# Patient Record
Sex: FEMALE | Race: WHITE | Employment: FULL TIME | URBAN - METROPOLITAN AREA
[De-identification: names, ages, dates, MRNs, and addresses within clinical notes are randomized per-mention and may not be internally consistent; named-entity substitution may affect disease eponyms.]

---

## 2017-06-06 ENCOUNTER — OFFICE VISIT (OUTPATIENT)
Dept: ORTHOPEDIC SURGERY | Age: 57
End: 2017-06-06

## 2017-06-06 ENCOUNTER — TELEPHONE (OUTPATIENT)
Dept: ORTHOPEDIC SURGERY | Age: 57
End: 2017-06-06

## 2017-06-06 VITALS
DIASTOLIC BLOOD PRESSURE: 86 MMHG | SYSTOLIC BLOOD PRESSURE: 126 MMHG | BODY MASS INDEX: 19.99 KG/M2 | HEIGHT: 64 IN | HEART RATE: 98 BPM | WEIGHT: 117.06 LBS

## 2017-06-06 DIAGNOSIS — M51.36 DDD (DEGENERATIVE DISC DISEASE), LUMBAR: Primary | ICD-10-CM

## 2017-06-06 PROCEDURE — L0642 LO SAG RI AN/POS PNL PRE OTS: HCPCS | Performed by: PHYSICIAN ASSISTANT

## 2017-06-06 PROCEDURE — 99214 OFFICE O/P EST MOD 30 MIN: CPT | Performed by: PHYSICIAN ASSISTANT

## 2017-06-06 PROCEDURE — 72114 X-RAY EXAM L-S SPINE BENDING: CPT | Performed by: PHYSICIAN ASSISTANT

## 2017-06-06 RX ORDER — METHYLPREDNISOLONE 4 MG/1
TABLET ORAL
Qty: 1 KIT | Refills: 0 | Status: SHIPPED | OUTPATIENT
Start: 2017-06-06 | End: 2017-06-12

## 2017-06-12 ENCOUNTER — HOSPITAL ENCOUNTER (OUTPATIENT)
Dept: PHYSICAL THERAPY | Age: 57
Discharge: HOME | End: 2017-06-30
Admitting: PHYSICAL MEDICINE & REHABILITATION

## 2017-08-01 ENCOUNTER — HOSPITAL ENCOUNTER (OUTPATIENT)
Dept: PHYSICAL THERAPY | Age: 57
Discharge: HOME | End: 2017-08-31
Attending: PHYSICAL MEDICINE & REHABILITATION | Admitting: PHYSICAL MEDICINE & REHABILITATION

## 2017-08-03 ENCOUNTER — HOSPITAL ENCOUNTER (OUTPATIENT)
Dept: PHYSICAL THERAPY | Age: 57
Discharge: HOME | End: 2017-08-03
Admitting: PHYSICAL MEDICINE & REHABILITATION

## 2017-08-16 ENCOUNTER — OFFICE VISIT (OUTPATIENT)
Dept: ORTHOPEDIC SURGERY | Age: 57
End: 2017-08-16

## 2017-08-16 VITALS
WEIGHT: 117.06 LBS | BODY MASS INDEX: 19.99 KG/M2 | HEIGHT: 64 IN | HEART RATE: 97 BPM | DIASTOLIC BLOOD PRESSURE: 93 MMHG | SYSTOLIC BLOOD PRESSURE: 150 MMHG

## 2017-08-16 DIAGNOSIS — M51.36 DDD (DEGENERATIVE DISC DISEASE), LUMBAR: Primary | ICD-10-CM

## 2017-08-16 DIAGNOSIS — M43.16 SPONDYLOLISTHESIS OF LUMBAR REGION: ICD-10-CM

## 2017-08-16 DIAGNOSIS — M43.16 LUMBAR ADJACENT SEGMENT DISEASE WITH SPONDYLOLISTHESIS: ICD-10-CM

## 2017-08-16 DIAGNOSIS — M51.36 LUMBAR ADJACENT SEGMENT DISEASE WITH SPONDYLOLISTHESIS: ICD-10-CM

## 2017-08-16 PROCEDURE — 99213 OFFICE O/P EST LOW 20 MIN: CPT | Performed by: PHYSICIAN ASSISTANT

## 2017-08-25 ENCOUNTER — OFFICE VISIT (OUTPATIENT)
Dept: ORTHOPEDIC SURGERY | Age: 57
End: 2017-08-25

## 2017-08-25 ENCOUNTER — TELEPHONE (OUTPATIENT)
Dept: ORTHOPEDIC SURGERY | Age: 57
End: 2017-08-25

## 2017-08-25 VITALS
BODY MASS INDEX: 19.99 KG/M2 | SYSTOLIC BLOOD PRESSURE: 143 MMHG | HEIGHT: 64 IN | HEART RATE: 89 BPM | WEIGHT: 117.06 LBS | DIASTOLIC BLOOD PRESSURE: 93 MMHG

## 2017-08-25 DIAGNOSIS — M51.36 DDD (DEGENERATIVE DISC DISEASE), LUMBAR: Primary | ICD-10-CM

## 2017-08-25 DIAGNOSIS — M51.36 LUMBAR ADJACENT SEGMENT DISEASE WITH SPONDYLOLISTHESIS: ICD-10-CM

## 2017-08-25 DIAGNOSIS — M43.16 LUMBAR ADJACENT SEGMENT DISEASE WITH SPONDYLOLISTHESIS: ICD-10-CM

## 2017-08-25 DIAGNOSIS — M48.061 LUMBAR STENOSIS: ICD-10-CM

## 2017-08-25 PROCEDURE — 99214 OFFICE O/P EST MOD 30 MIN: CPT | Performed by: PHYSICIAN ASSISTANT

## 2017-08-25 RX ORDER — DOXYCYCLINE HYCLATE 50 MG/1
CAPSULE ORAL
COMMUNITY
Start: 2017-06-01

## 2017-08-25 RX ORDER — MEDROXYPROGESTERONE ACETATE 2.5 MG/1
TABLET ORAL
COMMUNITY
Start: 2017-07-24 | End: 2018-12-08

## 2017-08-29 ENCOUNTER — HOSPITAL ENCOUNTER (OUTPATIENT)
Dept: PAIN MANAGEMENT | Age: 57
Discharge: HOME | End: 2017-08-29
Attending: PHYSICAL MEDICINE & REHABILITATION | Admitting: PHYSICAL MEDICINE & REHABILITATION

## 2017-08-29 VITALS
OXYGEN SATURATION: 100 % | DIASTOLIC BLOOD PRESSURE: 100 MMHG | BODY MASS INDEX: 19.97 KG/M2 | TEMPERATURE: 98.3 F | HEIGHT: 64 IN | SYSTOLIC BLOOD PRESSURE: 165 MMHG | HEART RATE: 82 BPM | RESPIRATION RATE: 18 BRPM | WEIGHT: 117 LBS

## 2017-08-29 ASSESSMENT — PAIN DESCRIPTION - DESCRIPTORS: DESCRIPTORS: CONSTANT;NAGGING

## 2017-08-29 ASSESSMENT — PAIN - FUNCTIONAL ASSESSMENT
PAIN_FUNCTIONAL_ASSESSMENT: 0-10
PAIN_FUNCTIONAL_ASSESSMENT: 0-10

## 2017-09-14 ENCOUNTER — HOSPITAL ENCOUNTER (OUTPATIENT)
Dept: OTHER | Age: 57
Discharge: HOME | End: 2017-09-30
Attending: PHYSICIAN ASSISTANT | Admitting: PHYSICIAN ASSISTANT

## 2017-09-14 LAB
BASOPHILS ABSOLUTE: 0.1 K/UL (ref 0–0.2)
BASOPHILS RELATIVE PERCENT: 1 %
EOSINOPHILS ABSOLUTE: 0.1 K/UL (ref 0–0.6)
EOSINOPHILS RELATIVE PERCENT: 1.6 %
HCT VFR BLD CALC: 36.5 % (ref 36–48)
HEMOGLOBIN: 12.7 G/DL (ref 12–16)
LYMPHOCYTES ABSOLUTE: 1.9 K/UL (ref 1–5.1)
LYMPHOCYTES RELATIVE PERCENT: 33.5 %
MCH RBC QN AUTO: 33.5 PG (ref 26–34)
MCHC RBC AUTO-ENTMCNC: 34.9 G/DL (ref 31–36)
MCV RBC AUTO: 95.9 FL (ref 80–100)
MONOCYTES ABSOLUTE: 0.6 K/UL (ref 0–1.3)
MONOCYTES RELATIVE PERCENT: 11.6 %
NEUTROPHILS ABSOLUTE: 2.9 K/UL (ref 1.7–7.7)
NEUTROPHILS RELATIVE PERCENT: 52.3 %
PDW BLD-RTO: 13.1 % (ref 12.4–15.4)
PLATELET # BLD: 258 K/UL (ref 135–450)
PMV BLD AUTO: 10.1 FL (ref 5–10.5)
RBC # BLD: 3.8 M/UL (ref 4–5.2)
SEDIMENTATION RATE, ERYTHROCYTE: 25 MM/HR (ref 0–30)
WBC # BLD: 5.6 K/UL (ref 4–11)

## 2017-09-15 LAB
ALBUMIN SERPL-MCNC: 3.6 G/DL (ref 3.1–4.9)
ALPHA-1-GLOBULIN: 0.3 G/DL (ref 0.2–0.4)
ALPHA-2-GLOBULIN: 0.9 G/DL (ref 0.4–1.1)
BETA GLOBULIN: 1 G/DL (ref 0.9–1.6)
GAMMA GLOBULIN: 1 G/DL (ref 0.6–1.8)
SPE/IFE INTERPRETATION: NORMAL
TOTAL PROTEIN: 6.8 G/DL (ref 6.4–8.2)

## 2017-09-19 ENCOUNTER — OFFICE VISIT (OUTPATIENT)
Dept: ORTHOPEDIC SURGERY | Age: 57
End: 2017-09-19

## 2017-09-19 VITALS
WEIGHT: 117.06 LBS | HEIGHT: 64 IN | SYSTOLIC BLOOD PRESSURE: 128 MMHG | DIASTOLIC BLOOD PRESSURE: 75 MMHG | BODY MASS INDEX: 19.99 KG/M2 | HEART RATE: 88 BPM

## 2017-09-19 DIAGNOSIS — M51.36 LUMBAR ADJACENT SEGMENT DISEASE WITH SPONDYLOLISTHESIS: ICD-10-CM

## 2017-09-19 DIAGNOSIS — M51.36 DDD (DEGENERATIVE DISC DISEASE), LUMBAR: Primary | ICD-10-CM

## 2017-09-19 DIAGNOSIS — M43.16 LUMBAR ADJACENT SEGMENT DISEASE WITH SPONDYLOLISTHESIS: ICD-10-CM

## 2017-09-19 DIAGNOSIS — M48.061 LUMBAR STENOSIS: ICD-10-CM

## 2017-09-19 LAB
24HR URINE VOLUME (ML): 4200 ML
PROTEIN 24 HOUR URINE: 0.17 G/24HR

## 2017-09-19 PROCEDURE — 99213 OFFICE O/P EST LOW 20 MIN: CPT | Performed by: PHYSICIAN ASSISTANT

## 2017-09-20 LAB — URINE ELECTROPHORESIS INTERP: NORMAL

## 2017-09-21 ENCOUNTER — TELEPHONE (OUTPATIENT)
Dept: ORTHOPEDIC SURGERY | Age: 57
End: 2017-09-21

## 2017-11-07 NOTE — LETTER
Your outpatient injection is scheduled for 11/21/17 with Dr. Emilie Jordan. **PLEASE ARRIVE AT: [facility]102[address] **    1. Please do not have anything to eat or drink for 2 hours prior to your injection time. 2.  If you are receiving planned sedation, please do not eat or drink for 3 hours prior to your injection time. 3. **Please continue taking any daily routine prescribed medications as directed by your physician. **  4. PLEASE HAVE A  AVAILABLE TO TAKE YOU HOME. Please have an adult stay with you for 4-6 hours following your procedure. 5. If you develop a fever or any type of infections prior to your scheduled injection, please contact our office. 6. If you are on antibiotics (i.e. For urinary tract infection, bronchitis, etc.) the antibiotic must be completed and must be symptom free prior to your scheduled procedure. 7.  If you are taking Aspirin, please stop for __3___ days, anti-inflammatory medication, i.e. Advil, Aleve, Ibuprofen, Celebrex or Naprosyn, please stop for 3 days prior to your injection. 8. If you are taking any blood thinners, you must obtain clearance for your prescribing physician prior to stopping and have a note axed to our office to fax# [phone]: Coumadin: 6 days, Plavix: 7 days, Xarelto: 4 days, Pradaxa: 72 hours, Trental: 4 days, Eliquis: 3 days, Brillinta 5 days, Pletal 2 days, & Effient 7 days. 9. Please advise our office if you have Glaucoma, you will need to obtain clearance from your eye doctor prior to having an Epidural Steroid Injection. 10. **PLEASE BRING A LIST OF YOUR CURRENT MEDICATIONS TO YOUR PROCEDURE**        Insurance Information:    Our office will contact your insurance company to complete any prior authorization notification that    needs to be completed prior to your procedure. Please make sure we are notified of any insurance changes prior to your procedure. If you have any questions, please feel free to contact me at [phone] ext. [phone]      Thank you,       Judi Cormier LPN   to Dr. Dyana Shahid      Directions to the facility are attached. **YOUR FOLLOW UP APPOINTMENT FOR AFTER YOU PROCEDURE WITH DR. Catha Babinski / Yeyo Smith AT [facility] ON 12/5/17 AT 140PM .**                      [facility] [address]  [facility]  [phone]) [address] [facility]                     ________________________________________________________________________________________      [address] [place]      1. Admit to preop. 2. Start IV 1000 ml LR at [facility] or _____ml/hr for planned conscious sedation     3. May inject 1 % Lidocaine 0.1 ml Intradermal to numb IV site     4. Protime/INR if patient is on Coumadin     5. Urine Pregnancy Test (females only) - 12 -50 years     6. Accu Check Glucose if diabetic. Notify physician if <80 or >250.      7. Sedate all neurotomies        ________________________________________________________________________________________      POST-OPERATIVE ORDERS - DR. LOPEZ      1. Admit to Post Op Phase 2     2. Implement Standards of Care for Phase 2 Post Op     3. Check Site - May discharge when site is free of bleeding     4. Discharge to home after meets Phase 2 criteria     5. Discharge cervical patients after 30 minutes and when meets Phase 2 criteria. 6. Give discharge instruction sheet     7. For Diabetic patient, if blood sugar less than 80 in preop,          Recheck blood sugar in Post Op. 8. Discontinue IV     9.  For Nausea may give Zofran 4 MG IV/IM/ODT                 ________________________________________________________________________________________        11/7/17 9:56 AM

## 2017-11-13 NOTE — ADDENDUM NOTE
Encounter addended by: Donovan Carr MA on: 11/13/2017  8:46 AM<BR>    Actions taken: Letter status changed

## 2017-11-14 ENCOUNTER — TELEPHONE (OUTPATIENT)
Dept: ORTHOPEDIC SURGERY | Age: 57
End: 2017-11-14

## 2017-11-14 NOTE — TELEPHONE ENCOUNTER
CPT  70617   DX   M54.5  M43.16  M48.061  OP SX AUTH NPR FOR THIS PLAN  MIDLINE   LEVELS   L5 - S1   PROCEDURE   EPIDURAL INJECTION  [facility]   WITH   VALETIN  -  INSURANCE   [employer] OPEN ACCES NATIONAL POS  ASO

## 2017-11-21 ENCOUNTER — HOSPITAL ENCOUNTER (OUTPATIENT)
Dept: PAIN MANAGEMENT | Age: 57
Discharge: HOME | End: 2017-11-21
Attending: PHYSICAL MEDICINE & REHABILITATION | Admitting: PHYSICAL MEDICINE & REHABILITATION

## 2017-11-21 ENCOUNTER — HOSPITAL ENCOUNTER (OUTPATIENT)
Dept: MAMMOGRAPHY | Age: 57
Discharge: HOME | End: 2017-11-21
Attending: NURSE PRACTITIONER | Admitting: NURSE PRACTITIONER

## 2017-11-21 VITALS
HEIGHT: 64 IN | WEIGHT: 115 LBS | RESPIRATION RATE: 14 BRPM | DIASTOLIC BLOOD PRESSURE: 95 MMHG | SYSTOLIC BLOOD PRESSURE: 144 MMHG | BODY MASS INDEX: 19.63 KG/M2 | TEMPERATURE: 97.4 F | OXYGEN SATURATION: 100 % | HEART RATE: 87 BPM

## 2017-11-21 DIAGNOSIS — Z12.39 BREAST CANCER SCREENING: ICD-10-CM

## 2017-11-21 RX ORDER — LISINOPRIL 5 MG/1
5 TABLET ORAL DAILY
COMMUNITY
End: 2018-12-08

## 2017-11-21 RX ORDER — HYDROCHLOROTHIAZIDE 25 MG/1
25 TABLET ORAL DAILY
COMMUNITY
End: 2018-12-08

## 2017-11-21 ASSESSMENT — ACTIVITIES OF DAILY LIVING (ADL): EFFECT OF PAIN ON DAILY ACTIVITIES: WALKING

## 2017-11-21 ASSESSMENT — PAIN - FUNCTIONAL ASSESSMENT
PAIN_FUNCTIONAL_ASSESSMENT: 0-10
PAIN_FUNCTIONAL_ASSESSMENT: 0-10

## 2017-11-21 ASSESSMENT — PAIN SCALES - GENERAL: PAINLEVEL_OUTOF10: 0

## 2017-11-21 ASSESSMENT — PAIN DESCRIPTION - DESCRIPTORS: DESCRIPTORS: ACHING

## 2017-11-21 NOTE — PROGRESS NOTES
Pt stable. Instructions give to patient and family, verbalized understanding. Pt discharged per w/c.

## 2017-11-21 NOTE — PROGRESS NOTES
Lumbar/Sacral Interlaminar Injection   Injection Note    Sight marked/ confirmed with x-ray: yes  Position: Prone with head on pillow    Prepped with: Chloraprep      Lidocaine 1%-4ml  Depomedrol (40 mg/ml)-2ml  Omnipaque 240mg/ml-2ml    Monitor by: Anayeli Dumont RN  C - Arm operated by: Enrico Warren RT  Tech: BRAVO Alvarez SA  Prepped by:  Ronald Gottlieb SA

## 2017-11-21 NOTE — PROGRESS NOTES
NURSING CARE PLANS FOLLOWED     · Potential for anxiety-decrease anxiety-allow patient to verbalize  · Potential for infection-no infection-proper infection controls  · Potential for fall the patient will move to fall risk after procedure- through the recovery  phase   · [place] to environment  · Call light in reach  · Instruct to call for assistance prior to getting up  · Non skid footwear on   · Bed wheels locked and bed in lowest position - siderails up times two  · Potential for deep sedation-no deep sedation-know maximum allowable dose         adverse reactions and nursing considerations.  Assess VS and LOC

## 2017-11-22 NOTE — OP NOTE
Patient:  Louie Gamble Record #:  [id number]   Date:  11-21-17  Physician:  Serge Fuentes M.D. Facility: [facility]     Pre-op diagnosis: Lumbar spondylosis, lumbar radiculitis, lumbar stenosis  Post-op diagnosis:  same  Procedure: Midline L5/S1 interlaminar epidural injection #2 with flouroscopic guidance  Anesthesia: Local  Procedure Note:    The patient was admitted through pre-op and written consent was obtained. The patient was advised of the risks and benefits of the procedure, including but not limited to the following: bleeding, pain, infection, temporary paralysis, nerve damage and spinal headache. The patient was given the opportunity to ask questions. There were no contraindications for this procedure. The appropriate area was prepped and draped in a sterile fashion. Landmarks were identified and marked. The skin and soft tissues were anesthetized with 1% lidocaine. A 20G Touhy needle was advanced to the midline L5/S1 interlaminar space using fluoroscopic guidance with ideal needle tip placement confirmed by multiple views. Injection of contrast showed epidural flow. There were no signs of intravascular or intrathecal injection. 80 mg depomedrol and 1cc 1% lidocaine were then injected. There were no complications and the patient tolerated the procedure well. The patient was transferred to the recovery area and monitored. Discharge instructions were given. The patient is to contact me for any post-procedure concerns. The patient is to follow up as scheduled.     KEVIN: 5cm     F Tori Dueñas MD

## 2017-11-30 ENCOUNTER — TELEPHONE (OUTPATIENT)
Dept: ORTHOPEDIC SURGERY | Age: 57
End: 2017-11-30

## 2017-11-30 NOTE — TELEPHONE ENCOUNTER
LET PATIENT KNOW PER HER REQUEST A SCRIPT WAS FAXED TO [facility] FOR A COMPOUNDED CREAM PER DR LOPEZ'S INSTRUCTIONS.

## 2017-12-28 ENCOUNTER — TELEPHONE (OUTPATIENT)
Dept: ORTHOPEDIC SURGERY | Age: 57
End: 2017-12-28

## 2017-12-28 NOTE — TELEPHONE ENCOUNTER
L/M to call office and schedule a follow-up appt deena the compounding med she was given is only taking the edge of and she states that she is having blistering on the skin where she applies the cream

## 2018-01-25 ENCOUNTER — OFFICE VISIT (OUTPATIENT)
Dept: ORTHOPEDIC SURGERY | Age: 58
End: 2018-01-25

## 2018-01-25 VITALS
HEART RATE: 95 BPM | SYSTOLIC BLOOD PRESSURE: 116 MMHG | HEIGHT: 64 IN | BODY MASS INDEX: 19.65 KG/M2 | WEIGHT: 115.08 LBS | DIASTOLIC BLOOD PRESSURE: 65 MMHG

## 2018-01-25 DIAGNOSIS — M43.16 LUMBAR ADJACENT SEGMENT DISEASE WITH SPONDYLOLISTHESIS: Primary | ICD-10-CM

## 2018-01-25 DIAGNOSIS — M51.36 DDD (DEGENERATIVE DISC DISEASE), LUMBAR: ICD-10-CM

## 2018-01-25 DIAGNOSIS — M51.36 LUMBAR ADJACENT SEGMENT DISEASE WITH SPONDYLOLISTHESIS: Primary | ICD-10-CM

## 2018-01-25 PROCEDURE — 99213 OFFICE O/P EST LOW 20 MIN: CPT | Performed by: PHYSICIAN ASSISTANT

## 2018-01-25 NOTE — PROGRESS NOTES
6/6/2017 shows L4 5 spondylolisthesis with severe DDD, facet arthropathy, no high-grade instability                  Impression:  1) Chronic mechanical back pain  2) L4-5 spondy, severe stenosis, facet arthropathy        Plan:  1) Diclofenac gel 1% PRN  2) We discussed bilateral L3 L4 L5 MBB #1 to rule out contributing facet pain syndrome.   May call if wishing to proceed              Dictated by Malcolm Vila PA-C, also seen & evaluated by Dr. Janessa Calero

## 2018-05-17 ENCOUNTER — TELEPHONE (OUTPATIENT)
Dept: ORTHOPEDIC SURGERY | Age: 58
End: 2018-05-17

## 2018-05-17 DIAGNOSIS — M43.16 SPONDYLOLISTHESIS OF LUMBAR REGION: ICD-10-CM

## 2018-05-17 DIAGNOSIS — M51.36 DEGENERATION OF LUMBAR INTERVERTEBRAL DISC: Primary | ICD-10-CM

## 2018-06-07 ENCOUNTER — TELEPHONE (OUTPATIENT)
Dept: ORTHOPEDIC SURGERY | Age: 58
End: 2018-06-07

## 2018-06-08 ENCOUNTER — TELEPHONE (OUTPATIENT)
Dept: ORTHOPEDIC SURGERY | Age: 58
End: 2018-06-08

## 2018-12-08 ENCOUNTER — APPOINTMENT (OUTPATIENT)
Dept: CT IMAGING | Age: 58
End: 2018-12-08
Payer: COMMERCIAL

## 2018-12-08 ENCOUNTER — HOSPITAL ENCOUNTER (EMERGENCY)
Age: 58
Discharge: HOME | End: 2018-12-08
Attending: EMERGENCY MEDICINE
Payer: COMMERCIAL

## 2018-12-08 VITALS
RESPIRATION RATE: 14 BRPM | TEMPERATURE: 97.4 F | DIASTOLIC BLOOD PRESSURE: 81 MMHG | HEART RATE: 92 BPM | WEIGHT: 115 LBS | HEIGHT: 63 IN | OXYGEN SATURATION: 97 % | BODY MASS INDEX: 20.38 KG/M2 | SYSTOLIC BLOOD PRESSURE: 129 MMHG

## 2018-12-08 DIAGNOSIS — R18.8 PELVIC FLUID COLLECTION: Primary | ICD-10-CM

## 2018-12-08 DIAGNOSIS — R10.9 ABDOMINAL PAIN, UNSPECIFIED ABDOMINAL LOCATION: ICD-10-CM

## 2018-12-08 LAB
A/G RATIO: 1.5 (ref 1.1–2.2)
ALBUMIN SERPL-MCNC: 4.5 G/DL (ref 3.4–5)
ALP BLD-CCNC: 70 U/L (ref 40–129)
ALT SERPL-CCNC: 10 U/L (ref 10–40)
ANION GAP SERPL CALCULATED.3IONS-SCNC: 19 MMOL/L (ref 3–16)
AST SERPL-CCNC: 21 U/L (ref 15–37)
BACTERIA: ABNORMAL /HPF
BASOPHILS ABSOLUTE: 0.1 K/UL (ref 0–0.2)
BASOPHILS RELATIVE PERCENT: 0.8 %
BILIRUB SERPL-MCNC: 0.4 MG/DL (ref 0–1)
BILIRUBIN URINE: NEGATIVE
BLOOD, URINE: NEGATIVE
BUN BLDV-MCNC: 6 MG/DL (ref 7–20)
CALCIUM SERPL-MCNC: 9.1 MG/DL (ref 8.3–10.6)
CHLORIDE BLD-SCNC: 95 MMOL/L (ref 99–110)
CLARITY: CLEAR
CO2: 22 MMOL/L (ref 21–32)
COLOR: YELLOW
CREAT SERPL-MCNC: <0.5 MG/DL (ref 0.6–1.1)
EOSINOPHILS ABSOLUTE: 0.1 K/UL (ref 0–0.6)
EOSINOPHILS RELATIVE PERCENT: 0.6 %
EPITHELIAL CELLS, UA: ABNORMAL /HPF
GFR AFRICAN AMERICAN: >60
GFR NON-AFRICAN AMERICAN: >60
GLOBULIN: 3.1 G/DL
GLUCOSE BLD-MCNC: 90 MG/DL (ref 70–99)
GLUCOSE URINE: NEGATIVE MG/DL
HCT VFR BLD CALC: 37.5 % (ref 36–48)
HEMOGLOBIN: 12.9 G/DL (ref 12–16)
KETONES, URINE: NEGATIVE MG/DL
LEUKOCYTE ESTERASE, URINE: ABNORMAL
LIPASE: 35 U/L (ref 13–60)
LYMPHOCYTES ABSOLUTE: 1.9 K/UL (ref 1–5.1)
LYMPHOCYTES RELATIVE PERCENT: 18.3 %
MCH RBC QN AUTO: 32.4 PG (ref 26–34)
MCHC RBC AUTO-ENTMCNC: 34.4 G/DL (ref 31–36)
MCV RBC AUTO: 94 FL (ref 80–100)
MICROSCOPIC EXAMINATION: YES
MONOCYTES ABSOLUTE: 0.6 K/UL (ref 0–1.3)
MONOCYTES RELATIVE PERCENT: 5.9 %
NEUTROPHILS ABSOLUTE: 7.6 K/UL (ref 1.7–7.7)
NEUTROPHILS RELATIVE PERCENT: 74.4 %
NITRITE, URINE: NEGATIVE
PDW BLD-RTO: 13.6 % (ref 12.4–15.4)
PH UA: 7
PLATELET # BLD: 318 K/UL (ref 135–450)
PMV BLD AUTO: 9.2 FL (ref 5–10.5)
POTASSIUM SERPL-SCNC: 3.5 MMOL/L (ref 3.5–5.1)
PROTEIN UA: NEGATIVE MG/DL
RBC # BLD: 3.99 M/UL (ref 4–5.2)
RBC UA: ABNORMAL /HPF (ref 0–2)
SODIUM BLD-SCNC: 136 MMOL/L (ref 136–145)
SPECIFIC GRAVITY UA: <=1.005
TOTAL PROTEIN: 7.6 G/DL (ref 6.4–8.2)
TROPONIN: <0.01 NG/ML
URINE TYPE: ABNORMAL
UROBILINOGEN, URINE: 0.2 E.U./DL
WBC # BLD: 10.2 K/UL (ref 4–11)
WBC UA: ABNORMAL /HPF (ref 0–5)

## 2018-12-08 PROCEDURE — 93005 ELECTROCARDIOGRAM TRACING: CPT | Performed by: EMERGENCY MEDICINE

## 2018-12-08 PROCEDURE — 99284 EMERGENCY DEPT VISIT MOD MDM: CPT

## 2018-12-08 PROCEDURE — 83690 ASSAY OF LIPASE: CPT

## 2018-12-08 PROCEDURE — 84484 ASSAY OF TROPONIN QUANT: CPT

## 2018-12-08 PROCEDURE — 74177 CT ABD & PELVIS W/CONTRAST: CPT

## 2018-12-08 PROCEDURE — 80053 COMPREHEN METABOLIC PANEL: CPT

## 2018-12-08 PROCEDURE — 85025 COMPLETE CBC W/AUTO DIFF WBC: CPT

## 2018-12-08 PROCEDURE — 6360000004 HC RX CONTRAST MEDICATION: Performed by: EMERGENCY MEDICINE

## 2018-12-08 PROCEDURE — 81001 URINALYSIS AUTO W/SCOPE: CPT

## 2018-12-08 RX ORDER — ESTRADIOL 0.05 MG/D
1 PATCH TRANSDERMAL WEEKLY
COMMUNITY

## 2018-12-08 RX ADMIN — IOPAMIDOL 75 ML: 755 INJECTION, SOLUTION INTRAVENOUS at 20:09

## 2018-12-08 ASSESSMENT — PAIN DESCRIPTION - LOCATION: LOCATION: ABDOMEN

## 2018-12-08 ASSESSMENT — PAIN DESCRIPTION - PAIN TYPE: TYPE: ACUTE PAIN

## 2018-12-08 ASSESSMENT — PAIN SCALES - GENERAL: PAINLEVEL_OUTOF10: 8

## 2018-12-08 ASSESSMENT — PAIN SCALES - WONG BAKER: WONGBAKER_NUMERICALRESPONSE: 0

## 2018-12-09 LAB
EKG ATRIAL RATE: 81 BPM
EKG DIAGNOSIS: NORMAL
EKG P AXIS: 141 DEGREES
EKG P-R INTERVAL: 172 MS
EKG Q-T INTERVAL: 364 MS
EKG QRS DURATION: 76 MS
EKG QTC CALCULATION (BAZETT): 422 MS
EKG R AXIS: -17 DEGREES
EKG T AXIS: 89 DEGREES
EKG VENTRICULAR RATE: 81 BPM

## 2018-12-09 PROCEDURE — 93010 ELECTROCARDIOGRAM REPORT: CPT | Performed by: INTERNAL MEDICINE

## 2018-12-09 NOTE — ED PROVIDER NOTES
Denies rash   Neurologic:  Denies headache, focal weakness or sensory changes   Endocrine:  Denies polyuria or polydypsia   Lymphatic:  Denies swollen glands   Psychiatric:  Denies depression, suicidal ideation or homicidal ideation   All systems negative except as marked. Positives and Pertinent negatives as per HPI. Except as noted above in the ROS, all other systems were reviewed and negative. PAST MEDICAL HISTORY     Past Medical History:   Diagnosis Date    Acid reflux     Gastritis     Hiatal hernia     Hypertension     Pinched nerve in neck          SURGICAL HISTORY       Past Surgical History:   Procedure Laterality Date    COLONOSCOPY  1/23/2013    polyps    COLONOSCOPY  3/18/16    diverticulosis    ENDOMETRIAL ABLATION      NOSE SURGERY      UPPER GASTROINTESTINAL ENDOSCOPY  1/23/2013    Hiatal Hernia    UPPER GASTROINTESTINAL ENDOSCOPY  3/18/16    bx         CURRENT MEDICATIONS       Current Discharge Medication List      CONTINUE these medications which have NOT CHANGED    Details   Nebivolol HCl (BYSTOLIC PO) Take by mouth      estradiol (CLIMARA) 0.05 MG/24HR Place 1 patch onto the skin once a week      doxycycline (VIBRAMYCIN) 50 MG capsule       omeprazole (PRILOSEC) 40 MG capsule Take 40 mg by mouth daily. diclofenac sodium (VOLTAREN) 1 % GEL Apply 4 g topically 4 times daily  Qty: 5 Tube, Refills: 0      Nutritional Supplements (JUICE PLUS FIBRE PO) Take  by mouth. Multiple Vitamins-Minerals (CENTRUM SILVER PO) Take  by mouth. ALLERGIES     Patient has no known allergies. FAMILY HISTORY     History reviewed. No pertinent family history.        SOCIAL HISTORY       Social History     Social History    Marital status:      Spouse name: N/A    Number of children: N/A    Years of education: N/A     Social History Main Topics    Smoking status: Former Smoker     Packs/day: 0.50     Years: 30.00     Quit date: 1/21/2017    Smokeless tobacco: Never Used    Alcohol use 0.6 oz/week     1 Cans of beer per week    Drug use: Unknown    Sexual activity: Not Asked     Other Topics Concern    None     Social History Narrative    None       SCREENINGS    Benedict Coma Scale  Eye Opening: Spontaneous  Best Verbal Response: Oriented  Best Motor Response: Obeys commands  Yvette Coma Scale Score: 15        PHYSICAL EXAM    (up to 7 for level 4, 8 or more for level 5)     ED Triage Vitals [12/08/18 1833]   BP Temp Temp Source Pulse Resp SpO2 Height Weight   (!) 149/90 97.4 °F (36.3 °C) Temporal 103 16 100 % 5' 3\" (1.6 m) 115 lb (52.2 kg)           PHYSICAL EXAM    VITAL SIGNS: /81   Pulse 92   Temp 97.4 °F (36.3 °C) (Temporal)   Resp 14   Ht 5' 3\" (1.6 m)   Wt 115 lb (52.2 kg)   SpO2 97%   BMI 20.37 kg/m²    Constitutional:  Well developed, Well nourished, No acute distress, Non-toxic appearance. HENT:  Normocephalic, Atraumatic, Bilateral external ears normal, Oropharynx moist, No oral exudates, Nose normal.   Neck: Normal range of motion, No tenderness, Supple, No stridor. Eyes:   Conjunctiva normal, No discharge. Respiratory:  Normal breath sounds, No respiratory distress, No wheezing, No chest tenderness. Cardiovascular:  Normal heart rate, Normal rhythm, No murmurs, No rubs, No gallops. GI:  Bowel sounds normal, Soft, very mild generalized tenderness, but mostly in the epigastric and upper area with no guarding, No masses, No pulsatile masses. :     Musculoskeletal:  Intact distal pulses, No edema, No tenderness, No cyanosis, No clubbing. Good range of motion in all major joints. No tenderness to palpation or major deformities noted. Back: No tenderness. Integument:  Warm, Dry, No erythema, No rash. Lymphatic:  No lymphadenopathy noted. Neurologic:  Alert & oriented x 3, Normal motor function, Normal sensory function, No focal deficits noted.    Psychiatric:  Affect normal, Judgment normal, Mood normal.       DIAGNOSTIC RESULTS bilateral renal collecting system dilation. The patient may benefit from catheterization. Otherwise, no acute abnormality identified. Fluid within the endometrial canal, unusual for the patient's age. Nonemergent pelvic ultrasound is recommended to better evaluate that. PROCEDURES   Unless otherwise noted below, none     Procedures    CRITICAL CARE TIME   N/A    CONSULTS:  None    EMERGENCY DEPARTMENT COURSE and DIFFERENTIAL DIAGNOSIS/MDM:   Vitals:    Vitals:    12/08/18 1833 12/08/18 1908 12/08/18 2034 12/08/18 2131   BP: (!) 149/90 (!) 155/91 136/82 129/81   Pulse: 103  88 92   Resp: 16  14 14   Temp: 97.4 °F (36.3 °C)      TempSrc: Temporal      SpO2: 100% 100% 100% 97%   Weight: 115 lb (52.2 kg)      Height: 5' 3\" (1.6 m)          Elder Mosher is a 60 y.o. female who presented to the Emergency Department with Generalized epigastric and \"all over\" abdominal pain. CT was done to rule out acute surgical issues. There is no suspicion for obstruction with normal bowel movements and tolerating oral intake without difficulty. Her symptoms did improve in the ED with no treatment. CT was noted to have bladder distention, which was immediately prior to patient urinating large volume. There is no evidence of infection. CT also incidentally noted a small amount of pelvic fluid which at her age is abnormal and does need follow-up imaging with OB. This was discussed with the patient. Patient was given the following medications:  Medications   iopamidol (ISOVUE-370) 76 % injection 75 mL (75 mLs Intravenous Given 12/8/18 2009)       The patient tolerated their visit well. The patient and / or the family were informed of the results of any tests, a time was given to answer questions. FINAL IMPRESSION      1. Pelvic fluid collection    2.  Abdominal pain, unspecified abdominal location          DISPOSITION/PLAN   DISPOSITION Decision To Discharge 12/08/2018 10:09:56 PM      PATIENT REFERRED

## 2018-12-14 ENCOUNTER — HOSPITAL ENCOUNTER (OUTPATIENT)
Dept: ULTRASOUND IMAGING | Age: 58
Discharge: HOME | End: 2018-12-14
Payer: COMMERCIAL

## 2018-12-14 ENCOUNTER — HOSPITAL ENCOUNTER (OUTPATIENT)
Dept: WOMENS IMAGING | Age: 58
Discharge: HOME | End: 2018-12-14
Payer: COMMERCIAL

## 2018-12-14 DIAGNOSIS — Z12.31 ENCOUNTER FOR SCREENING MAMMOGRAM FOR BREAST CANCER: ICD-10-CM

## 2018-12-14 DIAGNOSIS — N88.9 ABNORMAL APPEARANCE OF CERVIX: ICD-10-CM

## 2018-12-14 PROCEDURE — 77067 SCR MAMMO BI INCL CAD: CPT

## 2018-12-14 PROCEDURE — 76856 US EXAM PELVIC COMPLETE: CPT

## 2018-12-14 PROCEDURE — 76830 TRANSVAGINAL US NON-OB: CPT

## 2019-06-19 ENCOUNTER — OFFICE VISIT (OUTPATIENT)
Dept: ORTHOPEDIC SURGERY | Age: 59
End: 2019-06-19
Payer: COMMERCIAL

## 2019-06-19 VITALS
BODY MASS INDEX: 20.39 KG/M2 | WEIGHT: 115.08 LBS | HEIGHT: 63 IN | DIASTOLIC BLOOD PRESSURE: 85 MMHG | HEART RATE: 105 BPM | SYSTOLIC BLOOD PRESSURE: 125 MMHG

## 2019-06-19 DIAGNOSIS — M51.36 DEGENERATION OF LUMBAR INTERVERTEBRAL DISC: Primary | ICD-10-CM

## 2019-06-19 DIAGNOSIS — M43.16 SPONDYLOLISTHESIS OF LUMBAR REGION: ICD-10-CM

## 2019-06-19 PROCEDURE — 99214 OFFICE O/P EST MOD 30 MIN: CPT | Performed by: PHYSICAL MEDICINE & REHABILITATION

## 2019-06-19 RX ORDER — METHYLPREDNISOLONE 4 MG/1
TABLET ORAL
Qty: 1 KIT | Refills: 0 | Status: SHIPPED | OUTPATIENT
Start: 2019-06-19 | End: 2019-06-25

## 2019-06-19 RX ORDER — PREDNISONE 10 MG/1
TABLET ORAL
Qty: 26 TABLET | Refills: 0 | Status: SHIPPED | OUTPATIENT
Start: 2019-06-19 | End: 2019-12-12 | Stop reason: SDUPTHER

## 2019-06-19 NOTE — PROGRESS NOTES
· Sensation: Sensation is intact without deficit  · Coordination/Balance: Good coordination   ·   LUMBAR/SACRAL EXAMINATION:  · Inspection: Local inspection shows no step-off or bruising. Lumbar alignment is normal.  Sagittal and Coronal balance is neutral.      · Palpation:   No evidence of tenderness at the midline. No tenderness bilaterally at the paraspinal or trochanters. There is no step-off or paraspinal spasm. · Range of Motion:  Able to sit forward flexed w/out pain   · Strength:   Strength testing is 5/5 in all muscle groups tested. · Special Tests:   Straight leg raise and crossed SLR negative. Leg length and pelvis level.  0 out of 5 Vance's signs. · Skin: small erythematous low back papules   · Reflexes: Reflexes are symmetrically 2+ at the patellar and ankle tendons. Clonus absent bilaterally at the feet. · Gait & station: normal unassisted   · Additional Examinations: RIGHT LOWER EXTREMITY: Inspection/examination of the right lower extremity does not show any tenderness, deformity or injury. Range of motion is full. There is no gross instability. There are no rashes, ulcerations or lesions. Strength and tone are normal.LEFT LOWER EXTREMITY:  Inspection/examination of the left lower extremity does not show any tenderness, deformity or injury. Range of motion is full. There is no gross instability. There are no rashes, ulcerations or lesions. Strength and tone are normal.    Diagnostic Testing:   Labs reviewed from 9-14-17: normal CBC, normal SPEP    Normal urine protein electrophoresis pattern.  No monoclonal band is   identified.        Lumbar MRI scan report reviewed with her today from 8/21/2017 showing L4 5 spondylolisthesis with moderate to severe central stenosis, multilevel varying degrees of foraminal stenosis, facet arthropathy.  Heterogeneous marrow signal      4 views lumbar spine 6/6/2017 shows L4 5 spondylolisthesis with severe DDD, facet arthropathy, no high-grade instability                  Impression:  1) Chronic mechanical back pain, worse over the last year  2) L4-5 spondy, severe stenosis, facet arthropathy  3) history of GI bleed      Plan:    Pred taper followed by Medrol Dosepak  Follow-up on return if symptoms are still aggravated           Dr. Donnie Olsen

## 2019-12-09 ENCOUNTER — TELEPHONE (OUTPATIENT)
Dept: ORTHOPEDIC SURGERY | Age: 59
End: 2019-12-09

## 2019-12-12 DIAGNOSIS — M51.36 DEGENERATION OF LUMBAR INTERVERTEBRAL DISC: ICD-10-CM

## 2019-12-12 DIAGNOSIS — M43.16 SPONDYLOLISTHESIS OF LUMBAR REGION: ICD-10-CM

## 2019-12-12 RX ORDER — PREDNISONE 10 MG/1
TABLET ORAL
Qty: 26 TABLET | Refills: 0 | Status: SHIPPED | OUTPATIENT
Start: 2019-12-12

## 2019-12-17 ENCOUNTER — HOSPITAL ENCOUNTER (OUTPATIENT)
Dept: WOMENS IMAGING | Age: 59
Discharge: HOME | End: 2019-12-17
Payer: COMMERCIAL

## 2019-12-17 DIAGNOSIS — Z12.31 SCREENING MAMMOGRAM, ENCOUNTER FOR: ICD-10-CM

## 2019-12-17 PROCEDURE — 77063 BREAST TOMOSYNTHESIS BI: CPT

## 2019-12-30 ENCOUNTER — TELEPHONE (OUTPATIENT)
Dept: ORTHOPEDIC SURGERY | Age: 59
End: 2019-12-30

## 2020-12-24 ENCOUNTER — HOSPITAL ENCOUNTER (OUTPATIENT)
Dept: WOMENS IMAGING | Age: 60
Discharge: HOME | End: 2020-12-24
Payer: COMMERCIAL

## 2020-12-24 PROCEDURE — 77063 BREAST TOMOSYNTHESIS BI: CPT

## 2025-04-23 ENCOUNTER — HOSPITAL ENCOUNTER (OUTPATIENT)
Dept: CT IMAGING | Age: 65
Discharge: HOME | End: 2025-04-23
Payer: MEDICARE

## 2025-04-23 ENCOUNTER — TRANSCRIBE ORDERS (OUTPATIENT)
Dept: ADMINISTRATIVE | Age: 65
End: 2025-04-23

## 2025-04-23 ENCOUNTER — HOSPITAL ENCOUNTER (INPATIENT)
Age: 65
LOS: 37 days | Discharge: HOME | DRG: 856 | End: 2025-05-30
Attending: EMERGENCY MEDICINE | Admitting: HOSPITALIST
Payer: MEDICARE

## 2025-04-23 DIAGNOSIS — K56.609 SBO (SMALL BOWEL OBSTRUCTION) (HCC): ICD-10-CM

## 2025-04-23 DIAGNOSIS — K57.92 DIVERTICULITIS: ICD-10-CM

## 2025-04-23 DIAGNOSIS — R10.9 ABDOMINAL PAIN, UNSPECIFIED ABDOMINAL LOCATION: Primary | ICD-10-CM

## 2025-04-23 DIAGNOSIS — K65.1 INTRA-ABDOMINAL ABSCESS (HCC): Primary | ICD-10-CM

## 2025-04-23 DIAGNOSIS — R10.9 ABDOMINAL PAIN, UNSPECIFIED ABDOMINAL LOCATION: ICD-10-CM

## 2025-04-23 PROBLEM — T81.43XA POSTPROCEDURAL INTRAABDOMINAL ABSCESS (HCC): Status: ACTIVE | Noted: 2025-04-23

## 2025-04-23 LAB
ALBUMIN SERPL-MCNC: 3.6 G/DL (ref 3.4–5)
ALBUMIN/GLOB SERPL: 1 {RATIO} (ref 1.1–2.2)
ALP SERPL-CCNC: 96 U/L (ref 40–129)
ALT SERPL-CCNC: 7 U/L (ref 10–40)
ANION GAP SERPL CALCULATED.3IONS-SCNC: 14 MMOL/L (ref 3–16)
AST SERPL-CCNC: 15 U/L (ref 15–37)
BASOPHILS # BLD: 0.1 K/UL (ref 0–0.2)
BASOPHILS NFR BLD: 0.9 %
BILIRUB SERPL-MCNC: <0.2 MG/DL (ref 0–1)
BUN SERPL-MCNC: 6 MG/DL (ref 7–20)
CALCIUM SERPL-MCNC: 9.3 MG/DL (ref 8.3–10.6)
CHLORIDE SERPL-SCNC: 93 MMOL/L (ref 99–110)
CO2 SERPL-SCNC: 24 MMOL/L (ref 21–32)
CREAT SERPL-MCNC: 0.5 MG/DL (ref 0.6–1.2)
DEPRECATED RDW RBC AUTO: 15 % (ref 12.4–15.4)
EOSINOPHIL # BLD: 0.1 K/UL (ref 0–0.6)
EOSINOPHIL NFR BLD: 1.6 %
GFR SERPLBLD CREATININE-BSD FMLA CKD-EPI: >90 ML/MIN/{1.73_M2}
GLUCOSE SERPL-MCNC: 82 MG/DL (ref 70–99)
HCT VFR BLD AUTO: 27.1 % (ref 36–48)
HGB BLD-MCNC: 9.3 G/DL (ref 12–16)
LACTATE BLDV-SCNC: 1.3 MMOL/L (ref 0.4–2)
LYMPHOCYTES # BLD: 2.2 K/UL (ref 1–5.1)
LYMPHOCYTES NFR BLD: 25.3 %
MCH RBC QN AUTO: 32.3 PG (ref 26–34)
MCHC RBC AUTO-ENTMCNC: 34.3 G/DL (ref 31–36)
MCV RBC AUTO: 94.3 FL (ref 80–100)
MONOCYTES # BLD: 1.2 K/UL (ref 0–1.3)
MONOCYTES NFR BLD: 13.8 %
NEUTROPHILS # BLD: 5 K/UL (ref 1.7–7.7)
NEUTROPHILS NFR BLD: 58.4 %
PLATELET # BLD AUTO: 555 K/UL (ref 135–450)
PMV BLD AUTO: 8.6 FL (ref 5–10.5)
POTASSIUM SERPL-SCNC: 3.6 MMOL/L (ref 3.5–5.1)
PROT SERPL-MCNC: 7.2 G/DL (ref 6.4–8.2)
RBC # BLD AUTO: 2.87 M/UL (ref 4–5.2)
SODIUM SERPL-SCNC: 131 MMOL/L (ref 136–145)
WBC # BLD AUTO: 8.5 K/UL (ref 4–11)

## 2025-04-23 PROCEDURE — 2500000003 HC RX 250 WO HCPCS: Performed by: NURSE PRACTITIONER

## 2025-04-23 PROCEDURE — 99285 EMERGENCY DEPT VISIT HI MDM: CPT

## 2025-04-23 PROCEDURE — 74177 CT ABD & PELVIS W/CONTRAST: CPT

## 2025-04-23 PROCEDURE — 2580000003 HC RX 258: Performed by: NURSE PRACTITIONER

## 2025-04-23 PROCEDURE — 6360000004 HC RX CONTRAST MEDICATION: Performed by: NURSE PRACTITIONER

## 2025-04-23 PROCEDURE — 2580000003 HC RX 258: Performed by: EMERGENCY MEDICINE

## 2025-04-23 PROCEDURE — 80053 COMPREHEN METABOLIC PANEL: CPT

## 2025-04-23 PROCEDURE — 85025 COMPLETE CBC W/AUTO DIFF WBC: CPT

## 2025-04-23 PROCEDURE — 1200000000 HC SEMI PRIVATE

## 2025-04-23 PROCEDURE — 83605 ASSAY OF LACTIC ACID: CPT

## 2025-04-23 PROCEDURE — 6370000000 HC RX 637 (ALT 250 FOR IP): Performed by: NURSE PRACTITIONER

## 2025-04-23 PROCEDURE — 6360000002 HC RX W HCPCS: Performed by: EMERGENCY MEDICINE

## 2025-04-23 PROCEDURE — 96374 THER/PROPH/DIAG INJ IV PUSH: CPT

## 2025-04-23 RX ORDER — OXYCODONE AND ACETAMINOPHEN 5; 325 MG/1; MG/1
2 TABLET ORAL EVERY 4 HOURS PRN
Status: DISCONTINUED | OUTPATIENT
Start: 2025-04-23 | End: 2025-05-01

## 2025-04-23 RX ORDER — MORPHINE SULFATE 2 MG/ML
2 INJECTION, SOLUTION INTRAMUSCULAR; INTRAVENOUS
Status: COMPLETED | OUTPATIENT
Start: 2025-04-23 | End: 2025-04-27

## 2025-04-23 RX ORDER — POLYETHYLENE GLYCOL 3350 17 G/17G
17 POWDER, FOR SOLUTION ORAL DAILY PRN
Status: DISCONTINUED | OUTPATIENT
Start: 2025-04-23 | End: 2025-04-30

## 2025-04-23 RX ORDER — IOPAMIDOL 755 MG/ML
75 INJECTION, SOLUTION INTRAVASCULAR
Status: COMPLETED | OUTPATIENT
Start: 2025-04-23 | End: 2025-04-23

## 2025-04-23 RX ORDER — SODIUM CHLORIDE 9 MG/ML
INJECTION, SOLUTION INTRAVENOUS CONTINUOUS
Status: ACTIVE | OUTPATIENT
Start: 2025-04-23 | End: 2025-04-24

## 2025-04-23 RX ORDER — ONDANSETRON 2 MG/ML
4 INJECTION INTRAMUSCULAR; INTRAVENOUS EVERY 6 HOURS PRN
Status: DISCONTINUED | OUTPATIENT
Start: 2025-04-23 | End: 2025-05-30 | Stop reason: HOSPADM

## 2025-04-23 RX ORDER — ONDANSETRON 4 MG/1
4 TABLET, ORALLY DISINTEGRATING ORAL EVERY 8 HOURS PRN
Status: DISCONTINUED | OUTPATIENT
Start: 2025-04-23 | End: 2025-05-30 | Stop reason: HOSPADM

## 2025-04-23 RX ORDER — OXYCODONE AND ACETAMINOPHEN 5; 325 MG/1; MG/1
1 TABLET ORAL EVERY 4 HOURS PRN
Status: DISCONTINUED | OUTPATIENT
Start: 2025-04-23 | End: 2025-05-01

## 2025-04-23 RX ORDER — SODIUM CHLORIDE 0.9 % (FLUSH) 0.9 %
5-40 SYRINGE (ML) INJECTION EVERY 12 HOURS SCHEDULED
Status: DISCONTINUED | OUTPATIENT
Start: 2025-04-23 | End: 2025-05-30 | Stop reason: HOSPADM

## 2025-04-23 RX ORDER — ACETAMINOPHEN 325 MG/1
650 TABLET ORAL EVERY 6 HOURS PRN
Status: DISCONTINUED | OUTPATIENT
Start: 2025-04-23 | End: 2025-05-30 | Stop reason: HOSPADM

## 2025-04-23 RX ORDER — SODIUM CHLORIDE 0.9 % (FLUSH) 0.9 %
5-40 SYRINGE (ML) INJECTION PRN
Status: DISCONTINUED | OUTPATIENT
Start: 2025-04-23 | End: 2025-05-30 | Stop reason: HOSPADM

## 2025-04-23 RX ORDER — SODIUM CHLORIDE 9 MG/ML
INJECTION, SOLUTION INTRAVENOUS PRN
Status: DISCONTINUED | OUTPATIENT
Start: 2025-04-23 | End: 2025-05-30 | Stop reason: HOSPADM

## 2025-04-23 RX ORDER — ENOXAPARIN SODIUM 100 MG/ML
30 INJECTION SUBCUTANEOUS DAILY
Status: DISCONTINUED | OUTPATIENT
Start: 2025-04-24 | End: 2025-05-30 | Stop reason: HOSPADM

## 2025-04-23 RX ORDER — DIATRIZOATE MEGLUMINE AND DIATRIZOATE SODIUM 660; 100 MG/ML; MG/ML
12 SOLUTION ORAL; RECTAL
Status: DISCONTINUED | OUTPATIENT
Start: 2025-04-23 | End: 2025-04-24 | Stop reason: HOSPADM

## 2025-04-23 RX ORDER — ACETAMINOPHEN 650 MG/1
650 SUPPOSITORY RECTAL EVERY 6 HOURS PRN
Status: DISCONTINUED | OUTPATIENT
Start: 2025-04-23 | End: 2025-05-30 | Stop reason: HOSPADM

## 2025-04-23 RX ORDER — MORPHINE SULFATE 4 MG/ML
4 INJECTION, SOLUTION INTRAMUSCULAR; INTRAVENOUS ONCE
Refills: 0 | Status: COMPLETED | OUTPATIENT
Start: 2025-04-23 | End: 2025-04-23

## 2025-04-23 RX ADMIN — Medication 6 MG: at 21:54

## 2025-04-23 RX ADMIN — PIPERACILLIN AND TAZOBACTAM 3375 MG: 3; .375 INJECTION, POWDER, LYOPHILIZED, FOR SOLUTION INTRAVENOUS at 19:06

## 2025-04-23 RX ADMIN — IOPAMIDOL 75 ML: 755 INJECTION, SOLUTION INTRAVENOUS at 15:02

## 2025-04-23 RX ADMIN — SODIUM CHLORIDE: 0.9 INJECTION, SOLUTION INTRAVENOUS at 21:50

## 2025-04-23 RX ADMIN — MORPHINE SULFATE 4 MG: 4 INJECTION, SOLUTION INTRAMUSCULAR; INTRAVENOUS at 21:01

## 2025-04-23 RX ADMIN — SODIUM CHLORIDE, PRESERVATIVE FREE 10 ML: 5 INJECTION INTRAVENOUS at 21:50

## 2025-04-23 RX ADMIN — DIATRIZOATE MEGLUMINE AND DIATRIZOATE SODIUM 12 ML: 660; 100 LIQUID ORAL; RECTAL at 15:02

## 2025-04-23 ASSESSMENT — PAIN SCALES - GENERAL
PAINLEVEL_OUTOF10: 4
PAINLEVEL_OUTOF10: 6

## 2025-04-23 ASSESSMENT — PAIN - FUNCTIONAL ASSESSMENT: PAIN_FUNCTIONAL_ASSESSMENT: 0-10

## 2025-04-23 ASSESSMENT — PAIN DESCRIPTION - LOCATION
LOCATION: ABDOMEN
LOCATION: ABDOMEN

## 2025-04-23 ASSESSMENT — LIFESTYLE VARIABLES
HOW MANY STANDARD DRINKS CONTAINING ALCOHOL DO YOU HAVE ON A TYPICAL DAY: PATIENT DOES NOT DRINK
HOW OFTEN DO YOU HAVE A DRINK CONTAINING ALCOHOL: NEVER

## 2025-04-23 ASSESSMENT — PAIN DESCRIPTION - ORIENTATION: ORIENTATION: LEFT

## 2025-04-23 NOTE — H&P
[facility] Medicine History & Physical    V 1.6    Date of Admission: 4/23/2025    Date of Service:  Pt seen/examined on 4/23/2025     [x]Admitted to Inpatient with expected LOS greater than two midnights due to medical therapy.  []Placed in Observation status.    Chief Admission Complaint:  abdominal pain    Presenting Admission History:      64 y.o. female who presented to [facility] with abdominal pain.  PMHx significant for HTN.  History obtained from the patient and review of EMR.  The patient stated she had a hemicolectomy in February 2025 in [facility] and since then has been experiencing intermittent abdominal pain.  Per chart review, the patient has been readmitted 1 time since her surgery for electrolyte abnormalities.  However, the patient stated her pain has gotten progressively worse over the last couple of weeks.  She reports speaking with GI and they ordered a CT outpatient today.  Patient  is at the bedside and stated that she was called and told to go to the emergency department due to \"a collection of pus\" seen on the CT. In the emergency department a CT abdomen pelvis was obtained that revealed Fluid and gas collection within the pelvis interposed between the colon and uterus measuring up to 4.1 cm.  Abscess is favored.  This appears largely enveloped by abdominal and adnexal structures.  A [place] uterine fistula is not excluded.  Additional small dependent collection within the region of the pouch of Alfred measuring up to 2.5 cm.  Mild dilation of small bowel loops which may indicate partial obstruction.  There is no high-grade small bowel obstruction.  The patient was given morphine for pain.  She was also started on Zosyn.  Upon further evaluation, the patient was found to be dehydrated with hyponatremia.  This is likely secondary to inadequate p.o. intake.  She was admitted for further evaluation and treatment.  IV fluids have been initiated and GI has been

## 2025-04-24 PROBLEM — E43 SEVERE PROTEIN-CALORIE MALNUTRITION: Status: ACTIVE | Noted: 2025-04-24

## 2025-04-24 LAB
ANION GAP SERPL CALCULATED.3IONS-SCNC: 14 MMOL/L (ref 3–16)
BASOPHILS # BLD: 0.1 K/UL (ref 0–0.2)
BASOPHILS NFR BLD: 0.8 %
BUN SERPL-MCNC: 6 MG/DL (ref 7–20)
C DIFF TOX A+B STL QL IA: NORMAL
CALCIUM SERPL-MCNC: 8.8 MG/DL (ref 8.3–10.6)
CHLORIDE SERPL-SCNC: 100 MMOL/L (ref 99–110)
CO2 SERPL-SCNC: 22 MMOL/L (ref 21–32)
CREAT SERPL-MCNC: 0.5 MG/DL (ref 0.6–1.2)
DEPRECATED RDW RBC AUTO: 15.1 % (ref 12.4–15.4)
EOSINOPHIL # BLD: 0.1 K/UL (ref 0–0.6)
EOSINOPHIL NFR BLD: 1.4 %
GFR SERPLBLD CREATININE-BSD FMLA CKD-EPI: >90 ML/MIN/{1.73_M2}
GLUCOSE SERPL-MCNC: 90 MG/DL (ref 70–99)
HCT VFR BLD AUTO: 25.7 % (ref 36–48)
HGB BLD-MCNC: 8.8 G/DL (ref 12–16)
LYMPHOCYTES # BLD: 1.2 K/UL (ref 1–5.1)
LYMPHOCYTES NFR BLD: 11.9 %
MCH RBC QN AUTO: 32 PG (ref 26–34)
MCHC RBC AUTO-ENTMCNC: 34 G/DL (ref 31–36)
MCV RBC AUTO: 94.1 FL (ref 80–100)
MONOCYTES # BLD: 1.2 K/UL (ref 0–1.3)
MONOCYTES NFR BLD: 12.3 %
NEUTROPHILS # BLD: 7.2 K/UL (ref 1.7–7.7)
NEUTROPHILS NFR BLD: 73.6 %
PLATELET # BLD AUTO: 539 K/UL (ref 135–450)
PMV BLD AUTO: 9.1 FL (ref 5–10.5)
POTASSIUM SERPL-SCNC: 3.6 MMOL/L (ref 3.5–5.1)
RBC # BLD AUTO: 2.74 M/UL (ref 4–5.2)
SODIUM SERPL-SCNC: 136 MMOL/L (ref 136–145)
WBC # BLD AUTO: 9.8 K/UL (ref 4–11)

## 2025-04-24 PROCEDURE — 1200000000 HC SEMI PRIVATE

## 2025-04-24 PROCEDURE — APPNB60 APP NON BILLABLE TIME 46-60 MINS: Performed by: CLINICAL NURSE SPECIALIST

## 2025-04-24 PROCEDURE — 87336 ENTAMOEB HIST DISPR AG IA: CPT

## 2025-04-24 PROCEDURE — 2580000003 HC RX 258: Performed by: NURSE PRACTITIONER

## 2025-04-24 PROCEDURE — 80048 BASIC METABOLIC PNL TOTAL CA: CPT

## 2025-04-24 PROCEDURE — 85025 COMPLETE CBC W/AUTO DIFF WBC: CPT

## 2025-04-24 PROCEDURE — 6370000000 HC RX 637 (ALT 250 FOR IP): Performed by: NURSE PRACTITIONER

## 2025-04-24 PROCEDURE — 87324 CLOSTRIDIUM AG IA: CPT

## 2025-04-24 PROCEDURE — 87449 NOS EACH ORGANISM AG IA: CPT

## 2025-04-24 PROCEDURE — 99222 1ST HOSP IP/OBS MODERATE 55: CPT | Performed by: SURGERY

## 2025-04-24 PROCEDURE — 87328 CRYPTOSPORIDIUM AG IA: CPT

## 2025-04-24 PROCEDURE — 36415 COLL VENOUS BLD VENIPUNCTURE: CPT

## 2025-04-24 PROCEDURE — 2500000003 HC RX 250 WO HCPCS: Performed by: NURSE PRACTITIONER

## 2025-04-24 PROCEDURE — 87506 IADNA-DNA/RNA PROBE TQ 6-11: CPT

## 2025-04-24 PROCEDURE — 6360000002 HC RX W HCPCS: Performed by: NURSE PRACTITIONER

## 2025-04-24 RX ORDER — DICYCLOMINE HYDROCHLORIDE 10 MG/1
10 CAPSULE ORAL 3 TIMES DAILY
COMMUNITY

## 2025-04-24 RX ORDER — ESTRADIOL 0.75 MG/1.25G
GEL, METERED TOPICAL DAILY
COMMUNITY

## 2025-04-24 RX ORDER — CALCIUM CARBONATE 500 MG/1
1 TABLET, CHEWABLE ORAL 2 TIMES DAILY
COMMUNITY

## 2025-04-24 RX ORDER — CHOLESTYRAMINE 4 G/9G
1 POWDER, FOR SUSPENSION ORAL DAILY
Status: ON HOLD | COMMUNITY
End: 2025-05-30 | Stop reason: HOSPADM

## 2025-04-24 RX ADMIN — MORPHINE SULFATE 2 MG: 2 INJECTION, SOLUTION INTRAMUSCULAR; INTRAVENOUS at 11:54

## 2025-04-24 RX ADMIN — OXYCODONE AND ACETAMINOPHEN 1 TABLET: 5; 325 TABLET ORAL at 16:24

## 2025-04-24 RX ADMIN — PIPERACILLIN AND TAZOBACTAM 3375 MG: 3; .375 INJECTION, POWDER, LYOPHILIZED, FOR SOLUTION INTRAVENOUS at 03:31

## 2025-04-24 RX ADMIN — SODIUM CHLORIDE, PRESERVATIVE FREE 10 ML: 5 INJECTION INTRAVENOUS at 12:05

## 2025-04-24 RX ADMIN — PIPERACILLIN AND TAZOBACTAM 3375 MG: 3; .375 INJECTION, POWDER, LYOPHILIZED, FOR SOLUTION INTRAVENOUS at 19:05

## 2025-04-24 RX ADMIN — SODIUM CHLORIDE, PRESERVATIVE FREE 10 ML: 5 INJECTION INTRAVENOUS at 21:05

## 2025-04-24 RX ADMIN — Medication 6 MG: at 21:04

## 2025-04-24 RX ADMIN — SODIUM CHLORIDE: 0.9 INJECTION, SOLUTION INTRAVENOUS at 12:05

## 2025-04-24 RX ADMIN — ENOXAPARIN SODIUM 30 MG: 100 INJECTION SUBCUTANEOUS at 16:18

## 2025-04-24 RX ADMIN — PIPERACILLIN AND TAZOBACTAM 3375 MG: 3; .375 INJECTION, POWDER, LYOPHILIZED, FOR SOLUTION INTRAVENOUS at 12:00

## 2025-04-24 ASSESSMENT — PAIN DESCRIPTION - LOCATION
LOCATION: ABDOMEN

## 2025-04-24 ASSESSMENT — PAIN SCALES - GENERAL
PAINLEVEL_OUTOF10: 6
PAINLEVEL_OUTOF10: 8
PAINLEVEL_OUTOF10: 6

## 2025-04-24 ASSESSMENT — PAIN DESCRIPTION - ORIENTATION
ORIENTATION: LOWER;RIGHT
ORIENTATION: LEFT
ORIENTATION: LEFT

## 2025-04-24 ASSESSMENT — PAIN DESCRIPTION - DESCRIPTORS
DESCRIPTORS: ACHING;SHARP
DESCRIPTORS: STABBING
DESCRIPTORS: SHARP

## 2025-04-24 ASSESSMENT — PAIN - FUNCTIONAL ASSESSMENT: PAIN_FUNCTIONAL_ASSESSMENT: PREVENTS OR INTERFERES SOME ACTIVE ACTIVITIES AND ADLS

## 2025-04-24 NOTE — CONSULTS
Consult Call Back    Who:Yolanda Gonzalez, APRN - CNP   Date:4/24/2025,  Time:12:42 PM    Electronically signed by Becca Villalpando on 4/24/25 at 12:42 PM EDT

## 2025-04-24 NOTE — ED NOTES
Tuyet Richter is a 64 y.o. female admitted for  Principal Problem:    Postprocedural intraabdominal abscess (HCC)  Resolved Problems:    * No resolved hospital problems. *  .   Patient Home via   Chief Complaint   Patient presents with    Abdominal Pain     Pt with abdominal pain for several months.  Had ba-colectomy in February, today was seen by GI had CT scan.  Called and told to come to ER d/t abscess found on scan   .  Patient is alert and Person, Place, Time, and Situation  Patient's baseline mobility: Baseline Mobility: Independent   Code Status: No Order   Cardiac Rhythm:       Is patient on baseline Oxygen: no how many Liters:   Abnormal Assessment Findings: Abdominal pain    Isolation:       NIH Score:    C-SSRS: Risk of Suicide: No Risk  Bedside swallow:        Active LDA's:   Peripheral IV 04/23/25 Left Antecubital (Active)     Patient admitted with a rooney: no If the rooney is chronic was it exchanged:  Reason for rooney:   Patient admitted with Central Line:  NA . PICC line placement confirmed: YES OR NO:[id number]}   Reason for Central line:   Was central line Inserted from an outside facility:        Family/Caregiver Present no Any Concerns: no   Restraints no  Sitter no         Vitals: MEWS Score: 1    Vitals:    04/23/25 1821 04/23/25 1947 04/23/25 2101   BP: (!) 144/80 (!) 136/92    Pulse: 96     Resp: 17  19   Temp: 98.4 °F (36.9 °C)     TempSrc: Oral     SpO2: 99%     Weight: 50.1 kg (110 lb 6.4 oz)     Height: 1.6 m (5' 3\")         Last documented pain score (0-10 scale) Pain Level: 6  Pain medication administered Yes- see MAR.    Pertinent or High Risk Medications/Drips: No.    Pending Blood Product Administration: no    Abnormal labs:   Abnormal Labs Reviewed   CBC WITH AUTO DIFFERENTIAL - Abnormal; Notable for the following components:       Result Value    RBC 2.87 (*)     Hemoglobin 9.3 (*)     Hematocrit 27.1 (*)     Platelets 555 (*)     All other components within normal limits

## 2025-04-24 NOTE — CONSULTS
Consult Placed     Who: Dr. Pinto  Date: 4/24/2025  Time: 8:07 AM       Electronically signed by Becca Villalpando on 4/24/2025 at 8:07 AM

## 2025-04-24 NOTE — PROGRESS NOTES
Comprehensive Nutrition Assessment    Type and Reason for Visit:  Initial, Positive nutrition screen    Nutrition Recommendations/Plan:   Diet modified to Low Fiber.  Encourage PO intakes as tolerated.  Ensure ONS once/day - prefers chocolate/strawberry.   Monitor pertinent labs, bowel habits, weight, N/V, supplement acceptance, clinical progression.       Malnutrition Assessment:  Malnutrition Status:  Severe malnutrition (04/24/25 1120)    Context:  Acute Illness     Findings of the 6 clinical characteristics of malnutrition:  Energy Intake:  75% or less of estimated energy requirements for 7 or more days  Weight Loss:  Mild weight loss     Body Fat Loss:  Moderate body fat loss Orbital, Triceps   Muscle Mass Loss:  Moderate muscle mass loss Temples (temporalis), Clavicles (pectoralis & deltoids)  Fluid Accumulation:  No fluid accumulation     Strength:  Not Performed    Nutrition Assessment:    Patient seen for positive nutrition screen (weight loss). Admitted for abdominal pain s/p hemicolectomy concerning for intraabdominal abscess, dehydration with hyponatremia likely 2/2 inadequate po intake. Noted pt s/p hemicolectomy 2/2025. PMHx significant for HTN. Currently on regular diet. Patient seen with family in room. Pt reports she has been \"eating a little of anything\" since her surgery. States she was given various different diet recommendations in [place] after her surgery. Has been avoiding salads due to abdominal pain. States she has been drinking one Ipracom shake daily for electrolytes. Patient reports she currently weighs ~105lbs, reports admission weight of 110lbs is likely inaccurate. UBW is 118-120lbs. Noted weight loss of -11% x1 year noted per EMR. No issues chewing or swallowing. Patient agreeable to Ensure ONS once/day to better meet nutrient needs. Discussed low fiber diet education with patient and family. RD encouraged 5-6 small meals daily, drinking adequate fluids, limiting caffeine,

## 2025-04-24 NOTE — PROGRESS NOTES
[facility] Medicine Progress Note  V 1.6      Date of Admission: 4/23/2025    Hospital Day: 2      Chief Admission Complaint:  Abdominal pain    Subjective:  Reports some ongoing LLQ abdominal pain. No nausea or vomiting.     Presenting Admission History:       64 y.o. female who presented to [facility] with abdominal pain.  PMHx significant for HTN.  History obtained from the patient and review of EMR.  The patient stated she had a hemicolectomy in February 2025 in [facility] and since then has been experiencing intermittent abdominal pain.  Per chart review, the patient has been readmitted 1 time since her surgery for electrolyte abnormalities.  However, the patient stated her pain has gotten progressively worse over the last couple of weeks.  She reports speaking with GI and they ordered a CT outpatient today.  Patient  is at the bedside and stated that she was called and told to go to the emergency department due to \"a collection of pus\" seen on the CT. In the emergency department a CT abdomen pelvis was obtained that revealed Fluid and gas collection within the pelvis interposed between the colon and uterus measuring up to 4.1 cm.  Abscess is favored.  This appears largely enveloped by abdominal and adnexal structures.  A [place] uterine fistula is not excluded.  Additional small dependent collection within the region of the pouch of Alfred measuring up to 2.5 cm.  Mild dilation of small bowel loops which may indicate partial obstruction.  There is no high-grade small bowel obstruction.  The patient was given morphine for pain.  She was also started on Zosyn.  Upon further evaluation, the patient was found to be dehydrated with hyponatremia.  This is likely secondary to inadequate p.o. intake.  She was admitted for further evaluation and treatment.  IV fluids have been initiated and GI has been consulted for the a.m. The patient denied any other associated symptoms as well as any

## 2025-04-24 NOTE — CONSULTS
CONSULTATION  Tuyet Richter is a 64 y.o. female asked to see us in consultation by  Cyn Hendrickson APRN - CNP & Mumtaz Morgan MD for evaluation of intraabdominal abscess.      Ms. Richter is a 64-year-old female with past medical history of GERD, gastritis, hiatal hernia, HTN and chronic diarrhea who was sent to the ER with abdominal pain and for abnormal CT imaging demonstrating a pelvic abscess.  She follows with Dr. Berry and Halie Anna in our office.  She was initially seen in May 2024 for for diarrhea.    Her workup demonstrated a fecal calprotectin > 2000.  Subsequent colonoscopy demonstrated linear erythema in the ascending colon, however biopsies and CTE did not show evidence of Crohn's disease.  It was thought that she may have doxycycline/NSAID induced colitis.  She has been off these for several months now.  She had doing well on colestipol until February 2025.  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  She required a right hemicolectomy 2/27 with pathology being negative for malignancy.  She was readmitted with a postop ileus and then admitted again with electrolyte deficiencies and diarrhea.  She was seen in office yesterday for worsening left lower quadrant pain suprapubic pain.  A CT demonstrated fluid and gas collection within the pelvis between the colon and the uterus favoring an abscess, as well as an additional small dependent collection in the region of the pouch of Alfred and small bowel ileus versus obstruction.    Her white count is normal.  She has no fever.  Vitals are stable.  Her CT chest was negative.            Medications Prior to Admission: omeprazole (PRILOSEC) 40 MG capsule, Take 20 mg by mouth daily  predniSONE (DELTASONE) 10 MG tablet, SIG: iii po BID x 2 days then ii po BID x 2 days then i po BID x  record.        Electronically signed by: SARAI Wilson 4/24/2025 8:10 AM           (Office) [phone]  (Fax) [phone]  Available via perfect serve

## 2025-04-24 NOTE — CONSULTS
Consult Placed     Who: cat  Date:4/23/25  Time:2155   Perfect served  Electronically signed by Desirae Ledezma on 4/23/2025 at 9:56 PM

## 2025-04-24 NOTE — CARE COORDINATION
Case Management Assessment  Initial Evaluation    Date/Time of Evaluation: 4/24/2025 2:13 PM  Assessment Completed by: GASTON Alexander    If patient is discharged prior to next notation, then this note serves as note for discharge by case management.    Patient Name: Tuyet Richter                   YOB: 1960  Diagnosis: Intra-abdominal abscess (HCC) [K65.1]  Postprocedural intraabdominal abscess (HCC) [T81.43XA, K65.1]                   Date / Time: 4/23/2025  6:17 PM    Patient Admission Status: Inpatient   Readmission Risk (Low < 19, Mod (19-27), High > 27): Readmission Risk Score: 11.4    Current PCP: Cyn Hendrickson APRN - CNP  PCP verified by CM? Yes (being seen every two week re abscess)    Chart Reviewed: Yes      History Provided by: Patient  Patient Orientation: Alert and Oriented, Person, Place, Situation, Self    Patient Cognition: Alert    Hospitalization in the last 30 days (Readmission):  No      Advance Directives:      Code Status: Full Code   Patient's Primary Decision Maker is: Legal Next of Kin    Primary Decision Maker: Blas Richter - Spouse - [phone]    Secondary Decision Maker: Sheri Lantigua - Child - [phone]    Discharge Planning:    Patient lives with: Spouse/Significant Other Type of Home: House  Primary Care Giver: Self  Patient Support Systems include: Spouse/Significant Other   Current Financial resources: Medicare  Current community resources: None  Current services prior to admission: None            Type of Home Care services:  None    ADLS  Prior functional level: Independent in ADLs/IADLs  Current functional level: Independent in ADLs/IADLs    Family can provide assistance at DC: Yes  Would you like Case Management to discuss the discharge plan with any other family members/significant others, and if so, who? No  Plans to Return to Present Housing: Yes  Potential Assistance needed at discharge: N/A  Patient expects to discharge to: House  Plan  for transportation at discharge: Self    Financial    Payor: [employer] MEDICARE / Plan: [employer] GOLD PLUS HMO / Product Type: *No Product type* /     Does insurance require precert for SNF: Yes    Potential assistance Purchasing Medications: No      [facility] [id number] - [place], [place] - [address] [phone] - F [phone]  [address] [address]  Phone: [phone] Fax: [phone]    [facility] [id number] - [facility] [address] [facility] [phone] - F [phone] [address] [facility] [address]  Phone: [phone] Fax: [phone]      Notes:    Factors facilitating achievement of predicted outcomes: Family support, Motivated, Cooperative, and Pleasant    Barriers to discharge: Pain and Medical complications    Additional Case Management Notes: Patient reports lives at home with Spouse, IPTA. Patient reports has PCP ( following closely with re abscess ), can afford medications, and has transport to and from appointments.     The Plan for Transition of Care is related to the following treatment goals of Intra-abdominal abscess (HCC) [K65.1]  Postprocedural intraabdominal abscess (HCC) [T81.43XA, K65.1]    IF APPLICABLE: The Patient and/or patient representative Tuyet and her family were provided with a choice of provider and agrees with the discharge plan. Freedom of choice list with basic dialogue that supports the patient's individualized plan of care/goals and shares the quality data associated with the providers was provided to: Patient     The Patient and/or Patient Representative Agree with the Discharge Plan? Yes    GASTON Alexander  Case Management Department  Ph: [phone] Fax: [phone]

## 2025-04-24 NOTE — ED PROVIDER NOTES
EMERGENCY DEPARTMENT ENCOUNTER     [place] C3 TELE/MED SURG/ONC     Pt Name: Tuyet Richter   MRN: [id number]   Birthdate 1960   Date of evaluation: 4/23/2025   Provider: Mic Guthrie MD   PCP: Cyn Hendrickson APRN - CNP   Note Started: 9:29 PM EDT 4/23/25     CHIEF COMPLAINT     Chief Complaint   Patient presents with    Abdominal Pain     Pt with abdominal pain for several months.  Had ba-colectomy in February, today was seen by GI had CT scan.  Called and told to come to ER d/t abscess found on scan        HISTORY OF PRESENT ILLNESS:  History from : Patient   Limitations to history : None     Tuyet Richter is a 64 y.o. female who presents for abdominal pain.  Patient reports that in February she had a right hemicolectomy for cecal volvulus and has essentially had pain ever since.  She has been readmitted once since then for electrolyte abnormalities.  Because of her continued pain she eventually was in with her gastroenterologist to obtain CT today showing intra-abdominal abscess and advised her to come here for admission.  She denies any fevers, chills or sweats.  No vomiting.  Her initial surgery was performed in  in February.    Nursing Notes were all reviewed and agreed with or any disagreements were addressed in the HPI.     ROS: Positives and Pertinent negatives as per HPI.    PAST MEDICAL HISTORY     Past medical history:  has a past medical history of Acid reflux, Gastritis, Hiatal hernia, Hypertension, and Pinched nerve in neck.    Past surgical history:  has a past surgical history that includes Endometrial ablation; Nose surgery; Upper gastrointestinal endoscopy (1/23/2013); Colonoscopy (1/23/2013); Colonoscopy (3/18/16); and Upper gastrointestinal endoscopy (3/18/16).    Med list:   Current Facility-Administered Medications on File Prior to Encounter   Medication Dose Route Frequency Provider Last Rate Last Admin    diatrizoate meglumine-sodium (GASTROGRAFIN) 66-10

## 2025-04-24 NOTE — CONSULTS
Department of General Surgery Consult    PATIENT NAME: Tuyet Richter   YOB: 1960    ADMISSION DATE: 4/23/2025  6:17 PM      TODAY'S DATE: 4/24/2025    Reason for Consult:  intra-abdominal fluid collection    Chief Complaint: abd pain    Historian: patient, EMR    Requesting Practitioner:  Cathy    HISTORY OF PRESENT ILLNESS:              The patient is a 64 y.o. female who presents with ongoing abdominal pain and outpt CT showing intra-abdominal fluid collection. The pt reports she had right colectomy at [place] in [place] secondary to cecal volvulus. She had complications of post operative sbo, as well as severe electrolyte abnormalities. She reports that she has had abdominal pain fairly consistently since her surgery and that she often has to lie down with heat packs. She reports having loose stools. She reports she has been eating well, and eats 3 meals a day. She denies nausea or vomiting, no fever or chills.    Past Medical History:        Diagnosis Date    Acid reflux     Gastritis     Hiatal hernia     Hypertension     Pinched nerve in neck        Past Surgical History:        Procedure Laterality Date    COLONOSCOPY  1/23/2013    polyps    COLONOSCOPY  3/18/16    diverticulosis    ENDOMETRIAL ABLATION      NOSE SURGERY      UPPER GASTROINTESTINAL ENDOSCOPY  1/23/2013    Hiatal Hernia    UPPER GASTROINTESTINAL ENDOSCOPY  3/18/16    bx       Current Medications:   Current Facility-Administered Medications: sodium chloride flush 0.9 % injection 5-40 mL, 5-40 mL, IntraVENous, 2 times per day  sodium chloride flush 0.9 % injection 5-40 mL, 5-40 mL, IntraVENous, PRN  0.9 % sodium chloride infusion, , IntraVENous, PRN  enoxaparin Sodium (LOVENOX) injection 30 mg, 30 mg, SubCUTAneous, Daily  ondansetron (ZOFRAN-ODT) disintegrating tablet 4 mg, 4 mg, Oral, Q8H PRN **OR** ondansetron (ZOFRAN) injection 4 mg, 4 mg, IntraVENous, Q6H PRN  polyethylene glycol (GLYCOLAX) packet 17 g, 17 g, Oral,

## 2025-04-25 LAB
ANION GAP SERPL CALCULATED.3IONS-SCNC: 13 MMOL/L (ref 3–16)
BUN SERPL-MCNC: 3 MG/DL (ref 7–20)
CALCIUM SERPL-MCNC: 8.9 MG/DL (ref 8.3–10.6)
CHLORIDE SERPL-SCNC: 98 MMOL/L (ref 99–110)
CO2 SERPL-SCNC: 24 MMOL/L (ref 21–32)
CREAT SERPL-MCNC: 0.5 MG/DL (ref 0.6–1.2)
CRYPTOSP AG STL QL IA: NORMAL
E HISTOLYT AG STL QL IA: NORMAL
G LAMBLIA AG STL QL IA: NORMAL
GFR SERPLBLD CREATININE-BSD FMLA CKD-EPI: >90 ML/MIN/{1.73_M2}
GI PATHOGENS PNL STL NAA+PROBE: NORMAL
GLUCOSE SERPL-MCNC: 84 MG/DL (ref 70–99)
MAGNESIUM SERPL-MCNC: 1.52 MG/DL (ref 1.8–2.4)
POTASSIUM SERPL-SCNC: 3.6 MMOL/L (ref 3.5–5.1)
SODIUM SERPL-SCNC: 135 MMOL/L (ref 136–145)

## 2025-04-25 PROCEDURE — 36415 COLL VENOUS BLD VENIPUNCTURE: CPT

## 2025-04-25 PROCEDURE — 6370000000 HC RX 637 (ALT 250 FOR IP): Performed by: NURSE PRACTITIONER

## 2025-04-25 PROCEDURE — 6360000002 HC RX W HCPCS: Performed by: NURSE PRACTITIONER

## 2025-04-25 PROCEDURE — 2580000003 HC RX 258: Performed by: NURSE PRACTITIONER

## 2025-04-25 PROCEDURE — 1200000000 HC SEMI PRIVATE

## 2025-04-25 PROCEDURE — 80048 BASIC METABOLIC PNL TOTAL CA: CPT

## 2025-04-25 PROCEDURE — 6370000000 HC RX 637 (ALT 250 FOR IP): Performed by: INTERNAL MEDICINE

## 2025-04-25 PROCEDURE — 99231 SBSQ HOSP IP/OBS SF/LOW 25: CPT | Performed by: SURGERY

## 2025-04-25 PROCEDURE — 83735 ASSAY OF MAGNESIUM: CPT

## 2025-04-25 PROCEDURE — 2500000003 HC RX 250 WO HCPCS: Performed by: NURSE PRACTITIONER

## 2025-04-25 RX ORDER — PANTOPRAZOLE SODIUM 40 MG/1
40 TABLET, DELAYED RELEASE ORAL
Status: DISCONTINUED | OUTPATIENT
Start: 2025-04-26 | End: 2025-05-20

## 2025-04-25 RX ORDER — LANOLIN ALCOHOL/MO/W.PET/CERES
400 CREAM (GRAM) TOPICAL 2 TIMES DAILY
Status: DISCONTINUED | OUTPATIENT
Start: 2025-04-25 | End: 2025-05-07

## 2025-04-25 RX ORDER — DICYCLOMINE HYDROCHLORIDE 10 MG/1
10 CAPSULE ORAL 3 TIMES DAILY
Status: DISCONTINUED | OUTPATIENT
Start: 2025-04-25 | End: 2025-05-30 | Stop reason: HOSPADM

## 2025-04-25 RX ORDER — CALCIUM CARBONATE 500 MG/1
1 TABLET, CHEWABLE ORAL 2 TIMES DAILY
Status: DISCONTINUED | OUTPATIENT
Start: 2025-04-25 | End: 2025-05-07

## 2025-04-25 RX ADMIN — PIPERACILLIN AND TAZOBACTAM 3375 MG: 3; .375 INJECTION, POWDER, LYOPHILIZED, FOR SOLUTION INTRAVENOUS at 18:43

## 2025-04-25 RX ADMIN — OXYCODONE AND ACETAMINOPHEN 1 TABLET: 5; 325 TABLET ORAL at 22:40

## 2025-04-25 RX ADMIN — DICYCLOMINE HYDROCHLORIDE 10 MG: 10 CAPSULE ORAL at 15:45

## 2025-04-25 RX ADMIN — SODIUM CHLORIDE, PRESERVATIVE FREE 10 ML: 5 INJECTION INTRAVENOUS at 10:12

## 2025-04-25 RX ADMIN — DICYCLOMINE HYDROCHLORIDE 10 MG: 10 CAPSULE ORAL at 20:38

## 2025-04-25 RX ADMIN — SODIUM CHLORIDE, PRESERVATIVE FREE 10 ML: 5 INJECTION INTRAVENOUS at 20:38

## 2025-04-25 RX ADMIN — PIPERACILLIN AND TAZOBACTAM 3375 MG: 3; .375 INJECTION, POWDER, LYOPHILIZED, FOR SOLUTION INTRAVENOUS at 03:54

## 2025-04-25 RX ADMIN — OXYCODONE AND ACETAMINOPHEN 1 TABLET: 5; 325 TABLET ORAL at 06:33

## 2025-04-25 RX ADMIN — Medication 400 MG: at 20:38

## 2025-04-25 RX ADMIN — Medication 6 MG: at 20:38

## 2025-04-25 RX ADMIN — PIPERACILLIN AND TAZOBACTAM 3375 MG: 3; .375 INJECTION, POWDER, LYOPHILIZED, FOR SOLUTION INTRAVENOUS at 10:50

## 2025-04-25 RX ADMIN — CALCIUM CARBONATE 500 MG: 500 TABLET, CHEWABLE ORAL at 20:38

## 2025-04-25 RX ADMIN — ENOXAPARIN SODIUM 30 MG: 100 INJECTION SUBCUTANEOUS at 10:12

## 2025-04-25 ASSESSMENT — PAIN SCALES - GENERAL
PAINLEVEL_OUTOF10: 4
PAINLEVEL_OUTOF10: 6
PAINLEVEL_OUTOF10: 1
PAINLEVEL_OUTOF10: 4
PAINLEVEL_OUTOF10: 6
PAINLEVEL_OUTOF10: 6
PAINLEVEL_OUTOF10: 0
PAINLEVEL_OUTOF10: 0

## 2025-04-25 ASSESSMENT — PAIN DESCRIPTION - DESCRIPTORS
DESCRIPTORS: CRAMPING

## 2025-04-25 ASSESSMENT — PAIN DESCRIPTION - ORIENTATION
ORIENTATION: LEFT;LOWER
ORIENTATION: LEFT;LOWER
ORIENTATION: MID

## 2025-04-25 ASSESSMENT — PAIN DESCRIPTION - LOCATION
LOCATION: ABDOMEN

## 2025-04-25 ASSESSMENT — PAIN - FUNCTIONAL ASSESSMENT
PAIN_FUNCTIONAL_ASSESSMENT: ACTIVITIES ARE NOT PREVENTED
PAIN_FUNCTIONAL_ASSESSMENT: ACTIVITIES ARE NOT PREVENTED

## 2025-04-25 NOTE — PROGRESS NOTES
[facility]    PATIENT NAME: Tuyet Richter     TODAY'S DATE: 4/25/2025    CHIEF COMPLAINT: none    INTERVAL HISTORY/HPI:    Pt notes she feels better overall, but she did need a dose of pain meds early this AM.  Having loose, non-bloody stools.     OBJECTIVE:  VITALS:  /74   Pulse 89   Temp 97.5 °F (36.4 °C) (Oral)   Resp 17   Ht 1.6 m (5' 3\")   Wt 50.1 kg (110 lb 6.4 oz)   SpO2 95%   BMI 19.56 kg/m²     INTAKE/OUTPUT:    I/O last 3 completed shifts:  In: 1663.2 [P.O.:360; I.V.:1076.2; IV Piggyback:227]  Out: -   No intake/output data recorded.    CONSTITUTIONAL:  awake and alert  LUNGS:     ABDOMEN:   , soft, distended, non-tender     Data:  CBC:   Recent Labs     04/23/25 1837 04/24/25  0449   WBC 8.5 9.8   HGB 9.3* 8.8*   HCT 27.1* 25.7*   * 539*     BMP:    Recent Labs     04/23/25 1837 04/24/25  0449   * 136   K 3.6 3.6   CL 93* 100   CO2 24 22   BUN 6* 6*   CREATININE 0.5* 0.5*   GLUCOSE 82 90     Hepatic:   Recent Labs     04/23/25 1837   AST 15   ALT 7*   BILITOT <0.2   ALKPHOS 96     Mag:    No results for input(s): \"MG\" in the last 72 hours.   Phos:   No results for input(s): \"PHOS\" in the last 72 hours.   INR: No results for input(s): \"INR\" in the last 72 hours.    Radiology Review:         ASSESSMENT AND PLAN:  Pelvic abscess of unclear origin or duration, clinical significance.  Plan for now is conservative management with IV antibiotics and bowel rest (ok to continue clear liquids).   - cont current management   - will likely get repeat CT in 2-3 days to reassess status of pelvic abscesses, or as needed if a change in clinical status warrants earlier imaging    Will follow    Electronically signed by ANSLEY SWIFT MD

## 2025-04-25 NOTE — PROGRESS NOTES
Pt alert and orientated. Call light within reach. No complaints at this time. Pt tolerating clear diet.Jannette Lara RN

## 2025-04-25 NOTE — PROGRESS NOTES
[facility] Medicine Progress Note  V 1.6      Date of Admission: 4/23/2025    Hospital Day: 3      Chief Admission Complaint:  Abdominal pain    Subjective:  Reports slight improvement from prior. Tolerating Clear liquids so far.     Presenting Admission History:       64 y.o. female who presented to [facility] with abdominal pain.  PMHx significant for HTN.  History obtained from the patient and review of EMR.  The patient stated she had a hemicolectomy in February 2025 in [facility] and since then has been experiencing intermittent abdominal pain.  Per chart review, the patient has been readmitted 1 time since her surgery for electrolyte abnormalities.  However, the patient stated her pain has gotten progressively worse over the last couple of weeks.  She reports speaking with GI and they ordered a CT outpatient today.  Patient  is at the bedside and stated that she was called and told to go to the emergency department due to \"a collection of pus\" seen on the CT. In the emergency department a CT abdomen pelvis was obtained that revealed Fluid and gas collection within the pelvis interposed between the colon and uterus measuring up to 4.1 cm.  Abscess is favored.  This appears largely enveloped by abdominal and adnexal structures.  A [place] uterine fistula is not excluded.  Additional small dependent collection within the region of the pouch of Alfred measuring up to 2.5 cm.  Mild dilation of small bowel loops which may indicate partial obstruction.  There is no high-grade small bowel obstruction.  The patient was given morphine for pain.  She was also started on Zosyn.  Upon further evaluation, the patient was found to be dehydrated with hyponatremia.  This is likely secondary to inadequate p.o. intake.  She was admitted for further evaluation and treatment.  IV fluids have been initiated and GI has been consulted for the a.m. The patient denied any other associated symptoms as well

## 2025-04-25 NOTE — CARE COORDINATION
Hospital day 2:Patient on C3 re intra abd abscess care managed by IM, GI, and General Surgery, Patient from home with spouse, ambulatory in room. Plans for repeat Ct after some more IV ATB. SW does not anticipate DCP needs.GASTON Alexander

## 2025-04-26 LAB
ANION GAP SERPL CALCULATED.3IONS-SCNC: 13 MMOL/L (ref 3–16)
BUN SERPL-MCNC: <2 MG/DL (ref 7–20)
CALCIUM SERPL-MCNC: 8.8 MG/DL (ref 8.3–10.6)
CHLORIDE SERPL-SCNC: 99 MMOL/L (ref 99–110)
CO2 SERPL-SCNC: 23 MMOL/L (ref 21–32)
CREAT SERPL-MCNC: 0.5 MG/DL (ref 0.6–1.2)
GFR SERPLBLD CREATININE-BSD FMLA CKD-EPI: >90 ML/MIN/{1.73_M2}
GLUCOSE SERPL-MCNC: 88 MG/DL (ref 70–99)
MAGNESIUM SERPL-MCNC: 1.47 MG/DL (ref 1.8–2.4)
POTASSIUM SERPL-SCNC: 3.6 MMOL/L (ref 3.5–5.1)
SODIUM SERPL-SCNC: 135 MMOL/L (ref 136–145)

## 2025-04-26 PROCEDURE — 36415 COLL VENOUS BLD VENIPUNCTURE: CPT

## 2025-04-26 PROCEDURE — 2580000003 HC RX 258: Performed by: NURSE PRACTITIONER

## 2025-04-26 PROCEDURE — 6370000000 HC RX 637 (ALT 250 FOR IP): Performed by: INTERNAL MEDICINE

## 2025-04-26 PROCEDURE — 83735 ASSAY OF MAGNESIUM: CPT

## 2025-04-26 PROCEDURE — 6370000000 HC RX 637 (ALT 250 FOR IP): Performed by: NURSE PRACTITIONER

## 2025-04-26 PROCEDURE — APPNB45 APP NON BILLABLE 31-45 MINUTES: Performed by: CLINICAL NURSE SPECIALIST

## 2025-04-26 PROCEDURE — 80048 BASIC METABOLIC PNL TOTAL CA: CPT

## 2025-04-26 PROCEDURE — 6360000002 HC RX W HCPCS: Performed by: INTERNAL MEDICINE

## 2025-04-26 PROCEDURE — 1200000000 HC SEMI PRIVATE

## 2025-04-26 PROCEDURE — 6360000002 HC RX W HCPCS: Performed by: NURSE PRACTITIONER

## 2025-04-26 PROCEDURE — 2500000003 HC RX 250 WO HCPCS: Performed by: NURSE PRACTITIONER

## 2025-04-26 PROCEDURE — 99232 SBSQ HOSP IP/OBS MODERATE 35: CPT | Performed by: SURGERY

## 2025-04-26 RX ORDER — MAGNESIUM SULFATE 1 G/100ML
1000 INJECTION INTRAVENOUS PRN
Status: DISCONTINUED | OUTPATIENT
Start: 2025-04-26 | End: 2025-05-01

## 2025-04-26 RX ORDER — VALACYCLOVIR HYDROCHLORIDE 500 MG/1
500 TABLET, FILM COATED ORAL 2 TIMES DAILY
Status: DISCONTINUED | OUTPATIENT
Start: 2025-04-26 | End: 2025-05-07

## 2025-04-26 RX ADMIN — DICYCLOMINE HYDROCHLORIDE 10 MG: 10 CAPSULE ORAL at 14:48

## 2025-04-26 RX ADMIN — Medication 400 MG: at 10:29

## 2025-04-26 RX ADMIN — PIPERACILLIN AND TAZOBACTAM 3375 MG: 3; .375 INJECTION, POWDER, LYOPHILIZED, FOR SOLUTION INTRAVENOUS at 03:24

## 2025-04-26 RX ADMIN — OXYCODONE AND ACETAMINOPHEN 2 TABLET: 5; 325 TABLET ORAL at 23:09

## 2025-04-26 RX ADMIN — MAGNESIUM SULFATE HEPTAHYDRATE 1000 MG: 1 INJECTION, SOLUTION INTRAVENOUS at 20:58

## 2025-04-26 RX ADMIN — SODIUM CHLORIDE, PRESERVATIVE FREE 10 ML: 5 INJECTION INTRAVENOUS at 14:42

## 2025-04-26 RX ADMIN — PANTOPRAZOLE SODIUM 40 MG: 40 TABLET, DELAYED RELEASE ORAL at 05:39

## 2025-04-26 RX ADMIN — Medication 400 MG: at 20:08

## 2025-04-26 RX ADMIN — PIPERACILLIN AND TAZOBACTAM 3375 MG: 3; .375 INJECTION, POWDER, LYOPHILIZED, FOR SOLUTION INTRAVENOUS at 19:04

## 2025-04-26 RX ADMIN — POTASSIUM BICARBONATE 40 MEQ: 782 TABLET, EFFERVESCENT ORAL at 10:29

## 2025-04-26 RX ADMIN — DICYCLOMINE HYDROCHLORIDE 10 MG: 10 CAPSULE ORAL at 10:29

## 2025-04-26 RX ADMIN — PIPERACILLIN AND TAZOBACTAM 3375 MG: 3; .375 INJECTION, POWDER, LYOPHILIZED, FOR SOLUTION INTRAVENOUS at 10:59

## 2025-04-26 RX ADMIN — VALACYCLOVIR HYDROCHLORIDE 500 MG: 500 TABLET, FILM COATED ORAL at 20:52

## 2025-04-26 RX ADMIN — MAGNESIUM SULFATE HEPTAHYDRATE 1000 MG: 1 INJECTION, SOLUTION INTRAVENOUS at 20:00

## 2025-04-26 RX ADMIN — Medication 6 MG: at 20:08

## 2025-04-26 RX ADMIN — SODIUM CHLORIDE, PRESERVATIVE FREE 10 ML: 5 INJECTION INTRAVENOUS at 20:10

## 2025-04-26 RX ADMIN — CALCIUM CARBONATE 500 MG: 500 TABLET, CHEWABLE ORAL at 10:29

## 2025-04-26 RX ADMIN — CALCIUM CARBONATE 500 MG: 500 TABLET, CHEWABLE ORAL at 20:08

## 2025-04-26 RX ADMIN — ENOXAPARIN SODIUM 30 MG: 100 INJECTION SUBCUTANEOUS at 10:29

## 2025-04-26 RX ADMIN — DICYCLOMINE HYDROCHLORIDE 10 MG: 10 CAPSULE ORAL at 20:08

## 2025-04-26 ASSESSMENT — PAIN SCALES - GENERAL
PAINLEVEL_OUTOF10: 5
PAINLEVEL_OUTOF10: 4
PAINLEVEL_OUTOF10: 6
PAINLEVEL_OUTOF10: 3

## 2025-04-26 ASSESSMENT — PAIN DESCRIPTION - LOCATION
LOCATION: ABDOMEN

## 2025-04-26 ASSESSMENT — PAIN DESCRIPTION - DESCRIPTORS
DESCRIPTORS: CRAMPING
DESCRIPTORS: CRAMPING

## 2025-04-26 ASSESSMENT — PAIN - FUNCTIONAL ASSESSMENT
PAIN_FUNCTIONAL_ASSESSMENT: ACTIVITIES ARE NOT PREVENTED
PAIN_FUNCTIONAL_ASSESSMENT: PREVENTS OR INTERFERES SOME ACTIVE ACTIVITIES AND ADLS

## 2025-04-26 ASSESSMENT — PAIN DESCRIPTION - ORIENTATION
ORIENTATION: MID
ORIENTATION: RIGHT;LEFT

## 2025-04-26 NOTE — PROGRESS NOTES
Pt alert and orientated. Call light within reach. No complaints at this time. Enc pt to call for assistance as needed.Jannette Lara RN

## 2025-04-26 NOTE — PROGRESS NOTES
[facility] Medicine Progress Note  V 1.6      Date of Admission: 4/23/2025    Hospital Day: 4  Status:  Inpatient     Chief Admission Complaint:  abdominal pain    Presenting Admission History:       64 y.o. female who presented to [facility] with abdominal pain.  PMHx significant for HTN.  History obtained from the patient and review of EMR.  The patient stated she had a hemicolectomy in February 2025 in [facility] and since then has been experiencing intermittent abdominal pain.  Per chart review, the patient has been readmitted 1 time since her surgery for electrolyte abnormalities.  However, the patient stated her pain has gotten progressively worse over the last couple of weeks.  She reports speaking with GI and they ordered a CT outpatient today.  Patient  is at the bedside and stated that she was called and told to go to the emergency department due to \"a collection of pus\" seen on the CT. In the emergency department a CT abdomen pelvis was obtained that revealed Fluid and gas collection within the pelvis interposed between the colon and uterus measuring up to 4.1 cm.  Abscess is favored.  This appears largely enveloped by abdominal and adnexal structures.  A [place] uterine fistula is not excluded.  Additional small dependent collection within the region of the pouch of Alfred measuring up to 2.5 cm.  Mild dilation of small bowel loops which may indicate partial obstruction.  There is no high-grade small bowel obstruction.  The patient was given morphine for pain.  She was also started on Zosyn.  Upon further evaluation, the patient was found to be dehydrated with hyponatremia.  This is likely secondary to inadequate p.o. intake.  She was admitted for further evaluation and treatment.     24 Hour Events: pt with abd pain          Subjective:  will need follow up CT tomorrow or Monday to re-evaluate abscess  Stool cx neg  Pt

## 2025-04-26 NOTE — PLAN OF CARE
Problem: Pain  Goal: Verbalizes/displays adequate comfort level or baseline comfort level  4/25/2025 2033 by Yenifer Augustine RN  Outcome: Progressing  Flowsheets (Taken 4/25/2025 2033)  Verbalizes/displays adequate comfort level or baseline comfort level:   Assess pain using appropriate pain scale   Encourage patient to monitor pain and request assistance   Administer analgesics based on type and severity of pain and evaluate response   Consider cultural and social influences on pain and pain management   Notify Licensed Independent Practitioner if interventions unsuccessful or patient reports new pain   Implement non-pharmacological measures as appropriate and evaluate response     Problem: Safety - Adult  Goal: Free from fall injury  4/25/2025 2033 by Yenifer Augustine RN  Outcome: Progressing  Flowsheets (Taken 4/25/2025 2033)  Free From Fall Injury:   Based on caregiver fall risk screen, instruct family/caregiver to ask for assistance with transferring infant if caregiver noted to have fall risk factors   Instruct family/caregiver on patient safety     Problem: ABCDS Injury Assessment  Goal: Absence of physical injury  4/25/2025 2033 by Yenifer Augustine RN  Outcome: Progressing  Flowsheets (Taken 4/25/2025 2033)  Absence of Physical Injury: Implement safety measures based on patient assessment     Problem: Nutrition Deficit:  Goal: Optimize nutritional status  4/25/2025 2033 by Yenifer Augustine RN  Outcome: Progressing  Flowsheets (Taken 4/25/2025 2033)  Nutrient intake appropriate for improving, restoring, or maintaining nutritional needs:   Assess nutritional status and recommend course of action   Monitor oral intake, labs, and treatment plans   Recommend appropriate diets, oral nutritional supplements, and vitamin/mineral supplements   Order, calculate, and assess calorie counts as needed   Recommend, monitor, and adjust tube feedings and TPN/PPN based on assessed needs   Provide specific nutrition education to patient or

## 2025-04-26 NOTE — PROGRESS NOTES
[facility] SURGERY    PATIENT NAME: Tuyet Richter     TODAY'S DATE: 4/26/2025    CHIEF COMPLAINT: abd pain    INTERVAL HISTORY/HPI:    Pt notes she is doing okay and feeling some better. Having loose stools. No nausea     OBJECTIVE:  VITALS:  /74   Pulse 90   Temp 97.7 °F (36.5 °C) (Oral)   Resp 16   Ht 1.6 m (5' 3\")   Wt 50.1 kg (110 lb 6.4 oz)   SpO2 99%   BMI 19.56 kg/m²     INTAKE/OUTPUT:    I/O last 3 completed shifts:  In: 706.6 [P.O.:580; I.V.:45.2; IV Piggyback:81.4]  Out: -   No intake/output data recorded.    CONSTITUTIONAL:  awake and alert  LUNGS: no crackles or wheezes    ABDOMEN: soft, mildly distended, mildly tender lower abd    Data:  CBC:   Recent Labs     04/23/25  1837 04/24/25  0449   WBC 8.5 9.8   HGB 9.3* 8.8*   HCT 27.1* 25.7*   * 539*     BMP:    Recent Labs     04/24/25  0449 04/25/25  1534 04/26/25  0426    135* 135*   K 3.6 3.6 3.6    98* 99   CO2 22 24 23   BUN 6* 3* <2*   CREATININE 0.5* 0.5* 0.5*   GLUCOSE 90 84 88     Hepatic:   Recent Labs     04/23/25  1837   AST 15   ALT 7*   BILITOT <0.2   ALKPHOS 96     Mag:      Recent Labs     04/25/25  1534   MG 1.52*        ASSESSMENT AND PLAN:  Pelvic abscess of unclear origin or duration, clinical significance in pt 8 weeks post right colectomy at [facility] in [place].      Continue with current conservative management with IV antibiotics and bowel rest (ok to continue clear liquids).     - will likely get repeat CT in 1-2 days to reassess status of pelvic abscesses, or as needed if a change in clinical status warrants earlier imaging    Will follow    Electronically signed by SARAI Arciniega - [facility]     SURGERY STAFF      I have personally performed a face to face diagnostic evaluation on this patient and agree with the progress note and care plan of Holly Davila NP.   More than half of the time spent on this encounter was completed by me including the history, physical examination and the entire

## 2025-04-27 ENCOUNTER — APPOINTMENT (OUTPATIENT)
Dept: CT IMAGING | Age: 65
DRG: 856 | End: 2025-04-27
Payer: MEDICARE

## 2025-04-27 LAB
ANION GAP SERPL CALCULATED.3IONS-SCNC: 10 MMOL/L (ref 3–16)
BUN SERPL-MCNC: <2 MG/DL (ref 7–20)
CALCIUM SERPL-MCNC: 9 MG/DL (ref 8.3–10.6)
CHLORIDE SERPL-SCNC: 104 MMOL/L (ref 99–110)
CO2 SERPL-SCNC: 24 MMOL/L (ref 21–32)
CREAT SERPL-MCNC: 0.5 MG/DL (ref 0.6–1.2)
DEPRECATED RDW RBC AUTO: 14.9 % (ref 12.4–15.4)
GFR SERPLBLD CREATININE-BSD FMLA CKD-EPI: >90 ML/MIN/{1.73_M2}
GLUCOSE SERPL-MCNC: 88 MG/DL (ref 70–99)
HCT VFR BLD AUTO: 27.7 % (ref 36–48)
HGB BLD-MCNC: 9.4 G/DL (ref 12–16)
MAGNESIUM SERPL-MCNC: 1.95 MG/DL (ref 1.8–2.4)
MCH RBC QN AUTO: 31.7 PG (ref 26–34)
MCHC RBC AUTO-ENTMCNC: 34 G/DL (ref 31–36)
MCV RBC AUTO: 93.1 FL (ref 80–100)
PLATELET # BLD AUTO: 571 K/UL (ref 135–450)
PMV BLD AUTO: 9.1 FL (ref 5–10.5)
POTASSIUM SERPL-SCNC: 3.7 MMOL/L (ref 3.5–5.1)
RBC # BLD AUTO: 2.97 M/UL (ref 4–5.2)
SODIUM SERPL-SCNC: 138 MMOL/L (ref 136–145)
WBC # BLD AUTO: 4.6 K/UL (ref 4–11)

## 2025-04-27 PROCEDURE — 1200000000 HC SEMI PRIVATE

## 2025-04-27 PROCEDURE — 6360000002 HC RX W HCPCS: Performed by: NURSE PRACTITIONER

## 2025-04-27 PROCEDURE — APPNB45 APP NON BILLABLE 31-45 MINUTES: Performed by: CLINICAL NURSE SPECIALIST

## 2025-04-27 PROCEDURE — 2580000003 HC RX 258: Performed by: NURSE PRACTITIONER

## 2025-04-27 PROCEDURE — 6370000000 HC RX 637 (ALT 250 FOR IP): Performed by: INTERNAL MEDICINE

## 2025-04-27 PROCEDURE — 6360000004 HC RX CONTRAST MEDICATION: Performed by: SURGERY

## 2025-04-27 PROCEDURE — 74176 CT ABD & PELVIS W/O CONTRAST: CPT

## 2025-04-27 PROCEDURE — 85027 COMPLETE CBC AUTOMATED: CPT

## 2025-04-27 PROCEDURE — 6360000002 HC RX W HCPCS: Performed by: SURGERY

## 2025-04-27 PROCEDURE — 6370000000 HC RX 637 (ALT 250 FOR IP): Performed by: NURSE PRACTITIONER

## 2025-04-27 PROCEDURE — 2580000003 HC RX 258: Performed by: SURGERY

## 2025-04-27 PROCEDURE — 99232 SBSQ HOSP IP/OBS MODERATE 35: CPT | Performed by: SURGERY

## 2025-04-27 PROCEDURE — 83735 ASSAY OF MAGNESIUM: CPT

## 2025-04-27 PROCEDURE — 36415 COLL VENOUS BLD VENIPUNCTURE: CPT

## 2025-04-27 PROCEDURE — 80048 BASIC METABOLIC PNL TOTAL CA: CPT

## 2025-04-27 PROCEDURE — 2500000003 HC RX 250 WO HCPCS: Performed by: NURSE PRACTITIONER

## 2025-04-27 PROCEDURE — 74177 CT ABD & PELVIS W/CONTRAST: CPT

## 2025-04-27 PROCEDURE — 6360000002 HC RX W HCPCS: Performed by: INTERNAL MEDICINE

## 2025-04-27 RX ORDER — DICYCLOMINE HCL 20 MG
20 TABLET ORAL 4 TIMES DAILY PRN
Status: DISCONTINUED | OUTPATIENT
Start: 2025-04-27 | End: 2025-04-27

## 2025-04-27 RX ORDER — DIATRIZOATE MEGLUMINE AND DIATRIZOATE SODIUM 660; 100 MG/ML; MG/ML
12 SOLUTION ORAL; RECTAL
Status: DISCONTINUED | OUTPATIENT
Start: 2025-04-27 | End: 2025-05-28 | Stop reason: ALTCHOICE

## 2025-04-27 RX ORDER — SODIUM CHLORIDE 9 MG/ML
INJECTION, SOLUTION INTRAVENOUS CONTINUOUS
Status: DISCONTINUED | OUTPATIENT
Start: 2025-04-27 | End: 2025-05-06

## 2025-04-27 RX ORDER — IOPAMIDOL 755 MG/ML
75 INJECTION, SOLUTION INTRAVASCULAR
Status: COMPLETED | OUTPATIENT
Start: 2025-04-27 | End: 2025-04-27

## 2025-04-27 RX ORDER — MORPHINE SULFATE 4 MG/ML
4 INJECTION, SOLUTION INTRAMUSCULAR; INTRAVENOUS
Status: DISCONTINUED | OUTPATIENT
Start: 2025-04-27 | End: 2025-05-01

## 2025-04-27 RX ORDER — MAGNESIUM SULFATE IN WATER 40 MG/ML
2000 INJECTION, SOLUTION INTRAVENOUS ONCE
Status: COMPLETED | OUTPATIENT
Start: 2025-04-27 | End: 2025-04-27

## 2025-04-27 RX ORDER — MORPHINE SULFATE 2 MG/ML
2 INJECTION, SOLUTION INTRAMUSCULAR; INTRAVENOUS
Status: DISCONTINUED | OUTPATIENT
Start: 2025-04-27 | End: 2025-05-01

## 2025-04-27 RX ADMIN — POTASSIUM BICARBONATE 40 MEQ: 782 TABLET, EFFERVESCENT ORAL at 10:45

## 2025-04-27 RX ADMIN — ONDANSETRON 4 MG: 2 INJECTION, SOLUTION INTRAMUSCULAR; INTRAVENOUS at 20:44

## 2025-04-27 RX ADMIN — SODIUM CHLORIDE: 0.9 INJECTION, SOLUTION INTRAVENOUS at 21:03

## 2025-04-27 RX ADMIN — CALCIUM CARBONATE 500 MG: 500 TABLET, CHEWABLE ORAL at 20:45

## 2025-04-27 RX ADMIN — Medication 400 MG: at 09:55

## 2025-04-27 RX ADMIN — ENOXAPARIN SODIUM 30 MG: 100 INJECTION SUBCUTANEOUS at 09:56

## 2025-04-27 RX ADMIN — CALCIUM CARBONATE 500 MG: 500 TABLET, CHEWABLE ORAL at 09:55

## 2025-04-27 RX ADMIN — PIPERACILLIN AND TAZOBACTAM 3375 MG: 3; .375 INJECTION, POWDER, LYOPHILIZED, FOR SOLUTION INTRAVENOUS at 03:01

## 2025-04-27 RX ADMIN — VALACYCLOVIR HYDROCHLORIDE 500 MG: 500 TABLET, FILM COATED ORAL at 10:02

## 2025-04-27 RX ADMIN — SODIUM CHLORIDE, PRESERVATIVE FREE 10 ML: 5 INJECTION INTRAVENOUS at 09:56

## 2025-04-27 RX ADMIN — OXYCODONE AND ACETAMINOPHEN 2 TABLET: 5; 325 TABLET ORAL at 13:47

## 2025-04-27 RX ADMIN — PIPERACILLIN AND TAZOBACTAM 3375 MG: 3; .375 INJECTION, POWDER, LYOPHILIZED, FOR SOLUTION INTRAVENOUS at 12:07

## 2025-04-27 RX ADMIN — MORPHINE SULFATE 2 MG: 2 INJECTION, SOLUTION INTRAMUSCULAR; INTRAVENOUS at 14:41

## 2025-04-27 RX ADMIN — DIATRIZOATE MEGLUMINE AND DIATRIZOATE SODIUM 12 ML: 660; 100 LIQUID ORAL; RECTAL at 15:57

## 2025-04-27 RX ADMIN — MAGNESIUM SULFATE HEPTAHYDRATE 2000 MG: 40 INJECTION, SOLUTION INTRAVENOUS at 09:59

## 2025-04-27 RX ADMIN — DICYCLOMINE HYDROCHLORIDE 10 MG: 10 CAPSULE ORAL at 13:47

## 2025-04-27 RX ADMIN — MEROPENEM 1000 MG: 1 INJECTION INTRAVENOUS at 21:06

## 2025-04-27 RX ADMIN — Medication 400 MG: at 20:45

## 2025-04-27 RX ADMIN — IOPAMIDOL 75 ML: 755 INJECTION, SOLUTION INTRAVENOUS at 17:27

## 2025-04-27 RX ADMIN — MORPHINE SULFATE 2 MG: 2 INJECTION, SOLUTION INTRAMUSCULAR; INTRAVENOUS at 22:50

## 2025-04-27 RX ADMIN — MORPHINE SULFATE 2 MG: 2 INJECTION, SOLUTION INTRAMUSCULAR; INTRAVENOUS at 17:41

## 2025-04-27 RX ADMIN — SODIUM CHLORIDE, PRESERVATIVE FREE 10 ML: 5 INJECTION INTRAVENOUS at 20:45

## 2025-04-27 RX ADMIN — PIPERACILLIN AND TAZOBACTAM 3375 MG: 3; .375 INJECTION, POWDER, LYOPHILIZED, FOR SOLUTION INTRAVENOUS at 18:33

## 2025-04-27 RX ADMIN — DICYCLOMINE HYDROCHLORIDE 10 MG: 10 CAPSULE ORAL at 09:55

## 2025-04-27 RX ADMIN — DICYCLOMINE HYDROCHLORIDE 10 MG: 10 CAPSULE ORAL at 20:45

## 2025-04-27 RX ADMIN — OXYCODONE AND ACETAMINOPHEN 2 TABLET: 5; 325 TABLET ORAL at 18:36

## 2025-04-27 RX ADMIN — Medication 6 MG: at 20:45

## 2025-04-27 RX ADMIN — MORPHINE SULFATE 2 MG: 2 INJECTION, SOLUTION INTRAMUSCULAR; INTRAVENOUS at 20:44

## 2025-04-27 ASSESSMENT — PAIN SCALES - GENERAL
PAINLEVEL_OUTOF10: 7
PAINLEVEL_OUTOF10: 8
PAINLEVEL_OUTOF10: 9
PAINLEVEL_OUTOF10: 9
PAINLEVEL_OUTOF10: 8
PAINLEVEL_OUTOF10: 10
PAINLEVEL_OUTOF10: 5
PAINLEVEL_OUTOF10: 10

## 2025-04-27 ASSESSMENT — PAIN DESCRIPTION - ORIENTATION
ORIENTATION: MID
ORIENTATION: MID
ORIENTATION: UPPER

## 2025-04-27 ASSESSMENT — PAIN DESCRIPTION - DESCRIPTORS
DESCRIPTORS: ACHING;CRAMPING
DESCRIPTORS: ACHING
DESCRIPTORS: CRAMPING;ACHING
DESCRIPTORS: CRAMPING;ACHING
DESCRIPTORS: ACHING;DISCOMFORT
DESCRIPTORS: ACHING
DESCRIPTORS: CRAMPING

## 2025-04-27 ASSESSMENT — PAIN DESCRIPTION - LOCATION
LOCATION: ABDOMEN

## 2025-04-27 NOTE — PROGRESS NOTES
Gave pt prn oral pain meds with sip of water. Pt then up to BR. Pt became nauseated and vomited clear liquid. Did not appear to have meds in it. Pt returned to bed and resting comfortably.

## 2025-04-27 NOTE — PLAN OF CARE
Problem: Pain  Goal: Verbalizes/displays adequate comfort level or baseline comfort level  Outcome: Progressing  Flowsheets (Taken 4/27/2025 1106)  Verbalizes/displays adequate comfort level or baseline comfort level:   Encourage patient to monitor pain and request assistance   Assess pain using appropriate pain scale   Administer analgesics based on type and severity of pain and evaluate response   Implement non-pharmacological measures as appropriate and evaluate response     Problem: Safety - Adult  Goal: Free from fall injury  Outcome: Progressing  Flowsheets (Taken 4/27/2025 1106)  Free From Fall Injury: Instruct family/caregiver on patient safety     Problem: ABCDS Injury Assessment  Goal: Absence of physical injury  Outcome: Progressing  Flowsheets (Taken 4/25/2025 2033 by Yenifer Augustine, RN)  Absence of Physical Injury: Implement safety measures based on patient assessment     Problem: Nutrition Deficit:  Goal: Optimize nutritional status  Outcome: Progressing  Flowsheets (Taken 4/27/2025 1106)  Nutrient intake appropriate for improving, restoring, or maintaining nutritional needs: Monitor oral intake, labs, and treatment plans

## 2025-04-27 NOTE — PROGRESS NOTES
CT this morning showed stable pelvic fluid collection. As not reachable per IR before and with improved symptoms and labs plan was to advance diet and continue with antibiotics. After full liquids patient reported pain worse than ever before so will make npo again and repeat CT with oral and IV contrast.    Carroll Guerrero

## 2025-04-27 NOTE — PROGRESS NOTES
Pt assessment completed and charted. VSS. Pt a/ox4. Pt reported pain 5-10/10 this shift. Pt was not nauseated after eating fulls for lunch, but reported that pain got even worse, surgery was update. Pt reported cramping and gas pains. Pt educated on prn pain meds, meds given per mar and heat pack applied to abd. Pt able to ambulate to BR. Pt denies any other needs at this time.  Pt calls out appropriately.       Pt is a fall risk;  -Bed in lowest position and wheels locked.  -Call light within reach.   -Bedside table within reach.   -Non-skid footwear in place.

## 2025-04-27 NOTE — PROGRESS NOTES
[facility] Medicine Progress Note  V 1.6      Date of Admission: 4/23/2025    Hospital Day: 5  Status:  Inpatient     Chief Admission Complaint:  abdominal pain    Presenting Admission History:       64 y.o. female who presented to [facility] with abdominal pain.  PMHx significant for HTN.  History obtained from the patient and review of EMR.  The patient stated she had a hemicolectomy in February 2025 in [facility] and since then has been experiencing intermittent abdominal pain.  Per chart review, the patient has been readmitted 1 time since her surgery for electrolyte abnormalities.  However, the patient stated her pain has gotten progressively worse over the last couple of weeks.  She reports speaking with GI and they ordered a CT outpatient today.  Patient  is at the bedside and stated that she was called and told to go to the emergency department due to \"a collection of pus\" seen on the CT. In the emergency department a CT abdomen pelvis was obtained that revealed Fluid and gas collection within the pelvis interposed between the colon and uterus measuring up to 4.1 cm.  Abscess is favored.  This appears largely enveloped by abdominal and adnexal structures.  A [place] uterine fistula is not excluded.  Additional small dependent collection within the region of the pouch of Alfred measuring up to 2.5 cm.  Mild dilation of small bowel loops which may indicate partial obstruction.  There is no high-grade small bowel obstruction.  The patient was given morphine for pain.  She was also started on Zosyn.  Upon further evaluation, the patient was found to be dehydrated with hyponatremia.  This is likely secondary to inadequate p.o. intake.  She was admitted for further evaluation and treatment.     24 Hour Events: pt with abd pain and diarrhea ongoing, but mild        Subjective:  will need follow up CT today  Pt  denies significant pain

## 2025-04-27 NOTE — PROGRESS NOTES
[facility] SURGERY    PATIENT NAME: Tuyet Richter     TODAY'S DATE: 4/27/2025    CHIEF COMPLAINT: abd pain    INTERVAL HISTORY/HPI:    Pt resting comfortably.      OBJECTIVE:  VITALS:  /75   Pulse 89   Temp 97.7 °F (36.5 °C) (Oral)   Resp 18   Ht 1.6 m (5' 3\")   Wt 50.1 kg (110 lb 6.4 oz)   SpO2 99%   BMI 19.56 kg/m²     INTAKE/OUTPUT:    I/O last 3 completed shifts:  In: 1240 [P.O.:1240]  Out: -   No intake/output data recorded.    CONSTITUTIONAL:  awake and alert  LUNGS: no crackles or wheezes    ABDOMEN: soft, mildly distended, mildly tender lower abd    Data:  CBC:   Recent Labs     04/27/25  0518   WBC 4.6   HGB 9.4*   HCT 27.7*   *     BMP:    Recent Labs     04/25/25  1534 04/26/25  0426 04/27/25  0519   * 135* 138   K 3.6 3.6 3.7   CL 98* 99 104   CO2 24 23 24   BUN 3* <2* <2*   CREATININE 0.5* 0.5* 0.5*   GLUCOSE 84 88 88     Hepatic:   No results for input(s): \"AST\", \"ALT\", \"BILITOT\", \"ALKPHOS\" in the last 72 hours.    Invalid input(s): \"ALB\"    Mag:      Recent Labs     04/25/25  1534 04/26/25  0426 04/27/25  0519   MG 1.52* 1.47* 1.95        ASSESSMENT AND PLAN:  Pelvic abscess of unclear origin or duration, clinical significance in pt 8 weeks post right colectomy at OSH in [place].      Continue with current conservative management with IV antibiotics and bowel rest (ok to continue clear liquids).    Plan to repeat CT scan today to further assess. NPO for now    Electronically signed by SARAI Arciniega - PAMELLA       SURGERY STAFF      I have personally performed a face to face diagnostic evaluation on this patient and agree with the progress note and care plan of Holly Davila NP.   More than half of the time spent on this encounter was completed by me including the history, physical examination and the entire medical decision making.     My findings are as follows:   mild pain, no nausea  PHYSICAL EXAM:  VITALS:  /72   Pulse 90   Temp 97.5 °F (36.4 °C)  (Oral)   Resp 16   Ht 1.6 m (5' 3\")   Wt 50.1 kg (110 lb 6.4 oz)   SpO2 99%   BMI 19.56 kg/m²   24HR INTAKE/OUTPUT:    I/O last 3 completed shifts:  In: 1240 [P.O.:1240]  Out: -   No intake/output data recorded.    CONSTITUTIONAL:  awake and alert  LUNGS:  Respirations easy and unlabored, no crackles or wheezing  CARD:  regular rate and rhythm  ABDOMEN:  normal bowel sounds, soft, non-distended, mild tender     All laboratory data and imaging personally reviewed by me.        Assessment and Plan:    65 yo with pelvic abscess s/p right colectomy  F/u on CT results.  May be difficult to interpret as not given oral contrast.  If abscess seen and enlarging then will discuss with IR perc drainage feasibility.  If improving then diet as tolerated and home on abx in next 1-2 days.    Carroll Guerrero MD

## 2025-04-27 NOTE — PLAN OF CARE
Problem: Pain  Goal: Verbalizes/displays adequate comfort level or baseline comfort level  4/26/2025 2005 by Yenifer Augustine RN  Outcome: Progressing  Flowsheets (Taken 4/25/2025 2033)  Verbalizes/displays adequate comfort level or baseline comfort level:   Assess pain using appropriate pain scale   Encourage patient to monitor pain and request assistance   Administer analgesics based on type and severity of pain and evaluate response   Consider cultural and social influences on pain and pain management   Notify Licensed Independent Practitioner if interventions unsuccessful or patient reports new pain   Implement non-pharmacological measures as appropriate and evaluate response     Problem: Safety - Adult  Goal: Free from fall injury  4/26/2025 2005 by Yenifer Augustine RN  Outcome: Progressing  Flowsheets (Taken 4/25/2025 2033)  Free From Fall Injury:   Based on caregiver fall risk screen, instruct family/caregiver to ask for assistance with transferring infant if caregiver noted to have fall risk factors   Instruct family/caregiver on patient safety     Problem: ABCDS Injury Assessment  Goal: Absence of physical injury  4/26/2025 2005 by Yenifer Augustine RN  Outcome: Progressing  Flowsheets (Taken 4/25/2025 2033)  Absence of Physical Injury: Implement safety measures based on patient assessment     Problem: Nutrition Deficit:  Goal: Optimize nutritional status  4/26/2025 2005 by Yenifer Augustine RN  Outcome: Progressing  Flowsheets (Taken 4/25/2025 2033)  Nutrient intake appropriate for improving, restoring, or maintaining nutritional needs:   Assess nutritional status and recommend course of action   Monitor oral intake, labs, and treatment plans   Recommend appropriate diets, oral nutritional supplements, and vitamin/mineral supplements   Order, calculate, and assess calorie counts as needed   Recommend, monitor, and adjust tube feedings and TPN/PPN based on assessed needs   Provide specific nutrition education to patient or

## 2025-04-27 NOTE — PROGRESS NOTES
Paged surgery that pt was concerned since her pain is still 7/10, worse than it has been. Pt wanted to know if he reviewed the CT. Pt also felt that she needs to be seen since such a change has occurred. Updated surgery with his and pt that I paged the surgery team with concerns. Pt resting in bed at this time.         Surgery read at 1915.    Surgery called writers phone and writer let Pt speak with surgery. New orders placed by surgery. RN updated on coming night shift RN.

## 2025-04-27 NOTE — PROGRESS NOTES
Paged surgery that pt was reporting gas pains. Also that pt reported pain 10/10 w/ cramping. That pain has gotten increasingly worse since eating lunch, that it was the worst pain she has had while she's been here.     Assessed pt at this time. Pt had bowel sounds, abd still soft and distention not increased at this time.       See surgeries not and new orders placed.

## 2025-04-27 NOTE — PROGRESS NOTES
Patient with continued abd pain.  Vitals have been stable.  Had bm today.  CT reviewed.  Given persistent abscess and question of possible fistulization there is a possibility that this is sigmoid diverticulitis (has had before and much diverticulosis on imaging) related and not secondary to operation from February on right colon.  As such, would still attempt to avoid operation acutely.  Will change abx to merrem, restart ivf and ok for clear liquids.  Discussed with patient.    Carroll hahn MD

## 2025-04-28 ENCOUNTER — ANESTHESIA EVENT (OUTPATIENT)
Dept: OPERATING ROOM | Age: 65
End: 2025-04-28
Payer: MEDICARE

## 2025-04-28 PROBLEM — K57.92 DIVERTICULITIS: Status: ACTIVE | Noted: 2025-04-23

## 2025-04-28 LAB
ANION GAP SERPL CALCULATED.3IONS-SCNC: 13 MMOL/L (ref 3–16)
BASOPHILS # BLD: 0.1 K/UL (ref 0–0.2)
BASOPHILS NFR BLD: 1.1 %
BUN SERPL-MCNC: <2 MG/DL (ref 7–20)
CALCIUM SERPL-MCNC: 9.5 MG/DL (ref 8.3–10.6)
CHLORIDE SERPL-SCNC: 98 MMOL/L (ref 99–110)
CO2 SERPL-SCNC: 24 MMOL/L (ref 21–32)
CREAT SERPL-MCNC: 0.5 MG/DL (ref 0.6–1.2)
DEPRECATED RDW RBC AUTO: 14.7 % (ref 12.4–15.4)
EOSINOPHIL # BLD: 0.1 K/UL (ref 0–0.6)
EOSINOPHIL NFR BLD: 1.1 %
GFR SERPLBLD CREATININE-BSD FMLA CKD-EPI: >90 ML/MIN/{1.73_M2}
GLUCOSE SERPL-MCNC: 107 MG/DL (ref 70–99)
HCT VFR BLD AUTO: 31 % (ref 36–48)
HGB BLD-MCNC: 10.4 G/DL (ref 12–16)
LYMPHOCYTES # BLD: 1.3 K/UL (ref 1–5.1)
LYMPHOCYTES NFR BLD: 15.9 %
MAGNESIUM SERPL-MCNC: 1.72 MG/DL (ref 1.8–2.4)
MCH RBC QN AUTO: 31.4 PG (ref 26–34)
MCHC RBC AUTO-ENTMCNC: 33.7 G/DL (ref 31–36)
MCV RBC AUTO: 93.1 FL (ref 80–100)
MONOCYTES # BLD: 0.7 K/UL (ref 0–1.3)
MONOCYTES NFR BLD: 8.9 %
NEUTROPHILS # BLD: 6 K/UL (ref 1.7–7.7)
NEUTROPHILS NFR BLD: 73 %
PLATELET # BLD AUTO: 710 K/UL (ref 135–450)
PMV BLD AUTO: 8.9 FL (ref 5–10.5)
POTASSIUM SERPL-SCNC: 4.4 MMOL/L (ref 3.5–5.1)
RBC # BLD AUTO: 3.33 M/UL (ref 4–5.2)
SODIUM SERPL-SCNC: 135 MMOL/L (ref 136–145)
WBC # BLD AUTO: 8.3 K/UL (ref 4–11)

## 2025-04-28 PROCEDURE — 85025 COMPLETE CBC W/AUTO DIFF WBC: CPT

## 2025-04-28 PROCEDURE — 36415 COLL VENOUS BLD VENIPUNCTURE: CPT

## 2025-04-28 PROCEDURE — APPNB45 APP NON BILLABLE 31-45 MINUTES: Performed by: CLINICAL NURSE SPECIALIST

## 2025-04-28 PROCEDURE — 1200000000 HC SEMI PRIVATE

## 2025-04-28 PROCEDURE — 2500000003 HC RX 250 WO HCPCS: Performed by: NURSE PRACTITIONER

## 2025-04-28 PROCEDURE — 6360000002 HC RX W HCPCS: Performed by: SURGERY

## 2025-04-28 PROCEDURE — 6360000002 HC RX W HCPCS: Performed by: NURSE PRACTITIONER

## 2025-04-28 PROCEDURE — 6370000000 HC RX 637 (ALT 250 FOR IP): Performed by: INTERNAL MEDICINE

## 2025-04-28 PROCEDURE — 6370000000 HC RX 637 (ALT 250 FOR IP): Performed by: NURSE PRACTITIONER

## 2025-04-28 PROCEDURE — 2580000003 HC RX 258: Performed by: SURGERY

## 2025-04-28 PROCEDURE — 80048 BASIC METABOLIC PNL TOTAL CA: CPT

## 2025-04-28 PROCEDURE — 99232 SBSQ HOSP IP/OBS MODERATE 35: CPT | Performed by: SURGERY

## 2025-04-28 PROCEDURE — 83735 ASSAY OF MAGNESIUM: CPT

## 2025-04-28 RX ORDER — METOPROLOL SUCCINATE 25 MG/1
25 TABLET, EXTENDED RELEASE ORAL DAILY
Status: DISCONTINUED | OUTPATIENT
Start: 2025-04-28 | End: 2025-05-20

## 2025-04-28 RX ORDER — AMLODIPINE BESYLATE 2.5 MG/1
2.5 TABLET ORAL DAILY
Status: DISCONTINUED | OUTPATIENT
Start: 2025-04-28 | End: 2025-05-07

## 2025-04-28 RX ADMIN — AMLODIPINE BESYLATE 2.5 MG: 2.5 TABLET ORAL at 12:59

## 2025-04-28 RX ADMIN — MEROPENEM 1000 MG: 1 INJECTION INTRAVENOUS at 04:16

## 2025-04-28 RX ADMIN — DICYCLOMINE HYDROCHLORIDE 10 MG: 10 CAPSULE ORAL at 14:29

## 2025-04-28 RX ADMIN — MORPHINE SULFATE 4 MG: 4 INJECTION, SOLUTION INTRAMUSCULAR; INTRAVENOUS at 05:14

## 2025-04-28 RX ADMIN — ONDANSETRON 4 MG: 2 INJECTION, SOLUTION INTRAMUSCULAR; INTRAVENOUS at 04:12

## 2025-04-28 RX ADMIN — CALCIUM CARBONATE 500 MG: 500 TABLET, CHEWABLE ORAL at 21:16

## 2025-04-28 RX ADMIN — Medication 6 MG: at 21:16

## 2025-04-28 RX ADMIN — SODIUM CHLORIDE, PRESERVATIVE FREE 10 ML: 5 INJECTION INTRAVENOUS at 10:07

## 2025-04-28 RX ADMIN — MEROPENEM 1000 MG: 1 INJECTION INTRAVENOUS at 13:04

## 2025-04-28 RX ADMIN — MEROPENEM 1000 MG: 1 INJECTION INTRAVENOUS at 21:18

## 2025-04-28 RX ADMIN — ONDANSETRON 4 MG: 2 INJECTION, SOLUTION INTRAMUSCULAR; INTRAVENOUS at 10:16

## 2025-04-28 RX ADMIN — METOPROLOL SUCCINATE 25 MG: 25 TABLET, EXTENDED RELEASE ORAL at 12:59

## 2025-04-28 RX ADMIN — DICYCLOMINE HYDROCHLORIDE 10 MG: 10 CAPSULE ORAL at 10:06

## 2025-04-28 RX ADMIN — ENOXAPARIN SODIUM 30 MG: 100 INJECTION SUBCUTANEOUS at 10:07

## 2025-04-28 RX ADMIN — PANTOPRAZOLE SODIUM 40 MG: 40 TABLET, DELAYED RELEASE ORAL at 05:15

## 2025-04-28 RX ADMIN — SODIUM CHLORIDE, PRESERVATIVE FREE 10 ML: 5 INJECTION INTRAVENOUS at 21:19

## 2025-04-28 RX ADMIN — DICYCLOMINE HYDROCHLORIDE 10 MG: 10 CAPSULE ORAL at 21:16

## 2025-04-28 RX ADMIN — Medication 400 MG: at 21:16

## 2025-04-28 RX ADMIN — CALCIUM CARBONATE 500 MG: 500 TABLET, CHEWABLE ORAL at 10:06

## 2025-04-28 ASSESSMENT — PAIN DESCRIPTION - LOCATION
LOCATION: ABDOMEN

## 2025-04-28 ASSESSMENT — PAIN SCALES - GENERAL
PAINLEVEL_OUTOF10: 8
PAINLEVEL_OUTOF10: 3
PAINLEVEL_OUTOF10: 1
PAINLEVEL_OUTOF10: 7

## 2025-04-28 ASSESSMENT — PAIN DESCRIPTION - ORIENTATION: ORIENTATION: RIGHT

## 2025-04-28 ASSESSMENT — PAIN DESCRIPTION - DESCRIPTORS: DESCRIPTORS: ACHING

## 2025-04-28 NOTE — PROGRESS NOTES
Pt has been to the bathroom 2-3 times an hr all night. Pain 8/10, emesis-twice.   Nurse has gave Zofran for nausea and morphine for pain

## 2025-04-28 NOTE — CARE COORDINATION
Hospital day 5: Patient on C3 re intra abdominal abscess care managed by IM and General Surgery. Patient on IV ATB. OR tomorrow if sx not improved. Ambulatory  in room. SW following.GASTON Alexander

## 2025-04-28 NOTE — PLAN OF CARE
Problem: Pain  Goal: Verbalizes/displays adequate comfort level or baseline comfort level  4/28/2025 0953 by Viri Owen, RN  Outcome: Progressing  Flowsheets (Taken 4/28/2025 0953)  Verbalizes/displays adequate comfort level or baseline comfort level:   Encourage patient to monitor pain and request assistance   Assess pain using appropriate pain scale   Administer analgesics based on type and severity of pain and evaluate response   Implement non-pharmacological measures as appropriate and evaluate response     Problem: Safety - Adult  Goal: Free from fall injury  4/28/2025 0953 by Viri Owen, RN  Outcome: Progressing  Flowsheets (Taken 4/28/2025 0953)  Free From Fall Injury:   Instruct family/caregiver on patient safety   Based on caregiver fall risk screen, instruct family/caregiver to ask for assistance with transferring infant if caregiver noted to have fall risk factors     Problem: Nutrition Deficit:  Goal: Optimize nutritional status  4/28/2025 0953 by Viri Owen, RN  Outcome: Progressing  Flowsheets (Taken 4/28/2025 0953)  Nutrient intake appropriate for improving, restoring, or maintaining nutritional needs:   Assess nutritional status and recommend course of action   Monitor oral intake, labs, and treatment plans   Recommend appropriate diets, oral nutritional supplements, and vitamin/mineral supplements

## 2025-04-28 NOTE — PROGRESS NOTES
Pt is alert and oriented. VSS. RA. Pt c/o abdominal discomfort. Pt denied need for narcotic pain medications. Pt given scheduled Bentyl. Pt c/o of nausea. Pt given PRN Zofran per MAR. Pt on clear liquid diet. Pt tolerating clear liquids this afternoon. Pt showered today with CHG and sheets changed. Shift assessment completed and documented. Call light within reach. Bed side table within reach. Wheels locked. Bed in lowest position.  Pt instructed to call out for assistance. Pt expressesed understanding & calls out appropritately. All care per orders. Electronically signed by Viri Owen RN on 4/28/2025 at 3:42 PM

## 2025-04-28 NOTE — PROGRESS NOTES
[facility]    PATIENT NAME: Tuyet Richter     TODAY'S DATE: 4/28/2025    CHIEF COMPLAINT: abd pain, bloating    INTERVAL HISTORY/HPI:    With continued abd pain this AM, and associated bloating. Taking clear liquids. No BMs since yesterday.   With tachycardia     OBJECTIVE:  VITALS:  BP (!) 164/108   Pulse (!) 114   Temp 97.7 °F (36.5 °C) (Oral)   Resp 18   Ht 1.6 m (5' 3\")   Wt 50.1 kg (110 lb 6.4 oz)   SpO2 97%   BMI 19.56 kg/m²     INTAKE/OUTPUT:    I/O last 3 completed shifts:  In: 420 [P.O.:420]  Out: 25 [Emesis/NG output:25]  I/O this shift:  In: 60 [P.O.:50; I.V.:10]  Out: -     CONSTITUTIONAL:  awake and alert  LUNGS: no crackles or wheezes    ABDOMEN: soft, increased distention,  tender lower abd    Data:  CBC:   Recent Labs     04/27/25  0518 04/28/25  0504   WBC 4.6 8.3   HGB 9.4* 10.4*   HCT 27.7* 31.0*   * 710*     BMP:    Recent Labs     04/26/25  0426 04/27/25  0519 04/28/25  0504   * 138 135*   K 3.6 3.7 4.4   CL 99 104 98*   CO2 23 24 24   BUN <2* <2* <2*   CREATININE 0.5* 0.5* 0.5*   GLUCOSE 88 88 107*       Narrative & Impression  EXAM: CT ABDOMEN PELVIS W IV CONTRAST     INDICATION:  abdominal pain     COMPARISON: 4/23/2025 and 4/27/2025     TECHNIQUE: Axial CT imaging obtained through the abdomen and pelvis. Axial images and multiplanar reformatted images were reviewed.   Up-to-date CT equipment and radiation dose reduction techniques were employed.     IV Contrast: 75 cc Omnipaque 350   Oral Contrast: Yes              CT ABDOMEN AND PELVIS:     LOWER CHEST:   Unremarkable.     LIVER:   Normal.     GALLBLADDER AND BILIARY DUCTS:  No calcified gallstones. Nondistended gallbladder.  No intra- or extrahepatic biliary dilatation.     PANCREAS:   Normal.     SPLEEN:   Normal.     ADRENAL GLANDS:   Normal.     KIDNEYS AND URETERS:   Normal.     URINARY BLADDER:  Normal.     REPRODUCTIVE ORGANS:   Small volume gas noted within the vaginal canal is nonspecific.

## 2025-04-28 NOTE — PLAN OF CARE
Problem: Pain  Goal: Verbalizes/displays adequate comfort level or baseline comfort level  4/28/2025 0050 by Yenifer Augustine RN  Outcome: Progressing  Flowsheets (Taken 4/27/2025 1106 by Merissa Simmons, RN)  Verbalizes/displays adequate comfort level or baseline comfort level:   Encourage patient to monitor pain and request assistance   Assess pain using appropriate pain scale   Administer analgesics based on type and severity of pain and evaluate response   Implement non-pharmacological measures as appropriate and evaluate response     Problem: Safety - Adult  Goal: Free from fall injury  4/28/2025 0050 by Yenifer Augustine, RN  Outcome: Progressing  Flowsheets (Taken 4/27/2025 1106 by Merissa Simmons, RN)  Free From Fall Injury: Instruct family/caregiver on patient safety     Problem: ABCDS Injury Assessment  Goal: Absence of physical injury  4/28/2025 0050 by Yenifer Augustine, RN  Outcome: Progressing  Flowsheets (Taken 4/25/2025 2033)  Absence of Physical Injury: Implement safety measures based on patient assessment

## 2025-04-28 NOTE — PROGRESS NOTES
Comprehensive Nutrition Assessment    Type and Reason for Visit:  Reassess    Nutrition Recommendations/Plan:   Continue clear liquids diet per MD.  Should alternate nutrition therapy become appropriate, please consult clinical dietitian.  Monitor pertinent labs, bowel habits, weight, N/V, clinical progression.       Malnutrition Assessment:  Malnutrition Status:  Severe malnutrition (04/24/25 1120)    Context:  Acute Illness     Findings of the 6 clinical characteristics of malnutrition:  Energy Intake:  75% or less of estimated energy requirements for 7 or more days  Weight Loss:  Mild weight loss     Body Fat Loss:  Moderate body fat loss Orbital, Triceps   Muscle Mass Loss:  Moderate muscle mass loss Temples (temporalis), Clavicles (pectoralis & deltoids)  Fluid Accumulation:  No fluid accumulation     Strength:  Not Performed    Nutrition Assessment:    Followup: Patient currently on clear liquids diet. Pts diet had been advanced to full liquids 4/27, however had abdominal pain following advancement. Possible sigmoid diverticulitis per GenSurg note. Per GenSurg, \"Monitor today but if not improving with change in abx possible OR tomorrow.  Discussed with family and patient that this could involve abscess drainage, colectomy, colostomy depending on intra-op findings.\" Pt with abdominal pain and bloating today. LBM 4/27. Should alternate nutrition therapy become appropriate, please consult clinical dietitian. Noted pt is at risk of refeeding due to inadequate oral intakes. Will continue to monitor.    Nutrition Related Findings:    Na 135, glucose 107, Mg 1.72. LBM 4/27. Wound Type: None       Current Nutrition Intake & Therapies:    Average Meal Intake: Unable to assess  Average Supplements Intake: None Ordered  ADULT DIET; Clear Liquid  Diet NPO Exceptions are: Sips of Water with Meds  Diet NPO    Anthropometric Measures:  Height: 160 cm (5' 3\")  Ideal Body Weight (IBW): 115 lbs (52 kg)       Current Body

## 2025-04-28 NOTE — PROGRESS NOTES
[facility] Medicine Progress Note  V 1.6      Date of Admission: 4/23/2025    Hospital Day: 6      Chief Admission Complaint:  Abdominal pain    Subjective:  Less abdominal pain and nausea today compared to yesterday.     Presenting Admission History:       64 y.o. female who presented to [facility] with abdominal pain.  PMHx significant for HTN.  History obtained from the patient and review of EMR.  The patient stated she had a hemicolectomy in February 2025 in [facility] and since then has been experiencing intermittent abdominal pain.  Per chart review, the patient has been readmitted 1 time since her surgery for electrolyte abnormalities.  However, the patient stated her pain has gotten progressively worse over the last couple of weeks.  She reports speaking with GI and they ordered a CT outpatient today.  Patient  is at the bedside and stated that she was called and told to go to the emergency department due to \"a collection of pus\" seen on the CT. In the emergency department a CT abdomen pelvis was obtained that revealed Fluid and gas collection within the pelvis interposed between the colon and uterus measuring up to 4.1 cm.  Abscess is favored.  This appears largely enveloped by abdominal and adnexal structures.  A [place] uterine fistula is not excluded.  Additional small dependent collection within the region of the pouch of Alfred measuring up to 2.5 cm.  Mild dilation of small bowel loops which may indicate partial obstruction.  There is no high-grade small bowel obstruction.  The patient was given morphine for pain.  She was also started on Zosyn.  Upon further evaluation, the patient was found to be dehydrated with hyponatremia.  This is likely secondary to inadequate p.o. intake.  She was admitted for further evaluation and treatment.  IV fluids have been initiated and GI has been consulted for the a.m. The patient denied any other associated symptoms as well as any

## 2025-04-29 ENCOUNTER — ANESTHESIA (OUTPATIENT)
Dept: OPERATING ROOM | Age: 65
End: 2025-04-29
Payer: MEDICARE

## 2025-04-29 LAB
ANION GAP SERPL CALCULATED.3IONS-SCNC: 11 MMOL/L (ref 3–16)
BASOPHILS # BLD: 0.1 K/UL (ref 0–0.2)
BASOPHILS NFR BLD: 0.8 %
BUN SERPL-MCNC: <2 MG/DL (ref 7–20)
CALCIUM SERPL-MCNC: 9.2 MG/DL (ref 8.3–10.6)
CHLORIDE SERPL-SCNC: 102 MMOL/L (ref 99–110)
CO2 SERPL-SCNC: 22 MMOL/L (ref 21–32)
CREAT SERPL-MCNC: 0.6 MG/DL (ref 0.6–1.2)
DEPRECATED RDW RBC AUTO: 15.2 % (ref 12.4–15.4)
EOSINOPHIL # BLD: 0.2 K/UL (ref 0–0.6)
EOSINOPHIL NFR BLD: 1.3 %
GFR SERPLBLD CREATININE-BSD FMLA CKD-EPI: >90 ML/MIN/{1.73_M2}
GLUCOSE BLD-MCNC: 100 MG/DL (ref 70–99)
GLUCOSE BLD-MCNC: 93 MG/DL (ref 70–99)
GLUCOSE BLD-MCNC: 97 MG/DL (ref 70–99)
GLUCOSE SERPL-MCNC: 147 MG/DL (ref 70–99)
HCT VFR BLD AUTO: 31.5 % (ref 36–48)
HGB BLD-MCNC: 10.4 G/DL (ref 12–16)
LYMPHOCYTES # BLD: 1.9 K/UL (ref 1–5.1)
LYMPHOCYTES NFR BLD: 13.6 %
MAGNESIUM SERPL-MCNC: 1.84 MG/DL (ref 1.8–2.4)
MCH RBC QN AUTO: 30.9 PG (ref 26–34)
MCHC RBC AUTO-ENTMCNC: 33 G/DL (ref 31–36)
MCV RBC AUTO: 93.8 FL (ref 80–100)
MONOCYTES # BLD: 1.4 K/UL (ref 0–1.3)
MONOCYTES NFR BLD: 10 %
NEUTROPHILS # BLD: 10.6 K/UL (ref 1.7–7.7)
NEUTROPHILS NFR BLD: 74.3 %
PERFORMED ON: ABNORMAL
PERFORMED ON: NORMAL
PERFORMED ON: NORMAL
PLATELET # BLD AUTO: 685 K/UL (ref 135–450)
PMV BLD AUTO: 8.6 FL (ref 5–10.5)
POTASSIUM SERPL-SCNC: 3.6 MMOL/L (ref 3.5–5.1)
RBC # BLD AUTO: 3.36 M/UL (ref 4–5.2)
SODIUM SERPL-SCNC: 135 MMOL/L (ref 136–145)
WBC # BLD AUTO: 14.3 K/UL (ref 4–11)

## 2025-04-29 PROCEDURE — 6370000000 HC RX 637 (ALT 250 FOR IP): Performed by: NURSE PRACTITIONER

## 2025-04-29 PROCEDURE — 6360000002 HC RX W HCPCS: Performed by: SURGERY

## 2025-04-29 PROCEDURE — 3600000004 HC SURGERY LEVEL 4 BASE: Performed by: SURGERY

## 2025-04-29 PROCEDURE — 2709999900 HC NON-CHARGEABLE SUPPLY: Performed by: SURGERY

## 2025-04-29 PROCEDURE — 3600000014 HC SURGERY LEVEL 4 ADDTL 15MIN: Performed by: SURGERY

## 2025-04-29 PROCEDURE — 36415 COLL VENOUS BLD VENIPUNCTURE: CPT

## 2025-04-29 PROCEDURE — 1200000000 HC SEMI PRIVATE

## 2025-04-29 PROCEDURE — 7100000001 HC PACU RECOVERY - ADDTL 15 MIN: Performed by: SURGERY

## 2025-04-29 PROCEDURE — 85025 COMPLETE CBC W/AUTO DIFF WBC: CPT

## 2025-04-29 PROCEDURE — 99232 SBSQ HOSP IP/OBS MODERATE 35: CPT | Performed by: SURGERY

## 2025-04-29 PROCEDURE — 0W9J40Z DRAINAGE OF PELVIC CAVITY WITH DRAINAGE DEVICE, PERCUTANEOUS ENDOSCOPIC APPROACH: ICD-10-PCS | Performed by: SURGERY

## 2025-04-29 PROCEDURE — 2580000003 HC RX 258: Performed by: SURGERY

## 2025-04-29 PROCEDURE — 7100000000 HC PACU RECOVERY - FIRST 15 MIN: Performed by: SURGERY

## 2025-04-29 PROCEDURE — 6370000000 HC RX 637 (ALT 250 FOR IP): Performed by: INTERNAL MEDICINE

## 2025-04-29 PROCEDURE — 80048 BASIC METABOLIC PNL TOTAL CA: CPT

## 2025-04-29 PROCEDURE — 6360000002 HC RX W HCPCS: Performed by: NURSE ANESTHETIST, CERTIFIED REGISTERED

## 2025-04-29 PROCEDURE — 2500000003 HC RX 250 WO HCPCS: Performed by: NURSE ANESTHETIST, CERTIFIED REGISTERED

## 2025-04-29 PROCEDURE — 2500000003 HC RX 250 WO HCPCS: Performed by: SURGERY

## 2025-04-29 PROCEDURE — 44180 LAP ENTEROLYSIS: CPT | Performed by: SURGERY

## 2025-04-29 PROCEDURE — 2500000003 HC RX 250 WO HCPCS: Performed by: NURSE PRACTITIONER

## 2025-04-29 PROCEDURE — 3700000000 HC ANESTHESIA ATTENDED CARE: Performed by: SURGERY

## 2025-04-29 PROCEDURE — 83735 ASSAY OF MAGNESIUM: CPT

## 2025-04-29 PROCEDURE — 2580000003 HC RX 258: Performed by: NURSE ANESTHETIST, CERTIFIED REGISTERED

## 2025-04-29 PROCEDURE — 3700000001 HC ADD 15 MINUTES (ANESTHESIA): Performed by: SURGERY

## 2025-04-29 RX ORDER — FENTANYL CITRATE 50 UG/ML
INJECTION, SOLUTION INTRAMUSCULAR; INTRAVENOUS
Status: DISCONTINUED | OUTPATIENT
Start: 2025-04-29 | End: 2025-04-29 | Stop reason: SDUPTHER

## 2025-04-29 RX ORDER — SODIUM CHLORIDE, SODIUM LACTATE, POTASSIUM CHLORIDE, AND CALCIUM CHLORIDE .6; .31; .03; .02 G/100ML; G/100ML; G/100ML; G/100ML
IRRIGANT IRRIGATION PRN
Status: DISCONTINUED | OUTPATIENT
Start: 2025-04-29 | End: 2025-04-29 | Stop reason: ALTCHOICE

## 2025-04-29 RX ORDER — PROPOFOL 10 MG/ML
INJECTION, EMULSION INTRAVENOUS
Status: DISCONTINUED | OUTPATIENT
Start: 2025-04-29 | End: 2025-04-29 | Stop reason: SDUPTHER

## 2025-04-29 RX ORDER — MEPERIDINE HYDROCHLORIDE 50 MG/ML
12.5 INJECTION INTRAMUSCULAR; INTRAVENOUS; SUBCUTANEOUS EVERY 5 MIN PRN
Status: DISCONTINUED | OUTPATIENT
Start: 2025-04-29 | End: 2025-04-29 | Stop reason: HOSPADM

## 2025-04-29 RX ORDER — SODIUM CHLORIDE 9 MG/ML
INJECTION, SOLUTION INTRAVENOUS PRN
Status: DISCONTINUED | OUTPATIENT
Start: 2025-04-29 | End: 2025-04-29 | Stop reason: HOSPADM

## 2025-04-29 RX ORDER — LABETALOL HYDROCHLORIDE 5 MG/ML
10 INJECTION, SOLUTION INTRAVENOUS
Status: DISCONTINUED | OUTPATIENT
Start: 2025-04-29 | End: 2025-04-29 | Stop reason: HOSPADM

## 2025-04-29 RX ORDER — ROCURONIUM BROMIDE 10 MG/ML
INJECTION, SOLUTION INTRAVENOUS
Status: DISCONTINUED | OUTPATIENT
Start: 2025-04-29 | End: 2025-04-29 | Stop reason: SDUPTHER

## 2025-04-29 RX ORDER — LIDOCAINE HYDROCHLORIDE 20 MG/ML
INJECTION, SOLUTION EPIDURAL; INFILTRATION; INTRACAUDAL; PERINEURAL
Status: DISCONTINUED | OUTPATIENT
Start: 2025-04-29 | End: 2025-04-29 | Stop reason: SDUPTHER

## 2025-04-29 RX ORDER — OXYCODONE HYDROCHLORIDE 5 MG/1
5 TABLET ORAL PRN
Status: DISCONTINUED | OUTPATIENT
Start: 2025-04-29 | End: 2025-04-29 | Stop reason: HOSPADM

## 2025-04-29 RX ORDER — ONDANSETRON 2 MG/ML
INJECTION INTRAMUSCULAR; INTRAVENOUS
Status: DISCONTINUED | OUTPATIENT
Start: 2025-04-29 | End: 2025-04-29 | Stop reason: SDUPTHER

## 2025-04-29 RX ORDER — ONDANSETRON 2 MG/ML
4 INJECTION INTRAMUSCULAR; INTRAVENOUS
Status: DISCONTINUED | OUTPATIENT
Start: 2025-04-29 | End: 2025-04-29 | Stop reason: HOSPADM

## 2025-04-29 RX ORDER — BUPIVACAINE HYDROCHLORIDE AND EPINEPHRINE 5; 5 MG/ML; UG/ML
INJECTION, SOLUTION PERINEURAL PRN
Status: DISCONTINUED | OUTPATIENT
Start: 2025-04-29 | End: 2025-04-29 | Stop reason: ALTCHOICE

## 2025-04-29 RX ORDER — SODIUM CHLORIDE 0.9 % (FLUSH) 0.9 %
5-40 SYRINGE (ML) INJECTION EVERY 12 HOURS SCHEDULED
Status: DISCONTINUED | OUTPATIENT
Start: 2025-04-29 | End: 2025-04-29 | Stop reason: HOSPADM

## 2025-04-29 RX ORDER — SODIUM CHLORIDE 0.9 % (FLUSH) 0.9 %
5-40 SYRINGE (ML) INJECTION PRN
Status: DISCONTINUED | OUTPATIENT
Start: 2025-04-29 | End: 2025-04-29 | Stop reason: HOSPADM

## 2025-04-29 RX ORDER — NALOXONE HYDROCHLORIDE 0.4 MG/ML
INJECTION, SOLUTION INTRAMUSCULAR; INTRAVENOUS; SUBCUTANEOUS PRN
Status: DISCONTINUED | OUTPATIENT
Start: 2025-04-29 | End: 2025-04-29 | Stop reason: HOSPADM

## 2025-04-29 RX ORDER — SODIUM CHLORIDE 9 MG/ML
INJECTION, SOLUTION INTRAVENOUS
Status: DISCONTINUED | OUTPATIENT
Start: 2025-04-29 | End: 2025-04-29 | Stop reason: SDUPTHER

## 2025-04-29 RX ORDER — OXYCODONE HYDROCHLORIDE 5 MG/1
10 TABLET ORAL PRN
Status: DISCONTINUED | OUTPATIENT
Start: 2025-04-29 | End: 2025-04-29 | Stop reason: HOSPADM

## 2025-04-29 RX ORDER — DIPHENHYDRAMINE HYDROCHLORIDE 50 MG/ML
12.5 INJECTION, SOLUTION INTRAMUSCULAR; INTRAVENOUS
Status: DISCONTINUED | OUTPATIENT
Start: 2025-04-29 | End: 2025-04-29 | Stop reason: HOSPADM

## 2025-04-29 RX ORDER — MIDAZOLAM HYDROCHLORIDE 1 MG/ML
INJECTION, SOLUTION INTRAMUSCULAR; INTRAVENOUS
Status: DISCONTINUED | OUTPATIENT
Start: 2025-04-29 | End: 2025-04-29 | Stop reason: SDUPTHER

## 2025-04-29 RX ADMIN — PANTOPRAZOLE SODIUM 40 MG: 40 TABLET, DELAYED RELEASE ORAL at 05:07

## 2025-04-29 RX ADMIN — MEROPENEM 1000 MG: 1 INJECTION INTRAVENOUS at 12:30

## 2025-04-29 RX ADMIN — DICYCLOMINE HYDROCHLORIDE 10 MG: 10 CAPSULE ORAL at 20:09

## 2025-04-29 RX ADMIN — SUGAMMADEX 200 MG: 100 INJECTION, SOLUTION INTRAVENOUS at 17:19

## 2025-04-29 RX ADMIN — ONDANSETRON 4 MG: 2 INJECTION INTRAMUSCULAR; INTRAVENOUS at 15:44

## 2025-04-29 RX ADMIN — Medication 400 MG: at 20:10

## 2025-04-29 RX ADMIN — FENTANYL CITRATE 100 MCG: 50 INJECTION, SOLUTION INTRAMUSCULAR; INTRAVENOUS at 15:34

## 2025-04-29 RX ADMIN — Medication 6 MG: at 20:09

## 2025-04-29 RX ADMIN — ROCURONIUM BROMIDE 100 MG: 50 INJECTION, SOLUTION INTRAVENOUS at 15:40

## 2025-04-29 RX ADMIN — MIDAZOLAM 2 MG: 1 INJECTION INTRAMUSCULAR; INTRAVENOUS at 15:34

## 2025-04-29 RX ADMIN — ACETAMINOPHEN 650 MG: 325 TABLET ORAL at 03:34

## 2025-04-29 RX ADMIN — SODIUM CHLORIDE: 0.9 INJECTION, SOLUTION INTRAVENOUS at 20:06

## 2025-04-29 RX ADMIN — SODIUM CHLORIDE, PRESERVATIVE FREE 10 ML: 5 INJECTION INTRAVENOUS at 08:11

## 2025-04-29 RX ADMIN — Medication 400 MG: at 08:10

## 2025-04-29 RX ADMIN — FENTANYL CITRATE 50 MCG: 50 INJECTION, SOLUTION INTRAMUSCULAR; INTRAVENOUS at 16:52

## 2025-04-29 RX ADMIN — MEROPENEM 1000 MG: 1 INJECTION INTRAVENOUS at 05:06

## 2025-04-29 RX ADMIN — DICYCLOMINE HYDROCHLORIDE 10 MG: 10 CAPSULE ORAL at 12:30

## 2025-04-29 RX ADMIN — FENTANYL CITRATE 50 MCG: 50 INJECTION, SOLUTION INTRAMUSCULAR; INTRAVENOUS at 16:31

## 2025-04-29 RX ADMIN — METOPROLOL SUCCINATE 25 MG: 25 TABLET, EXTENDED RELEASE ORAL at 08:10

## 2025-04-29 RX ADMIN — AMLODIPINE BESYLATE 2.5 MG: 2.5 TABLET ORAL at 08:10

## 2025-04-29 RX ADMIN — LIDOCAINE HYDROCHLORIDE 100 MG: 20 INJECTION, SOLUTION EPIDURAL; INFILTRATION; INTRACAUDAL at 15:38

## 2025-04-29 RX ADMIN — PROPOFOL 100 MG: 10 INJECTION, EMULSION INTRAVENOUS at 15:40

## 2025-04-29 RX ADMIN — CALCIUM CARBONATE 500 MG: 500 TABLET, CHEWABLE ORAL at 08:10

## 2025-04-29 RX ADMIN — CALCIUM CARBONATE 500 MG: 500 TABLET, CHEWABLE ORAL at 20:09

## 2025-04-29 RX ADMIN — MEROPENEM 1000 MG: 1 INJECTION INTRAVENOUS at 20:09

## 2025-04-29 RX ADMIN — VALACYCLOVIR HYDROCHLORIDE 500 MG: 500 TABLET, FILM COATED ORAL at 08:16

## 2025-04-29 RX ADMIN — SODIUM CHLORIDE: 9 INJECTION, SOLUTION INTRAVENOUS at 15:34

## 2025-04-29 RX ADMIN — SODIUM CHLORIDE, PRESERVATIVE FREE 10 ML: 5 INJECTION INTRAVENOUS at 20:10

## 2025-04-29 RX ADMIN — DICYCLOMINE HYDROCHLORIDE 10 MG: 10 CAPSULE ORAL at 08:10

## 2025-04-29 RX ADMIN — MORPHINE SULFATE 2 MG: 2 INJECTION, SOLUTION INTRAMUSCULAR; INTRAVENOUS at 20:06

## 2025-04-29 ASSESSMENT — PAIN DESCRIPTION - ORIENTATION
ORIENTATION: MID;RIGHT
ORIENTATION: MID

## 2025-04-29 ASSESSMENT — PAIN SCALES - GENERAL
PAINLEVEL_OUTOF10: 6
PAINLEVEL_OUTOF10: 0
PAINLEVEL_OUTOF10: 8
PAINLEVEL_OUTOF10: 8

## 2025-04-29 ASSESSMENT — PAIN DESCRIPTION - LOCATION
LOCATION: ABDOMEN

## 2025-04-29 ASSESSMENT — PAIN - FUNCTIONAL ASSESSMENT: PAIN_FUNCTIONAL_ASSESSMENT: 0-10

## 2025-04-29 ASSESSMENT — PAIN DESCRIPTION - DESCRIPTORS: DESCRIPTORS: ACHING

## 2025-04-29 NOTE — ANESTHESIA PRE PROCEDURE
bx       Social History:    Social History     Tobacco Use   • Smoking status: Former     Current packs/day: 0.00     Average packs/day: 0.5 packs/day for 30.0 years (15.0 ttl pk-yrs)     Types: Cigarettes     Start date: 1987     Quit date: 2017     Years since quittin.2   • Smokeless tobacco: Never   Substance Use Topics   • Alcohol use: Not Currently     Alcohol/week: 1.0 standard drink of alcohol     Types: 1 Cans of beer per week                                Counseling given: Not Answered      Vital Signs (Current):   Vitals:    25 2000 25 2345 25 0800 25 1435   BP: 136/86 112/73 117/71 98/63   Pulse: (!) 102 95 95 97   Resp: 16 16 18 16   Temp: 98.4 °F (36.9 °C) 98.2 °F (36.8 °C) 98.2 °F (36.8 °C) 98.6 °F (37 °C)   TempSrc: Oral Oral Oral Temporal   SpO2: 98% 95% 96% 97%   Weight:       Height:                                                  BP Readings from Last 3 Encounters:   25 98/63   19 125/85   18 129/81       NPO Status: Time of last liquid consumption: 2300                        Time of last solid consumption: 1200                        Date of last liquid consumption: 25                        Date of last solid food consumption: 25    BMI:   Wt Readings from Last 3 Encounters:   25 50.1 kg (110 lb 6.4 oz)   19 52.2 kg (115 lb 1.3 oz)   18 52.2 kg (115 lb)     Body mass index is 19.56 kg/m².    CBC:   Lab Results   Component Value Date/Time    WBC 14.3 2025 05:21 AM    RBC 3.36 2025 05:21 AM    HGB 10.4 2025 05:21 AM    HCT 31.5 2025 05:21 AM    MCV 93.8 2025 05:21 AM    RDW 15.2 2025 05:21 AM     2025 05:21 AM       CMP:   Lab Results   Component Value Date/Time     2025 05:21 AM    K 3.6 2025 05:21 AM    K 3.6 2025 04:26 AM     2025 05:21 AM    CO2 22 2025 05:21 AM    BUN <2 2025 05:21 AM    CREATININE 0.6

## 2025-04-29 NOTE — PROGRESS NOTES
Patient arrived to PACU bay 6, phase one initiated. Placed on bedside monitor, VSS. Report obtained from OR RN and anesthesia. Patient on room air. Assessment WNL. Warm blankets applied. Side rails in place, will monitor patient closely.

## 2025-04-29 NOTE — PROGRESS NOTES
[facility] Medicine Progress Note  V 1.6      Date of Admission: 4/23/2025    Hospital Day: 7      Chief Admission Complaint:  Abdominal pain    Subjective:      Patient with increasing white count today.  Decision made to proceed with surgery today      Presenting Admission History:       64 y.o. female who presented to [facility] with abdominal pain.  PMHx significant for HTN.  History obtained from the patient and review of EMR.  The patient stated she had a hemicolectomy in February 2025 in [facility] and since then has been experiencing intermittent abdominal pain.  Per chart review, the patient has been readmitted 1 time since her surgery for electrolyte abnormalities.  However, the patient stated her pain has gotten progressively worse over the last couple of weeks.  She reports speaking with GI and they ordered a CT outpatient today.  Patient  is at the bedside and stated that she was called and told to go to the emergency department due to \"a collection of pus\" seen on the CT. In the emergency department a CT abdomen pelvis was obtained that revealed Fluid and gas collection within the pelvis interposed between the colon and uterus measuring up to 4.1 cm.  Abscess is favored.  This appears largely enveloped by abdominal and adnexal structures.  A [place] uterine fistula is not excluded.  Additional small dependent collection within the region of the pouch of Alfred measuring up to 2.5 cm.  Mild dilation of small bowel loops which may indicate partial obstruction.  There is no high-grade small bowel obstruction.  The patient was given morphine for pain.  She was also started on Zosyn.  Upon further evaluation, the patient was found to be dehydrated with hyponatremia.  This is likely secondary to inadequate p.o. intake.  She was admitted for further evaluation and treatment.  IV fluids have been initiated and GI has been consulted for the a.m. The patient denied any other

## 2025-04-29 NOTE — PROGRESS NOTES
Pt assessment completed and charted. VSS, pt on RA. Patient is a/o. IV site patent/flushed, infusing. Medication given per MAR.     Safety Measures in place:   Bed in lowest position and wheels locked.   Call light within reach.   Bedside table within reach.   Non-skid socks in place.   Pt denies any other needs at this time.    Pt calls out appropriately.  Patient in stable condition when RN left room.

## 2025-04-29 NOTE — PROGRESS NOTES
[facility]    PATIENT NAME: Tuyet Richter     TODAY'S DATE: 4/29/2025    CHIEF COMPLAINT: abd pain, bloating    INTERVAL HISTORY/HPI:    With continued abd pain this AM, some bms but some nausea     OBJECTIVE:  VITALS:  /71   Pulse 95   Temp 98.2 °F (36.8 °C) (Oral)   Resp 18   Ht 1.6 m (5' 3\")   Wt 50.1 kg (110 lb 6.4 oz)   SpO2 96%   BMI 19.56 kg/m²     INTAKE/OUTPUT:    I/O last 3 completed shifts:  In: 430 [P.O.:420; I.V.:10]  Out: 5 [Urine:3; Stool:2]  No intake/output data recorded.    CONSTITUTIONAL:  awake and alert  LUNGS: no crackles or wheezes    ABDOMEN: soft, some distention,  tender lower abd unchanged    Data:  CBC:   Recent Labs     04/27/25  0518 04/28/25  0504 04/29/25  0521   WBC 4.6 8.3 14.3*   HGB 9.4* 10.4* 10.4*   HCT 27.7* 31.0* 31.5*   * 710* 685*     BMP:    Recent Labs     04/27/25  0519 04/28/25  0504 04/29/25  0521    135* 135*   K 3.7 4.4 3.6    98* 102   CO2 24 24 22   BUN <2* <2* <2*   CREATININE 0.5* 0.5* 0.6   GLUCOSE 88 107* 147*         Hepatic:   No results for input(s): \"AST\", \"ALT\", \"BILITOT\", \"ALKPHOS\" in the last 72 hours.    Invalid input(s): \"ALB\"    Mag:      Recent Labs     04/27/25  0519 04/28/25  0504 04/29/25  0521   MG 1.95 1.72* 1.84        ASSESSMENT AND PLAN:  Pelvic abscess of unclear origin or duration, either related to prior right colectomy or new diverticulitis process  With persistent symtpoms and now elevated wbc will plan for OR today.  Discussed possible outcome including abscess drainage, bowel resection, possible colostomy, along with minimally invasive or open approach.     Electronically signed by Dallas Guerrero MD

## 2025-04-29 NOTE — BRIEF OP NOTE
Brief Postoperative Note      Patient: Tuyet Richter  YOB: 1960  MRN: [id number]    Date of Procedure: 4/29/2025    Preoperative Diagnosis:  Intraabdominal abscess, possible diverticulitis    Post-Op Diagnosis: Same       Procedure:  Diagnostic Laparoscopic with Lysis of Adhesions and Drainage of Intraabdominal Abscess, Rigid Sigmoidoscopy    Surgeon(s):  Dallas Guerrero MD    Assistant:  Surgical Assistant: Cadence Martinez    Anesthesia: General    Estimated Blood Loss (mL): less than 50     Complications: None    Specimens:   * No specimens in log *    Implants:  * No implants in log *      Drains:   Closed/Suction Drain LUQ Bulb (Active)       Urinary Catheter 04/29/25 Weldon (Active)       Findings:  Infection Present At Time Of Surgery (PATOS) (choose all levels that have infection present):  - Organ Space infection (below fascia) present as evidenced by pus, purulent fluid, and yellow thick and creamy fluid consistent with infection  Other Findings: see op note  This procedure was not performed to treat colon cancer through resection    Electronically signed by Dallas Guerrero MD on 4/29/2025 at 5:03 PM

## 2025-04-29 NOTE — PLAN OF CARE
Problem: Pain  Goal: Verbalizes/displays adequate comfort level or baseline comfort level  Outcome: Progressing  Flowsheets (Taken 4/28/2025 0953 by Viri Owen, RN)  Verbalizes/displays adequate comfort level or baseline comfort level:   Encourage patient to monitor pain and request assistance   Assess pain using appropriate pain scale   Administer analgesics based on type and severity of pain and evaluate response   Implement non-pharmacological measures as appropriate and evaluate response     Problem: Safety - Adult  Goal: Free from fall injury  Outcome: Progressing  Flowsheets (Taken 4/28/2025 0953 by Viri Owen, RN)  Free From Fall Injury:   Instruct family/caregiver on patient safety   Based on caregiver fall risk screen, instruct family/caregiver to ask for assistance with transferring infant if caregiver noted to have fall risk factors     Problem: ABCDS Injury Assessment  Goal: Absence of physical injury  Outcome: Progressing  Flowsheets (Taken 4/25/2025 2033)  Absence of Physical Injury: Implement safety measures based on patient assessment     Problem: Nutrition Deficit:  Goal: Optimize nutritional status  Outcome: Progressing  Flowsheets (Taken 4/28/2025 1335 by Bita Suero, IVY)  Nutrient intake appropriate for improving, restoring, or maintaining nutritional needs:   Assess nutritional status and recommend course of action   Monitor oral intake, labs, and treatment plans   Recommend appropriate diets, oral nutritional supplements, and vitamin/mineral supplements

## 2025-04-29 NOTE — ANESTHESIA POSTPROCEDURE EVALUATION
Department of Anesthesiology  Postprocedure Note    Patient: Tuyet Richter  MRN: [id number]  YOB: 1960  Date of evaluation: 4/29/2025    Procedure Summary       Date: 04/29/25 Room / Location: [address]    Anesthesia Start: 1534 Anesthesia Stop: 1737    Procedure: DIAGNOSTIC LAPAROSCOPY, LYSIS OF ADHESIONS, AND ABSCESS DRAINAGE (Abdomen) Diagnosis:       Diverticulitis      (Diverticulitis [K57.92])    Surgeons: Dallas Guerrero MD Responsible Provider: Yohan Genao MD    Anesthesia Type: general ASA Status: 2            Anesthesia Type: No value filed.    Anum Phase I: Anum Score: 10    Anum Phase II:      Anesthesia Post Evaluation    Patient location during evaluation: PACU  Patient participation: complete - patient participated  Level of consciousness: awake and alert  Airway patency: patent  Nausea & Vomiting: no nausea and no vomiting  Cardiovascular status: blood pressure returned to baseline  Respiratory status: acceptable  Hydration status: euvolemic  Comments: VSS on transfer to phase 2 recovery.  No anesthetic complications.  Pain management: adequate    No notable events documented.

## 2025-04-29 NOTE — PLAN OF CARE
Problem: Pain  Goal: Verbalizes/displays adequate comfort level or baseline comfort level  4/29/2025 1029 by Aleksandra Snyder RN  Outcome: Progressing  Flowsheets (Taken 4/28/2025 0953 by Viri Owen, RN)  Verbalizes/displays adequate comfort level or baseline comfort level:   Encourage patient to monitor pain and request assistance   Assess pain using appropriate pain scale   Administer analgesics based on type and severity of pain and evaluate response   Implement non-pharmacological measures as appropriate and evaluate response  4/29/2025 0245 by Yenifer Augustine RN  Outcome: Progressing  Flowsheets (Taken 4/28/2025 0953 by Viri Owen, RN)  Verbalizes/displays adequate comfort level or baseline comfort level:   Encourage patient to monitor pain and request assistance   Assess pain using appropriate pain scale   Administer analgesics based on type and severity of pain and evaluate response   Implement non-pharmacological measures as appropriate and evaluate response     Problem: Safety - Adult  Goal: Free from fall injury  4/29/2025 1029 by Aleksandra Sndyer RN  Outcome: Progressing  Flowsheets (Taken 4/28/2025 0953 by Viri Owen RN)  Free From Fall Injury:   Instruct family/caregiver on patient safety   Based on caregiver fall risk screen, instruct family/caregiver to ask for assistance with transferring infant if caregiver noted to have fall risk factors  4/29/2025 0245 by Yenifer Augustine RN  Outcome: Progressing  Flowsheets (Taken 4/28/2025 0953 by Viri Owen RN)  Free From Fall Injury:   Instruct family/caregiver on patient safety   Based on caregiver fall risk screen, instruct family/caregiver to ask for assistance with transferring infant if caregiver noted to have fall risk factors     Problem: ABCDS Injury Assessment  Goal: Absence of physical injury  4/29/2025 1029 by Aleksandra Snyder, RN  Outcome: Progressing  Flowsheets (Taken 4/25/2025 2033 by Yenifer Augustine, RN)  Absence of Physical

## 2025-04-29 NOTE — PROGRESS NOTES
ID     Consult request received  Chart briefly reviewed   Patient is now in OR     I will see her tomorrow  ANGY VENEGAS MD

## 2025-04-29 NOTE — CARE COORDINATION
Hospital day 6: Patient on C3 re Intra abdominal abscess care managed by IM, General Surgery, GI, and pending consult to ID. Patient from home with spouse, IPTA- ambulatory in room. Plans for OR this date. Patient on IV ATB. SW will follow for post op needs.GASTON Alexander

## 2025-04-29 NOTE — CONSULTS
Consult Placed   Consult sent via perfect serve  Who: Dr. Howell  Date: 4/29/2025  Time: 8:41 AM       Electronically signed by Becca Villalpando on 4/29/2025 at 8:41 AM

## 2025-04-30 LAB
ANION GAP SERPL CALCULATED.3IONS-SCNC: 10 MMOL/L (ref 3–16)
BASOPHILS # BLD: 0.1 K/UL (ref 0–0.2)
BASOPHILS NFR BLD: 0.4 %
BUN SERPL-MCNC: 3 MG/DL (ref 7–20)
CALCIUM SERPL-MCNC: 8.4 MG/DL (ref 8.3–10.6)
CHLORIDE SERPL-SCNC: 100 MMOL/L (ref 99–110)
CO2 SERPL-SCNC: 25 MMOL/L (ref 21–32)
CREAT SERPL-MCNC: 0.5 MG/DL (ref 0.6–1.2)
DEPRECATED RDW RBC AUTO: 15.3 % (ref 12.4–15.4)
EOSINOPHIL # BLD: 0 K/UL (ref 0–0.6)
EOSINOPHIL NFR BLD: 0.3 %
GFR SERPLBLD CREATININE-BSD FMLA CKD-EPI: >90 ML/MIN/{1.73_M2}
GLUCOSE SERPL-MCNC: 92 MG/DL (ref 70–99)
HCT VFR BLD AUTO: 30 % (ref 36–48)
HGB BLD-MCNC: 10 G/DL (ref 12–16)
LYMPHOCYTES # BLD: 1.3 K/UL (ref 1–5.1)
LYMPHOCYTES NFR BLD: 9.4 %
MCH RBC QN AUTO: 31 PG (ref 26–34)
MCHC RBC AUTO-ENTMCNC: 33.3 G/DL (ref 31–36)
MCV RBC AUTO: 92.8 FL (ref 80–100)
MONOCYTES # BLD: 0.9 K/UL (ref 0–1.3)
MONOCYTES NFR BLD: 6.9 %
NEUTROPHILS # BLD: 11 K/UL (ref 1.7–7.7)
NEUTROPHILS NFR BLD: 83 %
PLATELET # BLD AUTO: 611 K/UL (ref 135–450)
PMV BLD AUTO: 9 FL (ref 5–10.5)
POTASSIUM SERPL-SCNC: 3.3 MMOL/L (ref 3.5–5.1)
RBC # BLD AUTO: 3.23 M/UL (ref 4–5.2)
SODIUM SERPL-SCNC: 135 MMOL/L (ref 136–145)
WBC # BLD AUTO: 13.3 K/UL (ref 4–11)

## 2025-04-30 PROCEDURE — 6360000002 HC RX W HCPCS: Performed by: INTERNAL MEDICINE

## 2025-04-30 PROCEDURE — 6370000000 HC RX 637 (ALT 250 FOR IP): Performed by: SURGERY

## 2025-04-30 PROCEDURE — 36415 COLL VENOUS BLD VENIPUNCTURE: CPT

## 2025-04-30 PROCEDURE — 1200000000 HC SEMI PRIVATE

## 2025-04-30 PROCEDURE — 80048 BASIC METABOLIC PNL TOTAL CA: CPT

## 2025-04-30 PROCEDURE — 6360000002 HC RX W HCPCS: Performed by: SURGERY

## 2025-04-30 PROCEDURE — 2580000003 HC RX 258: Performed by: SURGERY

## 2025-04-30 PROCEDURE — 2580000003 HC RX 258: Performed by: INTERNAL MEDICINE

## 2025-04-30 PROCEDURE — 85025 COMPLETE CBC W/AUTO DIFF WBC: CPT

## 2025-04-30 PROCEDURE — 99024 POSTOP FOLLOW-UP VISIT: CPT | Performed by: SURGERY

## 2025-04-30 PROCEDURE — 6370000000 HC RX 637 (ALT 250 FOR IP): Performed by: STUDENT IN AN ORGANIZED HEALTH CARE EDUCATION/TRAINING PROGRAM

## 2025-04-30 PROCEDURE — 2580000003 HC RX 258: Performed by: STUDENT IN AN ORGANIZED HEALTH CARE EDUCATION/TRAINING PROGRAM

## 2025-04-30 PROCEDURE — 99223 1ST HOSP IP/OBS HIGH 75: CPT | Performed by: INTERNAL MEDICINE

## 2025-04-30 RX ORDER — SODIUM CHLORIDE 0.9 % (FLUSH) 0.9 %
5-40 SYRINGE (ML) INJECTION EVERY 12 HOURS SCHEDULED
Status: DISCONTINUED | OUTPATIENT
Start: 2025-04-30 | End: 2025-05-05 | Stop reason: SDUPTHER

## 2025-04-30 RX ORDER — MIDAZOLAM HYDROCHLORIDE 5 MG/ML
2 INJECTION, SOLUTION INTRAMUSCULAR; INTRAVENOUS
Status: DISCONTINUED | OUTPATIENT
Start: 2025-04-30 | End: 2025-05-20 | Stop reason: HOSPADM

## 2025-04-30 RX ORDER — SODIUM CHLORIDE 0.9 % (FLUSH) 0.9 %
5-40 SYRINGE (ML) INJECTION PRN
Status: DISCONTINUED | OUTPATIENT
Start: 2025-04-30 | End: 2025-05-05 | Stop reason: SDUPTHER

## 2025-04-30 RX ORDER — SODIUM CHLORIDE, SODIUM LACTATE, POTASSIUM CHLORIDE, CALCIUM CHLORIDE 600; 310; 30; 20 MG/100ML; MG/100ML; MG/100ML; MG/100ML
INJECTION, SOLUTION INTRAVENOUS CONTINUOUS
Status: DISCONTINUED | OUTPATIENT
Start: 2025-04-30 | End: 2025-05-01

## 2025-04-30 RX ORDER — SODIUM CHLORIDE 9 MG/ML
INJECTION, SOLUTION INTRAVENOUS PRN
Status: DISCONTINUED | OUTPATIENT
Start: 2025-04-30 | End: 2025-05-05 | Stop reason: SDUPTHER

## 2025-04-30 RX ORDER — LIDOCAINE HYDROCHLORIDE 10 MG/ML
1 INJECTION, SOLUTION INFILTRATION; PERINEURAL
Status: DISCONTINUED | OUTPATIENT
Start: 2025-04-30 | End: 2025-05-08

## 2025-04-30 RX ADMIN — Medication 400 MG: at 09:59

## 2025-04-30 RX ADMIN — MORPHINE SULFATE 4 MG: 4 INJECTION, SOLUTION INTRAMUSCULAR; INTRAVENOUS at 00:20

## 2025-04-30 RX ADMIN — MORPHINE SULFATE 4 MG: 4 INJECTION, SOLUTION INTRAMUSCULAR; INTRAVENOUS at 12:36

## 2025-04-30 RX ADMIN — MORPHINE SULFATE 4 MG: 4 INJECTION, SOLUTION INTRAMUSCULAR; INTRAVENOUS at 09:53

## 2025-04-30 RX ADMIN — MORPHINE SULFATE 2 MG: 2 INJECTION, SOLUTION INTRAMUSCULAR; INTRAVENOUS at 02:49

## 2025-04-30 RX ADMIN — MORPHINE SULFATE 4 MG: 4 INJECTION, SOLUTION INTRAMUSCULAR; INTRAVENOUS at 16:01

## 2025-04-30 RX ADMIN — MEROPENEM 1000 MG: 1 INJECTION INTRAVENOUS at 05:29

## 2025-04-30 RX ADMIN — SODIUM CHLORIDE, SODIUM LACTATE, POTASSIUM CHLORIDE, AND CALCIUM CHLORIDE: .6; .31; .03; .02 INJECTION, SOLUTION INTRAVENOUS at 07:38

## 2025-04-30 RX ADMIN — AMLODIPINE BESYLATE 2.5 MG: 2.5 TABLET ORAL at 09:52

## 2025-04-30 RX ADMIN — MEROPENEM 1000 MG: 1 INJECTION INTRAVENOUS at 12:41

## 2025-04-30 RX ADMIN — PANTOPRAZOLE SODIUM 40 MG: 40 TABLET, DELAYED RELEASE ORAL at 06:38

## 2025-04-30 RX ADMIN — Medication 400 MG: at 22:29

## 2025-04-30 RX ADMIN — MORPHINE SULFATE 4 MG: 4 INJECTION, SOLUTION INTRAMUSCULAR; INTRAVENOUS at 18:14

## 2025-04-30 RX ADMIN — ENOXAPARIN SODIUM 30 MG: 100 INJECTION SUBCUTANEOUS at 09:55

## 2025-04-30 RX ADMIN — VALACYCLOVIR HYDROCHLORIDE 500 MG: 500 TABLET, FILM COATED ORAL at 09:52

## 2025-04-30 RX ADMIN — DICYCLOMINE HYDROCHLORIDE 10 MG: 10 CAPSULE ORAL at 12:36

## 2025-04-30 RX ADMIN — DICYCLOMINE HYDROCHLORIDE 10 MG: 10 CAPSULE ORAL at 09:52

## 2025-04-30 RX ADMIN — PIPERACILLIN AND TAZOBACTAM 3375 MG: 3; .375 INJECTION, POWDER, LYOPHILIZED, FOR SOLUTION INTRAVENOUS at 22:57

## 2025-04-30 RX ADMIN — CALCIUM CARBONATE 500 MG: 500 TABLET, CHEWABLE ORAL at 09:53

## 2025-04-30 RX ADMIN — POTASSIUM BICARBONATE 40 MEQ: 782 TABLET, EFFERVESCENT ORAL at 09:53

## 2025-04-30 RX ADMIN — CALCIUM CARBONATE 500 MG: 500 TABLET, CHEWABLE ORAL at 22:28

## 2025-04-30 RX ADMIN — MORPHINE SULFATE 2 MG: 2 INJECTION, SOLUTION INTRAMUSCULAR; INTRAVENOUS at 22:42

## 2025-04-30 RX ADMIN — MORPHINE SULFATE 4 MG: 4 INJECTION, SOLUTION INTRAMUSCULAR; INTRAVENOUS at 06:44

## 2025-04-30 RX ADMIN — PIPERACILLIN AND TAZOBACTAM 4500 MG: 4; .5 INJECTION, POWDER, LYOPHILIZED, FOR SOLUTION INTRAVENOUS at 16:06

## 2025-04-30 RX ADMIN — METOPROLOL SUCCINATE 25 MG: 25 TABLET, EXTENDED RELEASE ORAL at 09:53

## 2025-04-30 RX ADMIN — DICYCLOMINE HYDROCHLORIDE 10 MG: 10 CAPSULE ORAL at 22:28

## 2025-04-30 RX ADMIN — Medication 6 MG: at 22:29

## 2025-04-30 ASSESSMENT — PAIN DESCRIPTION - DESCRIPTORS
DESCRIPTORS: ACHING
DESCRIPTORS: SHARP
DESCRIPTORS: ACHING
DESCRIPTORS: SHARP
DESCRIPTORS: CRAMPING
DESCRIPTORS: ACHING;DISCOMFORT
DESCRIPTORS: ACHING
DESCRIPTORS: DISCOMFORT;SHARP
DESCRIPTORS: DISCOMFORT

## 2025-04-30 ASSESSMENT — PAIN SCALES - GENERAL
PAINLEVEL_OUTOF10: 9
PAINLEVEL_OUTOF10: 7
PAINLEVEL_OUTOF10: 9
PAINLEVEL_OUTOF10: 8
PAINLEVEL_OUTOF10: 4
PAINLEVEL_OUTOF10: 9
PAINLEVEL_OUTOF10: 4
PAINLEVEL_OUTOF10: 6
PAINLEVEL_OUTOF10: 5
PAINLEVEL_OUTOF10: 9
PAINLEVEL_OUTOF10: 8
PAINLEVEL_OUTOF10: 8
PAINLEVEL_OUTOF10: 6

## 2025-04-30 ASSESSMENT — PAIN DESCRIPTION - LOCATION
LOCATION: ABDOMEN

## 2025-04-30 ASSESSMENT — PAIN DESCRIPTION - ORIENTATION
ORIENTATION: MID
ORIENTATION: RIGHT

## 2025-04-30 ASSESSMENT — PAIN SCALES - WONG BAKER: WONGBAKER_NUMERICALRESPONSE: HURTS LITTLE MORE

## 2025-04-30 NOTE — CONSULTS
Infectious Diseases   Consult Note      Reason for Consult:  intra-abd abscess   Requesting Physician:    Stephanie    Date of Admission: 4/23/2025    Subjective:   CHIEF COMPLAINT:  none given       HPI:    Tuyet Richter is a 65yoM with history of GERD, HH, HTN, chronic diarrhea.    She had a complicated course earlier in the year while in [place].  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    S/p R hemicolectomy 2/27/25  Post-op course c/b ileus.    Readmitted 3/10-3/15/25 with SBO that resolved without surgical intervention.     Admitted 3/24-3/27/25 with hypomagnesemia.   CT ap during that admission showed \"perioperative fluid densities and mesenteric fat stranding with increased colonic wall thickening at RUQ.\"      She has experienced abd pain since then.    She came to the ED 4/23/25 after having CT scan outpatient suggesting pelvic abscess.   She was admitted and started on empiric IV abx  Her pain persisted.  Repeat CT on hospital day #5 suggested possible colo-uterine fistula.    WBC increased on 4/29 despite meropenem and she was taken to OR for evaluation.  OR 4/29/25 - abscess noted and evacuated.  No cultures collected.  ID is consulted to manage the IV abx.  She is AF today.                    Current abx:  Meropenem 1g q8, s 4/27  Valtrex 500 po BID, s 4/26    Zosyn 4/23-4/27       Past Surgical History:       Diagnosis Date    Acid reflux     Gastritis     Hiatal hernia     Hypertension     Pinched nerve in neck          Procedure Laterality Date    COLONOSCOPY  1/23/2013    polyps    COLONOSCOPY  3/18/16    diverticulosis    ENDOMETRIAL ABLATION      NOSE SURGERY      SIGMOID COLECTOMY N/A 4/29/2025    DIAGNOSTIC LAPAROSCOPY, LYSIS OF ADHESIONS, AND ABSCESS DRAINAGE performed by Dallas Guerrero MD at [facility] OR    UPPER GASTROINTESTINAL ENDOSCOPY  1/23/2013    Hiatal Hernia    UPPER GASTROINTESTINAL ENDOSCOPY  3/18/16    bx         Social History:    TOBACCO:   reports that she quit smoking about 8  °F (36.6 °C) (Oral)   Resp 16   Ht 1.6 m (5' 3\")   Wt 50.1 kg (110 lb 6.4 oz)   SpO2 98%   BMI 19.56 kg/m²      24HR INTAKE/OUTPUT:    Intake/Output Summary (Last 24 hours) at 4/30/2025 1359  Last data filed at 4/30/2025 1233  Gross per 24 hour   Intake 2771.14 ml   Output 1090 ml   Net 1681.14 ml     CONSTITUTIONAL:  Awake, alert, cooperative, no apparent distress  Appears stated age  HEENT: NCAT, PERRL, EOMI.  Sclera white, conjunctiva full.  OP with dry mucosal membranes, no thrush, tongue protrudes midline  NECK:  Supple, symmetrical, trachea midline, no adenopathy  LUNGS:  no increased work of breathing  CTA norma without W/R/R  CARDIOVASCULAR:  RRR without murmur  ABDOMEN:   hypoactive bowel sounds, soft, distended, TODD in place    PSYCHIATRIC: Oriented to person place and time. No obvious depression or anxiety.  MUSCULOSKELETAL: No obvious misalignment or effusion of the joints. No clubbing, cyanosis of the digits.  SKIN:  normal skin color, texture, turgor and no redness, warmth, or swelling. No palpable nodules or stigmata of embolic phenomenon  NEUROLOGIC: nonfocal exam    ACCESS:  PIV in place       DATA:    Old records have been reviewed    CBC:  Recent Labs     04/28/25  0504 04/29/25  0521 04/30/25  0429   WBC 8.3 14.3* 13.3*   RBC 3.33* 3.36* 3.23*   HGB 10.4* 10.4* 10.0*   HCT 31.0* 31.5* 30.0*   * 685* 611*   MCV 93.1 93.8 92.8   MCH 31.4 30.9 31.0   MCHC 33.7 33.0 33.3   RDW 14.7 15.2 15.3      BMP:  Recent Labs     04/28/25  0504 04/29/25  0521 04/30/25  0429   * 135* 135*   K 4.4 3.6 3.3*   CL 98* 102 100   CO2 24 22 25   BUN <2* <2* 3*   CREATININE 0.5* 0.6 0.5*   CALCIUM 9.5 9.2 8.4   GLUCOSE 107* 147* 92      Sed Rate, Automated   Date Value Ref Range Status   09/14/2017 25 0 - 30 mm/Hr Final     No results found for: \"CRP\"      Cultures:  4/24 GI PCR panel neg    C diff PCR neg       Radiology Review:  All pertinent images / reports were reviewed as a part of this visit.     CT

## 2025-04-30 NOTE — PLAN OF CARE
Problem: Pain  Goal: Verbalizes/displays adequate comfort level or baseline comfort level  4/30/2025 0004 by Yenifer Augustine RN  Outcome: Progressing  Flowsheets (Taken 4/28/2025 0953 by Viri wOen, RN)  Verbalizes/displays adequate comfort level or baseline comfort level:   Encourage patient to monitor pain and request assistance   Assess pain using appropriate pain scale   Administer analgesics based on type and severity of pain and evaluate response   Implement non-pharmacological measures as appropriate and evaluate response     Problem: Safety - Adult  Goal: Free from fall injury  4/30/2025 0004 by Yenifer Augustine RN  Outcome: Progressing  Flowsheets (Taken 4/28/2025 0953 by Viri Owen, RN)  Free From Fall Injury:   Instruct family/caregiver on patient safety   Based on caregiver fall risk screen, instruct family/caregiver to ask for assistance with transferring infant if caregiver noted to have fall risk factors     Problem: ABCDS Injury Assessment  Goal: Absence of physical injury  4/30/2025 0004 by Yenifer Augustine RN  Outcome: Progressing  Flowsheets (Taken 4/25/2025 2033)  Absence of Physical Injury: Implement safety measures based on patient assessment     Problem: Nutrition Deficit:  Goal: Optimize nutritional status  4/30/2025 0004 by Yenifer Augustine RN  Outcome: Progressing  Flowsheets (Taken 4/28/2025 1335 by Bita Suero RD)  Nutrient intake appropriate for improving, restoring, or maintaining nutritional needs:   Assess nutritional status and recommend course of action   Monitor oral intake, labs, and treatment plans   Recommend appropriate diets, oral nutritional supplements, and vitamin/mineral supplements     Problem: Discharge Planning  Goal: Discharge to home or other facility with appropriate resources  Outcome: Progressing  Flowsheets (Taken 4/30/2025 0004)  Discharge to home or other facility with appropriate resources:   Refer to discharge planning if patient needs post-hospital services

## 2025-04-30 NOTE — PLAN OF CARE
Problem: Pain  Goal: Verbalizes/displays adequate comfort level or baseline comfort level  4/30/2025 0901 by Camilla Singh RN  Outcome: Progressing  4/30/2025 0004 by Yenifer Augustine RN  Outcome: Progressing  Flowsheets (Taken 4/28/2025 0953 by Viri Owen, RN)  Verbalizes/displays adequate comfort level or baseline comfort level:   Encourage patient to monitor pain and request assistance   Assess pain using appropriate pain scale   Administer analgesics based on type and severity of pain and evaluate response   Implement non-pharmacological measures as appropriate and evaluate response     Problem: Safety - Adult  Goal: Free from fall injury  4/30/2025 0901 by Camilla Singh RN  Outcome: Progressing  4/30/2025 0004 by Yenifer Augustine RN  Outcome: Progressing  Flowsheets (Taken 4/28/2025 0953 by Viri Owen, RN)  Free From Fall Injury:   Instruct family/caregiver on patient safety   Based on caregiver fall risk screen, instruct family/caregiver to ask for assistance with transferring infant if caregiver noted to have fall risk factors     Problem: ABCDS Injury Assessment  Goal: Absence of physical injury  4/30/2025 0901 by Camilla Singh RN  Outcome: Progressing  4/30/2025 0004 by Yenifer Augustine RN  Outcome: Progressing  Flowsheets (Taken 4/25/2025 2033)  Absence of Physical Injury: Implement safety measures based on patient assessment     Problem: Nutrition Deficit:  Goal: Optimize nutritional status  4/30/2025 0901 by Camilla Singh RN  Outcome: Progressing  4/30/2025 0004 by Yenifer Augustine RN  Outcome: Progressing  Flowsheets (Taken 4/28/2025 1335 by Bita Suero, IVY)  Nutrient intake appropriate for improving, restoring, or maintaining nutritional needs:   Assess nutritional status and recommend course of action   Monitor oral intake, labs, and treatment plans   Recommend appropriate diets, oral nutritional supplements, and vitamin/mineral supplements     Problem: Discharge Planning  Goal: Discharge

## 2025-04-30 NOTE — CARE COORDINATION
Hospital day 7, POD 1: Patient on C3 care managed by IM, ID, and General Surgery. Patient from with Spouse, IPTA. Patient on IV ATB. Patient on clear liquid diet . Out of bed this date. IV pain meds for control. SW will ct  to follow.GASTON Alexander

## 2025-04-30 NOTE — PROGRESS NOTES
Pt VSS, A&O, 2 lap sites KASHIF. TODD drain emptied. Pt reporting pain 8/10, gave Morphine per MAR. Pt ambulating with walker to bathroom and voiding. Bed at lowest position, call light and bedside table within reach.

## 2025-04-30 NOTE — PROGRESS NOTES
[facility] Medicine Progress Note  V 1.6      Date of Admission: 4/23/2025    Hospital Day: 8      Chief Admission Complaint:  Abdominal pain    Subjective:      Patient with her surgery yesterday.  During my evaluation patient was sitting on the side of the bed having lunch.  Patient states that she is doing okay.  Pain is currently managed with current medications.      Presenting Admission History:       64 y.o. female who presented to [facility] with abdominal pain.  PMHx significant for HTN.  History obtained from the patient and review of EMR.  The patient stated she had a hemicolectomy in February 2025 in [facility] and since then has been experiencing intermittent abdominal pain.  Per chart review, the patient has been readmitted 1 time since her surgery for electrolyte abnormalities.  However, the patient stated her pain has gotten progressively worse over the last couple of weeks.  She reports speaking with GI and they ordered a CT outpatient today.  Patient  is at the bedside and stated that she was called and told to go to the emergency department due to \"a collection of pus\" seen on the CT. In the emergency department a CT abdomen pelvis was obtained that revealed Fluid and gas collection within the pelvis interposed between the colon and uterus measuring up to 4.1 cm.  Abscess is favored.  This appears largely enveloped by abdominal and adnexal structures.  A [place] uterine fistula is not excluded.  Additional small dependent collection within the region of the pouch of Alfred measuring up to 2.5 cm.  Mild dilation of small bowel loops which may indicate partial obstruction.  There is no high-grade small bowel obstruction.  The patient was given morphine for pain.  She was also started on Zosyn.  Upon further evaluation, the patient was found to be dehydrated with hyponatremia.  This is likely secondary to inadequate p.o. intake.  She was admitted for further evaluation

## 2025-04-30 NOTE — OP NOTE
Operative Note      Patient: Tuyet Richter  YOB: 1960  MRN: [id number]    Date of Procedure: 4/29/2025    Pre-Op Diagnosis Codes:      * Diverticulitis [K57.92]    Post-Op Diagnosis: Same       Procedure(s):  DIAGNOSTIC LAPAROSCOPY, LYSIS OF ADHESIONS, AND ABSCESS DRAINAGE and Rigid Sigmoidoscopy    Surgeon(s):  Dallas Guererro MD    Assistant:   Surgical Assistant: Cadence Martinez    Anesthesia: General    Estimated Blood Loss (mL): less than 50     Complications: None    Specimens:   * No specimens in log *    Implants:  * No implants in log *      Drains:   Closed/Suction Drain LUQ Bulb (Active)   Site Description Clean, dry & intact 04/30/25 0800   Dressing Status Clean, dry & intact 04/30/25 0800   Drainage Appearance Bloody 04/30/25 1231   Drain Status Compressed 04/30/25 0800   Output (ml) 50 ml 04/30/25 1533       [REMOVED] Urinary Catheter 04/29/25 Weldon (Removed)       Findings:  Infection Present At Time Of Surgery (PATOS) (choose all levels that have infection present):  - Organ Space infection (below fascia) present as evidenced by pus, purulent fluid, fluid consistent with infection, and yellow thick and creamy fluid consistent with infection  Other Findings: as below  This procedure was not performed to treat colon cancer through resection    Detailed Description of Procedure:   Patient was given adequate description of the risks and rewards of the procedure, including bleeding and infection, need for other operation and freely consented.  She was given appropriate antibiotics and brought to the OR where GETA anesthesia was induced.  She was placed in supine position.  Prepped and draped in usual sterile fashion.     Incision made in left upper abdomen and 5mm optiview trocar inserted.  Abdomen insufflated to 15mmhg pressure.  Two additional trocars inserted in upper abdomen.  Placed in trendelenburg position.  Adhesions of omentum and small intestine to anterior abdomen taken  down with sharp and blunt dissection.  Additional adhesions to sigmoid colon taken down as well.  The sigmoid colon was inflamed and adherent to pelvis in midline and to the rigth side.  Most of these adhesions were taken down with sharp and blutn dissection. This exposed the abscess cavity just to the right of midline as seen on prior imaging. Purulent fluid was suctioned free.  Nothing feculent or enteric seen.  The cavity was irrigated with saline and fluid suctioned free.  I then did a rigid sigmoidoscopy and inflated air into the sigmoid colon while it was submerged in saline.  No air leakage was seen.  As such, if this were a diverticulitis related issue I felt that resection was not needed at this time.  Through the left upper trocar a 15F channel drain was inserted and positioned in the pelvis and abscess cavity.  It was secured to the skin with silk suture.  Other trocars were removed and abdomen desufflated.  Other sites closed with 3-0 vicryl sutures.     Sterile dressing placed.  All suture, sponge and instrument count correct times two at end of case.  Transferred to PACU in stable condition.    Carroll Guerrero MD      Electronically signed by Dallas Guerrero MD on 4/30/2025 at 4:59 PM

## 2025-04-30 NOTE — PROGRESS NOTES
[facility] GENERAL SURGERY    Surgery Progress Note           POD # 1    PATIENT NAME: Tuyet Richter     TODAY'S DATE: 4/30/2025    INTERVAL HISTORY:    Pt  with mild pain, no nausea.     OBJECTIVE:   VITALS:  /84   Pulse 97   Temp 97.9 °F (36.6 °C) (Oral)   Resp 16   Ht 1.6 m (5' 3\")   Wt 50.1 kg (110 lb 6.4 oz)   SpO2 97%   BMI 19.56 kg/m²     INTAKE/OUTPUT:    I/O last 3 completed shifts:  In: 1000 [I.V.:1000]  Out: 775 [Urine:603; Drains:170; Stool:2]  I/O this shift:  In: 1219.2 [I.V.:480.8; IV Piggyback:738.4]  Out: 0               CONSTITUTIONAL:  awake and alert  ABDOMEN:   hypoactive bowel sounds, soft, non-distended, tender, rigoberto serosanguinous   INCISION: clean    Data:  CBC:   Recent Labs     04/28/25  0504 04/29/25  0521 04/30/25 0429   WBC 8.3 14.3* 13.3*   HGB 10.4* 10.4* 10.0*   HCT 31.0* 31.5* 30.0*   * 685* 611*     BMP:    Recent Labs     04/28/25  0504 04/29/25  0521 04/30/25 0429   * 135* 135*   K 4.4 3.6 3.3*   CL 98* 102 100   CO2 24 22 25   BUN <2* <2* 3*   CREATININE 0.5* 0.6 0.5*   GLUCOSE 107* 147* 92     Hepatic: No results for input(s): \"AST\", \"ALT\", \"BILITOT\", \"ALKPHOS\" in the last 72 hours.    Invalid input(s): \"ALB\"  Mag:      Recent Labs     04/28/25  0504 04/29/25  0521   MG 1.72* 1.84      Phos:   No results for input(s): \"PHOS\" in the last 72 hours.   INR: No results for input(s): \"INR\" in the last 72 hours.      Radiology Review:  NA    ASSESSMENT AND PLAN:  65 y.o. female status post laparoscopic lysis of adhesions and abscess drainage  Continue abx  Clear liquids today until further bowel function  Increase ambulation         Electronically signed by Dallas Guerrero MD

## 2025-04-30 NOTE — PROGRESS NOTES
Pt is A/O x4 and vital signs are stable. RN assessment completed and charted. Pt complaining of abdominal discomfort ranging from 5-7/10 and morphine 4 mg given at 0953. Pt has 2 lap sites with dressing on them and minimal drainage on the dressings. Pt is on a clear liquid diet and has tolerated that well. Pt was up to restroom and tolerated that well and had 250 mL of urine. Bowel sounds active and last BM 4/28/2025. Pt is awake in bed watching television and on RA. Call light within reach. Pt is independent and calls out appropriately. Pt is aware of POC and verbalized understanding. Pt is satisfied with results. Pt stable and denied any needs at this time.

## 2025-05-01 ENCOUNTER — APPOINTMENT (OUTPATIENT)
Dept: GENERAL RADIOLOGY | Age: 65
DRG: 856 | End: 2025-05-01
Payer: MEDICARE

## 2025-05-01 ENCOUNTER — APPOINTMENT (OUTPATIENT)
Dept: CT IMAGING | Age: 65
DRG: 856 | End: 2025-05-01
Payer: MEDICARE

## 2025-05-01 LAB
ALBUMIN SERPL-MCNC: 2.6 G/DL (ref 3.4–5)
ALBUMIN/GLOB SERPL: 1 {RATIO} (ref 1.1–2.2)
ALP SERPL-CCNC: 105 U/L (ref 40–129)
ALT SERPL-CCNC: <5 U/L (ref 10–40)
ANION GAP SERPL CALCULATED.3IONS-SCNC: 9 MMOL/L (ref 3–16)
AST SERPL-CCNC: 13 U/L (ref 15–37)
BASOPHILS # BLD: 0.1 K/UL (ref 0–0.2)
BASOPHILS NFR BLD: 0.8 %
BILIRUB SERPL-MCNC: 0.3 MG/DL (ref 0–1)
BUN SERPL-MCNC: 3 MG/DL (ref 7–20)
CALCIUM SERPL-MCNC: 8.2 MG/DL (ref 8.3–10.6)
CHLORIDE SERPL-SCNC: 99 MMOL/L (ref 99–110)
CO2 SERPL-SCNC: 26 MMOL/L (ref 21–32)
CREAT SERPL-MCNC: 0.4 MG/DL (ref 0.6–1.2)
DEPRECATED RDW RBC AUTO: 15.1 % (ref 12.4–15.4)
EOSINOPHIL # BLD: 0.2 K/UL (ref 0–0.6)
EOSINOPHIL NFR BLD: 1.2 %
GFR SERPLBLD CREATININE-BSD FMLA CKD-EPI: >90 ML/MIN/{1.73_M2}
GLUCOSE SERPL-MCNC: 120 MG/DL (ref 70–99)
HCT VFR BLD AUTO: 25.6 % (ref 36–48)
HGB BLD-MCNC: 8.7 G/DL (ref 12–16)
LYMPHOCYTES # BLD: 1.2 K/UL (ref 1–5.1)
LYMPHOCYTES NFR BLD: 9.3 %
MAGNESIUM SERPL-MCNC: 1.34 MG/DL (ref 1.8–2.4)
MCH RBC QN AUTO: 31.1 PG (ref 26–34)
MCHC RBC AUTO-ENTMCNC: 33.9 G/DL (ref 31–36)
MCV RBC AUTO: 91.7 FL (ref 80–100)
MONOCYTES # BLD: 1.5 K/UL (ref 0–1.3)
MONOCYTES NFR BLD: 11.3 %
NEUTROPHILS # BLD: 10.2 K/UL (ref 1.7–7.7)
NEUTROPHILS NFR BLD: 77.4 %
PLATELET # BLD AUTO: 530 K/UL (ref 135–450)
PMV BLD AUTO: 8.8 FL (ref 5–10.5)
POTASSIUM SERPL-SCNC: 3.5 MMOL/L (ref 3.5–5.1)
PROT SERPL-MCNC: 5.2 G/DL (ref 6.4–8.2)
RBC # BLD AUTO: 2.79 M/UL (ref 4–5.2)
SODIUM SERPL-SCNC: 134 MMOL/L (ref 136–145)
WBC # BLD AUTO: 13.2 K/UL (ref 4–11)

## 2025-05-01 PROCEDURE — 6360000002 HC RX W HCPCS: Performed by: STUDENT IN AN ORGANIZED HEALTH CARE EDUCATION/TRAINING PROGRAM

## 2025-05-01 PROCEDURE — 74177 CT ABD & PELVIS W/CONTRAST: CPT

## 2025-05-01 PROCEDURE — 2580000003 HC RX 258: Performed by: INTERNAL MEDICINE

## 2025-05-01 PROCEDURE — 6360000004 HC RX CONTRAST MEDICATION: Performed by: SURGERY

## 2025-05-01 PROCEDURE — 36415 COLL VENOUS BLD VENIPUNCTURE: CPT

## 2025-05-01 PROCEDURE — 85025 COMPLETE CBC W/AUTO DIFF WBC: CPT

## 2025-05-01 PROCEDURE — 6370000000 HC RX 637 (ALT 250 FOR IP): Performed by: SURGERY

## 2025-05-01 PROCEDURE — 2500000003 HC RX 250 WO HCPCS: Performed by: SURGERY

## 2025-05-01 PROCEDURE — 1200000000 HC SEMI PRIVATE

## 2025-05-01 PROCEDURE — 99024 POSTOP FOLLOW-UP VISIT: CPT | Performed by: SURGERY

## 2025-05-01 PROCEDURE — 6370000000 HC RX 637 (ALT 250 FOR IP): Performed by: STUDENT IN AN ORGANIZED HEALTH CARE EDUCATION/TRAINING PROGRAM

## 2025-05-01 PROCEDURE — APPNB45 APP NON BILLABLE 31-45 MINUTES: Performed by: CLINICAL NURSE SPECIALIST

## 2025-05-01 PROCEDURE — 83735 ASSAY OF MAGNESIUM: CPT

## 2025-05-01 PROCEDURE — 80053 COMPREHEN METABOLIC PANEL: CPT

## 2025-05-01 PROCEDURE — 6360000002 HC RX W HCPCS: Performed by: INTERNAL MEDICINE

## 2025-05-01 PROCEDURE — 74019 RADEX ABDOMEN 2 VIEWS: CPT

## 2025-05-01 PROCEDURE — 6360000002 HC RX W HCPCS: Performed by: SURGERY

## 2025-05-01 RX ORDER — MORPHINE SULFATE 2 MG/ML
2 INJECTION, SOLUTION INTRAMUSCULAR; INTRAVENOUS EVERY 4 HOURS PRN
Refills: 0 | Status: DISCONTINUED | OUTPATIENT
Start: 2025-05-01 | End: 2025-05-20

## 2025-05-01 RX ORDER — TRAMADOL HYDROCHLORIDE 50 MG/1
50 TABLET ORAL EVERY 4 HOURS PRN
Status: DISCONTINUED | OUTPATIENT
Start: 2025-05-01 | End: 2025-05-03

## 2025-05-01 RX ORDER — TRAMADOL HYDROCHLORIDE 50 MG/1
100 TABLET ORAL EVERY 4 HOURS PRN
Status: DISCONTINUED | OUTPATIENT
Start: 2025-05-01 | End: 2025-05-03

## 2025-05-01 RX ORDER — MAGNESIUM SULFATE IN WATER 40 MG/ML
4000 INJECTION, SOLUTION INTRAVENOUS ONCE
Status: COMPLETED | OUTPATIENT
Start: 2025-05-01 | End: 2025-05-01

## 2025-05-01 RX ORDER — IOPAMIDOL 755 MG/ML
75 INJECTION, SOLUTION INTRAVASCULAR
Status: COMPLETED | OUTPATIENT
Start: 2025-05-01 | End: 2025-05-01

## 2025-05-01 RX ADMIN — MORPHINE SULFATE 2 MG: 2 INJECTION, SOLUTION INTRAMUSCULAR; INTRAVENOUS at 21:31

## 2025-05-01 RX ADMIN — PIPERACILLIN AND TAZOBACTAM 3375 MG: 3; .375 INJECTION, POWDER, LYOPHILIZED, FOR SOLUTION INTRAVENOUS at 21:34

## 2025-05-01 RX ADMIN — PIPERACILLIN AND TAZOBACTAM 3375 MG: 3; .375 INJECTION, POWDER, LYOPHILIZED, FOR SOLUTION INTRAVENOUS at 14:00

## 2025-05-01 RX ADMIN — METOPROLOL SUCCINATE 25 MG: 25 TABLET, EXTENDED RELEASE ORAL at 08:26

## 2025-05-01 RX ADMIN — PIPERACILLIN AND TAZOBACTAM 3375 MG: 3; .375 INJECTION, POWDER, LYOPHILIZED, FOR SOLUTION INTRAVENOUS at 06:42

## 2025-05-01 RX ADMIN — TRAMADOL HYDROCHLORIDE 100 MG: 50 TABLET, COATED ORAL at 09:15

## 2025-05-01 RX ADMIN — DICYCLOMINE HYDROCHLORIDE 10 MG: 10 CAPSULE ORAL at 08:26

## 2025-05-01 RX ADMIN — MORPHINE SULFATE 2 MG: 2 INJECTION, SOLUTION INTRAMUSCULAR; INTRAVENOUS at 11:14

## 2025-05-01 RX ADMIN — MAGNESIUM SULFATE HEPTAHYDRATE 4000 MG: 4 INJECTION, SOLUTION INTRAVENOUS at 08:30

## 2025-05-01 RX ADMIN — CALCIUM CARBONATE 500 MG: 500 TABLET, CHEWABLE ORAL at 08:26

## 2025-05-01 RX ADMIN — DICYCLOMINE HYDROCHLORIDE 10 MG: 10 CAPSULE ORAL at 21:32

## 2025-05-01 RX ADMIN — Medication 400 MG: at 08:26

## 2025-05-01 RX ADMIN — Medication 6 MG: at 21:32

## 2025-05-01 RX ADMIN — IOPAMIDOL 75 ML: 755 INJECTION, SOLUTION INTRAVENOUS at 13:40

## 2025-05-01 RX ADMIN — CALCIUM CARBONATE 500 MG: 500 TABLET, CHEWABLE ORAL at 21:32

## 2025-05-01 RX ADMIN — POTASSIUM BICARBONATE 40 MEQ: 782 TABLET, EFFERVESCENT ORAL at 08:25

## 2025-05-01 RX ADMIN — SODIUM CHLORIDE, PRESERVATIVE FREE 10 ML: 5 INJECTION INTRAVENOUS at 21:38

## 2025-05-01 RX ADMIN — DICYCLOMINE HYDROCHLORIDE 10 MG: 10 CAPSULE ORAL at 13:59

## 2025-05-01 RX ADMIN — ONDANSETRON 4 MG: 2 INJECTION, SOLUTION INTRAMUSCULAR; INTRAVENOUS at 11:17

## 2025-05-01 RX ADMIN — AMLODIPINE BESYLATE 2.5 MG: 2.5 TABLET ORAL at 08:26

## 2025-05-01 RX ADMIN — PANTOPRAZOLE SODIUM 40 MG: 40 TABLET, DELAYED RELEASE ORAL at 06:42

## 2025-05-01 RX ADMIN — Medication 400 MG: at 21:32

## 2025-05-01 ASSESSMENT — PAIN SCALES - GENERAL
PAINLEVEL_OUTOF10: 7
PAINLEVEL_OUTOF10: 9

## 2025-05-01 ASSESSMENT — PAIN DESCRIPTION - LOCATION
LOCATION: ABDOMEN

## 2025-05-01 ASSESSMENT — PAIN DESCRIPTION - DESCRIPTORS
DESCRIPTORS: SHARP;SHOOTING;STABBING;CRAMPING
DESCRIPTORS: CRAMPING
DESCRIPTORS: SHARP;SHOOTING;STABBING;CRAMPING

## 2025-05-01 NOTE — PROGRESS NOTES
Pt a/o. Vss. Pt c/o sharp, shooting, stabbing, cramping pain in abd  7/10 this morning. Offered PRN analgesics and pt refused percocet because she said it made her vomit and refused morphine because she is \"trying to get off the morphine.\" PS sent to MD for something else to control pain. Pt moaning here and there in pain. Pt up in chair eating clear liquid tray this morning. Pt denied passing gas or BM but states she \"thinks she is about to have a BM today.\" Pt did belch a few times during assessment. ABD more distended today than yesterday. Pt stable and denied needs when writer left room.

## 2025-05-01 NOTE — CARE COORDINATION
Hospital day 8, POD 2: Patient on C3 re intra abd abscess care managed by IM, ID, and General Surgery. Patient from home with spouse, IPTA. Up to chair this date. Patient on IV ATB. Clears diet. Plans for AXR re pain and distention. SW will ct to follow.GASTON Alexander

## 2025-05-01 NOTE — PROGRESS NOTES
[facility] Medicine Progress Note  V 1.6      Date of Admission: 4/23/2025    Hospital Day: 9      Chief Admission Complaint:  Abdominal pain    Subjective:      Patient continues to progress.  Her only complaints today are that the pain medication makes her feel loopy and she would like to try something not quite a strong.      Presenting Admission History:       64 y.o. female who presented to [facility] with abdominal pain.  PMHx significant for HTN.  History obtained from the patient and review of EMR.  The patient stated she had a hemicolectomy in February 2025 in [facility] and since then has been experiencing intermittent abdominal pain.  Per chart review, the patient has been readmitted 1 time since her surgery for electrolyte abnormalities.  However, the patient stated her pain has gotten progressively worse over the last couple of weeks.  She reports speaking with GI and they ordered a CT outpatient today.  Patient  is at the bedside and stated that she was called and told to go to the emergency department due to \"a collection of pus\" seen on the CT. In the emergency department a CT abdomen pelvis was obtained that revealed Fluid and gas collection within the pelvis interposed between the colon and uterus measuring up to 4.1 cm.  Abscess is favored.  This appears largely enveloped by abdominal and adnexal structures.  A [place] uterine fistula is not excluded.  Additional small dependent collection within the region of the pouch of Alfred measuring up to 2.5 cm.  Mild dilation of small bowel loops which may indicate partial obstruction.  There is no high-grade small bowel obstruction.  The patient was given morphine for pain.  She was also started on Zosyn.  Upon further evaluation, the patient was found to be dehydrated with hyponatremia.  This is likely secondary to inadequate p.o. intake.  She was admitted for further evaluation and treatment.  IV fluids have been initiated

## 2025-05-01 NOTE — PROGRESS NOTES
[facility] GENERAL SURGERY    Surgery Progress Note           POD # 2    PATIENT NAME: Tuyet Richter     TODAY'S DATE: 5/1/2025    INTERVAL HISTORY:    Pt reports pain this AM and increased abd bloating. States she stopped taking the pain medicine, but thinks she may need it.   Was up in the chair this morning.      OBJECTIVE:   VITALS:  /78   Pulse 96   Temp 97.5 °F (36.4 °C) (Oral)   Resp 16   Ht 1.6 m (5' 3\")   Wt 50.1 kg (110 lb 6.4 oz)   SpO2 96%   BMI 19.56 kg/m²     INTAKE/OUTPUT:    I/O last 3 completed shifts:  In: 3211.1 [P.O.:1690; I.V.:763.7; IV Piggyback:757.4]  Out: 2725 [Urine:2425; Drains:300]  I/O this shift:  In: 480 [P.O.:480]  Out: 60 [Drains:60]              CONSTITUTIONAL:  awake and alert  ABDOMEN:   hypoactive bowel sounds, soft,  distended, tender diffusely, rigoberto serosanguinous   INCISION: clean    Data:  CBC:   Recent Labs     04/29/25  0521 04/30/25  0429 05/01/25  0506   WBC 14.3* 13.3* 13.2*   HGB 10.4* 10.0* 8.7*   HCT 31.5* 30.0* 25.6*   * 611* 530*     BMP:    Recent Labs     04/29/25  0521 04/30/25  0429 05/01/25  0506   * 135* 134*   K 3.6 3.3* 3.5    100 99   CO2 22 25 26   BUN <2* 3* 3*   CREATININE 0.6 0.5* 0.4*   GLUCOSE 147* 92 120*     Hepatic:   Recent Labs     05/01/25  0506   AST 13*   ALT <5*   BILITOT 0.3   ALKPHOS 105     Mag:      Recent Labs     04/29/25  0521 05/01/25  0506   MG 1.84 1.34*      Phos:   No results for input(s): \"PHOS\" in the last 72 hours.   INR: No results for input(s): \"INR\" in the last 72 hours.      Radiology Review:    AXR pending    ASSESSMENT AND PLAN:  65 y.o. female status post laparoscopic lysis of adhesions and abscess drainage  GI: CLD, will obtain AXR this AM - may need ngt. IF she continues to indicate higher degree of pain, then would proceed with CT scan to further assess.   ID: continue with antibiotics, appreciate ID input  Activity: ambulate with assistance  Prophy: protonix,

## 2025-05-01 NOTE — PROGRESS NOTES
Comprehensive Nutrition Assessment    Type and Reason for Visit:  Reassess    Nutrition Recommendations/Plan:   If unable to advance PO diet in the next 48hrs, recommend initiation of TPN due to NPO/clears x8 days.  Recommend check TG, Mg, Phos, CMP now if not done in last 24 hours.  Bag 1: Clinimix 5/15 starting at 35 mL/hr  Physician/LIP to monitor closely and correct lytes (Phos,Mg,K+) d/t risk of refeeding syndrome  Bag 2: As long as electrolytes WNL, advance Clinimix 5/15 to goal rate of 75 mL/hr  Recommend 250 mL bags of 20% lipids 2 times per week  Recommend FSBS, monitor glucose, need for insulin  Pharmacy to adjust MVI and Trace Elements as needed   When PN to be discontinued, cut rate by 50% and let current bag run out.       Malnutrition Assessment:  Malnutrition Status:  Severe malnutrition (04/24/25 1120)    Context:  Acute Illness     Findings of the 6 clinical characteristics of malnutrition:  Energy Intake:  75% or less of estimated energy requirements for 7 or more days  Weight Loss:  Mild weight loss     Body Fat Loss:  Moderate body fat loss Orbital, Triceps   Muscle Mass Loss:  Moderate muscle mass loss Temples (temporalis), Clavicles (pectoralis & deltoids)  Fluid Accumulation:  No fluid accumulation     Strength:  Not Performed    Nutrition Assessment:    Followup: Noted patient is s/p laparoscopic lysis of adhesions and abscess drainage 4/29. Noted patient has been on NPO/clear liquid diet throughout admission (briefy advanced to full liquids 4/27). Per RN note, pt does feel she may have a BM today. If unable to advance PO diet in the next 48hrs, recommend initiation of TPN due to NPO/clears x8 days and risk of refeeding due to inadequate PO intakes. Will continue to monitor.    Nutrition Related Findings:    Na 134, Mg 1.34, glucose 120. LBM 4/28. Wound Type: Surgical Incision       Current Nutrition Intake & Therapies:    Average Meal Intake: Unable to assess  Average Supplements Intake:

## 2025-05-02 LAB
ANION GAP SERPL CALCULATED.3IONS-SCNC: 9 MMOL/L (ref 3–16)
BASOPHILS # BLD: 0.1 K/UL (ref 0–0.2)
BASOPHILS NFR BLD: 0.6 %
BUN SERPL-MCNC: 3 MG/DL (ref 7–20)
CALCIUM SERPL-MCNC: 8 MG/DL (ref 8.3–10.6)
CHLORIDE SERPL-SCNC: 96 MMOL/L (ref 99–110)
CO2 SERPL-SCNC: 24 MMOL/L (ref 21–32)
CREAT SERPL-MCNC: 0.3 MG/DL (ref 0.6–1.2)
DEPRECATED RDW RBC AUTO: 15.3 % (ref 12.4–15.4)
EOSINOPHIL # BLD: 0.1 K/UL (ref 0–0.6)
EOSINOPHIL NFR BLD: 0.8 %
GFR SERPLBLD CREATININE-BSD FMLA CKD-EPI: >90 ML/MIN/{1.73_M2}
GLUCOSE SERPL-MCNC: 89 MG/DL (ref 70–99)
HCT VFR BLD AUTO: 28.1 % (ref 36–48)
HGB BLD-MCNC: 9.3 G/DL (ref 12–16)
LYMPHOCYTES # BLD: 1 K/UL (ref 1–5.1)
LYMPHOCYTES NFR BLD: 8.7 %
MCH RBC QN AUTO: 31.1 PG (ref 26–34)
MCHC RBC AUTO-ENTMCNC: 33 G/DL (ref 31–36)
MCV RBC AUTO: 94.2 FL (ref 80–100)
MONOCYTES # BLD: 1 K/UL (ref 0–1.3)
MONOCYTES NFR BLD: 8.8 %
NEUTROPHILS # BLD: 9.6 K/UL (ref 1.7–7.7)
NEUTROPHILS NFR BLD: 81.1 %
PLATELET # BLD AUTO: 463 K/UL (ref 135–450)
PMV BLD AUTO: 9 FL (ref 5–10.5)
POTASSIUM SERPL-SCNC: 3.5 MMOL/L (ref 3.5–5.1)
RBC # BLD AUTO: 2.98 M/UL (ref 4–5.2)
SODIUM SERPL-SCNC: 129 MMOL/L (ref 136–145)
WBC # BLD AUTO: 11.8 K/UL (ref 4–11)

## 2025-05-02 PROCEDURE — 85025 COMPLETE CBC W/AUTO DIFF WBC: CPT

## 2025-05-02 PROCEDURE — 6360000002 HC RX W HCPCS: Performed by: INTERNAL MEDICINE

## 2025-05-02 PROCEDURE — 36415 COLL VENOUS BLD VENIPUNCTURE: CPT

## 2025-05-02 PROCEDURE — 99232 SBSQ HOSP IP/OBS MODERATE 35: CPT | Performed by: INTERNAL MEDICINE

## 2025-05-02 PROCEDURE — 2580000003 HC RX 258: Performed by: INTERNAL MEDICINE

## 2025-05-02 PROCEDURE — 1200000000 HC SEMI PRIVATE

## 2025-05-02 PROCEDURE — 6370000000 HC RX 637 (ALT 250 FOR IP): Performed by: SURGERY

## 2025-05-02 PROCEDURE — 6360000002 HC RX W HCPCS: Performed by: STUDENT IN AN ORGANIZED HEALTH CARE EDUCATION/TRAINING PROGRAM

## 2025-05-02 PROCEDURE — 80048 BASIC METABOLIC PNL TOTAL CA: CPT

## 2025-05-02 PROCEDURE — 2580000003 HC RX 258: Performed by: SURGERY

## 2025-05-02 PROCEDURE — 6360000002 HC RX W HCPCS: Performed by: SURGERY

## 2025-05-02 PROCEDURE — 99024 POSTOP FOLLOW-UP VISIT: CPT | Performed by: SURGERY

## 2025-05-02 RX ADMIN — MORPHINE SULFATE 2 MG: 2 INJECTION, SOLUTION INTRAMUSCULAR; INTRAVENOUS at 10:22

## 2025-05-02 RX ADMIN — DICYCLOMINE HYDROCHLORIDE 10 MG: 10 CAPSULE ORAL at 19:38

## 2025-05-02 RX ADMIN — PIPERACILLIN AND TAZOBACTAM 3375 MG: 3; .375 INJECTION, POWDER, LYOPHILIZED, FOR SOLUTION INTRAVENOUS at 14:49

## 2025-05-02 RX ADMIN — METOPROLOL SUCCINATE 25 MG: 25 TABLET, EXTENDED RELEASE ORAL at 10:32

## 2025-05-02 RX ADMIN — MORPHINE SULFATE 2 MG: 2 INJECTION, SOLUTION INTRAMUSCULAR; INTRAVENOUS at 14:34

## 2025-05-02 RX ADMIN — SODIUM CHLORIDE: 0.9 INJECTION, SOLUTION INTRAVENOUS at 19:44

## 2025-05-02 RX ADMIN — MORPHINE SULFATE 2 MG: 2 INJECTION, SOLUTION INTRAMUSCULAR; INTRAVENOUS at 22:57

## 2025-05-02 RX ADMIN — MORPHINE SULFATE 2 MG: 2 INJECTION, SOLUTION INTRAMUSCULAR; INTRAVENOUS at 03:48

## 2025-05-02 RX ADMIN — Medication 400 MG: at 10:32

## 2025-05-02 RX ADMIN — AMLODIPINE BESYLATE 2.5 MG: 2.5 TABLET ORAL at 10:31

## 2025-05-02 RX ADMIN — PIPERACILLIN AND TAZOBACTAM 3375 MG: 3; .375 INJECTION, POWDER, LYOPHILIZED, FOR SOLUTION INTRAVENOUS at 22:12

## 2025-05-02 RX ADMIN — PIPERACILLIN AND TAZOBACTAM 3375 MG: 3; .375 INJECTION, POWDER, LYOPHILIZED, FOR SOLUTION INTRAVENOUS at 06:17

## 2025-05-02 RX ADMIN — PANTOPRAZOLE SODIUM 40 MG: 40 TABLET, DELAYED RELEASE ORAL at 06:16

## 2025-05-02 RX ADMIN — Medication 6 MG: at 19:39

## 2025-05-02 RX ADMIN — SODIUM CHLORIDE: 0.9 INJECTION, SOLUTION INTRAVENOUS at 03:23

## 2025-05-02 RX ADMIN — ENOXAPARIN SODIUM 30 MG: 100 INJECTION SUBCUTANEOUS at 10:33

## 2025-05-02 RX ADMIN — DICYCLOMINE HYDROCHLORIDE 10 MG: 10 CAPSULE ORAL at 10:32

## 2025-05-02 RX ADMIN — Medication 400 MG: at 19:39

## 2025-05-02 RX ADMIN — DICYCLOMINE HYDROCHLORIDE 10 MG: 10 CAPSULE ORAL at 14:35

## 2025-05-02 RX ADMIN — CALCIUM CARBONATE 500 MG: 500 TABLET, CHEWABLE ORAL at 19:39

## 2025-05-02 RX ADMIN — CALCIUM CARBONATE 500 MG: 500 TABLET, CHEWABLE ORAL at 10:32

## 2025-05-02 RX ADMIN — ONDANSETRON 4 MG: 2 INJECTION, SOLUTION INTRAMUSCULAR; INTRAVENOUS at 10:22

## 2025-05-02 ASSESSMENT — PAIN SCALES - GENERAL
PAINLEVEL_OUTOF10: 9
PAINLEVEL_OUTOF10: 8
PAINLEVEL_OUTOF10: 8
PAINLEVEL_OUTOF10: 3
PAINLEVEL_OUTOF10: 9

## 2025-05-02 ASSESSMENT — PAIN DESCRIPTION - LOCATION
LOCATION: ABDOMEN

## 2025-05-02 ASSESSMENT — PAIN DESCRIPTION - DESCRIPTORS
DESCRIPTORS: ACHING
DESCRIPTORS: CRAMPING
DESCRIPTORS: CRAMPING
DESCRIPTORS: TENDER;TIGHTNESS

## 2025-05-02 ASSESSMENT — PAIN - FUNCTIONAL ASSESSMENT: PAIN_FUNCTIONAL_ASSESSMENT: ACTIVITIES ARE NOT PREVENTED

## 2025-05-02 ASSESSMENT — PAIN DESCRIPTION - ORIENTATION
ORIENTATION: RIGHT;LEFT;MID;UPPER
ORIENTATION: RIGHT;LEFT;LOWER;UPPER
ORIENTATION: RIGHT;LEFT;LOWER;UPPER
ORIENTATION: RIGHT;LEFT;MID;UPPER

## 2025-05-02 NOTE — PROGRESS NOTES
Infectious Disease Follow up Notes    CC : pelvic abscess      Antibiotics:  Zosyn 3.375 q8  Valtrex 500 po BID      Admit Date:   4/23/2025  Hospital Day: 10    Subjective:   She remains AF on RA   Ambulatory in the room  TODD output is serous   CLD ordered    Objective:     Patient Vitals for the past 8 hrs:   BP Temp Temp src Pulse Resp SpO2   05/02/25 1000 116/68 96.8 °F (36 °C) Oral (!) 105 18 96 %       EXAM:  General:   alert, conversant, NAD   Ambulating in room     HEENT:   NCAT, PERRL, sclera anicteric   MMM, no thrush   NECK:  Supple      LUNGS:   non-labored breathing    CV:   RRR  ABD: Slightly distended, soft, BS present   Midline inc well healed  TODD w serous effluent     EXT: No focal rash           LINE:     PIV     Scheduled Meds:   sodium chloride flush  5-40 mL IntraVENous 2 times per day    piperacillin-tazobactam  3,375 mg IntraVENous Q8H    amLODIPine  2.5 mg Oral Daily    metoprolol succinate  25 mg Oral Daily    valACYclovir  500 mg Oral BID    calcium carbonate  1 tablet Oral BID    dicyclomine  10 mg Oral TID    magnesium oxide  400 mg Oral BID    pantoprazole  40 mg Oral QAM AC    sodium chloride flush  5-40 mL IntraVENous 2 times per day    enoxaparin  30 mg SubCUTAneous Daily    melatonin  6 mg Oral Nightly       Continuous Infusions:   sodium chloride      sodium chloride 75 mL/hr at 05/02/25 0323    sodium chloride Stopped (04/25/25 0153)          Data Review:    Lab Results   Component Value Date    WBC 11.8 (H) 05/02/2025    HGB 9.3 (L) 05/02/2025    HCT 28.1 (L) 05/02/2025    MCV 94.2 05/02/2025     (H) 05/02/2025     Lab Results   Component Value Date    CREATININE 0.3 (L) 05/02/2025    BUN 3 (L) 05/02/2025     (L) 05/02/2025    K 3.5 05/02/2025    CL 96 (L) 05/02/2025    CO2 24 05/02/2025       Hepatic Function Panel:   Lab Results   Component Value Date/Time    ALKPHOS 105 05/01/2025 05:06

## 2025-05-02 NOTE — PLAN OF CARE
Problem: Pain  Goal: Verbalizes/displays adequate comfort level or baseline comfort level  Outcome: Progressing  Flowsheets (Taken 4/28/2025 0953 by Viri Owen, RN)  Verbalizes/displays adequate comfort level or baseline comfort level:   Encourage patient to monitor pain and request assistance   Assess pain using appropriate pain scale   Administer analgesics based on type and severity of pain and evaluate response   Implement non-pharmacological measures as appropriate and evaluate response     Problem: Safety - Adult  Goal: Free from fall injury  Outcome: Progressing  Flowsheets (Taken 4/28/2025 0953 by Viri Owen, RN)  Free From Fall Injury:   Instruct family/caregiver on patient safety   Based on caregiver fall risk screen, instruct family/caregiver to ask for assistance with transferring infant if caregiver noted to have fall risk factors     Problem: ABCDS Injury Assessment  Goal: Absence of physical injury  Outcome: Progressing  Flowsheets (Taken 4/25/2025 2033 by Yenifer Augustine, RN)  Absence of Physical Injury: Implement safety measures based on patient assessment     Problem: Nutrition Deficit:  Goal: Optimize nutritional status  Outcome: Progressing  Flowsheets (Taken 5/1/2025 0955 by Bita Suero, RD)  Nutrient intake appropriate for improving, restoring, or maintaining nutritional needs:   Assess nutritional status and recommend course of action   Monitor oral intake, labs, and treatment plans   Recommend appropriate diets, oral nutritional supplements, and vitamin/mineral supplements   Recommend, monitor, and adjust tube feedings and TPN/PPN based on assessed needs     Problem: Discharge Planning  Goal: Discharge to home or other facility with appropriate resources  Outcome: Progressing  Flowsheets (Taken 4/30/2025 0004 by Yenifer Augustine, RN)  Discharge to home or other facility with appropriate resources:   Refer to discharge planning if patient needs post-hospital services based on physician

## 2025-05-02 NOTE — PROGRESS NOTES
[facility] Medicine Progress Note  V 1.6      Date of Admission: 4/23/2025    Hospital Day: 10      Chief Admission Complaint:  Abdominal pain    Subjective:      Patient with ileus on CT.  Her pain is better controlled and she does not feel that she is as loopy from the pain medications at lower dose.  Currently on a clear liquid diet.    Presenting Admission History:       64 y.o. female who presented to [facility] with abdominal pain.  PMHx significant for HTN.  History obtained from the patient and review of EMR.  The patient stated she had a hemicolectomy in February 2025 in [facility] and since then has been experiencing intermittent abdominal pain.  Per chart review, the patient has been readmitted 1 time since her surgery for electrolyte abnormalities.  However, the patient stated her pain has gotten progressively worse over the last couple of weeks.  She reports speaking with GI and they ordered a CT outpatient today.  Patient  is at the bedside and stated that she was called and told to go to the emergency department due to \"a collection of pus\" seen on the CT. In the emergency department a CT abdomen pelvis was obtained that revealed Fluid and gas collection within the pelvis interposed between the colon and uterus measuring up to 4.1 cm.  Abscess is favored.  This appears largely enveloped by abdominal and adnexal structures.  A [place] uterine fistula is not excluded.  Additional small dependent collection within the region of the pouch of Alfred measuring up to 2.5 cm.  Mild dilation of small bowel loops which may indicate partial obstruction.  There is no high-grade small bowel obstruction.  The patient was given morphine for pain.  She was also started on Zosyn.  Upon further evaluation, the patient was found to be dehydrated with hyponatremia.  This is likely secondary to inadequate p.o. intake.  She was admitted for further evaluation and treatment.  IV fluids have

## 2025-05-02 NOTE — PROGRESS NOTES
Pt assessment completed and charted. VSS, pt on RA. Patient is a/o. IV site patent/flushed, infusing. 2 abdominal incisions C/D/I. Medication given per MAR by student and instructor. Pt reports small bowel movement. Bowel sounds active. Pt reports tolerable pain.     Safety Measures in place:   Bed in lowest position and wheels locked.   Call light within reach.   Bedside table within reach.   Non-skid socks in place.   Pt denies any other needs at this time.    Pt calls out appropriately.  Patient in stable condition when RN left room.

## 2025-05-02 NOTE — PROGRESS NOTES
[facility] GENERAL SURGERY    Surgery Progress Note           POD # 3    PATIENT NAME: Tuyet Richter     TODAY'S DATE: 5/2/2025    INTERVAL HISTORY:    Pt with some abd pain, not taking pain meds that often, no flatus     OBJECTIVE:   VITALS:  /69   Pulse 79   Temp 98.1 °F (36.7 °C) (Oral)   Resp 18   Ht 1.6 m (5' 3\")   Wt 50.1 kg (110 lb 6.4 oz)   SpO2 96%   BMI 19.56 kg/m²     INTAKE/OUTPUT:    I/O last 3 completed shifts:  In: 1800 [P.O.:1200; I.V.:600]  Out: 1900 [Urine:1600; Drains:300]  No intake/output data recorded.              CONSTITUTIONAL:  awake and alert  ABDOMEN:   hypoactive bowel sounds, soft,  distended, moderate tender, rigoberto serous  INCISION: clean    Data:  CBC:   Recent Labs     04/30/25  0429 05/01/25  0506 05/02/25  0756   WBC 13.3* 13.2* 11.8*   HGB 10.0* 8.7* 9.3*   HCT 30.0* 25.6* 28.1*   * 530* 463*     BMP:    Recent Labs     04/30/25  0429 05/01/25  0506 05/02/25  0756   * 134* 129*   K 3.3* 3.5 3.5    99 96*   CO2 25 26 24   BUN 3* 3* 3*   CREATININE 0.5* 0.4* 0.3*   GLUCOSE 92 120* 89     Hepatic:   Recent Labs     05/01/25  0506   AST 13*   ALT <5*   BILITOT 0.3   ALKPHOS 105     Mag:      Recent Labs     05/01/25  0506   MG 1.34*      Phos:   No results for input(s): \"PHOS\" in the last 72 hours.   INR: No results for input(s): \"INR\" in the last 72 hours.      Radiology Review:    CT - drain in good position, ileus    ASSESSMENT AND PLAN:  65 y.o. female status post laparoscopic lysis of adhesions and abscess drainage  CT with inflammation and drain in good position, ileus  Continue clear liquids slowly until ileus resolves  WBC improving and afebrile, continue abx      Electronically signed by Dallas Guerrero MD

## 2025-05-02 NOTE — CARE COORDINATION
Hospital day 9: Patient on C3 re Intra abd abscess care managed by IM, General Surgery, and ID. Patient from home ambulating in room. Clears diet IV ATB. Awaiting finial ATB recs. Met with spouse bedside ( patient in RR) denies questions/concerns at this time. .GASTON Alexander

## 2025-05-03 ENCOUNTER — APPOINTMENT (OUTPATIENT)
Dept: GENERAL RADIOLOGY | Age: 65
DRG: 856 | End: 2025-05-03
Payer: MEDICARE

## 2025-05-03 LAB
BASOPHILS # BLD: 0.1 K/UL (ref 0–0.2)
BASOPHILS NFR BLD: 0.5 %
DEPRECATED RDW RBC AUTO: 15.1 % (ref 12.4–15.4)
EOSINOPHIL # BLD: 0 K/UL (ref 0–0.6)
EOSINOPHIL NFR BLD: 0.1 %
HCT VFR BLD AUTO: 28.7 % (ref 36–48)
HGB BLD-MCNC: 9.5 G/DL (ref 12–16)
LYMPHOCYTES # BLD: 1 K/UL (ref 1–5.1)
LYMPHOCYTES NFR BLD: 7.3 %
MAGNESIUM SERPL-MCNC: 1.43 MG/DL (ref 1.8–2.4)
MCH RBC QN AUTO: 30.7 PG (ref 26–34)
MCHC RBC AUTO-ENTMCNC: 33.3 G/DL (ref 31–36)
MCV RBC AUTO: 92.2 FL (ref 80–100)
MONOCYTES # BLD: 1 K/UL (ref 0–1.3)
MONOCYTES NFR BLD: 7.4 %
NEUTROPHILS # BLD: 11.4 K/UL (ref 1.7–7.7)
NEUTROPHILS NFR BLD: 84.7 %
PLATELET # BLD AUTO: 554 K/UL (ref 135–450)
PMV BLD AUTO: 9.9 FL (ref 5–10.5)
RBC # BLD AUTO: 3.11 M/UL (ref 4–5.2)
WBC # BLD AUTO: 13.4 K/UL (ref 4–11)

## 2025-05-03 PROCEDURE — 6370000000 HC RX 637 (ALT 250 FOR IP): Performed by: SURGERY

## 2025-05-03 PROCEDURE — 6360000002 HC RX W HCPCS: Performed by: STUDENT IN AN ORGANIZED HEALTH CARE EDUCATION/TRAINING PROGRAM

## 2025-05-03 PROCEDURE — 2500000003 HC RX 250 WO HCPCS: Performed by: STUDENT IN AN ORGANIZED HEALTH CARE EDUCATION/TRAINING PROGRAM

## 2025-05-03 PROCEDURE — 99024 POSTOP FOLLOW-UP VISIT: CPT | Performed by: SURGERY

## 2025-05-03 PROCEDURE — 36415 COLL VENOUS BLD VENIPUNCTURE: CPT

## 2025-05-03 PROCEDURE — 85025 COMPLETE CBC W/AUTO DIFF WBC: CPT

## 2025-05-03 PROCEDURE — 2500000003 HC RX 250 WO HCPCS: Performed by: SURGERY

## 2025-05-03 PROCEDURE — 6360000002 HC RX W HCPCS: Performed by: INTERNAL MEDICINE

## 2025-05-03 PROCEDURE — 2580000003 HC RX 258: Performed by: INTERNAL MEDICINE

## 2025-05-03 PROCEDURE — 6360000002 HC RX W HCPCS: Performed by: SURGERY

## 2025-05-03 PROCEDURE — 74019 RADEX ABDOMEN 2 VIEWS: CPT

## 2025-05-03 PROCEDURE — 83735 ASSAY OF MAGNESIUM: CPT

## 2025-05-03 PROCEDURE — 1200000000 HC SEMI PRIVATE

## 2025-05-03 RX ORDER — MAGNESIUM SULFATE IN WATER 40 MG/ML
2000 INJECTION, SOLUTION INTRAVENOUS ONCE
Status: COMPLETED | OUTPATIENT
Start: 2025-05-03 | End: 2025-05-03

## 2025-05-03 RX ORDER — KETOROLAC TROMETHAMINE 30 MG/ML
15 INJECTION, SOLUTION INTRAMUSCULAR; INTRAVENOUS EVERY 8 HOURS PRN
Status: DISCONTINUED | OUTPATIENT
Start: 2025-05-03 | End: 2025-05-05

## 2025-05-03 RX ADMIN — SODIUM CHLORIDE, PRESERVATIVE FREE 10 ML: 5 INJECTION INTRAVENOUS at 08:36

## 2025-05-03 RX ADMIN — MAGNESIUM SULFATE HEPTAHYDRATE 2000 MG: 40 INJECTION, SOLUTION INTRAVENOUS at 13:56

## 2025-05-03 RX ADMIN — CALCIUM CARBONATE 500 MG: 500 TABLET, CHEWABLE ORAL at 20:17

## 2025-05-03 RX ADMIN — MORPHINE SULFATE 2 MG: 2 INJECTION, SOLUTION INTRAMUSCULAR; INTRAVENOUS at 03:46

## 2025-05-03 RX ADMIN — DICYCLOMINE HYDROCHLORIDE 10 MG: 10 CAPSULE ORAL at 20:17

## 2025-05-03 RX ADMIN — MORPHINE SULFATE 2 MG: 2 INJECTION, SOLUTION INTRAMUSCULAR; INTRAVENOUS at 08:35

## 2025-05-03 RX ADMIN — MORPHINE SULFATE 2 MG: 2 INJECTION, SOLUTION INTRAMUSCULAR; INTRAVENOUS at 20:16

## 2025-05-03 RX ADMIN — Medication 400 MG: at 20:17

## 2025-05-03 RX ADMIN — Medication 6 MG: at 20:17

## 2025-05-03 RX ADMIN — PIPERACILLIN AND TAZOBACTAM 3375 MG: 3; .375 INJECTION, POWDER, LYOPHILIZED, FOR SOLUTION INTRAVENOUS at 16:13

## 2025-05-03 RX ADMIN — ACETAMINOPHEN 650 MG: 325 TABLET ORAL at 16:08

## 2025-05-03 RX ADMIN — PIPERACILLIN AND TAZOBACTAM 3375 MG: 3; .375 INJECTION, POWDER, LYOPHILIZED, FOR SOLUTION INTRAVENOUS at 21:42

## 2025-05-03 RX ADMIN — SODIUM CHLORIDE, PRESERVATIVE FREE 10 ML: 5 INJECTION INTRAVENOUS at 20:25

## 2025-05-03 RX ADMIN — AMLODIPINE BESYLATE 2.5 MG: 2.5 TABLET ORAL at 08:35

## 2025-05-03 RX ADMIN — KETOROLAC TROMETHAMINE 15 MG: 30 INJECTION, SOLUTION INTRAMUSCULAR at 10:21

## 2025-05-03 RX ADMIN — Medication 400 MG: at 08:35

## 2025-05-03 RX ADMIN — MORPHINE SULFATE 2 MG: 2 INJECTION, SOLUTION INTRAMUSCULAR; INTRAVENOUS at 16:08

## 2025-05-03 RX ADMIN — PIPERACILLIN AND TAZOBACTAM 3375 MG: 3; .375 INJECTION, POWDER, LYOPHILIZED, FOR SOLUTION INTRAVENOUS at 06:13

## 2025-05-03 RX ADMIN — SODIUM CHLORIDE, PRESERVATIVE FREE 10 ML: 5 INJECTION INTRAVENOUS at 20:17

## 2025-05-03 RX ADMIN — ENOXAPARIN SODIUM 30 MG: 100 INJECTION SUBCUTANEOUS at 08:35

## 2025-05-03 RX ADMIN — VALACYCLOVIR HYDROCHLORIDE 500 MG: 500 TABLET, FILM COATED ORAL at 20:26

## 2025-05-03 RX ADMIN — VALACYCLOVIR HYDROCHLORIDE 500 MG: 500 TABLET, FILM COATED ORAL at 12:16

## 2025-05-03 RX ADMIN — CALCIUM CARBONATE 500 MG: 500 TABLET, CHEWABLE ORAL at 08:35

## 2025-05-03 RX ADMIN — ONDANSETRON 4 MG: 4 TABLET, ORALLY DISINTEGRATING ORAL at 08:45

## 2025-05-03 RX ADMIN — METOPROLOL SUCCINATE 25 MG: 25 TABLET, EXTENDED RELEASE ORAL at 08:34

## 2025-05-03 RX ADMIN — DICYCLOMINE HYDROCHLORIDE 10 MG: 10 CAPSULE ORAL at 14:14

## 2025-05-03 RX ADMIN — KETOROLAC TROMETHAMINE 15 MG: 30 INJECTION, SOLUTION INTRAMUSCULAR at 21:36

## 2025-05-03 RX ADMIN — PANTOPRAZOLE SODIUM 40 MG: 40 TABLET, DELAYED RELEASE ORAL at 06:13

## 2025-05-03 RX ADMIN — DICYCLOMINE HYDROCHLORIDE 10 MG: 10 CAPSULE ORAL at 08:35

## 2025-05-03 ASSESSMENT — PAIN DESCRIPTION - ORIENTATION
ORIENTATION: RIGHT;LEFT;LOWER;MID
ORIENTATION: RIGHT;LEFT
ORIENTATION: RIGHT;LEFT;UPPER

## 2025-05-03 ASSESSMENT — PAIN SCALES - GENERAL
PAINLEVEL_OUTOF10: 8
PAINLEVEL_OUTOF10: 7
PAINLEVEL_OUTOF10: 7
PAINLEVEL_OUTOF10: 10
PAINLEVEL_OUTOF10: 7
PAINLEVEL_OUTOF10: 4
PAINLEVEL_OUTOF10: 4

## 2025-05-03 ASSESSMENT — PAIN DESCRIPTION - LOCATION
LOCATION: ABDOMEN
LOCATION: ABDOMEN;INCISION
LOCATION: ABDOMEN
LOCATION: ABDOMEN

## 2025-05-03 ASSESSMENT — PAIN DESCRIPTION - DESCRIPTORS
DESCRIPTORS: ACHING;DISCOMFORT;TENDER;SORE;THROBBING
DESCRIPTORS: ACHING;DISCOMFORT;TENDER;THROBBING;SORE

## 2025-05-03 NOTE — PLAN OF CARE
Problem: Pain  Goal: Verbalizes/displays adequate comfort level or baseline comfort level  5/3/2025 1923 by Dorita Figueroa RN  Outcome: Progressing  Flowsheets (Taken 5/3/2025 1923)  Verbalizes/displays adequate comfort level or baseline comfort level:   Encourage patient to monitor pain and request assistance   Administer analgesics based on type and severity of pain and evaluate response   Implement non-pharmacological measures as appropriate and evaluate response   Assess pain using appropriate pain scale     Problem: Safety - Adult  Goal: Free from fall injury  5/3/2025 1923 by Dorita Figueroa RN  Outcome: Progressing  Flowsheets (Taken 5/3/2025 1923)  Free From Fall Injury:   Instruct family/caregiver on patient safety   Based on caregiver fall risk screen, instruct family/caregiver to ask for assistance with transferring infant if caregiver noted to have fall risk factors     Problem: ABCDS Injury Assessment  Goal: Absence of physical injury  5/3/2025 1923 by Dorita Figueroa RN  Outcome: Progressing  Flowsheets (Taken 5/3/2025 1923)  Absence of Physical Injury: Implement safety measures based on patient assessment     Problem: Nutrition Deficit:  Goal: Optimize nutritional status  5/3/2025 1923 by Dorita Figueroa RN  Outcome: Progressing  Flowsheets (Taken 5/3/2025 1923)  Nutrient intake appropriate for improving, restoring, or maintaining nutritional needs:   Assess nutritional status and recommend course of action   Monitor oral intake, labs, and treatment plans   Recommend appropriate diets, oral nutritional supplements, and vitamin/mineral supplements   Order, calculate, and assess calorie counts as needed     Problem: Discharge Planning  Goal: Discharge to home or other facility with appropriate resources  5/3/2025 1923 by Dorita Figueroa RN  Outcome: Progressing  Flowsheets (Taken 5/3/2025 1923)  Discharge to home or other facility with appropriate resources:   Identify barriers to discharge with

## 2025-05-03 NOTE — PROGRESS NOTES
A&Ox4. Bed alarm on. Bed on lowest position. SR x2. With intermittent abdominal pain, mostly on bilateral upper side with bloated feeling. Not in distress. Call light within reach.

## 2025-05-03 NOTE — PROGRESS NOTES
[facility] Medicine Progress Note  V 1.6      Date of Admission: 4/23/2025    Hospital Day: 11      Chief Admission Complaint:  Abdominal pain    Subjective:      Patient with continued imaging findings consistent with ileus.  Patient with some continued abdominal discomfort at this time.  Pain medications modified by surgery    Presenting Admission History:       64 y.o. female who presented to [facility] with abdominal pain.  PMHx significant for HTN.  History obtained from the patient and review of EMR.  The patient stated she had a hemicolectomy in February 2025 in [facility] and since then has been experiencing intermittent abdominal pain.  Per chart review, the patient has been readmitted 1 time since her surgery for electrolyte abnormalities.  However, the patient stated her pain has gotten progressively worse over the last couple of weeks.  She reports speaking with GI and they ordered a CT outpatient today.  Patient  is at the bedside and stated that she was called and told to go to the emergency department due to \"a collection of pus\" seen on the CT. In the emergency department a CT abdomen pelvis was obtained that revealed Fluid and gas collection within the pelvis interposed between the colon and uterus measuring up to 4.1 cm.  Abscess is favored.  This appears largely enveloped by abdominal and adnexal structures.  A [place] uterine fistula is not excluded.  Additional small dependent collection within the region of the pouch of Alfred measuring up to 2.5 cm.  Mild dilation of small bowel loops which may indicate partial obstruction.  There is no high-grade small bowel obstruction.  The patient was given morphine for pain.  She was also started on Zosyn.  Upon further evaluation, the patient was found to be dehydrated with hyponatremia.  This is likely secondary to inadequate p.o. intake.  She was admitted for further evaluation and treatment.  IV fluids have been initiated

## 2025-05-03 NOTE — PROGRESS NOTES
Pt assessment completed and charted. VSS, pt on RA. Patient is a/o with periods of confusion. IV site patent/flushed,infusing. 2 abdominal incisions C/D/I. Medication given per MAR. Pt passed flatulence while nurse was in room.     Safety Measures in place:   Bed in lowest position and wheels locked.   Call light within reach.   Bedside table within reach.   Non-skid socks in place.   Pt denies any other needs at this time.    Pt calls out appropriately.  Patient in stable condition when RN left room.

## 2025-05-03 NOTE — PLAN OF CARE
Problem: Pain  Goal: Verbalizes/displays adequate comfort level or baseline comfort level  Outcome: Progressing  Flowsheets (Taken 4/28/2025 0953 by Viri Owen, RN)  Verbalizes/displays adequate comfort level or baseline comfort level:   Encourage patient to monitor pain and request assistance   Assess pain using appropriate pain scale   Administer analgesics based on type and severity of pain and evaluate response   Implement non-pharmacological measures as appropriate and evaluate response     Problem: Safety - Adult  Goal: Free from fall injury  Outcome: Progressing  Flowsheets (Taken 4/28/2025 0953 by Viri Owen, RN)  Free From Fall Injury:   Instruct family/caregiver on patient safety   Based on caregiver fall risk screen, instruct family/caregiver to ask for assistance with transferring infant if caregiver noted to have fall risk factors     Problem: ABCDS Injury Assessment  Goal: Absence of physical injury  Outcome: Progressing  Flowsheets (Taken 4/25/2025 2033 by Yenifer Augustine, RN)  Absence of Physical Injury: Implement safety measures based on patient assessment     Problem: Nutrition Deficit:  Goal: Optimize nutritional status  Outcome: Progressing  Flowsheets (Taken 5/1/2025 0955 by Bita Suero, RD)  Nutrient intake appropriate for improving, restoring, or maintaining nutritional needs:   Assess nutritional status and recommend course of action   Monitor oral intake, labs, and treatment plans   Recommend appropriate diets, oral nutritional supplements, and vitamin/mineral supplements   Recommend, monitor, and adjust tube feedings and TPN/PPN based on assessed needs

## 2025-05-03 NOTE — PLAN OF CARE
Problem: Pain  Goal: Verbalizes/displays adequate comfort level or baseline comfort level  5/3/2025 1050 by Aleksandra Snyder RN  Outcome: Progressing  Flowsheets (Taken 4/28/2025 0953 by Viri Owen, RN)  Verbalizes/displays adequate comfort level or baseline comfort level:   Encourage patient to monitor pain and request assistance   Assess pain using appropriate pain scale   Administer analgesics based on type and severity of pain and evaluate response   Implement non-pharmacological measures as appropriate and evaluate response  5/3/2025 0354 by Wilian Armstrong Jr, RN  Outcome: Progressing  Flowsheets (Taken 4/28/2025 0953 by Viri Owen, RN)  Verbalizes/displays adequate comfort level or baseline comfort level:   Encourage patient to monitor pain and request assistance   Assess pain using appropriate pain scale   Administer analgesics based on type and severity of pain and evaluate response   Implement non-pharmacological measures as appropriate and evaluate response     Problem: Safety - Adult  Goal: Free from fall injury  5/3/2025 1050 by Aleksandra Snyder RN  Outcome: Progressing  Flowsheets (Taken 4/28/2025 0953 by Viri Owen RN)  Free From Fall Injury:   Instruct family/caregiver on patient safety   Based on caregiver fall risk screen, instruct family/caregiver to ask for assistance with transferring infant if caregiver noted to have fall risk factors  5/3/2025 0354 by Wilian Armstrong Jr, RN  Outcome: Progressing  Flowsheets (Taken 4/28/2025 0953 by Viri Owen, RN)  Free From Fall Injury:   Instruct family/caregiver on patient safety   Based on caregiver fall risk screen, instruct family/caregiver to ask for assistance with transferring infant if caregiver noted to have fall risk factors     Problem: ABCDS Injury Assessment  Goal: Absence of physical injury  5/3/2025 1050 by Aleksandra Snyder RN  Outcome: Progressing  Flowsheets (Taken 4/25/2025 2033 by Yenifer Augustine  RN)  Absence of Physical Injury: Implement safety measures based on patient assessment  5/3/2025 0354 by Wilian Armstrong Jr, RN  Outcome: Progressing  Flowsheets (Taken 4/25/2025 2033 by Yenifer Augustine, RN)  Absence of Physical Injury: Implement safety measures based on patient assessment     Problem: Nutrition Deficit:  Goal: Optimize nutritional status  5/3/2025 1050 by Aleksandra Snyder RN  Outcome: Progressing  Flowsheets (Taken 5/1/2025 0955 by Bita Suero RD)  Nutrient intake appropriate for improving, restoring, or maintaining nutritional needs:   Assess nutritional status and recommend course of action   Monitor oral intake, labs, and treatment plans   Recommend appropriate diets, oral nutritional supplements, and vitamin/mineral supplements   Recommend, monitor, and adjust tube feedings and TPN/PPN based on assessed needs  5/3/2025 0354 by Wilian Armstrong Jr, RN  Outcome: Progressing  Flowsheets (Taken 5/1/2025 0955 by Bita Suero RD)  Nutrient intake appropriate for improving, restoring, or maintaining nutritional needs:   Assess nutritional status and recommend course of action   Monitor oral intake, labs, and treatment plans   Recommend appropriate diets, oral nutritional supplements, and vitamin/mineral supplements   Recommend, monitor, and adjust tube feedings and TPN/PPN based on assessed needs     Problem: Discharge Planning  Goal: Discharge to home or other facility with appropriate resources  Outcome: Progressing  Flowsheets (Taken 4/30/2025 0004 by Yenifer Augustine, RN)  Discharge to home or other facility with appropriate resources:   Refer to discharge planning if patient needs post-hospital services based on physician order or complex needs related to functional status, cognitive ability or social support system   Arrange for interpreters to assist at discharge as needed   Identify discharge learning needs (meds, wound care, etc)   Arrange for needed discharge resources and

## 2025-05-03 NOTE — PROGRESS NOTES
[facility] GENERAL SURGERY DAILY PROGRESS NOTE    SUBJECTIVE: Awake, alert.  Complains of abdominal pain and bloating.  Bowels are working.  Denies N/V.     OBJECTIVE: CURRENT VITALS:  /72   Pulse (!) 103   Temp 97.9 °F (36.6 °C)   Resp 18   Ht 1.6 m (5' 3\")   Wt 50.1 kg (110 lb 6.4 oz)   SpO2 97%   BMI 19.56 kg/m²          ABD: Distended.  Tender to palpation.   INCISION:  C/D/I  TODD seropurulent    LABS:    CBC:   Recent Labs     05/01/25  0506 05/02/25  0756 05/03/25  0552   WBC 13.2* 11.8* 13.4*   RBC 2.79* 2.98* 3.11*   HGB 8.7* 9.3* 9.5*   HCT 25.6* 28.1* 28.7*   MCV 91.7 94.2 92.2   RDW 15.1 15.3 15.1   * 463* 554*     BMP:   Recent Labs     05/01/25  0506 05/02/25  0756   * 129*   K 3.5 3.5   CL 99 96*   CO2 26 24   BUN 3* 3*   CREATININE 0.4* 0.3*     Recent Labs     05/01/25  0506 05/03/25  0552   MG 1.34* 1.43*             ASSESSMENT:   POD 4 lap JAVED, drainage pelvic abscess      PLAN:   Will check AXR today given persistent symptoms.    Add Toradol to pain regiment.    Ambulate as tolerated.    IS         ANURADHA LIN MD

## 2025-05-03 NOTE — PROGRESS NOTES
[facility] Medicine Progress Note  V 1.6      Date of Admission: 4/23/2025    Hospital Day: 11      Chief Admission Complaint:  Abdominal pain    Subjective:      Patient with continued imaging findings consistent with ileus.    Presenting Admission History:       64 y.o. female who presented to [facility] with abdominal pain.  PMHx significant for HTN.  History obtained from the patient and review of EMR.  The patient stated she had a hemicolectomy in February 2025 in [facility] and since then has been experiencing intermittent abdominal pain.  Per chart review, the patient has been readmitted 1 time since her surgery for electrolyte abnormalities.  However, the patient stated her pain has gotten progressively worse over the last couple of weeks.  She reports speaking with GI and they ordered a CT outpatient today.  Patient  is at the bedside and stated that she was called and told to go to the emergency department due to \"a collection of pus\" seen on the CT. In the emergency department a CT abdomen pelvis was obtained that revealed Fluid and gas collection within the pelvis interposed between the colon and uterus measuring up to 4.1 cm.  Abscess is favored.  This appears largely enveloped by abdominal and adnexal structures.  A [place] uterine fistula is not excluded.  Additional small dependent collection within the region of the pouch of Alfred measuring up to 2.5 cm.  Mild dilation of small bowel loops which may indicate partial obstruction.  There is no high-grade small bowel obstruction.  The patient was given morphine for pain.  She was also started on Zosyn.  Upon further evaluation, the patient was found to be dehydrated with hyponatremia.  This is likely secondary to inadequate p.o. intake.  She was admitted for further evaluation and treatment.  IV fluids have been initiated and GI has been consulted for the a.m. The patient denied any other associated symptoms as well as  4/29    --------------------------------------------------    [place] (any 2 required for High level billing)    A. Problems (any 1)  [x] Acute/Chronic Illness/injury posing ongoing threat to life and/or bodily function without ongoing treatment - Intra-abdominal abscess  [] Severe exacerbation of chronic illness    --------------------------------------------------  B. Risk of Treatment (any 1)    [] Drugs/treatments that require intensive monitoring for toxicity    [] IV ABX (Vancomycin, Aminoglycosides, etc)     [] Post-Cath/Contrast study requiring serial monitoring    [] IV Narcotic analgesia    [] Aggressive IV diuresis    [] Hypertonic Saline    [] Critical electrolyte abnormalities requiring IV replacement    [] Insulin - Scheduled/SSI or Insulin gtt    [] Anticoagulation (Heparin gtt or Coumadin - other anticoagulants in special circumstances)    [] Cardiac Medications (IV Amiodarone/Diltiazem, Tikosyn, etc)    [] Hemodialysis    [] Other -    [] Change in code status    [] Decision to escalate care    [] Major surgery/procedure with associated risk factors    --------------------------------------------------  C. Data (any 2)    [x] Data Review (any 3)    [x] Consultant notes from yesterday/today    [x] All available current labs reviewed interpreted for clinical significance    [x] Appropriate follow-up labs were ordered  [] Collateral history obtained     [] Independent Interpretation of tests (any 1)    [] Telemetry (Rhythm Strip) personally reviewed and interpreted        [] Imaging personally reviewed and interpreted     [x] Discussion (any 1)  [x] Multi-Disciplinary Rounds with Case Management  [] Discussed management of the case with           Labs:  Personally reviewed on 5/3/2025 and interpreted for clinical significance as documented above.     Recent Labs     05/01/25  0506 05/02/25  0756 05/03/25  0552   WBC 13.2* 11.8* 13.4*   HGB 8.7* 9.3* 9.5*   HCT 25.6* 28.1* 28.7*   * 463* 554*

## 2025-05-04 LAB
ANION GAP SERPL CALCULATED.3IONS-SCNC: 9 MMOL/L (ref 3–16)
BASOPHILS # BLD: 0.1 K/UL (ref 0–0.2)
BASOPHILS NFR BLD: 1 %
BUN SERPL-MCNC: 3 MG/DL (ref 7–20)
CALCIUM SERPL-MCNC: 7.7 MG/DL (ref 8.3–10.6)
CHLORIDE SERPL-SCNC: 101 MMOL/L (ref 99–110)
CO2 SERPL-SCNC: 27 MMOL/L (ref 21–32)
CREAT SERPL-MCNC: 0.4 MG/DL (ref 0.6–1.2)
DEPRECATED RDW RBC AUTO: 15.1 % (ref 12.4–15.4)
EOSINOPHIL # BLD: 0.1 K/UL (ref 0–0.6)
EOSINOPHIL NFR BLD: 1.1 %
GFR SERPLBLD CREATININE-BSD FMLA CKD-EPI: >90 ML/MIN/{1.73_M2}
GLUCOSE SERPL-MCNC: 146 MG/DL (ref 70–99)
HCT VFR BLD AUTO: 26 % (ref 36–48)
HGB BLD-MCNC: 8.7 G/DL (ref 12–16)
LYMPHOCYTES # BLD: 1 K/UL (ref 1–5.1)
LYMPHOCYTES NFR BLD: 10.7 %
MAGNESIUM SERPL-MCNC: 1.86 MG/DL (ref 1.8–2.4)
MCH RBC QN AUTO: 30.8 PG (ref 26–34)
MCHC RBC AUTO-ENTMCNC: 33.6 G/DL (ref 31–36)
MCV RBC AUTO: 91.6 FL (ref 80–100)
MONOCYTES # BLD: 0.8 K/UL (ref 0–1.3)
MONOCYTES NFR BLD: 8.9 %
NEUTROPHILS # BLD: 7.4 K/UL (ref 1.7–7.7)
NEUTROPHILS NFR BLD: 78.3 %
PHOSPHATE SERPL-MCNC: 3.5 MG/DL (ref 2.5–4.9)
PLATELET # BLD AUTO: 518 K/UL (ref 135–450)
PMV BLD AUTO: 9.2 FL (ref 5–10.5)
POTASSIUM SERPL-SCNC: 3.1 MMOL/L (ref 3.5–5.1)
RBC # BLD AUTO: 2.84 M/UL (ref 4–5.2)
SODIUM SERPL-SCNC: 137 MMOL/L (ref 136–145)
WBC # BLD AUTO: 9.5 K/UL (ref 4–11)

## 2025-05-04 PROCEDURE — 6370000000 HC RX 637 (ALT 250 FOR IP): Performed by: SURGERY

## 2025-05-04 PROCEDURE — 2500000003 HC RX 250 WO HCPCS: Performed by: SURGERY

## 2025-05-04 PROCEDURE — 80048 BASIC METABOLIC PNL TOTAL CA: CPT

## 2025-05-04 PROCEDURE — 6370000000 HC RX 637 (ALT 250 FOR IP): Performed by: STUDENT IN AN ORGANIZED HEALTH CARE EDUCATION/TRAINING PROGRAM

## 2025-05-04 PROCEDURE — 6360000002 HC RX W HCPCS: Performed by: SURGERY

## 2025-05-04 PROCEDURE — 84100 ASSAY OF PHOSPHORUS: CPT

## 2025-05-04 PROCEDURE — 85025 COMPLETE CBC W/AUTO DIFF WBC: CPT

## 2025-05-04 PROCEDURE — 36415 COLL VENOUS BLD VENIPUNCTURE: CPT

## 2025-05-04 PROCEDURE — 83735 ASSAY OF MAGNESIUM: CPT

## 2025-05-04 PROCEDURE — 2500000003 HC RX 250 WO HCPCS: Performed by: STUDENT IN AN ORGANIZED HEALTH CARE EDUCATION/TRAINING PROGRAM

## 2025-05-04 PROCEDURE — 2580000003 HC RX 258: Performed by: INTERNAL MEDICINE

## 2025-05-04 PROCEDURE — 99024 POSTOP FOLLOW-UP VISIT: CPT | Performed by: SURGERY

## 2025-05-04 PROCEDURE — 2580000003 HC RX 258: Performed by: SURGERY

## 2025-05-04 PROCEDURE — 6360000002 HC RX W HCPCS: Performed by: INTERNAL MEDICINE

## 2025-05-04 PROCEDURE — 6360000002 HC RX W HCPCS: Performed by: STUDENT IN AN ORGANIZED HEALTH CARE EDUCATION/TRAINING PROGRAM

## 2025-05-04 PROCEDURE — 1200000000 HC SEMI PRIVATE

## 2025-05-04 RX ORDER — MAGNESIUM SULFATE IN WATER 40 MG/ML
2000 INJECTION, SOLUTION INTRAVENOUS ONCE
Status: COMPLETED | OUTPATIENT
Start: 2025-05-04 | End: 2025-05-04

## 2025-05-04 RX ORDER — POTASSIUM CHLORIDE 1500 MG/1
40 TABLET, EXTENDED RELEASE ORAL EVERY 4 HOURS
Status: DISCONTINUED | OUTPATIENT
Start: 2025-05-04 | End: 2025-05-04

## 2025-05-04 RX ADMIN — PIPERACILLIN AND TAZOBACTAM 3375 MG: 3; .375 INJECTION, POWDER, LYOPHILIZED, FOR SOLUTION INTRAVENOUS at 14:33

## 2025-05-04 RX ADMIN — POTASSIUM BICARBONATE 40 MEQ: 782 TABLET, EFFERVESCENT ORAL at 11:55

## 2025-05-04 RX ADMIN — PIPERACILLIN AND TAZOBACTAM 3375 MG: 3; .375 INJECTION, POWDER, LYOPHILIZED, FOR SOLUTION INTRAVENOUS at 06:01

## 2025-05-04 RX ADMIN — SODIUM CHLORIDE: 0.9 INJECTION, SOLUTION INTRAVENOUS at 09:25

## 2025-05-04 RX ADMIN — PANTOPRAZOLE SODIUM 40 MG: 40 TABLET, DELAYED RELEASE ORAL at 05:53

## 2025-05-04 RX ADMIN — Medication 400 MG: at 20:31

## 2025-05-04 RX ADMIN — KETOROLAC TROMETHAMINE 15 MG: 30 INJECTION, SOLUTION INTRAMUSCULAR at 18:38

## 2025-05-04 RX ADMIN — MORPHINE SULFATE 2 MG: 2 INJECTION, SOLUTION INTRAMUSCULAR; INTRAVENOUS at 17:10

## 2025-05-04 RX ADMIN — MAGNESIUM SULFATE HEPTAHYDRATE 2000 MG: 40 INJECTION, SOLUTION INTRAVENOUS at 09:28

## 2025-05-04 RX ADMIN — POTASSIUM CHLORIDE 40 MEQ: 1500 TABLET, EXTENDED RELEASE ORAL at 09:15

## 2025-05-04 RX ADMIN — MORPHINE SULFATE 2 MG: 2 INJECTION, SOLUTION INTRAMUSCULAR; INTRAVENOUS at 00:12

## 2025-05-04 RX ADMIN — MORPHINE SULFATE 2 MG: 2 INJECTION, SOLUTION INTRAMUSCULAR; INTRAVENOUS at 04:22

## 2025-05-04 RX ADMIN — PIPERACILLIN AND TAZOBACTAM 3375 MG: 3; .375 INJECTION, POWDER, LYOPHILIZED, FOR SOLUTION INTRAVENOUS at 21:29

## 2025-05-04 RX ADMIN — Medication 6 MG: at 20:31

## 2025-05-04 RX ADMIN — VALACYCLOVIR HYDROCHLORIDE 500 MG: 500 TABLET, FILM COATED ORAL at 20:31

## 2025-05-04 RX ADMIN — Medication 400 MG: at 09:14

## 2025-05-04 RX ADMIN — SODIUM CHLORIDE, PRESERVATIVE FREE 10 ML: 5 INJECTION INTRAVENOUS at 20:31

## 2025-05-04 RX ADMIN — VALACYCLOVIR HYDROCHLORIDE 500 MG: 500 TABLET, FILM COATED ORAL at 09:47

## 2025-05-04 RX ADMIN — SODIUM CHLORIDE, PRESERVATIVE FREE 10 ML: 5 INJECTION INTRAVENOUS at 09:16

## 2025-05-04 RX ADMIN — DICYCLOMINE HYDROCHLORIDE 10 MG: 10 CAPSULE ORAL at 09:15

## 2025-05-04 RX ADMIN — CALCIUM CARBONATE 500 MG: 500 TABLET, CHEWABLE ORAL at 09:14

## 2025-05-04 RX ADMIN — METOPROLOL SUCCINATE 25 MG: 25 TABLET, EXTENDED RELEASE ORAL at 09:14

## 2025-05-04 RX ADMIN — KETOROLAC TROMETHAMINE 15 MG: 30 INJECTION, SOLUTION INTRAMUSCULAR at 09:15

## 2025-05-04 RX ADMIN — AMLODIPINE BESYLATE 2.5 MG: 2.5 TABLET ORAL at 09:15

## 2025-05-04 RX ADMIN — DICYCLOMINE HYDROCHLORIDE 10 MG: 10 CAPSULE ORAL at 20:31

## 2025-05-04 RX ADMIN — DICYCLOMINE HYDROCHLORIDE 10 MG: 10 CAPSULE ORAL at 14:27

## 2025-05-04 RX ADMIN — CALCIUM CARBONATE 500 MG: 500 TABLET, CHEWABLE ORAL at 20:31

## 2025-05-04 RX ADMIN — MORPHINE SULFATE 2 MG: 2 INJECTION, SOLUTION INTRAMUSCULAR; INTRAVENOUS at 21:23

## 2025-05-04 RX ADMIN — ENOXAPARIN SODIUM 30 MG: 100 INJECTION SUBCUTANEOUS at 09:14

## 2025-05-04 RX ADMIN — MORPHINE SULFATE 2 MG: 2 INJECTION, SOLUTION INTRAMUSCULAR; INTRAVENOUS at 11:55

## 2025-05-04 ASSESSMENT — PAIN SCALES - GENERAL
PAINLEVEL_OUTOF10: 6
PAINLEVEL_OUTOF10: 7
PAINLEVEL_OUTOF10: 6
PAINLEVEL_OUTOF10: 7
PAINLEVEL_OUTOF10: 6
PAINLEVEL_OUTOF10: 4
PAINLEVEL_OUTOF10: 8
PAINLEVEL_OUTOF10: 4
PAINLEVEL_OUTOF10: 7
PAINLEVEL_OUTOF10: 4

## 2025-05-04 ASSESSMENT — PAIN DESCRIPTION - DESCRIPTORS
DESCRIPTORS: ACHING;DISCOMFORT;TENDER;THROBBING;SORE
DESCRIPTORS: ACHING;DISCOMFORT;TENDER;SORE

## 2025-05-04 ASSESSMENT — PAIN DESCRIPTION - LOCATION
LOCATION: ABDOMEN;INCISION

## 2025-05-04 ASSESSMENT — PAIN DESCRIPTION - ORIENTATION
ORIENTATION: RIGHT;LEFT
ORIENTATION: RIGHT;LEFT;LOWER
ORIENTATION: RIGHT;LEFT;LOWER;MID
ORIENTATION: LOWER;MID

## 2025-05-04 ASSESSMENT — PAIN - FUNCTIONAL ASSESSMENT
PAIN_FUNCTIONAL_ASSESSMENT: ACTIVITIES ARE NOT PREVENTED

## 2025-05-04 NOTE — PROGRESS NOTES
Pt assessment completed and charted. VSS, pt on RA. Patient is a/o. IV site patent/flushed, infusing. 2 abdominal incisions C/D/I. Medication given per MAR. Pt passing gas and had bowel movement today. Pt reports having a great night with no pain. Pt states that she is wanting to wean off of morphine if possible today. Pt sitting up in bed eating breakfast during assessment.     Safety Measures in place:   Bed in lowest position and wheels locked.   Call light within reach.   Bedside table within reach.   Non-skid socks in place.   Pt denies any other needs at this time.    Pt calls out appropriately.  Patient in stable condition when RN left room.

## 2025-05-04 NOTE — PLAN OF CARE
Problem: Pain  Goal: Verbalizes/displays adequate comfort level or baseline comfort level  5/4/2025 1913 by Dorita Figueroa RN  Outcome: Progressing  Flowsheets (Taken 5/4/2025 1913)  Verbalizes/displays adequate comfort level or baseline comfort level:   Encourage patient to monitor pain and request assistance   Assess pain using appropriate pain scale   Implement non-pharmacological measures as appropriate and evaluate response   Administer analgesics based on type and severity of pain and evaluate response     Problem: Safety - Adult  Goal: Free from fall injury  5/4/2025 1913 by Dorita Figueroa RN  Outcome: Progressing  Flowsheets (Taken 5/4/2025 1913)  Free From Fall Injury:   Instruct family/caregiver on patient safety   Based on caregiver fall risk screen, instruct family/caregiver to ask for assistance with transferring infant if caregiver noted to have fall risk factors     Problem: ABCDS Injury Assessment  Goal: Absence of physical injury  5/4/2025 1913 by Dorita Figueroa RN  Outcome: Progressing  Flowsheets (Taken 5/4/2025 1913)  Absence of Physical Injury: Implement safety measures based on patient assessment     Problem: Nutrition Deficit:  Goal: Optimize nutritional status  5/4/2025 1913 by Dorita Figueroa RN  Outcome: Progressing  Flowsheets (Taken 5/4/2025 1913)  Nutrient intake appropriate for improving, restoring, or maintaining nutritional needs:   Assess nutritional status and recommend course of action   Recommend appropriate diets, oral nutritional supplements, and vitamin/mineral supplements   Monitor oral intake, labs, and treatment plans   Order, calculate, and assess calorie counts as needed     Problem: Discharge Planning  Goal: Discharge to home or other facility with appropriate resources  5/4/2025 1913 by Dorita Figueroa RN  Outcome: Progressing  Flowsheets (Taken 5/4/2025 1913)  Discharge to home or other facility with appropriate resources:   Identify barriers to discharge with  patient and caregiver   Arrange for needed discharge resources and transportation as appropriate   Identify discharge learning needs (meds, wound care, etc)     Problem: Skin/Tissue Integrity - Adult  Goal: Incisions, wounds, or drain sites healing without S/S of infection  Outcome: Progressing  Flowsheets (Taken 5/4/2025 1913)  Incisions, Wounds, or Drain Sites Healing Without Sign and Symptoms of Infection:   TWICE DAILY: Assess and document skin integrity   TWICE DAILY: Assess and document dressing/incision, wound bed, drain sites and surrounding tissue   Implement wound care per orders   Initiate isolation precautions as appropriate     Problem: Gastrointestinal - Adult  Goal: Minimal or absence of nausea and vomiting  5/4/2025 1913 by Dorita Figueroa RN  Outcome: Progressing  Flowsheets (Taken 5/4/2025 1913)  Minimal or absence of nausea and vomiting:   Administer IV fluids as ordered to ensure adequate hydration   Administer ordered antiemetic medications as needed   Provide nonpharmacologic comfort measures as appropriate   Advance diet as tolerated, if ordered     Problem: Gastrointestinal - Adult  Goal: Maintains or returns to baseline bowel function  5/4/2025 1913 by Dorita Figueroa RN  Outcome: Progressing  Flowsheets (Taken 5/4/2025 1913)  Maintains or returns to baseline bowel function:   Assess bowel function   Encourage oral fluids to ensure adequate hydration   Administer ordered medications as needed   Administer IV fluids as ordered to ensure adequate hydration   Encourage mobilization and activity     Problem: Gastrointestinal - Adult  Goal: Maintains adequate nutritional intake  5/4/2025 1913 by Dorita Figueroa RN  Outcome: Progressing  Flowsheets (Taken 5/4/2025 1913)  Maintains adequate nutritional intake:   Monitor percentage of each meal consumed   Identify factors contributing to decreased intake, treat as appropriate   Assist with meals as needed   Monitor intake and output, weight and lab

## 2025-05-04 NOTE — PLAN OF CARE
Problem: Pain  Goal: Verbalizes/displays adequate comfort level or baseline comfort level  Outcome: Progressing  Flowsheets (Taken 5/3/2025 1923 by Dorita Figueroa RN)  Verbalizes/displays adequate comfort level or baseline comfort level:   Encourage patient to monitor pain and request assistance   Administer analgesics based on type and severity of pain and evaluate response   Implement non-pharmacological measures as appropriate and evaluate response   Assess pain using appropriate pain scale     Problem: Safety - Adult  Goal: Free from fall injury  Outcome: Progressing  Flowsheets (Taken 5/3/2025 1923 by Dorita Figueroa RN)  Free From Fall Injury:   Instruct family/caregiver on patient safety   Based on caregiver fall risk screen, instruct family/caregiver to ask for assistance with transferring infant if caregiver noted to have fall risk factors     Problem: ABCDS Injury Assessment  Goal: Absence of physical injury  Outcome: Progressing  Flowsheets (Taken 5/3/2025 1923 by Dorita Figueroa RN)  Absence of Physical Injury: Implement safety measures based on patient assessment     Problem: Nutrition Deficit:  Goal: Optimize nutritional status  Outcome: Progressing  Flowsheets (Taken 5/3/2025 1923 by Dorita Figueroa, RN)  Nutrient intake appropriate for improving, restoring, or maintaining nutritional needs:   Assess nutritional status and recommend course of action   Monitor oral intake, labs, and treatment plans   Recommend appropriate diets, oral nutritional supplements, and vitamin/mineral supplements   Order, calculate, and assess calorie counts as needed     Problem: Discharge Planning  Goal: Discharge to home or other facility with appropriate resources  Outcome: Progressing  Flowsheets (Taken 5/3/2025 1923 by Dorita Figueroa, RN)  Discharge to home or other facility with appropriate resources:   Identify barriers to discharge with patient and caregiver   Arrange for needed discharge resources and  results   Monitor all insertion sites i.e., indwelling lines, tubes and drains   [place] appropriate cooling/warming therapies per order   Administer medications as ordered   Instruct and encourage patient and family to use good hand hygiene technique

## 2025-05-04 NOTE — PROGRESS NOTES
[facility] GENERAL SURGERY DAILY PROGRESS NOTE    SUBJECTIVE: Awake, alert.  Feels much better today.     OBJECTIVE: CURRENT VITALS:  /63   Pulse 90   Temp 99 °F (37.2 °C) (Oral)   Resp 16   Ht 1.6 m (5' 3\")   Wt 50.1 kg (110 lb 6.4 oz)   SpO2 97%   BMI 19.56 kg/m²          ABD: Soft.  Less tender.     LABS:    CBC:   Recent Labs     05/02/25  0756 05/03/25  0552 05/04/25  0551   WBC 11.8* 13.4* 9.5   RBC 2.98* 3.11* 2.84*   HGB 9.3* 9.5* 8.7*   HCT 28.1* 28.7* 26.0*   MCV 94.2 92.2 91.6   RDW 15.3 15.1 15.1   * 554* 518*     BMP:   Recent Labs     05/02/25  0756 05/04/25  0551   * 137   K 3.5 3.1*   CL 96* 101   CO2 24 27   PHOS  --  3.5   BUN 3* 3*   CREATININE 0.3* 0.4*     Recent Labs     05/03/25  0552 05/04/25  0551   MG 1.43* 1.86        ASSESSMENT:   POD 5 lap JAVED, drainage pelvic abscess        PLAN:   Full liquids     Ambulate as tolerated.     IS       ANURADHA LIN MD

## 2025-05-04 NOTE — PROGRESS NOTES
[facility] Medicine Progress Note  V 1.6      Date of Admission: 4/23/2025    Hospital Day: 12      Chief Admission Complaint:  Abdominal pain    Subjective:      Patient states her pain is currently under control.  She has been tolerating clear liquids and had bowel movement.  To be advanced by surgery to full liquids today.    Presenting Admission History:       64 y.o. female who presented to [facility] with abdominal pain.  PMHx significant for HTN.  History obtained from the patient and review of EMR.  The patient stated she had a hemicolectomy in February 2025 in [facility] and since then has been experiencing intermittent abdominal pain.  Per chart review, the patient has been readmitted 1 time since her surgery for electrolyte abnormalities.  However, the patient stated her pain has gotten progressively worse over the last couple of weeks.  She reports speaking with GI and they ordered a CT outpatient today.  Patient  is at the bedside and stated that she was called and told to go to the emergency department due to \"a collection of pus\" seen on the CT. In the emergency department a CT abdomen pelvis was obtained that revealed Fluid and gas collection within the pelvis interposed between the colon and uterus measuring up to 4.1 cm.  Abscess is favored.  This appears largely enveloped by abdominal and adnexal structures.  A [place] uterine fistula is not excluded.  Additional small dependent collection within the region of the pouch of Alfred measuring up to 2.5 cm.  Mild dilation of small bowel loops which may indicate partial obstruction.  There is no high-grade small bowel obstruction.  The patient was given morphine for pain.  She was also started on Zosyn.  Upon further evaluation, the patient was found to be dehydrated with hyponatremia.  This is likely secondary to inadequate p.o. intake.  She was admitted for further evaluation and treatment.  IV fluids have been initiated  Discharge Location: [x]Home w/ []HHC vs []SNF  []Acute Rehab  []LTAC  []Hospice  []Other -    Anticipated Discharge Day/Date:  >2 days  Barriers to Discharge: Clinical improvement, possible OR 4/29    --------------------------------------------------    [facility] (any 2 required for High level billing)    A. Problems (any 1)  [x] Acute/Chronic Illness/injury posing ongoing threat to life and/or bodily function without ongoing treatment - Intra-abdominal abscess  [] Severe exacerbation of chronic illness    --------------------------------------------------  B. Risk of Treatment (any 1)    [] Drugs/treatments that require intensive monitoring for toxicity    [] IV ABX (Vancomycin, Aminoglycosides, etc)     [] Post-Cath/Contrast study requiring serial monitoring    [] IV Narcotic analgesia    [] Aggressive IV diuresis    [] Hypertonic Saline    [] Critical electrolyte abnormalities requiring IV replacement    [] Insulin - Scheduled/SSI or Insulin gtt    [] Anticoagulation (Heparin gtt or Coumadin - other anticoagulants in special circumstances)    [] Cardiac Medications (IV Amiodarone/Diltiazem, Tikosyn, etc)    [] Hemodialysis    [] Other -    [] Change in code status    [] Decision to escalate care    [] Major surgery/procedure with associated risk factors    --------------------------------------------------  C. Data (any 2)    [x] Data Review (any 3)    [x] Consultant notes from yesterday/today    [x] All available current labs reviewed interpreted for clinical significance    [x] Appropriate follow-up labs were ordered  [] Collateral history obtained     [] Independent Interpretation of tests (any 1)    [] Telemetry (Rhythm Strip) personally reviewed and interpreted        [] Imaging personally reviewed and interpreted     [x] Discussion (any 1)  [x] Multi-Disciplinary Rounds with Case Management  [] Discussed management of the case with           Labs:  Personally reviewed on 5/4/2025 and interpreted for clinical

## 2025-05-05 ENCOUNTER — APPOINTMENT (OUTPATIENT)
Dept: GENERAL RADIOLOGY | Age: 65
DRG: 856 | End: 2025-05-05
Payer: MEDICARE

## 2025-05-05 LAB
ANION GAP SERPL CALCULATED.3IONS-SCNC: 16 MMOL/L (ref 3–16)
BASOPHILS # BLD: 0 K/UL (ref 0–0.2)
BASOPHILS NFR BLD: 0.4 %
BUN SERPL-MCNC: 4 MG/DL (ref 7–20)
CALCIUM SERPL-MCNC: 8.2 MG/DL (ref 8.3–10.6)
CHLORIDE SERPL-SCNC: 96 MMOL/L (ref 99–110)
CO2 SERPL-SCNC: 20 MMOL/L (ref 21–32)
CREAT SERPL-MCNC: 0.5 MG/DL (ref 0.6–1.2)
DEPRECATED RDW RBC AUTO: 15.5 % (ref 12.4–15.4)
EOSINOPHIL # BLD: 0 K/UL (ref 0–0.6)
EOSINOPHIL NFR BLD: 0.1 %
GFR SERPLBLD CREATININE-BSD FMLA CKD-EPI: >90 ML/MIN/{1.73_M2}
GLUCOSE BLD-MCNC: 92 MG/DL (ref 70–99)
GLUCOSE SERPL-MCNC: 78 MG/DL (ref 70–99)
HCT VFR BLD AUTO: 26.4 % (ref 36–48)
HGB BLD-MCNC: 8.9 G/DL (ref 12–16)
LYMPHOCYTES # BLD: 0.6 K/UL (ref 1–5.1)
LYMPHOCYTES NFR BLD: 8.9 %
MAGNESIUM SERPL-MCNC: 1.54 MG/DL (ref 1.8–2.4)
MCH RBC QN AUTO: 30.9 PG (ref 26–34)
MCHC RBC AUTO-ENTMCNC: 33.7 G/DL (ref 31–36)
MCV RBC AUTO: 91.7 FL (ref 80–100)
MONOCYTES # BLD: 0.6 K/UL (ref 0–1.3)
MONOCYTES NFR BLD: 8.3 %
NEUTROPHILS # BLD: 5.9 K/UL (ref 1.7–7.7)
NEUTROPHILS NFR BLD: 82.3 %
PERFORMED ON: NORMAL
PLATELET # BLD AUTO: 543 K/UL (ref 135–450)
PMV BLD AUTO: 10.2 FL (ref 5–10.5)
POTASSIUM SERPL-SCNC: 3.6 MMOL/L (ref 3.5–5.1)
RBC # BLD AUTO: 2.87 M/UL (ref 4–5.2)
SODIUM SERPL-SCNC: 132 MMOL/L (ref 136–145)
WBC # BLD AUTO: 7.2 K/UL (ref 4–11)

## 2025-05-05 PROCEDURE — 2500000003 HC RX 250 WO HCPCS: Performed by: SURGERY

## 2025-05-05 PROCEDURE — 74018 RADEX ABDOMEN 1 VIEW: CPT

## 2025-05-05 PROCEDURE — 83735 ASSAY OF MAGNESIUM: CPT

## 2025-05-05 PROCEDURE — 99024 POSTOP FOLLOW-UP VISIT: CPT | Performed by: SURGERY

## 2025-05-05 PROCEDURE — 2500000003 HC RX 250 WO HCPCS: Performed by: STUDENT IN AN ORGANIZED HEALTH CARE EDUCATION/TRAINING PROGRAM

## 2025-05-05 PROCEDURE — 6370000000 HC RX 637 (ALT 250 FOR IP): Performed by: SURGERY

## 2025-05-05 PROCEDURE — 36415 COLL VENOUS BLD VENIPUNCTURE: CPT

## 2025-05-05 PROCEDURE — 1200000000 HC SEMI PRIVATE

## 2025-05-05 PROCEDURE — 6360000002 HC RX W HCPCS: Performed by: STUDENT IN AN ORGANIZED HEALTH CARE EDUCATION/TRAINING PROGRAM

## 2025-05-05 PROCEDURE — 2580000003 HC RX 258: Performed by: INTERNAL MEDICINE

## 2025-05-05 PROCEDURE — 85025 COMPLETE CBC W/AUTO DIFF WBC: CPT

## 2025-05-05 PROCEDURE — 6360000002 HC RX W HCPCS: Performed by: SURGERY

## 2025-05-05 PROCEDURE — 6370000000 HC RX 637 (ALT 250 FOR IP): Performed by: NURSE PRACTITIONER

## 2025-05-05 PROCEDURE — 6360000002 HC RX W HCPCS: Performed by: NURSE PRACTITIONER

## 2025-05-05 PROCEDURE — 6360000002 HC RX W HCPCS: Performed by: INTERNAL MEDICINE

## 2025-05-05 PROCEDURE — 2580000003 HC RX 258: Performed by: SURGERY

## 2025-05-05 PROCEDURE — 80048 BASIC METABOLIC PNL TOTAL CA: CPT

## 2025-05-05 RX ORDER — DOCUSATE SODIUM 100 MG/1
100 CAPSULE, LIQUID FILLED ORAL DAILY
Status: DISCONTINUED | OUTPATIENT
Start: 2025-05-05 | End: 2025-05-05

## 2025-05-05 RX ORDER — POLYETHYLENE GLYCOL 3350 17 G/17G
17 POWDER, FOR SOLUTION ORAL DAILY
Status: DISCONTINUED | OUTPATIENT
Start: 2025-05-05 | End: 2025-05-06

## 2025-05-05 RX ORDER — MAGNESIUM SULFATE IN WATER 40 MG/ML
2000 INJECTION, SOLUTION INTRAVENOUS ONCE
Status: COMPLETED | OUTPATIENT
Start: 2025-05-05 | End: 2025-05-05

## 2025-05-05 RX ADMIN — SODIUM CHLORIDE, PRESERVATIVE FREE 10 ML: 5 INJECTION INTRAVENOUS at 10:56

## 2025-05-05 RX ADMIN — Medication 6 MG: at 21:19

## 2025-05-05 RX ADMIN — PANTOPRAZOLE SODIUM 40 MG: 40 TABLET, DELAYED RELEASE ORAL at 05:30

## 2025-05-05 RX ADMIN — MORPHINE SULFATE 2 MG: 2 INJECTION, SOLUTION INTRAMUSCULAR; INTRAVENOUS at 01:26

## 2025-05-05 RX ADMIN — MORPHINE SULFATE 2 MG: 2 INJECTION, SOLUTION INTRAMUSCULAR; INTRAVENOUS at 10:35

## 2025-05-05 RX ADMIN — MORPHINE SULFATE 2 MG: 2 INJECTION, SOLUTION INTRAMUSCULAR; INTRAVENOUS at 15:30

## 2025-05-05 RX ADMIN — POLYETHYLENE GLYCOL 3350 17 G: 17 POWDER, FOR SOLUTION ORAL at 17:15

## 2025-05-05 RX ADMIN — DICYCLOMINE HYDROCHLORIDE 10 MG: 10 CAPSULE ORAL at 10:31

## 2025-05-05 RX ADMIN — MORPHINE SULFATE 2 MG: 2 INJECTION, SOLUTION INTRAMUSCULAR; INTRAVENOUS at 21:20

## 2025-05-05 RX ADMIN — KETOROLAC TROMETHAMINE 15 MG: 30 INJECTION, SOLUTION INTRAMUSCULAR at 02:31

## 2025-05-05 RX ADMIN — AMLODIPINE BESYLATE 2.5 MG: 2.5 TABLET ORAL at 10:31

## 2025-05-05 RX ADMIN — PIPERACILLIN AND TAZOBACTAM 3375 MG: 3; .375 INJECTION, POWDER, LYOPHILIZED, FOR SOLUTION INTRAVENOUS at 21:55

## 2025-05-05 RX ADMIN — PIPERACILLIN AND TAZOBACTAM 3375 MG: 3; .375 INJECTION, POWDER, LYOPHILIZED, FOR SOLUTION INTRAVENOUS at 15:18

## 2025-05-05 RX ADMIN — SODIUM CHLORIDE: 0.9 INJECTION, SOLUTION INTRAVENOUS at 20:27

## 2025-05-05 RX ADMIN — MORPHINE SULFATE 2 MG: 2 INJECTION, SOLUTION INTRAMUSCULAR; INTRAVENOUS at 05:29

## 2025-05-05 RX ADMIN — DICYCLOMINE HYDROCHLORIDE 10 MG: 10 CAPSULE ORAL at 20:18

## 2025-05-05 RX ADMIN — SODIUM CHLORIDE, PRESERVATIVE FREE 10 ML: 5 INJECTION INTRAVENOUS at 20:22

## 2025-05-05 RX ADMIN — ONDANSETRON 4 MG: 2 INJECTION, SOLUTION INTRAMUSCULAR; INTRAVENOUS at 10:37

## 2025-05-05 RX ADMIN — CALCIUM CARBONATE 500 MG: 500 TABLET, CHEWABLE ORAL at 20:18

## 2025-05-05 RX ADMIN — Medication 400 MG: at 20:18

## 2025-05-05 RX ADMIN — VALACYCLOVIR HYDROCHLORIDE 500 MG: 500 TABLET, FILM COATED ORAL at 21:19

## 2025-05-05 RX ADMIN — DICYCLOMINE HYDROCHLORIDE 10 MG: 10 CAPSULE ORAL at 15:16

## 2025-05-05 RX ADMIN — ENOXAPARIN SODIUM 30 MG: 100 INJECTION SUBCUTANEOUS at 10:30

## 2025-05-05 RX ADMIN — VALACYCLOVIR HYDROCHLORIDE 500 MG: 500 TABLET, FILM COATED ORAL at 10:39

## 2025-05-05 RX ADMIN — SODIUM CHLORIDE, PRESERVATIVE FREE 10 ML: 5 INJECTION INTRAVENOUS at 10:58

## 2025-05-05 RX ADMIN — METOPROLOL SUCCINATE 25 MG: 25 TABLET, EXTENDED RELEASE ORAL at 10:31

## 2025-05-05 RX ADMIN — PIPERACILLIN AND TAZOBACTAM 3375 MG: 3; .375 INJECTION, POWDER, LYOPHILIZED, FOR SOLUTION INTRAVENOUS at 05:30

## 2025-05-05 RX ADMIN — Medication 400 MG: at 10:42

## 2025-05-05 RX ADMIN — MAGNESIUM SULFATE HEPTAHYDRATE 2000 MG: 40 INJECTION, SOLUTION INTRAVENOUS at 12:42

## 2025-05-05 RX ADMIN — CALCIUM CARBONATE 500 MG: 500 TABLET, CHEWABLE ORAL at 10:31

## 2025-05-05 ASSESSMENT — PAIN DESCRIPTION - DESCRIPTORS
DESCRIPTORS: STABBING
DESCRIPTORS: SHARP
DESCRIPTORS: DISCOMFORT
DESCRIPTORS: ACHING;DISCOMFORT;TENDER;THROBBING;SHARP;SORE

## 2025-05-05 ASSESSMENT — PAIN DESCRIPTION - ORIENTATION
ORIENTATION: RIGHT;LEFT;LOWER
ORIENTATION: RIGHT;LEFT;MID
ORIENTATION: RIGHT;LEFT;MID
ORIENTATION: RIGHT;LEFT;LOWER

## 2025-05-05 ASSESSMENT — PAIN SCALES - GENERAL
PAINLEVEL_OUTOF10: 6
PAINLEVEL_OUTOF10: 7
PAINLEVEL_OUTOF10: 1
PAINLEVEL_OUTOF10: 5
PAINLEVEL_OUTOF10: 6
PAINLEVEL_OUTOF10: 6
PAINLEVEL_OUTOF10: 8
PAINLEVEL_OUTOF10: 6
PAINLEVEL_OUTOF10: 7
PAINLEVEL_OUTOF10: 1
PAINLEVEL_OUTOF10: 7
PAINLEVEL_OUTOF10: 7
PAINLEVEL_OUTOF10: 5
PAINLEVEL_OUTOF10: 0

## 2025-05-05 ASSESSMENT — PAIN - FUNCTIONAL ASSESSMENT
PAIN_FUNCTIONAL_ASSESSMENT: PREVENTS OR INTERFERES SOME ACTIVE ACTIVITIES AND ADLS
PAIN_FUNCTIONAL_ASSESSMENT: ACTIVITIES ARE NOT PREVENTED
PAIN_FUNCTIONAL_ASSESSMENT: ACTIVITIES ARE NOT PREVENTED
PAIN_FUNCTIONAL_ASSESSMENT: PREVENTS OR INTERFERES SOME ACTIVE ACTIVITIES AND ADLS
PAIN_FUNCTIONAL_ASSESSMENT: ACTIVITIES ARE NOT PREVENTED

## 2025-05-05 ASSESSMENT — PAIN DESCRIPTION - PAIN TYPE: TYPE: SURGICAL PAIN

## 2025-05-05 ASSESSMENT — PAIN DESCRIPTION - LOCATION
LOCATION: ABDOMEN;INCISION
LOCATION: ABDOMEN;INCISION
LOCATION: ABDOMEN
LOCATION: ABDOMEN;INCISION
LOCATION: ABDOMEN
LOCATION: ABDOMEN;INCISION

## 2025-05-05 NOTE — DISCHARGE INSTRUCTIONS
[facility] GENERAL [facility]    Javier Pinto M.D.   [facility] Office      [facility] Office          [facility]                   [address]                [address]  Carroll Rivas M.D.              [address]           [address]            [place] [address]         [place] [address]  Emery Mejias M.D                         [phone]          [facility]                   Yohan Chan M.D.            [facility]                                                                       POST-OPERATIVE INSTRUCTIONS     FOLLOW UP APPOINTMENT WITH DR. RIVAS FROM GENERAL SURGERY ON MONDAY jUNE 9TH AT 3:00PM.   OFFICE PHONE # [phone]. PLEASE CALL IF YOU NEED TO RESCHEDULE THIS APPOINTMENT.   OFFICE IS LOCATED AT [facility], [address] [place] [address] - MEDICAL OFFICE BUILDING [address], [address].   [facility] building is on the same side of the road/driveway as the Emergency Department              (it is NOT the building across the street with the red windows.)  Pack central portion of incision with aquacel silver and cover with dry dressing. Change every other day.     You may shower.  Wash incisions gently, and pat them dry. Do not rub your incisions.    General guidelines for activity:  Avoid strenuous activity or lifting anything heavier than 15 pounds.   It is OK to be up walking around; walking up and down stairs is also OK.   Do what is comfortable: stop and rest when you feel tired.     Drink plenty of fluids and stay on a low fiber diet until finished with antibiotics.     Do NOT drive for one week and while taking your narcotic pain medicine.     Watch for signs of infection:   Fever over 100.5°   Excessive warmth or bright redness around your incisions  Leakage of bloody or cloudy fluid from you incisions      If you experience constipation  Increase your water intake, and activity;

## 2025-05-05 NOTE — PROGRESS NOTES
[facility] Medicine Progress Note  V 1.6      Date of Admission: 4/23/2025    Hospital Day: 13      Chief Admission Complaint:  Abdominal pain    Subjective:      Patient and her  seen at bedside today, they are concerned that her belly seems more distended today.  KUB reveals mild to moderate ileus.  Patient states that she is starting to have more frequent bowel movements, mostly liquid.  She is on full liquid diet per general surgery.    Presenting Admission History:       64 y.o. female who presented to [facility] with abdominal pain.  PMHx significant for HTN.  History obtained from the patient and review of EMR.  The patient stated she had a hemicolectomy in February 2025 in [facility] and since then has been experiencing intermittent abdominal pain.  Per chart review, the patient has been readmitted 1 time since her surgery for electrolyte abnormalities.  However, the patient stated her pain has gotten progressively worse over the last couple of weeks.  She reports speaking with GI and they ordered a CT outpatient today.  Patient  is at the bedside and stated that she was called and told to go to the emergency department due to \"a collection of pus\" seen on the CT. In the emergency department a CT abdomen pelvis was obtained that revealed Fluid and gas collection within the pelvis interposed between the colon and uterus measuring up to 4.1 cm.  Abscess is favored.  This appears largely enveloped by abdominal and adnexal structures.  A [place] uterine fistula is not excluded.  Additional small dependent collection within the region of the pouch of Alfred measuring up to 2.5 cm.  Mild dilation of small bowel loops which may indicate partial obstruction.  There is no high-grade small bowel obstruction.  The patient was given morphine for pain.  She was also started on Zosyn.  Upon further evaluation, the patient was found to be dehydrated with hyponatremia.  This is likely

## 2025-05-05 NOTE — PROGRESS NOTES
Comprehensive Nutrition Assessment    Type and Reason for Visit:  Reassess    Nutrition Recommendations/Plan:   If unable to advance PO diet in the next 48hrs, recommend initiation of TPN due to NPO/clears/full liquids since admission (x12 days).  Recommend check TG, Mg, Phos, CMP now if not done in last 24 hours.  Bag 1: Clinimix 5/15 starting at 35 mL/hr  Physician/LIP to monitor closely and correct lytes (Phos,Mg,K+) d/t risk of refeeding syndrome  Bag 2: As long as electrolytes WNL, advance Clinimix 5/15 to goal rate of 75 mL/hr  Recommend 250 mL bags of 20% lipids 2 times per week  Recommend FSBS, monitor glucose, need for insulin  Pharmacy to adjust MVI and Trace Elements as needed   When PN to be discontinued, cut rate by 50% and let current bag run out.       Malnutrition Assessment:  Malnutrition Status:  Severe malnutrition (04/24/25 1120)    Context:  Acute Illness     Findings of the 6 clinical characteristics of malnutrition:  Energy Intake:  75% or less of estimated energy requirements for 7 or more days  Weight Loss:  Mild weight loss     Body Fat Loss:  Moderate body fat loss Orbital, Triceps   Muscle Mass Loss:  Moderate muscle mass loss Temples (temporalis), Clavicles (pectoralis & deltoids)  Fluid Accumulation:  No fluid accumulation     Strength:  Not Performed    Nutrition Assessment:    Followup: Patient seen resting in bed, family also in room. Noted pts diet was briefly advanced to low fber this AM. Patient reports she had \"horrible\" pain last night/this morning after eating. States she \"put myself back on a full liquid diet.\" Having sips of tomato soup while RD in room. Pt would like to discontinue Ensure ONS as she has been \"belching up the vitamins.\" Pt also declines Ensure Clear ONS. States her family can bring in [employer] protein shakes from home. Pt reports she is nervous to discharge home due to pain with eating. Should diet be unable to advance further in the next 1-2 days,  recommend TPN due to minimal PO intakes since admission. Per GenSurg note, \"Discussed that slow improvement of inflammation and bloating is normal in this situation.  However, also discussed that if fails to resolve or worsens then further operative intervention (ie partial colectomy and colostomy) could be indicated.\" Will continue to monitor.    Nutrition Related Findings:    K 3.1, Mg 1.54. LBM 5/5. Wound Type: Surgical Incision       Current Nutrition Intake & Therapies:    Average Meal Intake: 51-75%, 1-25%  Average Supplements Intake: Refusing to take  ADULT DIET; Full Liquid  Current Parenteral Nutrition Orders:  Type and Formula: Premix Central   Lipids: Two times weekly, 250ml  Duration: Continuous  Rate/Volume: Clinimix 5/15 @ 75ml/hr to provide TV 1800ml, 1278kcal, 90g protein, 270g dextrose. + biweekly 20% 250ml lipids to provide an additional 143kcal per day. GIR = 3.74mg/kg/min.      Anthropometric Measures:  Height: 160 cm (5' 3\")  Ideal Body Weight (IBW): 115 lbs (52 kg)       Current Body Weight: 50.1 kg (110 lb 7.2 oz) (4/23/25), 96 % IBW. Weight Source: Standing scale  Current BMI (kg/m2): 19.6  Usual Body Weight: 56.2 kg (124 lb) (5/17/24 - [facility])     % Weight Change (Calculated): -10.9  Weight Adjustment For: No Adjustment           BMI Categories: Normal Weight (BMI 18.5-24.9)    Estimated Daily Nutrient Needs:  Energy Requirements Based On: Kcal/kg  Weight Used for Energy Requirements: Current  Energy (kcal/day): 1500 - 1600 (30-32kcal/kg)  Weight Used for Protein Requirements: Ideal  Protein (g/day): 63 - 94 (1.2-1.8g/kg)  Method Used for Fluid Requirements: 1 ml/kcal  Fluid (ml/day): 1500 - 1600    Nutrition Diagnosis:   Severe malnutrition related to acute injury/trauma, altered GI function as evidenced by criteria as identified in malnutrition assessment, poor intake prior to admission, GI abnormality    Nutrition Interventions:   Food and/or Nutrient Delivery:

## 2025-05-05 NOTE — CARE COORDINATION
Hospital day 12: Patient on C3 re Intra abd abscess care managed by IM, General Surgery, and ID. Patient from home with spouse, ambulatory in room. Patient on full liquid diet. Patient ct on IV ATB. Following.GASTON Alexander

## 2025-05-05 NOTE — PROGRESS NOTES
Pt had no complaints of increasing pain with clear diet. Pt rates pain as a 6 at this time.Jannette aLra RN

## 2025-05-05 NOTE — PROGRESS NOTES
[facility] GENERAL SURGERY    Surgery Progress Note           POD # 6    PATIENT NAME: Tuyet Richter     TODAY'S DATE: 5/5/2025    INTERVAL HISTORY:    Pt with abd pain but trying to avoid pain meds, having multiple bms but also bloated     OBJECTIVE:   VITALS:  /63   Pulse (!) 116   Temp 98.4 °F (36.9 °C) (Oral)   Resp 18   Ht 1.6 m (5' 3\")   Wt 50.1 kg (110 lb 6.4 oz)   SpO2 97%   BMI 19.56 kg/m²     INTAKE/OUTPUT:    I/O last 3 completed shifts:  In: 3573.2 [P.O.:1500; I.V.:1777.3; IV Piggyback:295.9]  Out: 230 [Drains:230]  No intake/output data recorded.              CONSTITUTIONAL:  awake and alert  ABDOMEN:   hypoactive bowel sounds, soft,  distended, moderate tender, rigoberto serous  INCISION: clean    Data:  CBC:   Recent Labs     05/03/25  0552 05/04/25  0551 05/05/25  0545   WBC 13.4* 9.5 7.2   HGB 9.5* 8.7* 8.9*   HCT 28.7* 26.0* 26.4*   * 518* 543*     BMP:    Recent Labs     05/04/25  0551      K 3.1*      CO2 27   BUN 3*   CREATININE 0.4*   GLUCOSE 146*     Hepatic:   No results for input(s): \"AST\", \"ALT\", \"BILITOT\", \"ALKPHOS\" in the last 72 hours.    Invalid input(s): \"ALB\"    Mag:      Recent Labs     05/03/25  0552 05/04/25  0551 05/05/25  0545   MG 1.43* 1.86 1.54*      Phos:     Recent Labs     05/04/25  0551   PHOS 3.5      INR: No results for input(s): \"INR\" in the last 72 hours.      Radiology Review:    NA    ASSESSMENT AND PLAN:  65 y.o. female status post laparoscopic lysis of adhesions and abscess drainage  Continue full liquids until ileus improving - having bms but still bloated  WBC normal and no fevers- continue abx  Discussed that slow improvement of inflammation and bloating is normal in this situation.  However, also discussed that if fails to resolve or worsens then further operative intervention (ie partial colectomy and colostomy) could be indicated.    Electronically signed by Dallas Guerrero MD

## 2025-05-05 NOTE — PLAN OF CARE
Problem: Safety - Adult  Goal: Free from fall injury  Outcome: Progressing     Problem: Pain  Goal: Verbalizes/displays adequate comfort level or baseline comfort level  Outcome: Progressing     Problem: Nutrition Deficit:  Goal: Optimize nutritional status  Outcome: Progressing  Flowsheets (Taken 5/5/2025 1252 by Bita Suero RD)  Nutrient intake appropriate for improving, restoring, or maintaining nutritional needs:   Assess nutritional status and recommend course of action   Monitor oral intake, labs, and treatment plans   Recommend appropriate diets, oral nutritional supplements, and vitamin/mineral supplements   Recommend, monitor, and adjust tube feedings and TPN/PPN based on assessed needs

## 2025-05-06 ENCOUNTER — APPOINTMENT (OUTPATIENT)
Dept: CT IMAGING | Age: 65
DRG: 856 | End: 2025-05-06
Payer: MEDICARE

## 2025-05-06 ENCOUNTER — APPOINTMENT (OUTPATIENT)
Dept: GENERAL RADIOLOGY | Age: 65
DRG: 856 | End: 2025-05-06
Payer: MEDICARE

## 2025-05-06 LAB
ALBUMIN SERPL-MCNC: 2.3 G/DL (ref 3.4–5)
ANION GAP SERPL CALCULATED.3IONS-SCNC: 10 MMOL/L (ref 3–16)
ANION GAP SERPL CALCULATED.3IONS-SCNC: 9 MMOL/L (ref 3–16)
BASOPHILS # BLD: 0 K/UL (ref 0–0.2)
BASOPHILS NFR BLD: 0.4 %
BUN SERPL-MCNC: 5 MG/DL (ref 7–20)
BUN SERPL-MCNC: 6 MG/DL (ref 7–20)
CALCIUM SERPL-MCNC: 8.1 MG/DL (ref 8.3–10.6)
CALCIUM SERPL-MCNC: 8.3 MG/DL (ref 8.3–10.6)
CHLORIDE SERPL-SCNC: 93 MMOL/L (ref 99–110)
CHLORIDE SERPL-SCNC: 95 MMOL/L (ref 99–110)
CO2 SERPL-SCNC: 23 MMOL/L (ref 21–32)
CO2 SERPL-SCNC: 24 MMOL/L (ref 21–32)
CREAT SERPL-MCNC: 0.3 MG/DL (ref 0.6–1.2)
CREAT SERPL-MCNC: 0.4 MG/DL (ref 0.6–1.2)
DEPRECATED RDW RBC AUTO: 15.5 % (ref 12.4–15.4)
EOSINOPHIL # BLD: 0.1 K/UL (ref 0–0.6)
EOSINOPHIL NFR BLD: 0.6 %
GFR SERPLBLD CREATININE-BSD FMLA CKD-EPI: >90 ML/MIN/{1.73_M2}
GFR SERPLBLD CREATININE-BSD FMLA CKD-EPI: >90 ML/MIN/{1.73_M2}
GLUCOSE BLD-MCNC: 87 MG/DL (ref 70–99)
GLUCOSE SERPL-MCNC: 93 MG/DL (ref 70–99)
GLUCOSE SERPL-MCNC: 97 MG/DL (ref 70–99)
HCT VFR BLD AUTO: 23.4 % (ref 36–48)
HGB BLD-MCNC: 8.1 G/DL (ref 12–16)
LYMPHOCYTES # BLD: 0.9 K/UL (ref 1–5.1)
LYMPHOCYTES NFR BLD: 10.6 %
MAGNESIUM SERPL-MCNC: 1.43 MG/DL (ref 1.8–2.4)
MCH RBC QN AUTO: 31.1 PG (ref 26–34)
MCHC RBC AUTO-ENTMCNC: 34.4 G/DL (ref 31–36)
MCV RBC AUTO: 90.3 FL (ref 80–100)
MONOCYTES # BLD: 1 K/UL (ref 0–1.3)
MONOCYTES NFR BLD: 11 %
NEUTROPHILS # BLD: 6.9 K/UL (ref 1.7–7.7)
NEUTROPHILS NFR BLD: 77.4 %
OSMOLALITY UR: 293 MOSM/KG (ref 390–1070)
PERFORMED ON: NORMAL
PHOSPHATE SERPL-MCNC: 1.7 MG/DL (ref 2.5–4.9)
PHOSPHATE SERPL-MCNC: 1.9 MG/DL (ref 2.5–4.9)
PLATELET # BLD AUTO: 534 K/UL (ref 135–450)
PMV BLD AUTO: 10.2 FL (ref 5–10.5)
POTASSIUM SERPL-SCNC: 3.4 MMOL/L (ref 3.5–5.1)
POTASSIUM SERPL-SCNC: 4.3 MMOL/L (ref 3.5–5.1)
POTASSIUM UR-SCNC: 23.3 MMOL/L
RBC # BLD AUTO: 2.59 M/UL (ref 4–5.2)
SODIUM SERPL-SCNC: 126 MMOL/L (ref 136–145)
SODIUM SERPL-SCNC: 128 MMOL/L (ref 136–145)
SODIUM UR-SCNC: <20 MMOL/L
TSH SERPL DL<=0.005 MIU/L-ACNC: 2.3 UIU/ML (ref 0.27–4.2)
URATE SERPL-MCNC: 1.6 MG/DL (ref 2.6–6)
WBC # BLD AUTO: 8.9 K/UL (ref 4–11)

## 2025-05-06 PROCEDURE — 2580000003 HC RX 258: Performed by: INTERNAL MEDICINE

## 2025-05-06 PROCEDURE — 80069 RENAL FUNCTION PANEL: CPT

## 2025-05-06 PROCEDURE — 2500000003 HC RX 250 WO HCPCS: Performed by: SURGERY

## 2025-05-06 PROCEDURE — 6360000002 HC RX W HCPCS: Performed by: NURSE PRACTITIONER

## 2025-05-06 PROCEDURE — 84550 ASSAY OF BLOOD/URIC ACID: CPT

## 2025-05-06 PROCEDURE — 02HV33Z INSERTION OF INFUSION DEVICE INTO SUPERIOR VENA CAVA, PERCUTANEOUS APPROACH: ICD-10-PCS | Performed by: SURGERY

## 2025-05-06 PROCEDURE — 3E0G76Z INTRODUCTION OF NUTRITIONAL SUBSTANCE INTO UPPER GI, VIA NATURAL OR ARTIFICIAL OPENING: ICD-10-PCS | Performed by: SURGERY

## 2025-05-06 PROCEDURE — 84133 ASSAY OF URINE POTASSIUM: CPT

## 2025-05-06 PROCEDURE — 30233N1 TRANSFUSION OF NONAUTOLOGOUS RED BLOOD CELLS INTO PERIPHERAL VEIN, PERCUTANEOUS APPROACH: ICD-10-PCS | Performed by: SURGERY

## 2025-05-06 PROCEDURE — 83735 ASSAY OF MAGNESIUM: CPT

## 2025-05-06 PROCEDURE — 84300 ASSAY OF URINE SODIUM: CPT

## 2025-05-06 PROCEDURE — C1751 CATH, INF, PER/CENT/MIDLINE: HCPCS

## 2025-05-06 PROCEDURE — 99024 POSTOP FOLLOW-UP VISIT: CPT | Performed by: SURGERY

## 2025-05-06 PROCEDURE — 6360000002 HC RX W HCPCS: Performed by: STUDENT IN AN ORGANIZED HEALTH CARE EDUCATION/TRAINING PROGRAM

## 2025-05-06 PROCEDURE — 85025 COMPLETE CBC W/AUTO DIFF WBC: CPT

## 2025-05-06 PROCEDURE — 6370000000 HC RX 637 (ALT 250 FOR IP): Performed by: SURGERY

## 2025-05-06 PROCEDURE — 1200000000 HC SEMI PRIVATE

## 2025-05-06 PROCEDURE — 6360000002 HC RX W HCPCS: Performed by: SURGERY

## 2025-05-06 PROCEDURE — 84443 ASSAY THYROID STIM HORMONE: CPT

## 2025-05-06 PROCEDURE — 74018 RADEX ABDOMEN 1 VIEW: CPT

## 2025-05-06 PROCEDURE — 6360000002 HC RX W HCPCS: Performed by: INTERNAL MEDICINE

## 2025-05-06 PROCEDURE — 84100 ASSAY OF PHOSPHORUS: CPT

## 2025-05-06 PROCEDURE — 6360000004 HC RX CONTRAST MEDICATION: Performed by: SURGERY

## 2025-05-06 PROCEDURE — 2580000003 HC RX 258: Performed by: SURGERY

## 2025-05-06 PROCEDURE — 0D9670Z DRAINAGE OF STOMACH WITH DRAINAGE DEVICE, VIA NATURAL OR ARTIFICIAL OPENING: ICD-10-PCS | Performed by: SURGERY

## 2025-05-06 PROCEDURE — 76937 US GUIDE VASCULAR ACCESS: CPT

## 2025-05-06 PROCEDURE — 74177 CT ABD & PELVIS W/CONTRAST: CPT

## 2025-05-06 PROCEDURE — 36569 INSJ PICC 5 YR+ W/O IMAGING: CPT

## 2025-05-06 PROCEDURE — 83935 ASSAY OF URINE OSMOLALITY: CPT

## 2025-05-06 PROCEDURE — 36415 COLL VENOUS BLD VENIPUNCTURE: CPT

## 2025-05-06 RX ORDER — SODIUM CHLORIDE 0.9 % (FLUSH) 0.9 %
5-40 SYRINGE (ML) INJECTION PRN
Status: DISCONTINUED | OUTPATIENT
Start: 2025-05-06 | End: 2025-05-08

## 2025-05-06 RX ORDER — POTASSIUM CHLORIDE 7.45 MG/ML
10 INJECTION INTRAVENOUS
Status: DISPENSED | OUTPATIENT
Start: 2025-05-06 | End: 2025-05-06

## 2025-05-06 RX ORDER — OXYMETAZOLINE HYDROCHLORIDE 0.05 G/100ML
2 SPRAY NASAL ONCE
Status: COMPLETED | OUTPATIENT
Start: 2025-05-06 | End: 2025-05-06

## 2025-05-06 RX ORDER — LIDOCAINE HYDROCHLORIDE 20 MG/ML
JELLY TOPICAL ONCE
Status: COMPLETED | OUTPATIENT
Start: 2025-05-06 | End: 2025-05-06

## 2025-05-06 RX ORDER — SODIUM CHLORIDE 0.9 % (FLUSH) 0.9 %
5-40 SYRINGE (ML) INJECTION EVERY 12 HOURS SCHEDULED
Status: DISCONTINUED | OUTPATIENT
Start: 2025-05-06 | End: 2025-05-08

## 2025-05-06 RX ORDER — IOPAMIDOL 755 MG/ML
75 INJECTION, SOLUTION INTRAVASCULAR
Status: COMPLETED | OUTPATIENT
Start: 2025-05-06 | End: 2025-05-06

## 2025-05-06 RX ORDER — POLYETHYLENE GLYCOL 3350 17 G/17G
17 POWDER, FOR SOLUTION ORAL DAILY PRN
Status: DISCONTINUED | OUTPATIENT
Start: 2025-05-06 | End: 2025-05-20

## 2025-05-06 RX ORDER — MAGNESIUM SULFATE IN WATER 40 MG/ML
2000 INJECTION, SOLUTION INTRAVENOUS ONCE
Status: COMPLETED | OUTPATIENT
Start: 2025-05-06 | End: 2025-05-06

## 2025-05-06 RX ORDER — POTASSIUM CHLORIDE 7.45 MG/ML
10 INJECTION INTRAVENOUS ONCE
Status: COMPLETED | OUTPATIENT
Start: 2025-05-06 | End: 2025-05-06

## 2025-05-06 RX ORDER — SODIUM CHLORIDE 9 MG/ML
25 INJECTION, SOLUTION INTRAVENOUS PRN
Status: DISCONTINUED | OUTPATIENT
Start: 2025-05-06 | End: 2025-05-30 | Stop reason: HOSPADM

## 2025-05-06 RX ORDER — LIDOCAINE HYDROCHLORIDE 10 MG/ML
5 INJECTION, SOLUTION INFILTRATION; PERINEURAL ONCE
Status: DISCONTINUED | OUTPATIENT
Start: 2025-05-06 | End: 2025-05-08

## 2025-05-06 RX ADMIN — AMLODIPINE BESYLATE 2.5 MG: 2.5 TABLET ORAL at 09:39

## 2025-05-06 RX ADMIN — CALCIUM CARBONATE 500 MG: 500 TABLET, CHEWABLE ORAL at 09:39

## 2025-05-06 RX ADMIN — LIDOCAINE HYDROCHLORIDE: 20 JELLY TOPICAL at 14:19

## 2025-05-06 RX ADMIN — CALCIUM CARBONATE 500 MG: 500 TABLET, CHEWABLE ORAL at 21:21

## 2025-05-06 RX ADMIN — POTASSIUM CHLORIDE 10 MEQ: 7.46 INJECTION, SOLUTION INTRAVENOUS at 13:30

## 2025-05-06 RX ADMIN — Medication 10 ML: at 21:21

## 2025-05-06 RX ADMIN — MORPHINE SULFATE 2 MG: 2 INJECTION, SOLUTION INTRAMUSCULAR; INTRAVENOUS at 02:49

## 2025-05-06 RX ADMIN — SODIUM CHLORIDE: 0.9 INJECTION, SOLUTION INTRAVENOUS at 17:52

## 2025-05-06 RX ADMIN — OXYMETAZOLINE HYDROCHLORIDE 2 SPRAY: 0.5 SPRAY NASAL at 14:20

## 2025-05-06 RX ADMIN — SODIUM CHLORIDE, PRESERVATIVE FREE 10 ML: 5 INJECTION INTRAVENOUS at 21:22

## 2025-05-06 RX ADMIN — VALACYCLOVIR HYDROCHLORIDE 500 MG: 500 TABLET, FILM COATED ORAL at 21:20

## 2025-05-06 RX ADMIN — ASCORBIC ACID, VITAMIN A PALMITATE, CHOLECALCIFEROL, THIAMINE HYDROCHLORIDE, RIBOFLAVIN-5 PHOSPHATE SODIUM, PYRIDOXINE HYDROCHLORIDE, NIACINAMIDE, DEXPANTHENOL, ALPHA-TOCOPHEROL ACETATE, VITAMIN K1, FOLIC ACID, BIOTIN, CYANOCOBALAMIN: 200; 3300; 200; 6; 3.6; 6; 40; 15; 10; 150; 600; 60; 5 INJECTION, SOLUTION INTRAVENOUS at 18:00

## 2025-05-06 RX ADMIN — PIPERACILLIN AND TAZOBACTAM 3375 MG: 3; .375 INJECTION, POWDER, LYOPHILIZED, FOR SOLUTION INTRAVENOUS at 17:53

## 2025-05-06 RX ADMIN — POTASSIUM CHLORIDE 10 MEQ: 7.46 INJECTION, SOLUTION INTRAVENOUS at 14:43

## 2025-05-06 RX ADMIN — MORPHINE SULFATE 2 MG: 2 INJECTION, SOLUTION INTRAMUSCULAR; INTRAVENOUS at 21:31

## 2025-05-06 RX ADMIN — Medication 400 MG: at 12:18

## 2025-05-06 RX ADMIN — PANTOPRAZOLE SODIUM 40 MG: 40 TABLET, DELAYED RELEASE ORAL at 06:39

## 2025-05-06 RX ADMIN — MORPHINE SULFATE 2 MG: 2 INJECTION, SOLUTION INTRAMUSCULAR; INTRAVENOUS at 14:10

## 2025-05-06 RX ADMIN — VASOPRESSIN: 20 INJECTION, SOLUTION INTRAVENOUS at 12:04

## 2025-05-06 RX ADMIN — DICYCLOMINE HYDROCHLORIDE 10 MG: 10 CAPSULE ORAL at 21:21

## 2025-05-06 RX ADMIN — Medication 6 MG: at 21:21

## 2025-05-06 RX ADMIN — METOPROLOL SUCCINATE 25 MG: 25 TABLET, EXTENDED RELEASE ORAL at 09:39

## 2025-05-06 RX ADMIN — ENOXAPARIN SODIUM 30 MG: 100 INJECTION SUBCUTANEOUS at 09:42

## 2025-05-06 RX ADMIN — ONDANSETRON 4 MG: 2 INJECTION, SOLUTION INTRAMUSCULAR; INTRAVENOUS at 02:54

## 2025-05-06 RX ADMIN — POTASSIUM CHLORIDE 10 MEQ: 7.46 INJECTION, SOLUTION INTRAVENOUS at 12:13

## 2025-05-06 RX ADMIN — ONDANSETRON 4 MG: 2 INJECTION, SOLUTION INTRAMUSCULAR; INTRAVENOUS at 21:31

## 2025-05-06 RX ADMIN — Medication 400 MG: at 21:22

## 2025-05-06 RX ADMIN — PIPERACILLIN AND TAZOBACTAM 3375 MG: 3; .375 INJECTION, POWDER, LYOPHILIZED, FOR SOLUTION INTRAVENOUS at 06:38

## 2025-05-06 RX ADMIN — I.V. FAT EMULSION 250 ML: 20 EMULSION INTRAVENOUS at 18:03

## 2025-05-06 RX ADMIN — DICYCLOMINE HYDROCHLORIDE 10 MG: 10 CAPSULE ORAL at 09:39

## 2025-05-06 RX ADMIN — MAGNESIUM SULFATE HEPTAHYDRATE 2000 MG: 40 INJECTION, SOLUTION INTRAVENOUS at 12:10

## 2025-05-06 RX ADMIN — IOPAMIDOL 75 ML: 755 INJECTION, SOLUTION INTRAVENOUS at 10:27

## 2025-05-06 RX ADMIN — VALACYCLOVIR HYDROCHLORIDE 500 MG: 500 TABLET, FILM COATED ORAL at 09:39

## 2025-05-06 ASSESSMENT — PAIN SCALES - GENERAL
PAINLEVEL_OUTOF10: 6
PAINLEVEL_OUTOF10: 0
PAINLEVEL_OUTOF10: 7
PAINLEVEL_OUTOF10: 0
PAINLEVEL_OUTOF10: 8

## 2025-05-06 ASSESSMENT — PAIN DESCRIPTION - LOCATION: LOCATION: ABDOMEN

## 2025-05-06 NOTE — CARE COORDINATION
Hospital day 13: Patient on C3 care managed by IM, General Surgery, and Nephrology. Patient from home with spouse PARVEEN. Na126. Pending CT. Spoke with RN plans for NGT and PICC line to start TPN. SW following for DCP needs.GASTON Alexander

## 2025-05-06 NOTE — PROGRESS NOTES
Pt is alert and oriented with intermittent confusion at times. VSS. RA. Pt c/o 7/10 pain. Pt given PRN pain medication per MAR. BS hypoactive x4. Abdomen is distended and taut. Pt with TODD drain left side of abdomen with minimal output of milky drainage. NG tube to intermittent suction at this time. Shift assessment completed and documented. Call light within reach. Bed side table within reach. Wheels locked. Bed in lowest position. Bed check in place. Pt instructed to call out for assistance. Pt expressesed understanding & calls out appropritately. All care per orders. Electronically signed by Viri Owen RN on 5/6/2025 at 6:56 PM

## 2025-05-06 NOTE — PROGRESS NOTES
NG tube placed at bedside. Pt tolerated well. STAT KUB ordered at this time to verify placement. NG clamped at this time while waiting for xray. NG tube is at 53 cm I the left nostril.

## 2025-05-06 NOTE — PLAN OF CARE
Problem: Pain  Goal: Verbalizes/displays adequate comfort level or baseline comfort level  5/6/2025 0005 by Linda Weston RN  Outcome: Progressing    Problem: Safety - Adult  Goal: Free from fall injury  5/6/2025 0005 by Linda Weston RN  Outcome: Progressing    Problem: ABCDS Injury Assessment  Goal: Absence of physical injury  5/6/2025 0005 by Linda Weston RN  Outcome: Progressing     Problem: Skin/Tissue Integrity - Adult  Goal: Skin integrity remains intact  5/6/2025 0005 by Linda Weston RN  Outcome: Progressing  Flowsheets (Taken 5/5/2025 2018)  Skin Integrity Remains Intact: Monitor for areas of redness and/or skin breakdown     Problem: Gastrointestinal - Adult  Goal: Maintains or returns to baseline bowel function  5/6/2025 0005 by Linda Weston RN  Outcome: Progressing     Problem: Infection - Adult  Goal: Absence of infection at discharge  5/6/2025 0005 by Linda Weston RN  Outcome: Progressing  Flowsheets (Taken 5/5/2025 2018)  Absence of infection at discharge: Assess and monitor for signs and symptoms of infection

## 2025-05-06 NOTE — PROGRESS NOTES
CT reviewed and discussed with patient and family at bedside along with plan for now.  With ileus vs sbo NG was placed.  Also to have PICC placed and will start TPN.  Continue with abx.  Current drain in good location but another possible fluid collection that could be aspirated by IR if needed.  If fails to improve with the above measures or if worsens then laparotomy would be indicated.  Results of such could include bowel resection and colostomy depending on intra-op findings.  Family also requesting input from Gastroenterology.    Carroll Guerrero MD

## 2025-05-06 NOTE — CONSULTS
[facility]  Nephrology Consult Note           Reason for Consult: Hyponatremia  Requesting Physician:  Dr. Morgan    Chief Complaint:    Chief Complaint   Patient presents with    Abdominal Pain     Pt with abdominal pain for several months.  Had ba-colectomy in February, today was seen by GI had CT scan.  Called and told to come to ER d/t abscess found on scan     History of Present Illness on 5/6/2025:    65 y.o. yo female with PMH of hypertension, hiatal hernia who is admitted on 4/23/2025 diverticulitis and abdominal abscesses, underwent laparoscopic lysis of adhesions and I&D of the abscesses on 4/29/2025.  She has been having ileus and is only advanced to liquid diet from 5/5/2025  Sodium was 129 on 5/2 then increased to 137 on 5/4 but dropped to 132 on 5/5 and 126 on 5/6 along with hypokalemia  She has chronic history of hyponatremia with sodium in the low 130s and was recently admitted in [place] in March during when she was put on salt tablets 2 g 3 times daily    Past Medical History:        Diagnosis Date    Acid reflux     Gastritis     Hiatal hernia     Hypertension     Pinched nerve in neck      Past Surgical History:        Procedure Laterality Date    COLONOSCOPY  1/23/2013    polyps    COLONOSCOPY  3/18/16    diverticulosis    ENDOMETRIAL ABLATION      NOSE SURGERY      SIGMOID COLECTOMY N/A 4/29/2025    DIAGNOSTIC LAPAROSCOPY, LYSIS OF ADHESIONS, AND ABSCESS DRAINAGE performed by Dallas Guerrero MD at [facility] OR    UPPER GASTROINTESTINAL ENDOSCOPY  1/23/2013    Hiatal Hernia    UPPER GASTROINTESTINAL ENDOSCOPY  3/18/16    bx       Home Medications:    No current facility-administered medications on file prior to encounter.     Current Outpatient Medications on File Prior to Encounter   Medication Sig Dispense Refill    Magnesium 100 MG CAPS Take 400 mg by mouth in the morning and at bedtime      Estradiol (ESTROGEL) 0.75 MG/1.25 GM (0.06%) GEL Place onto the skin daily      dicyclomine

## 2025-05-06 NOTE — PLAN OF CARE
Problem: Pain  Goal: Verbalizes/displays adequate comfort level or baseline comfort level  5/6/2025 1255 by Viri Owen RN  Outcome: Progressing  Flowsheets (Taken 5/6/2025 1255)  Verbalizes/displays adequate comfort level or baseline comfort level:   Encourage patient to monitor pain and request assistance   Assess pain using appropriate pain scale   Administer analgesics based on type and severity of pain and evaluate response   Implement non-pharmacological measures as appropriate and evaluate response     Problem: Safety - Adult  Goal: Free from fall injury  5/6/2025 1255 by Viri Owen, RN  Outcome: Progressing  Flowsheets (Taken 5/6/2025 1255)  Free From Fall Injury:   Instruct family/caregiver on patient safety   Based on caregiver fall risk screen, instruct family/caregiver to ask for assistance with transferring infant if caregiver noted to have fall risk factors     Problem: Skin/Tissue Integrity - Adult  Goal: Skin integrity remains intact  5/6/2025 1255 by Viri Owen, RN  Outcome: Progressing     Problem: Skin/Tissue Integrity - Adult  Goal: Incisions, wounds, or drain sites healing without S/S of infection  5/6/2025 1255 by Viri Owen, RN  Outcome: Progressing     Problem: Gastrointestinal - Adult  Goal: Minimal or absence of nausea and vomiting  5/6/2025 1255 by Viri Owen, RN  Outcome: Progressing  Flowsheets (Taken 5/6/2025 1255)  Minimal or absence of nausea and vomiting:   Administer IV fluids as ordered to ensure adequate hydration   Maintain NPO status until nausea and vomiting are resolved   Administer ordered antiemetic medications as needed   Provide nonpharmacologic comfort measures as appropriate

## 2025-05-06 NOTE — PROGRESS NOTES
New orders placed for NG tube by surgery team. Writer went in to inform pt regarding the new order for the NG tube.Pt and pt family would like to speak with the doctor regarding results of CT scan and reason for new orders. Call placed to general surgery office to have either Dr. Pinto or Dr. Guerrero come and see pt.

## 2025-05-06 NOTE — PROGRESS NOTES
DIT called for Midline/PICC placement.        Electronically signed by Lisy Gary on 5/6/2025 at 1:35 PM

## 2025-05-06 NOTE — PROGRESS NOTES
[facility] Medicine Progress Note  V 1.6      Date of Admission: 4/23/2025    Hospital Day: 14      Chief Admission Complaint:  Abdominal pain    Subjective:    Na down to 126 today, suspect due to hypervolemia, stopped IVFs for now. Nephrology consulted.     Patient with worsening abdominal distention today.  CT abdomen pelvis reveals high-grade SBO.  NG tube placement per surgery recommendations.  TPN started today.      Presenting Admission History:       64 y.o. female who presented to [facility] with abdominal pain.  PMHx significant for HTN.  History obtained from the patient and review of EMR.  The patient stated she had a hemicolectomy in February 2025 in [facility] and since then has been experiencing intermittent abdominal pain.  Per chart review, the patient has been readmitted 1 time since her surgery for electrolyte abnormalities.  However, the patient stated her pain has gotten progressively worse over the last couple of weeks.  She reports speaking with GI and they ordered a CT outpatient today.  Patient  is at the bedside and stated that she was called and told to go to the emergency department due to \"a collection of pus\" seen on the CT. In the emergency department a CT abdomen pelvis was obtained that revealed Fluid and gas collection within the pelvis interposed between the colon and uterus measuring up to 4.1 cm.  Abscess is favored.  This appears largely enveloped by abdominal and adnexal structures.  A [place] uterine fistula is not excluded.  Additional small dependent collection within the region of the pouch of Alfred measuring up to 2.5 cm.  Mild dilation of small bowel loops which may indicate partial obstruction.  There is no high-grade small bowel obstruction.  The patient was given morphine for pain.  She was also started on Zosyn.  Upon further evaluation, the patient was found to be dehydrated with hyponatremia.  This is likely secondary to inadequate

## 2025-05-06 NOTE — PROGRESS NOTES
[facility] GENERAL SURGERY    Surgery Progress Note           POD # 7    PATIENT NAME: Tuyet Richter     TODAY'S DATE: 5/6/2025    INTERVAL HISTORY:    Pt  still c/o persistent abdominal bloating, distention, pain, frequent hiccups, but also having flatus and multiple loose BMs.     OBJECTIVE:   VITALS:  /61   Pulse (!) 115   Temp 98.4 °F (36.9 °C) (Oral)   Resp 18   Ht 1.6 m (5' 3\")   Wt 50.1 kg (110 lb 6.4 oz)   SpO2 97%   BMI 19.56 kg/m²     INTAKE/OUTPUT:    I/O last 3 completed shifts:  In: 4063.4 [P.O.:3160; I.V.:609.5; IV Piggyback:293.8]  Out: 110 [Drains:110]  No intake/output data recorded.              CONSTITUTIONAL:  awake and alert  LUNGS:     ABDOMEN:   absent bowel sounds, firm, distended, tenderness noted diffusely   INCISION: clean, dry, healing, sero-sanguinous drainage in TODD    Data:  CBC:   Recent Labs     05/04/25 0551 05/05/25  0545 05/06/25  0428   WBC 9.5 7.2 8.9   HGB 8.7* 8.9* 8.1*   HCT 26.0* 26.4* 23.4*   * 543* 534*     BMP:    Recent Labs     05/04/25 0551 05/05/25  0545 05/06/25  0428    132* 126*   K 3.1* 3.6 3.4*    96* 93*   CO2 27 20* 24   BUN 3* 4* 6*   CREATININE 0.4* 0.5* 0.3*   GLUCOSE 146* 78 93     Hepatic: No results for input(s): \"AST\", \"ALT\", \"BILITOT\", \"ALKPHOS\" in the last 72 hours.    Invalid input(s): \"ALB\"  Mag:      Recent Labs     05/04/25  0551 05/05/25  0545 05/06/25  0428   MG 1.86 1.54* 1.43*      Phos:     Recent Labs     05/04/25 0551   PHOS 3.5      INR: No results for input(s): \"INR\" in the last 72 hours.      Radiology Review:       ASSESSMENT AND PLAN:  65 y.o. female status post laparoscopic lysis of adhesions and abscess drainage, and persistent abdominal distention since her latest surgical intervention, concerning for worsening ileus.     - will check repeat CT today; if there is sx of worsening ileus vs SBO, will likely need an NGT for decompression.   - will follow         Electronically signed by ANSLEY MOYA

## 2025-05-06 NOTE — PROGRESS NOTES
Order for PICC line per Dr. Monahan for TPN. Per RN patient in need of TL PICC given the need for antibx, and TPN   Pre procedure and timeout done with pt's RN. Okay with nephrology order placed    Successful insertion of a TL PICC line into pt's left basilic vein. No issues gaining access or advancing guidewire/introducer/PICC line. PICC tip terminates in the SVC according to [employer] 3CG tip confirmation system. PICC was seen dropping into SVC with tip tracking technology and discernable peaked p waves were noted without negative deflection. A printout will be in pt's chart.     PICC is cut at 42 cm and out externally 1 cm. All lumens flush without resistance and draw back brisk blood return.    PICC site CDI with hemostasis maintained and a biopatch applied to site. Pt instructed to stay in bed and keep arm flat and still for 30 minutes  to promote hemostasis.     Viri WEINSTEIN given handoff report

## 2025-05-06 NOTE — CONSULTS
Brief Nutrition Assessment    Nutrition Recommendations/Plan:   Recommend check TG, Mg, Phos, CMP now if not done in last 24 hours.  Bag 1: Clinimix 5/15 starting at 35 mL/hour.  Physician/LIP to monitor closely and correct electrolytes (Phos,Mg,K+) due to risk of refeeding syndrome (NPO/clear/full liquids majority of 13 day admission).  Bag 2: As long as electrolytes WNL, advance Clinimix 5/15 to 55 mL/hour.  Bag 3: As long as electrolytes WNL, advance Clinimix 5/15 to goal rate of 75 mL/hour.  Recommend 250 mL bags of 20% lipids 2 times per week.  Recommend FSBS, monitor glucose, need for insulin.  Pharmacy to adjust MVI and Trace Elements as needed   When PN to be discontinued, cut rate by 50% and let current bag run out.       Nutrition Assessment:   New consult received for parenteral nutrition recommendations. Dietitian following. Please see feeding recommendations above.  Recommend closely monitoring and replacing electrolytes d/t risk for refeeding syndrome. Will continue to monitor.     Nutrition Related Findings:    8 BMs documented during 5/5-6 in I/Os. Potassium: 3.4, magnesium: 1.43, and phosphorus: 1.7. Triglycerides ordered for tomorrow AM. No edema.     Current Nutrition Intake & Therapies:    Average Meal Intake: 51-75%, 1-25%  Average Supplements Intake: Refusing to take  Diet NPO  PN-Adult Premix 4.25/5 - Standard Electrolytes- Peripheral Line  Current Parenteral Nutrition Orders:  Type and Formula: Premix Central   Lipids: Two times weekly, 250ml  Duration: Continuous  Rate/Volume: Clinimix 5/15 at 75ml/hour x24 hours to provide 1800 mL TV, 1278 calories, 90 g protein, 270g dextrose, and GIR of 3.74 mg/kg/min PLUS biweekly 20% 250ml lipids to provide an additional 143 calories per day.     Estimated Daily Nutrient Needs:  Energy Requirements Based On: Kcal/kg  Weight Used for Energy Requirements: Current  Energy (kcal/day): 1,353-1,453 (27-29 kcal/kg CBW)  Weight Used for Protein Requirements:

## 2025-05-06 NOTE — CONSULTS
Consult Placed   PERFECT SERVE  Who: Dr. Posada  Date:5/6/2025  Time: 9:13 AM    Electronically signed by Lisy Gary on 5/6/2025 at 9:12 AM

## 2025-05-07 LAB
ALBUMIN SERPL-MCNC: 2.1 G/DL (ref 3.4–5)
ALBUMIN SERPL-MCNC: 2.3 G/DL (ref 3.4–5)
ALP SERPL-CCNC: 133 U/L (ref 40–129)
ALT SERPL-CCNC: 13 U/L (ref 10–40)
ANION GAP SERPL CALCULATED.3IONS-SCNC: 10 MMOL/L (ref 3–16)
ANION GAP SERPL CALCULATED.3IONS-SCNC: 8 MMOL/L (ref 3–16)
AST SERPL-CCNC: 30 U/L (ref 15–37)
BASOPHILS # BLD: 0 K/UL (ref 0–0.2)
BASOPHILS NFR BLD: 0.6 %
BILIRUB DIRECT SERPL-MCNC: 0.3 MG/DL (ref 0–0.3)
BILIRUB INDIRECT SERPL-MCNC: ABNORMAL MG/DL (ref 0–1)
BILIRUB SERPL-MCNC: <0.2 MG/DL (ref 0–1)
BUN SERPL-MCNC: 3 MG/DL (ref 7–20)
BUN SERPL-MCNC: 4 MG/DL (ref 7–20)
CALCIUM SERPL-MCNC: 7.5 MG/DL (ref 8.3–10.6)
CALCIUM SERPL-MCNC: 7.6 MG/DL (ref 8.3–10.6)
CHLORIDE SERPL-SCNC: 103 MMOL/L (ref 99–110)
CHLORIDE SERPL-SCNC: 103 MMOL/L (ref 99–110)
CO2 SERPL-SCNC: 22 MMOL/L (ref 21–32)
CO2 SERPL-SCNC: 23 MMOL/L (ref 21–32)
CREAT SERPL-MCNC: 0.3 MG/DL (ref 0.6–1.2)
CREAT SERPL-MCNC: 0.4 MG/DL (ref 0.6–1.2)
DEPRECATED RDW RBC AUTO: 15.6 % (ref 12.4–15.4)
EOSINOPHIL # BLD: 0.1 K/UL (ref 0–0.6)
EOSINOPHIL NFR BLD: 1.3 %
GFR SERPLBLD CREATININE-BSD FMLA CKD-EPI: >90 ML/MIN/{1.73_M2}
GFR SERPLBLD CREATININE-BSD FMLA CKD-EPI: >90 ML/MIN/{1.73_M2}
GLUCOSE BLD-MCNC: 107 MG/DL (ref 70–99)
GLUCOSE BLD-MCNC: 107 MG/DL (ref 70–99)
GLUCOSE BLD-MCNC: 114 MG/DL (ref 70–99)
GLUCOSE BLD-MCNC: 120 MG/DL (ref 70–99)
GLUCOSE SERPL-MCNC: 104 MG/DL (ref 70–99)
GLUCOSE SERPL-MCNC: 123 MG/DL (ref 70–99)
HCT VFR BLD AUTO: 21.8 % (ref 36–48)
HGB BLD-MCNC: 7.5 G/DL (ref 12–16)
LYMPHOCYTES # BLD: 1 K/UL (ref 1–5.1)
LYMPHOCYTES NFR BLD: 14.5 %
MAGNESIUM SERPL-MCNC: 1.46 MG/DL (ref 1.8–2.4)
MCH RBC QN AUTO: 31.1 PG (ref 26–34)
MCHC RBC AUTO-ENTMCNC: 34.3 G/DL (ref 31–36)
MCV RBC AUTO: 90.9 FL (ref 80–100)
MONOCYTES # BLD: 0.9 K/UL (ref 0–1.3)
MONOCYTES NFR BLD: 13.2 %
NEUTROPHILS # BLD: 4.7 K/UL (ref 1.7–7.7)
NEUTROPHILS NFR BLD: 70.4 %
PERFORMED ON: ABNORMAL
PHOSPHATE SERPL-MCNC: 2.7 MG/DL (ref 2.5–4.9)
PHOSPHATE SERPL-MCNC: 4.5 MG/DL (ref 2.5–4.9)
PLATELET # BLD AUTO: 482 K/UL (ref 135–450)
PMV BLD AUTO: 9.3 FL (ref 5–10.5)
POTASSIUM SERPL-SCNC: 3.5 MMOL/L (ref 3.5–5.1)
POTASSIUM SERPL-SCNC: 3.5 MMOL/L (ref 3.5–5.1)
POTASSIUM SERPL-SCNC: 3.7 MMOL/L (ref 3.5–5.1)
PROT SERPL-MCNC: 4.6 G/DL (ref 6.4–8.2)
RBC # BLD AUTO: 2.4 M/UL (ref 4–5.2)
SODIUM SERPL-SCNC: 134 MMOL/L (ref 136–145)
SODIUM SERPL-SCNC: 135 MMOL/L (ref 136–145)
WBC # BLD AUTO: 6.7 K/UL (ref 4–11)

## 2025-05-07 PROCEDURE — 6370000000 HC RX 637 (ALT 250 FOR IP): Performed by: NURSE PRACTITIONER

## 2025-05-07 PROCEDURE — 2580000003 HC RX 258: Performed by: INTERNAL MEDICINE

## 2025-05-07 PROCEDURE — 6360000002 HC RX W HCPCS: Performed by: HOSPITALIST

## 2025-05-07 PROCEDURE — 6360000002 HC RX W HCPCS: Performed by: STUDENT IN AN ORGANIZED HEALTH CARE EDUCATION/TRAINING PROGRAM

## 2025-05-07 PROCEDURE — 2500000003 HC RX 250 WO HCPCS: Performed by: SURGERY

## 2025-05-07 PROCEDURE — 6360000002 HC RX W HCPCS: Performed by: INTERNAL MEDICINE

## 2025-05-07 PROCEDURE — 2500000003 HC RX 250 WO HCPCS: Performed by: INTERNAL MEDICINE

## 2025-05-07 PROCEDURE — 1200000000 HC SEMI PRIVATE

## 2025-05-07 PROCEDURE — 36592 COLLECT BLOOD FROM PICC: CPT

## 2025-05-07 PROCEDURE — 6360000002 HC RX W HCPCS: Performed by: SURGERY

## 2025-05-07 PROCEDURE — 85025 COMPLETE CBC W/AUTO DIFF WBC: CPT

## 2025-05-07 PROCEDURE — 84100 ASSAY OF PHOSPHORUS: CPT

## 2025-05-07 PROCEDURE — 83735 ASSAY OF MAGNESIUM: CPT

## 2025-05-07 PROCEDURE — APPNB45 APP NON BILLABLE 31-45 MINUTES: Performed by: CLINICAL NURSE SPECIALIST

## 2025-05-07 PROCEDURE — 80076 HEPATIC FUNCTION PANEL: CPT

## 2025-05-07 PROCEDURE — 6370000000 HC RX 637 (ALT 250 FOR IP): Performed by: SURGERY

## 2025-05-07 PROCEDURE — 99024 POSTOP FOLLOW-UP VISIT: CPT | Performed by: SURGERY

## 2025-05-07 PROCEDURE — 80069 RENAL FUNCTION PANEL: CPT

## 2025-05-07 RX ORDER — NYSTATIN 100000 [USP'U]/ML
5 SUSPENSION ORAL 4 TIMES DAILY
Status: DISCONTINUED | OUTPATIENT
Start: 2025-05-07 | End: 2025-05-30 | Stop reason: HOSPADM

## 2025-05-07 RX ORDER — CALCIUM CARBONATE 500 MG/1
1 TABLET, CHEWABLE ORAL 2 TIMES DAILY
Status: DISCONTINUED | OUTPATIENT
Start: 2025-05-07 | End: 2025-05-30 | Stop reason: HOSPADM

## 2025-05-07 RX ORDER — MAGNESIUM SULFATE IN WATER 40 MG/ML
4000 INJECTION, SOLUTION INTRAVENOUS ONCE
Status: COMPLETED | OUTPATIENT
Start: 2025-05-07 | End: 2025-05-07

## 2025-05-07 RX ORDER — LANOLIN ALCOHOL/MO/W.PET/CERES
400 CREAM (GRAM) TOPICAL 2 TIMES DAILY
Status: DISCONTINUED | OUTPATIENT
Start: 2025-05-07 | End: 2025-05-29 | Stop reason: SDUPTHER

## 2025-05-07 RX ORDER — PANTOPRAZOLE SODIUM 40 MG/10ML
40 INJECTION, POWDER, LYOPHILIZED, FOR SOLUTION INTRAVENOUS DAILY
Status: DISCONTINUED | OUTPATIENT
Start: 2025-05-07 | End: 2025-05-30 | Stop reason: HOSPADM

## 2025-05-07 RX ORDER — VALACYCLOVIR HYDROCHLORIDE 500 MG/1
500 TABLET, FILM COATED ORAL 2 TIMES DAILY
Status: DISCONTINUED | OUTPATIENT
Start: 2025-05-07 | End: 2025-05-27

## 2025-05-07 RX ORDER — METOPROLOL TARTRATE 25 MG/1
12.5 TABLET, FILM COATED ORAL 2 TIMES DAILY
Status: DISCONTINUED | OUTPATIENT
Start: 2025-05-07 | End: 2025-05-30 | Stop reason: HOSPADM

## 2025-05-07 RX ORDER — AMLODIPINE BESYLATE 2.5 MG/1
2.5 TABLET ORAL DAILY
Status: DISCONTINUED | OUTPATIENT
Start: 2025-05-08 | End: 2025-05-30 | Stop reason: HOSPADM

## 2025-05-07 RX ADMIN — ASCORBIC ACID, VITAMIN A PALMITATE, CHOLECALCIFEROL, THIAMINE HYDROCHLORIDE, RIBOFLAVIN-5 PHOSPHATE SODIUM, PYRIDOXINE HYDROCHLORIDE, NIACINAMIDE, DEXPANTHENOL, ALPHA-TOCOPHEROL ACETATE, VITAMIN K1, FOLIC ACID, BIOTIN, CYANOCOBALAMIN: 200; 3300; 200; 6; 3.6; 6; 40; 15; 10; 150; 600; 60; 5 INJECTION, SOLUTION INTRAVENOUS at 18:34

## 2025-05-07 RX ADMIN — DICYCLOMINE HYDROCHLORIDE 10 MG: 10 CAPSULE ORAL at 22:39

## 2025-05-07 RX ADMIN — MORPHINE SULFATE 2 MG: 2 INJECTION, SOLUTION INTRAMUSCULAR; INTRAVENOUS at 18:52

## 2025-05-07 RX ADMIN — Medication 10 ML: at 09:11

## 2025-05-07 RX ADMIN — SODIUM CHLORIDE, PRESERVATIVE FREE 10 ML: 5 INJECTION INTRAVENOUS at 22:40

## 2025-05-07 RX ADMIN — METOPROLOL 12.5 MG: 25 TABLET ORAL at 11:12

## 2025-05-07 RX ADMIN — PIPERACILLIN AND TAZOBACTAM 3375 MG: 3; .375 INJECTION, POWDER, LYOPHILIZED, FOR SOLUTION INTRAVENOUS at 18:16

## 2025-05-07 RX ADMIN — MAGNESIUM SULFATE HEPTAHYDRATE 4000 MG: 40 INJECTION, SOLUTION INTRAVENOUS at 09:21

## 2025-05-07 RX ADMIN — NYSTATIN 500000 UNITS: 100000 SUSPENSION ORAL at 22:39

## 2025-05-07 RX ADMIN — VASOPRESSIN: 20 INJECTION, SOLUTION INTRAVENOUS at 03:13

## 2025-05-07 RX ADMIN — PIPERACILLIN AND TAZOBACTAM 3375 MG: 3; .375 INJECTION, POWDER, LYOPHILIZED, FOR SOLUTION INTRAVENOUS at 03:11

## 2025-05-07 RX ADMIN — METOPROLOL 12.5 MG: 25 TABLET ORAL at 22:39

## 2025-05-07 RX ADMIN — NYSTATIN 500000 UNITS: 100000 SUSPENSION ORAL at 18:17

## 2025-05-07 RX ADMIN — MORPHINE SULFATE 2 MG: 2 INJECTION, SOLUTION INTRAMUSCULAR; INTRAVENOUS at 14:20

## 2025-05-07 RX ADMIN — Medication 10 ML: at 22:40

## 2025-05-07 RX ADMIN — ONDANSETRON 4 MG: 2 INJECTION, SOLUTION INTRAMUSCULAR; INTRAVENOUS at 03:59

## 2025-05-07 RX ADMIN — POTASSIUM PHOSPHATE, MONOBASIC POTASSIUM PHOSPHATE, DIBASIC 30 MMOL: 224; 236 INJECTION, SOLUTION, CONCENTRATE INTRAVENOUS at 09:16

## 2025-05-07 RX ADMIN — MORPHINE SULFATE 2 MG: 2 INJECTION, SOLUTION INTRAMUSCULAR; INTRAVENOUS at 09:07

## 2025-05-07 RX ADMIN — Medication 400 MG: at 22:39

## 2025-05-07 RX ADMIN — PANTOPRAZOLE SODIUM 40 MG: 40 INJECTION, POWDER, FOR SOLUTION INTRAVENOUS at 09:09

## 2025-05-07 RX ADMIN — NYSTATIN 500000 UNITS: 100000 SUSPENSION ORAL at 14:15

## 2025-05-07 RX ADMIN — MORPHINE SULFATE 2 MG: 2 INJECTION, SOLUTION INTRAMUSCULAR; INTRAVENOUS at 03:59

## 2025-05-07 RX ADMIN — SODIUM CHLORIDE, PRESERVATIVE FREE 10 ML: 5 INJECTION INTRAVENOUS at 09:18

## 2025-05-07 RX ADMIN — CALCIUM CARBONATE 500 MG: 500 TABLET, CHEWABLE ORAL at 22:39

## 2025-05-07 RX ADMIN — I.V. FAT EMULSION 250 ML: 20 EMULSION INTRAVENOUS at 07:40

## 2025-05-07 RX ADMIN — PIPERACILLIN AND TAZOBACTAM 3375 MG: 3; .375 INJECTION, POWDER, LYOPHILIZED, FOR SOLUTION INTRAVENOUS at 09:14

## 2025-05-07 RX ADMIN — Medication 6 MG: at 22:39

## 2025-05-07 RX ADMIN — ENOXAPARIN SODIUM 30 MG: 100 INJECTION SUBCUTANEOUS at 09:10

## 2025-05-07 RX ADMIN — MORPHINE SULFATE 2 MG: 2 INJECTION, SOLUTION INTRAMUSCULAR; INTRAVENOUS at 22:55

## 2025-05-07 ASSESSMENT — PAIN DESCRIPTION - DESCRIPTORS
DESCRIPTORS: ACHING
DESCRIPTORS: STABBING

## 2025-05-07 ASSESSMENT — PAIN DESCRIPTION - LOCATION
LOCATION: ABDOMEN;CHEST
LOCATION: ABDOMEN
LOCATION: ABDOMEN
LOCATION: ABDOMEN;CHEST
LOCATION: ABDOMEN

## 2025-05-07 ASSESSMENT — PAIN SCALES - GENERAL
PAINLEVEL_OUTOF10: 8
PAINLEVEL_OUTOF10: 6
PAINLEVEL_OUTOF10: 8
PAINLEVEL_OUTOF10: 7
PAINLEVEL_OUTOF10: 8

## 2025-05-07 ASSESSMENT — PAIN DESCRIPTION - ORIENTATION
ORIENTATION: LEFT;LOWER
ORIENTATION: UPPER;LOWER;LEFT;RIGHT

## 2025-05-07 NOTE — PLAN OF CARE
Problem: Pain  Goal: Verbalizes/displays adequate comfort level or baseline comfort level  5/6/2025 2221 by Linda Weston RN  Outcome: Progressing  Flowsheets (Taken 5/6/2025 1255)  Verbalizes/displays adequate comfort level or baseline comfort level:   Encourage patient to monitor pain and request assistance   Assess pain using appropriate pain scale   Administer analgesics based on type and severity of pain and evaluate response   Implement non-pharmacological measures as appropriate and evaluate response     Problem: Safety - Adult  Goal: Free from fall injury  5/6/2025 2221 by Linda Weston RN  Outcome: Progressing    Flowsheets (Taken 5/6/2025 1255)  Free From Fall Injury:   Instruct family/caregiver on patient safety   Based on caregiver fall risk screen, instruct family/caregiver to ask for assistance with transferring infant if caregiver noted to have fall risk factors     Problem: ABCDS Injury Assessment  Goal: Absence of physical injury  Outcome: Progressing     Problem: Skin/Tissue Integrity - Adult  Goal: Skin integrity remains intact  5/6/2025 2221 by Linda Weston RN  Outcome: Progressing     Problem: Skin/Tissue Integrity - Adult  Goal: Incisions, wounds, or drain sites healing without S/S of infection  5/6/2025 2221 by Linda Weston RN  Outcome: Progressing     Problem: Gastrointestinal - Adult  Goal: Minimal or absence of nausea and vomiting  5/6/2025 2221 by Linda Weston RN  Outcome: Progressing  Flowsheets (Taken 5/6/2025 1255)  Minimal or absence of nausea and vomiting:   Administer IV fluids as ordered to ensure adequate hydration   Maintain NPO status until nausea and vomiting are resolved   Administer ordered antiemetic medications as needed   Provide nonpharmacologic comfort measures as appropriate     Problem: Infection - Adult  Goal: Absence of infection at discharge  Outcome: Progressing

## 2025-05-07 NOTE — CARE COORDINATION
Hospital day 14 care managed by IM, GI, General Surgery, and Nephrology. Patient from home with spouse. Patient with TODD drain, PICC/TPN, NGT, and on IV ATB. May benefit from PT/OT to keep moving? SW will follow.GASTON Alexander

## 2025-05-07 NOTE — PROGRESS NOTES
[facility] GENERAL SURGERY    Surgery Progress Note           POD # 8    PATIENT NAME: Tuyet Richter     TODAY'S DATE: 5/7/2025    INTERVAL HISTORY:    Pt with ongoing abd distention. Pain persists - increased Left abd.   Having loose stools     OBJECTIVE:   VITALS:  /86   Pulse 99   Temp 98.8 °F (37.1 °C) (Oral)   Resp 16   Ht 1.6 m (5' 3\")   Wt 61.4 kg (135 lb 5.8 oz)   SpO2 94%   BMI 23.98 kg/m²     INTAKE/OUTPUT:    I/O last 3 completed shifts:  In: 1243.5 [P.O.:600; I.V.:70.5; IV Piggyback:332.8]  Out: 225 [Emesis/NG output:200; Drains:25]  No intake/output data recorded.              CONSTITUTIONAL:  awake and alert  LUNGS: no crackles or wheezes    ABDOMEN:   absent bowel sounds, firm, distended, tenderness noted diffusely increased left abd  INCISION: clean, dry, healing, sero-sanguinous drainage in TODD    Data:  CBC:   Recent Labs     05/05/25  0545 05/06/25  0428 05/07/25  0552   WBC 7.2 8.9 6.7   HGB 8.9* 8.1* 7.5*   HCT 26.4* 23.4* 21.8*   * 534* 482*     BMP:    Recent Labs     05/06/25 0428 05/06/25  1524 05/07/25  0552   * 128* 134*   K 3.4* 4.3 3.5  3.5   CL 93* 95* 103   CO2 24 23 23   BUN 6* 5* 4*   CREATININE 0.3* 0.4* 0.3*   GLUCOSE 93 97 123*     Hepatic:   Recent Labs     05/07/25  0552   AST 30   ALT 13   BILITOT <0.2   ALKPHOS 133*     Mag:      Recent Labs     05/05/25  0545 05/06/25  0428 05/07/25  0552   MG 1.54* 1.43* 1.46*      Phos:     Recent Labs     05/06/25 0428 05/06/25  1524 05/07/25  0552   PHOS 1.7* 1.9* 2.7          ASSESSMENT AND PLAN:  65 y.o. female status post laparoscopic lysis of adhesions and abscess drainage with  C showing SBO vs ileus.     Continue with ngt to suction.     Will discuss with radiology possibly draining fluid right side.     Nutrition: continue with TPN, increase towards goal rate as per dietician recs.     Hyponatremia: improving.     Electronically signed by SARAI Arciniega CNP     Patient seen and agree with

## 2025-05-07 NOTE — PROGRESS NOTES
[email or web address]  Nephrology follow-up note           Reason for Consult: Hyponatremia  Requesting Physician:  Dr. Morgan    Sub/interval history  Resting  Getting TPN and multiple IV electrolyte replacements this morning  NG tube was placed on 5/6    Last 24 h uop not charted accurately    ROS: No chest pain/shortness of breath/fever/nausea/vomiting  PSFH: No visitor    Scheduled Meds:   pantoprazole  40 mg IntraVENous Daily    magnesium sulfate  4,000 mg IntraVENous Once    potassium phosphate IVPB (CENTRAL LINE)  30 mmol IntraVENous Once    [START ON 5/8/2025] amLODIPine  2.5 mg Per NG tube Daily    calcium carbonate  1 tablet Per NG tube BID    magnesium oxide  400 mg Per NG tube BID    valACYclovir  500 mg Per NG tube BID    melatonin  6 mg Per NG tube Nightly    metoprolol tartrate  12.5 mg Per NG tube BID    lidocaine 1 % injection  5 mL IntraDERmal Once    sodium chloride flush  5-40 mL IntraVENous 2 times per day    piperacillin-tazobactam  3,375 mg IntraVENous Q8H    [Held by provider] metoprolol succinate  25 mg Oral Daily    dicyclomine  10 mg Oral TID    pantoprazole  40 mg Oral QAM AC    sodium chloride flush  5-40 mL IntraVENous 2 times per day    enoxaparin  30 mg SubCUTAneous Daily     Continuous Infusions:   sodium chloride 100 mEq in sodium chloride 0.9 % 1,000 mL infusion 50 mL/hr at 05/07/25 0839    sodium chloride      PN-Adult Premix 4.25/5 - Standard Electrolytes- Peripheral Line 42 mL/hr at 05/06/25 2150    fat emulsion 250 mL (05/07/25 0740)    sodium chloride Stopped (05/06/25 1753)     PRN Meds:.polyethylene glycol, sodium chloride flush, sodium chloride, morphine, lidocaine 1 % injection, midazolam, diatrizoate meglumine-sodium, sodium chloride flush, sodium chloride, ondansetron **OR** ondansetron, acetaminophen **OR** acetaminophen    History of Present Illness on 5/6/2025:    65 y.o. yo female with PMH of hypertension, hiatal hernia who is admitted on 4/23/2025 diverticulitis and

## 2025-05-07 NOTE — CONSULTS
Consult Call Back    Who:Yolanda Gonzalez, APRN - CNP   Date:5/7/2025,  Time:12:33 PM    Electronically signed by Becca Villalpando on 5/7/25 at 12:33 PM EDT

## 2025-05-07 NOTE — CONSULTS
Ms. Richter was sent to the ER by one of our APRNs, Halie Anna on 4/24 with abdominal pain and abnormal CT scanning demonstrating an abscess within the pelvis between the colon and uterus.  She required a laparoscopic lysis of adhesions and abscess drainage.  Her abdominal distention was worsened yesterday resulting in a repeat CT scan that demonstrated a high-grade small bowel obstruction and interval development of peritoneal fluid in the upper abdomen. An NGT was placed following results.    She has followed with Dr. Berry and Halie Anna in the past for diarrhea and colitis suspected to be secondary to NSAID/doxycycline after extensive workup was completed.  She required a right hemicolectomy in February 2025 in management of the cecal volvulus in [place].  She was admitted 2 more times in [place] following that surgery for postop ileus and then electrolyte deficiencies in the setting of diarrhea.    Her daughter asked that we be consulted on the patient's case yesterday for \"goals of care\".  I did call her daughter yesterday to discuss the case and gather what her specific concerns were.   I expressed to her that the patient's current issues are surgical, and after reviewing case, there was no active role for GI at this time.   She stated understanding and said she wanted to ensure the patient had a close follow-up with us for her chronic GI issues after discharge, as she found it difficult to get an appointment after the patient returned from [place] earlier this year. I ensured her that I would arrange follow-up once I knew the patient was ready to leave the hospital. She was did not express any other concerns and was pleased at the end of our conversation.    I discussed with the surgery team this morning.          Electronically signed by: SARAI Wilson 5/7/2025 11:16 AM           (O) [phone]  (F) [phone]  Available via perfect serve

## 2025-05-07 NOTE — PROGRESS NOTES
Pt OOB one person assist to BR ,reports loose BM, Abd still distended, firm, NG to LIWS, green yellow drainage, Pt taking ice chips, To chair,Pt's  at bedside, Enc to use IS, Pt states IV Morphine helps with abd pain 5/10, denies nausea, Call light w/in reach.

## 2025-05-07 NOTE — PROGRESS NOTES
[facility] Medicine Progress Note  V 1.6      Date of Admission: 4/23/2025    Hospital Day: 15      Chief Admission Complaint:  Abdominal pain    Subjective:    Na improving. Pt feeling better overall. NG tube connected to suction. TPN infusing. Plan for possible IR drain placement tomorrow.      Presenting Admission History:       64 y.o. female who presented to [facility] with abdominal pain.  PMHx significant for HTN.  History obtained from the patient and review of EMR.  The patient stated she had a hemicolectomy in February 2025 in [facility] and since then has been experiencing intermittent abdominal pain.  Per chart review, the patient has been readmitted 1 time since her surgery for electrolyte abnormalities.  However, the patient stated her pain has gotten progressively worse over the last couple of weeks.  She reports speaking with GI and they ordered a CT outpatient today.  Patient  is at the bedside and stated that she was called and told to go to the emergency department due to \"a collection of pus\" seen on the CT. In the emergency department a CT abdomen pelvis was obtained that revealed Fluid and gas collection within the pelvis interposed between the colon and uterus measuring up to 4.1 cm.  Abscess is favored.  This appears largely enveloped by abdominal and adnexal structures.  A [place] uterine fistula is not excluded.  Additional small dependent collection within the region of the pouch of Alfred measuring up to 2.5 cm.  Mild dilation of small bowel loops which may indicate partial obstruction.  There is no high-grade small bowel obstruction.  The patient was given morphine for pain.  She was also started on Zosyn.  Upon further evaluation, the patient was found to be dehydrated with hyponatremia.  This is likely secondary to inadequate p.o. intake.  She was admitted for further evaluation and treatment.  IV fluids have been initiated and GI has been consulted for

## 2025-05-08 LAB
ALBUMIN SERPL-MCNC: 2.1 G/DL (ref 3.4–5)
ALP SERPL-CCNC: 90 U/L (ref 40–129)
ALT SERPL-CCNC: <5 U/L (ref 10–40)
ANION GAP SERPL CALCULATED.3IONS-SCNC: 10 MMOL/L (ref 3–16)
ANION GAP SERPL CALCULATED.3IONS-SCNC: 8 MMOL/L (ref 3–16)
AST SERPL-CCNC: 6 U/L (ref 15–37)
BASOPHILS # BLD: 0 K/UL (ref 0–0.2)
BASOPHILS NFR BLD: 0.5 %
BILIRUB DIRECT SERPL-MCNC: <0.1 MG/DL (ref 0–0.3)
BILIRUB INDIRECT SERPL-MCNC: ABNORMAL MG/DL (ref 0–1)
BILIRUB SERPL-MCNC: <0.2 MG/DL (ref 0–1)
BUN SERPL-MCNC: 4 MG/DL (ref 7–20)
BUN SERPL-MCNC: 4 MG/DL (ref 7–20)
CALCIUM SERPL-MCNC: 7.5 MG/DL (ref 8.3–10.6)
CALCIUM SERPL-MCNC: 7.6 MG/DL (ref 8.3–10.6)
CHLORIDE SERPL-SCNC: 100 MMOL/L (ref 99–110)
CHLORIDE SERPL-SCNC: 98 MMOL/L (ref 99–110)
CO2 SERPL-SCNC: 23 MMOL/L (ref 21–32)
CO2 SERPL-SCNC: 23 MMOL/L (ref 21–32)
CREAT SERPL-MCNC: 0.3 MG/DL (ref 0.6–1.2)
CREAT SERPL-MCNC: 0.3 MG/DL (ref 0.6–1.2)
DEPRECATED RDW RBC AUTO: 15.8 % (ref 12.4–15.4)
EOSINOPHIL # BLD: 0.1 K/UL (ref 0–0.6)
EOSINOPHIL NFR BLD: 0.9 %
GFR SERPLBLD CREATININE-BSD FMLA CKD-EPI: >90 ML/MIN/{1.73_M2}
GFR SERPLBLD CREATININE-BSD FMLA CKD-EPI: >90 ML/MIN/{1.73_M2}
GLUCOSE BLD-MCNC: 122 MG/DL (ref 70–99)
GLUCOSE BLD-MCNC: 126 MG/DL (ref 70–99)
GLUCOSE BLD-MCNC: 133 MG/DL (ref 70–99)
GLUCOSE BLD-MCNC: 142 MG/DL (ref 70–99)
GLUCOSE SERPL-MCNC: 112 MG/DL (ref 70–99)
GLUCOSE SERPL-MCNC: 124 MG/DL (ref 70–99)
HCT VFR BLD AUTO: 21.7 % (ref 36–48)
HGB BLD-MCNC: 7.3 G/DL (ref 12–16)
LYMPHOCYTES # BLD: 1.2 K/UL (ref 1–5.1)
LYMPHOCYTES NFR BLD: 15.6 %
MAGNESIUM SERPL-MCNC: 1.45 MG/DL (ref 1.8–2.4)
MAGNESIUM SERPL-MCNC: 2.02 MG/DL (ref 1.8–2.4)
MCH RBC QN AUTO: 30.5 PG (ref 26–34)
MCHC RBC AUTO-ENTMCNC: 33.8 G/DL (ref 31–36)
MCV RBC AUTO: 90.5 FL (ref 80–100)
MONOCYTES # BLD: 1.5 K/UL (ref 0–1.3)
MONOCYTES NFR BLD: 18.8 %
NEUTROPHILS # BLD: 5 K/UL (ref 1.7–7.7)
NEUTROPHILS NFR BLD: 64.2 %
PERFORMED ON: ABNORMAL
PHOSPHATE SERPL-MCNC: 2.7 MG/DL (ref 2.5–4.9)
PHOSPHATE SERPL-MCNC: 3.4 MG/DL (ref 2.5–4.9)
PLATELET # BLD AUTO: 429 K/UL (ref 135–450)
PMV BLD AUTO: 9 FL (ref 5–10.5)
POTASSIUM SERPL-SCNC: 3.1 MMOL/L (ref 3.5–5.1)
POTASSIUM SERPL-SCNC: 3.1 MMOL/L (ref 3.5–5.1)
POTASSIUM SERPL-SCNC: 3.8 MMOL/L (ref 3.5–5.1)
PROT SERPL-MCNC: 4.6 G/DL (ref 6.4–8.2)
RBC # BLD AUTO: 2.4 M/UL (ref 4–5.2)
SODIUM SERPL-SCNC: 129 MMOL/L (ref 136–145)
SODIUM SERPL-SCNC: 133 MMOL/L (ref 136–145)
TRIGL SERPL-MCNC: 95 MG/DL (ref 0–150)
WBC # BLD AUTO: 7.8 K/UL (ref 4–11)

## 2025-05-08 PROCEDURE — 6370000000 HC RX 637 (ALT 250 FOR IP): Performed by: SURGERY

## 2025-05-08 PROCEDURE — APPNB45 APP NON BILLABLE 31-45 MINUTES: Performed by: CLINICAL NURSE SPECIALIST

## 2025-05-08 PROCEDURE — 6360000002 HC RX W HCPCS: Performed by: INTERNAL MEDICINE

## 2025-05-08 PROCEDURE — 84478 ASSAY OF TRIGLYCERIDES: CPT

## 2025-05-08 PROCEDURE — 6360000002 HC RX W HCPCS: Performed by: SURGERY

## 2025-05-08 PROCEDURE — 80076 HEPATIC FUNCTION PANEL: CPT

## 2025-05-08 PROCEDURE — 80048 BASIC METABOLIC PNL TOTAL CA: CPT

## 2025-05-08 PROCEDURE — 83735 ASSAY OF MAGNESIUM: CPT

## 2025-05-08 PROCEDURE — 85025 COMPLETE CBC W/AUTO DIFF WBC: CPT

## 2025-05-08 PROCEDURE — 6360000002 HC RX W HCPCS: Performed by: NURSE PRACTITIONER

## 2025-05-08 PROCEDURE — 2500000003 HC RX 250 WO HCPCS: Performed by: SURGERY

## 2025-05-08 PROCEDURE — 99024 POSTOP FOLLOW-UP VISIT: CPT | Performed by: SURGERY

## 2025-05-08 PROCEDURE — 1200000000 HC SEMI PRIVATE

## 2025-05-08 PROCEDURE — 51798 US URINE CAPACITY MEASURE: CPT

## 2025-05-08 PROCEDURE — 6360000002 HC RX W HCPCS: Performed by: HOSPITALIST

## 2025-05-08 PROCEDURE — 6370000000 HC RX 637 (ALT 250 FOR IP): Performed by: NURSE PRACTITIONER

## 2025-05-08 PROCEDURE — 6360000002 HC RX W HCPCS: Performed by: STUDENT IN AN ORGANIZED HEALTH CARE EDUCATION/TRAINING PROGRAM

## 2025-05-08 PROCEDURE — 2580000003 HC RX 258: Performed by: INTERNAL MEDICINE

## 2025-05-08 PROCEDURE — 2500000003 HC RX 250 WO HCPCS: Performed by: CLINICAL NURSE SPECIALIST

## 2025-05-08 PROCEDURE — 84100 ASSAY OF PHOSPHORUS: CPT

## 2025-05-08 RX ORDER — MAGNESIUM SULFATE IN WATER 40 MG/ML
2000 INJECTION, SOLUTION INTRAVENOUS ONCE
Status: COMPLETED | OUTPATIENT
Start: 2025-05-08 | End: 2025-05-08

## 2025-05-08 RX ORDER — POTASSIUM CHLORIDE 7.45 MG/ML
10 INJECTION INTRAVENOUS
Status: COMPLETED | OUTPATIENT
Start: 2025-05-08 | End: 2025-05-08

## 2025-05-08 RX ADMIN — CALCIUM CARBONATE 500 MG: 500 TABLET, CHEWABLE ORAL at 10:35

## 2025-05-08 RX ADMIN — SODIUM CHLORIDE, PRESERVATIVE FREE 10 ML: 5 INJECTION INTRAVENOUS at 10:36

## 2025-05-08 RX ADMIN — MAGNESIUM SULFATE HEPTAHYDRATE 2000 MG: 40 INJECTION, SOLUTION INTRAVENOUS at 11:51

## 2025-05-08 RX ADMIN — MORPHINE SULFATE 2 MG: 2 INJECTION, SOLUTION INTRAMUSCULAR; INTRAVENOUS at 19:31

## 2025-05-08 RX ADMIN — POTASSIUM CHLORIDE 10 MEQ: 7.46 INJECTION, SOLUTION INTRAVENOUS at 11:50

## 2025-05-08 RX ADMIN — MORPHINE SULFATE 2 MG: 2 INJECTION, SOLUTION INTRAMUSCULAR; INTRAVENOUS at 23:34

## 2025-05-08 RX ADMIN — POTASSIUM CHLORIDE 10 MEQ: 7.46 INJECTION, SOLUTION INTRAVENOUS at 09:27

## 2025-05-08 RX ADMIN — PIPERACILLIN AND TAZOBACTAM 3375 MG: 3; .375 INJECTION, POWDER, LYOPHILIZED, FOR SOLUTION INTRAVENOUS at 17:50

## 2025-05-08 RX ADMIN — PANTOPRAZOLE SODIUM 40 MG: 40 INJECTION, POWDER, FOR SOLUTION INTRAVENOUS at 10:32

## 2025-05-08 RX ADMIN — NYSTATIN 500000 UNITS: 100000 SUSPENSION ORAL at 15:35

## 2025-05-08 RX ADMIN — DICYCLOMINE HYDROCHLORIDE 10 MG: 10 CAPSULE ORAL at 21:16

## 2025-05-08 RX ADMIN — Medication 10 ML: at 10:32

## 2025-05-08 RX ADMIN — CALCIUM CARBONATE 500 MG: 500 TABLET, CHEWABLE ORAL at 21:17

## 2025-05-08 RX ADMIN — MAGNESIUM SULFATE HEPTAHYDRATE 2000 MG: 40 INJECTION, SOLUTION INTRAVENOUS at 09:29

## 2025-05-08 RX ADMIN — AMLODIPINE BESYLATE 2.5 MG: 2.5 TABLET ORAL at 10:35

## 2025-05-08 RX ADMIN — DICYCLOMINE HYDROCHLORIDE 10 MG: 10 CAPSULE ORAL at 15:35

## 2025-05-08 RX ADMIN — BENZOCAINE: 200 SPRAY DENTAL; ORAL; PERIODONTAL at 23:45

## 2025-05-08 RX ADMIN — PIPERACILLIN AND TAZOBACTAM 3375 MG: 3; .375 INJECTION, POWDER, LYOPHILIZED, FOR SOLUTION INTRAVENOUS at 10:26

## 2025-05-08 RX ADMIN — DICYCLOMINE HYDROCHLORIDE 10 MG: 10 CAPSULE ORAL at 10:35

## 2025-05-08 RX ADMIN — METOPROLOL 12.5 MG: 25 TABLET ORAL at 10:34

## 2025-05-08 RX ADMIN — Medication 6 MG: at 21:16

## 2025-05-08 RX ADMIN — ENOXAPARIN SODIUM 30 MG: 100 INJECTION SUBCUTANEOUS at 10:32

## 2025-05-08 RX ADMIN — METOPROLOL 12.5 MG: 25 TABLET ORAL at 21:16

## 2025-05-08 RX ADMIN — DIPHENHYDRAMINE HYDROCHLORIDE 5 ML: 12.5 LIQUID ORAL at 13:52

## 2025-05-08 RX ADMIN — MORPHINE SULFATE 2 MG: 2 INJECTION, SOLUTION INTRAMUSCULAR; INTRAVENOUS at 03:20

## 2025-05-08 RX ADMIN — PIPERACILLIN AND TAZOBACTAM 3375 MG: 3; .375 INJECTION, POWDER, LYOPHILIZED, FOR SOLUTION INTRAVENOUS at 02:41

## 2025-05-08 RX ADMIN — POTASSIUM CHLORIDE 10 MEQ: 7.46 INJECTION, SOLUTION INTRAVENOUS at 15:38

## 2025-05-08 RX ADMIN — SODIUM CHLORIDE, PRESERVATIVE FREE 10 ML: 5 INJECTION INTRAVENOUS at 21:17

## 2025-05-08 RX ADMIN — ASCORBIC ACID, VITAMIN A PALMITATE, CHOLECALCIFEROL, THIAMINE HYDROCHLORIDE, RIBOFLAVIN-5 PHOSPHATE SODIUM, PYRIDOXINE HYDROCHLORIDE, NIACINAMIDE, DEXPANTHENOL, ALPHA-TOCOPHEROL ACETATE, VITAMIN K1, FOLIC ACID, BIOTIN, CYANOCOBALAMIN: 200; 3300; 200; 6; 3.6; 6; 40; 15; 10; 150; 600; 60; 5 INJECTION, SOLUTION INTRAVENOUS at 18:55

## 2025-05-08 RX ADMIN — NYSTATIN 500000 UNITS: 100000 SUSPENSION ORAL at 21:17

## 2025-05-08 RX ADMIN — POTASSIUM CHLORIDE 10 MEQ: 7.46 INJECTION, SOLUTION INTRAVENOUS at 10:30

## 2025-05-08 RX ADMIN — MORPHINE SULFATE 2 MG: 2 INJECTION, SOLUTION INTRAMUSCULAR; INTRAVENOUS at 11:17

## 2025-05-08 RX ADMIN — Medication 400 MG: at 21:17

## 2025-05-08 RX ADMIN — MORPHINE SULFATE 2 MG: 2 INJECTION, SOLUTION INTRAMUSCULAR; INTRAVENOUS at 15:33

## 2025-05-08 RX ADMIN — NYSTATIN 500000 UNITS: 100000 SUSPENSION ORAL at 09:36

## 2025-05-08 RX ADMIN — I.V. FAT EMULSION 250 ML: 20 EMULSION INTRAVENOUS at 18:55

## 2025-05-08 RX ADMIN — POTASSIUM CHLORIDE 10 MEQ: 7.46 INJECTION, SOLUTION INTRAVENOUS at 13:49

## 2025-05-08 RX ADMIN — Medication 400 MG: at 10:34

## 2025-05-08 ASSESSMENT — PAIN SCALES - GENERAL
PAINLEVEL_OUTOF10: 8
PAINLEVEL_OUTOF10: 6
PAINLEVEL_OUTOF10: 8
PAINLEVEL_OUTOF10: 9
PAINLEVEL_OUTOF10: 8

## 2025-05-08 ASSESSMENT — PAIN DESCRIPTION - LOCATION
LOCATION: ABDOMEN

## 2025-05-08 ASSESSMENT — PAIN DESCRIPTION - DESCRIPTORS: DESCRIPTORS: ACHING

## 2025-05-08 NOTE — PROGRESS NOTES
[email or web address]  Nephrology follow-up note           Reason for Consult: Hyponatremia  Requesting Physician:  Dr. Morgan    Sub/interval history  Resting  Patient complains of feeling bloated and having swelling  NG tube was placed on 5/6    Last 24 h uop 500 mL charted     ROS: No chest pain/shortness of breath/fever/nausea/vomiting  PSFH: No visitor    Scheduled Meds:   magnesium sulfate  2,000 mg IntraVENous Once    potassium chloride  10 mEq IntraVENous Q1H    pantoprazole  40 mg IntraVENous Daily    amLODIPine  2.5 mg Per NG tube Daily    calcium carbonate  1 tablet Per NG tube BID    magnesium oxide  400 mg Per NG tube BID    valACYclovir  500 mg Per NG tube BID    melatonin  6 mg Per NG tube Nightly    metoprolol tartrate  12.5 mg Per NG tube BID    nystatin  5 mL Mouth/Throat 4x Daily    fat emulsion  250 mL IntraVENous Once per day on Monday Thursday    lidocaine 1 % injection  5 mL IntraDERmal Once    sodium chloride flush  5-40 mL IntraVENous 2 times per day    piperacillin-tazobactam  3,375 mg IntraVENous Q8H    [Held by provider] metoprolol succinate  25 mg Oral Daily    dicyclomine  10 mg Oral TID    pantoprazole  40 mg Oral QAM AC    sodium chloride flush  5-40 mL IntraVENous 2 times per day    enoxaparin  30 mg SubCUTAneous Daily     Continuous Infusions:   PN-Adult Premix 5/15 - Standard Electrolytes - Central Line 55 mL/hr at 05/07/25 1834    sodium chloride      sodium chloride Stopped (05/06/25 1753)     PRN Meds:.polyethylene glycol, sodium chloride flush, sodium chloride, morphine, lidocaine 1 % injection, midazolam, diatrizoate meglumine-sodium, sodium chloride flush, sodium chloride, ondansetron **OR** ondansetron, acetaminophen **OR** acetaminophen    History of Present Illness on 5/6/2025:    65 y.o. yo female with PMH of hypertension, hiatal hernia who is admitted on 4/23/2025 diverticulitis and abdominal abscesses, underwent laparoscopic lysis of adhesions and I&D of the abscesses on

## 2025-05-08 NOTE — PLAN OF CARE
Problem: Pain  Goal: Verbalizes/displays adequate comfort level or baseline comfort level  Outcome: Progressing     Problem: Safety - Adult  Goal: Free from fall injury  Outcome: Progressing     Problem: ABCDS Injury Assessment  Goal: Absence of physical injury  Outcome: Progressing     Problem: Nutrition Deficit:  Goal: Optimize nutritional status  Outcome: Progressing     Problem: Discharge Planning  Goal: Discharge to home or other facility with appropriate resources  Outcome: Progressing     Problem: Skin/Tissue Integrity - Adult  Goal: Skin integrity remains intact  Outcome: Progressing  Flowsheets (Taken 5/7/2025 2222)  Skin Integrity Remains Intact: Monitor for areas of redness and/or skin breakdown  Goal: Incisions, wounds, or drain sites healing without S/S of infection  Outcome: Progressing     Problem: Gastrointestinal - Adult  Goal: Minimal or absence of nausea and vomiting  Outcome: Progressing  Goal: Maintains or returns to baseline bowel function  Outcome: Progressing  Goal: Maintains adequate nutritional intake  Outcome: Progressing     Problem: Infection - Adult  Goal: Absence of infection at discharge  Outcome: Progressing  Goal: Absence of infection during hospitalization  Outcome: Progressing     Problem: Skin/Tissue Integrity  Goal: Skin integrity remains intact  Outcome: Progressing  Flowsheets (Taken 5/7/2025 2222)  Skin Integrity Remains Intact: Monitor for areas of redness and/or skin breakdown

## 2025-05-08 NOTE — FLOWSHEET NOTE
05/07/25 1945   Vital Signs   Temp 98.2 °F (36.8 °C)   Temp Source Oral   Pulse (!) 102   Heart Rate Source Monitor   Respirations 16   /73   MAP (Calculated) 94   BP Location Right upper arm   BP Method Automatic   Patient Position Semi fowlers   Pain Assessment   Pain Assessment 0-10   Pain Level 6   Patient's Stated Pain Goal 0 - No pain   Pain Location Abdomen;Chest   Pain Orientation Left;Lower   Pain Descriptors Stabbing   Oxygen Therapy   SpO2 95 %   O2 Device None (Room air)     Daria Serrano NP notified of chest pain. Pt reports started aprox 1845. Was given morphine shortly after by prior RN, pain now improved to 6/10. Pt reports hx of hiatal hernia, unsure if this is pain from that or not. Vitals stable see above. Awaiting reponse    Update: message read 2007 with no new orders as of 2037.

## 2025-05-08 NOTE — PROGRESS NOTES
[facility] GENERAL SURGERY    Surgery Progress Note           POD # 9    PATIENT NAME: Tuyet Richter     TODAY'S DATE: 5/8/2025    INTERVAL HISTORY:    Pt with worsening abd distention today. Continues to have pain and tenderness. Small loose BMs.      OBJECTIVE:   VITALS:  /80   Pulse 91   Temp 98.4 °F (36.9 °C) (Oral)   Resp 16   Ht 1.6 m (5' 3\")   Wt 55.6 kg (122 lb 9.6 oz)   SpO2 96%   BMI 21.72 kg/m²     INTAKE/OUTPUT:    I/O last 3 completed shifts:  In: 2249.4 [P.O.:280; I.V.:1435.1; NG/GT:60; IV Piggyback:234.1]  Out: 860 [Urine:150; Emesis/NG output:700; Drains:10]  I/O this shift:  In: -   Out: 200 [Urine:200]              CONSTITUTIONAL:  awake and alert  LUNGS: no crackles or wheezes    ABDOMEN:   absent bowel sounds, firm, distended, tenderness noted diffusely increased left abd  INCISION: clean, dry, healing, creamy rigoberto drainage     Data:  CBC:   Recent Labs     05/06/25 0428 05/07/25  0552 05/08/25  0625   WBC 8.9 6.7 7.8   HGB 8.1* 7.5* 7.3*   HCT 23.4* 21.8* 21.7*   * 482* 429     BMP:    Recent Labs     05/07/25 0552 05/07/25 1810 05/08/25  0625   * 135* 133*   K 3.5  3.5 3.7 3.1*  3.1*    103 100   CO2 23 22 23   BUN 4* 3* 4*   CREATININE 0.3* 0.4* 0.3*   GLUCOSE 123* 104* 124*     Hepatic:   Recent Labs     05/07/25 0552 05/08/25  0625   AST 30 6*   ALT 13 <5*   BILITOT <0.2 <0.2   ALKPHOS 133* 90     Mag:      Recent Labs     05/06/25 0428 05/07/25  0552 05/08/25  0625   MG 1.43* 1.46* 1.45*      Phos:     Recent Labs     05/07/25 0552 05/07/25 1810 05/08/25  0625   PHOS 2.7 4.5 3.4          ASSESSMENT AND PLAN:  65 y.o. female status post laparoscopic lysis of adhesions and abscess drainage with  C showing SBO vs ileus.     Continue with ngt to suction.     Will discuss with Dr. Pinto and radiology possibly draining fluid right side .     Nutrition: continue with TPN    Hyponatremia: improving. Nephrology following    Electronically signed by Holly  SARAI Davila - CNP       Surgery Staff    I have examined this patient, and read and agree with the note by Holly Davila CNP from today; more than half of the total time was spent by me on the encounter.     Still not sure how to explain her abdominal/intestinal distention - mechanical obstruction vs dysmotility (ie ileus)  - will discuss w/ Radiology - consider additional imaging to try and clarify  - either repeat CT w/ PO contrast, or SBFT   - for now, continue NGT/NPO and TPN   - will follow    ANSLEY SWIFT MD

## 2025-05-08 NOTE — CARE COORDINATION
Hospital day 15: Patient on C3 re intra-abdominal abscess care managed by IM, Nephrology, and General Surgery. Patient from home with spouse, per RN ambulating stand by assist. Patient family is supportive. Patient getting TPN. TODD in place, discission of placing a second. NGT in place.  Following.GASTON Alexander

## 2025-05-08 NOTE — PROGRESS NOTES
Comprehensive Nutrition Assessment    Type and Reason for Visit:  Reassess    Nutrition Recommendations/Plan:   Physician/LIP to monitor closely and correct electrolytes (Phos,Mg,K+) due to risk of refeeding syndrome (NPO/clear/full liquids majority of 13 day admission).  Continue Clinimix 5/15 at 55 mL/hour and as long as electrolytes WNL, advance Clinimix 5/15 to goal rate of 75 mL/hour.  Recommend 250 mL bags of 20% lipids 2 times per week.  Recommend FSBS, monitor glucose, need for insulin.  Pharmacy to adjust MVI and Trace Elements as needed   When PN to be discontinued, cut rate by 50% and let current bag run out.       Malnutrition Assessment:  Malnutrition Status:  Severe malnutrition (04/24/25 1120)    Context:  Acute Illness     Findings of the 6 clinical characteristics of malnutrition:  Energy Intake:  75% or less of estimated energy requirements for 7 or more days  Weight Loss:  Mild weight loss     Body Fat Loss:  Moderate body fat loss Orbital, Triceps   Muscle Mass Loss:  Moderate muscle mass loss Temples (temporalis), Clavicles (pectoralis & deltoids)  Fluid Accumulation:  No fluid accumulation     Strength:  Not Performed    Nutrition Assessment:    Follow up: Pt nutritionally compromised AEB need for alternative nutrition. Currently NPO on TPN of Clinimix 5/15 at 55 ml/hr. Goal rate is 75 ml/hr. BG to suction. Pt denied having any questions reguarding TPN. Will continue to monitor.    Nutrition Related Findings:    Na 133. K 3.1. Mg 1.45. Phos WNL. Nephro following. BM x2 on 5/7. Wound Type: Surgical Incision       Current Nutrition Intake & Therapies:    Average Meal Intake: NPO  Average Supplements Intake: NPO  Diet NPO  PN-Adult Premix 5/15 - Standard Electrolytes - Central Line  PN-Adult Premix 5/15 - Standard Electrolytes - Central Line    Anthropometric Measures:  Height: 160 cm (5' 3\")  Ideal Body Weight (IBW): 115 lbs (52 kg)       Current Body Weight: 55.6 kg (122 lb 9.6 oz), 96 %

## 2025-05-08 NOTE — PROGRESS NOTES
[facility] Medicine Progress Note  V 1.6      Date of Admission: 4/23/2025    Hospital Day: 16      Chief Admission Complaint:  Abdominal pain    Subjective:    EMR and notes reviewed pt seen and examining, looks ill, c/o sore mouth and throat with NG and thrush       Presenting Admission History:       64 y.o. female who presented to [facility] with abdominal pain.  PMHx significant for HTN.  History obtained from the patient and review of EMR.  The patient stated she had a hemicolectomy in February 2025 in [facility] and since then has been experiencing intermittent abdominal pain.  Per chart review, the patient has been readmitted 1 time since her surgery for electrolyte abnormalities.  However, the patient stated her pain has gotten progressively worse over the last couple of weeks.  She reports speaking with GI and they ordered a CT outpatient today.  Patient  is at the bedside and stated that she was called and told to go to the emergency department due to \"a collection of pus\" seen on the CT. In the emergency department a CT abdomen pelvis was obtained that revealed Fluid and gas collection within the pelvis interposed between the colon and uterus measuring up to 4.1 cm.  Abscess is favored.  This appears largely enveloped by abdominal and adnexal structures.  A [place] uterine fistula is not excluded.  Additional small dependent collection within the region of the pouch of Alfred measuring up to 2.5 cm.  Mild dilation of small bowel loops which may indicate partial obstruction.  There is no high-grade small bowel obstruction.  The patient was given morphine for pain.  She was also started on Zosyn.  Upon further evaluation, the patient was found to be dehydrated with hyponatremia.  This is likely secondary to inadequate p.o. intake.  She was admitted for further evaluation and treatment.  IV fluids have been initiated and GI has been consulted for the a.m. The patient denied any

## 2025-05-08 NOTE — PROGRESS NOTES
Pt with taut abdomen, per her this is worse than this morning. Per pt \" she could see her belly button this morning\". Now her umbilicus is taut. Abdomen sounds hypoactive. +flatus. NG draining fluid to canister at ILWS. Dr. Pinto notified. Awaiting response    Update: received call from Dr. Pinto, new order to change to CONTINUOUS low fall suction and maintain ice chips, but educate pt on decreasing frequency due to abd distention.

## 2025-05-09 ENCOUNTER — APPOINTMENT (OUTPATIENT)
Dept: CT IMAGING | Age: 65
DRG: 856 | End: 2025-05-09
Attending: SURGERY
Payer: MEDICARE

## 2025-05-09 ENCOUNTER — APPOINTMENT (OUTPATIENT)
Dept: CT IMAGING | Age: 65
DRG: 856 | End: 2025-05-09
Payer: MEDICARE

## 2025-05-09 LAB
ALBUMIN SERPL-MCNC: 2.2 G/DL (ref 3.4–5)
ALBUMIN/GLOB SERPL: 0.8 {RATIO} (ref 1.1–2.2)
ALP SERPL-CCNC: 91 U/L (ref 40–129)
ALT SERPL-CCNC: <5 U/L (ref 10–40)
ANION GAP SERPL CALCULATED.3IONS-SCNC: 9 MMOL/L (ref 3–16)
ANION GAP SERPL CALCULATED.3IONS-SCNC: 9 MMOL/L (ref 3–16)
AST SERPL-CCNC: 9 U/L (ref 15–37)
BASOPHILS # BLD: 0 K/UL (ref 0–0.2)
BASOPHILS NFR BLD: 0 %
BILIRUB DIRECT SERPL-MCNC: <0.1 MG/DL (ref 0–0.3)
BILIRUB INDIRECT SERPL-MCNC: NORMAL MG/DL (ref 0–1)
BILIRUB SERPL-MCNC: <0.2 MG/DL (ref 0–1)
BUN SERPL-MCNC: 4 MG/DL (ref 7–20)
BUN SERPL-MCNC: 5 MG/DL (ref 7–20)
CALCIUM SERPL-MCNC: 7.7 MG/DL (ref 8.3–10.6)
CALCIUM SERPL-MCNC: 8.1 MG/DL (ref 8.3–10.6)
CHLORIDE SERPL-SCNC: 100 MMOL/L (ref 99–110)
CHLORIDE SERPL-SCNC: 99 MMOL/L (ref 99–110)
CO2 SERPL-SCNC: 23 MMOL/L (ref 21–32)
CO2 SERPL-SCNC: 24 MMOL/L (ref 21–32)
CREAT SERPL-MCNC: 0.3 MG/DL (ref 0.6–1.2)
CREAT SERPL-MCNC: 0.3 MG/DL (ref 0.6–1.2)
DEPRECATED RDW RBC AUTO: 16.2 % (ref 12.4–15.4)
EOSINOPHIL # BLD: 0.2 K/UL (ref 0–0.6)
EOSINOPHIL NFR BLD: 2 %
FERRITIN SERPL IA-MCNC: 530 NG/ML (ref 15–150)
GFR SERPLBLD CREATININE-BSD FMLA CKD-EPI: >90 ML/MIN/{1.73_M2}
GFR SERPLBLD CREATININE-BSD FMLA CKD-EPI: >90 ML/MIN/{1.73_M2}
GLUCOSE BLD-MCNC: 113 MG/DL (ref 70–99)
GLUCOSE BLD-MCNC: 120 MG/DL (ref 70–99)
GLUCOSE BLD-MCNC: 130 MG/DL (ref 70–99)
GLUCOSE SERPL-MCNC: 107 MG/DL (ref 70–99)
GLUCOSE SERPL-MCNC: 112 MG/DL (ref 70–99)
HCT VFR BLD AUTO: 23.8 % (ref 36–48)
HGB BLD-MCNC: 8.1 G/DL (ref 12–16)
HYPOCHROMIA BLD QL SMEAR: ABNORMAL
INR PPP: 1.13 (ref 0.85–1.15)
IRON SATN MFR SERPL: 11 % (ref 15–50)
IRON SERPL-MCNC: 11 UG/DL (ref 37–145)
LYMPHOCYTES # BLD: 1.6 K/UL (ref 1–5.1)
LYMPHOCYTES NFR BLD: 19 %
MAGNESIUM SERPL-MCNC: 1.48 MG/DL (ref 1.8–2.4)
MAGNESIUM SERPL-MCNC: 1.64 MG/DL (ref 1.8–2.4)
MCH RBC QN AUTO: 31 PG (ref 26–34)
MCHC RBC AUTO-ENTMCNC: 34.2 G/DL (ref 31–36)
MCV RBC AUTO: 90.5 FL (ref 80–100)
MONOCYTES # BLD: 1 K/UL (ref 0–1.3)
MONOCYTES NFR BLD: 12 %
NEUTROPHILS # BLD: 5.6 K/UL (ref 1.7–7.7)
NEUTROPHILS NFR BLD: 57 %
NEUTS BAND NFR BLD MANUAL: 10 % (ref 0–7)
OVALOCYTES BLD QL SMEAR: ABNORMAL
PATH INTERP BLD-IMP: NORMAL
PATH INTERP BLD-IMP: YES
PERFORMED ON: ABNORMAL
PHOSPHATE SERPL-MCNC: 3 MG/DL (ref 2.5–4.9)
PHOSPHATE SERPL-MCNC: 4.8 MG/DL (ref 2.5–4.9)
PLATELET # BLD AUTO: 422 K/UL (ref 135–450)
PLATELET BLD QL SMEAR: ADEQUATE
PMV BLD AUTO: 8.7 FL (ref 5–10.5)
POIKILOCYTOSIS BLD QL SMEAR: ABNORMAL
POLYCHROMASIA BLD QL SMEAR: ABNORMAL
POTASSIUM SERPL-SCNC: 3.7 MMOL/L (ref 3.5–5.1)
PROT SERPL-MCNC: 5 G/DL (ref 6.4–8.2)
PROTHROMBIN TIME: 14.7 SEC (ref 11.9–14.9)
RBC # BLD AUTO: 2.63 M/UL (ref 4–5.2)
RBC MORPH BLD: NORMAL
SLIDE REVIEW: ABNORMAL
SODIUM SERPL-SCNC: 132 MMOL/L (ref 136–145)
SODIUM SERPL-SCNC: 132 MMOL/L (ref 136–145)
TIBC SERPL-MCNC: 101 UG/DL (ref 260–445)
WBC # BLD AUTO: 8.4 K/UL (ref 4–11)

## 2025-05-09 PROCEDURE — 82728 ASSAY OF FERRITIN: CPT

## 2025-05-09 PROCEDURE — 2580000003 HC RX 258: Performed by: INTERNAL MEDICINE

## 2025-05-09 PROCEDURE — 83735 ASSAY OF MAGNESIUM: CPT

## 2025-05-09 PROCEDURE — 83550 IRON BINDING TEST: CPT

## 2025-05-09 PROCEDURE — C1769 GUIDE WIRE: HCPCS

## 2025-05-09 PROCEDURE — 80053 COMPREHEN METABOLIC PANEL: CPT

## 2025-05-09 PROCEDURE — 84100 ASSAY OF PHOSPHORUS: CPT

## 2025-05-09 PROCEDURE — 6370000000 HC RX 637 (ALT 250 FOR IP): Performed by: NURSE PRACTITIONER

## 2025-05-09 PROCEDURE — 6360000002 HC RX W HCPCS: Performed by: RADIOLOGY

## 2025-05-09 PROCEDURE — 0W9H3ZZ DRAINAGE OF RETROPERITONEUM, PERCUTANEOUS APPROACH: ICD-10-PCS | Performed by: SURGERY

## 2025-05-09 PROCEDURE — 6360000002 HC RX W HCPCS: Performed by: STUDENT IN AN ORGANIZED HEALTH CARE EDUCATION/TRAINING PROGRAM

## 2025-05-09 PROCEDURE — 6360000002 HC RX W HCPCS: Performed by: INTERNAL MEDICINE

## 2025-05-09 PROCEDURE — 2500000003 HC RX 250 WO HCPCS: Performed by: SURGERY

## 2025-05-09 PROCEDURE — 99024 POSTOP FOLLOW-UP VISIT: CPT | Performed by: SURGERY

## 2025-05-09 PROCEDURE — 6360000002 HC RX W HCPCS: Performed by: SURGERY

## 2025-05-09 PROCEDURE — 2709999900 CT ABSCESS DRAINAGE W CATH PLACEMENT S&I

## 2025-05-09 PROCEDURE — 74177 CT ABD & PELVIS W/CONTRAST: CPT

## 2025-05-09 PROCEDURE — 6370000000 HC RX 637 (ALT 250 FOR IP): Performed by: SURGERY

## 2025-05-09 PROCEDURE — 2500000003 HC RX 250 WO HCPCS: Performed by: INTERNAL MEDICINE

## 2025-05-09 PROCEDURE — 6360000004 HC RX CONTRAST MEDICATION: Performed by: SURGERY

## 2025-05-09 PROCEDURE — 1200000000 HC SEMI PRIVATE

## 2025-05-09 PROCEDURE — 83540 ASSAY OF IRON: CPT

## 2025-05-09 PROCEDURE — 85610 PROTHROMBIN TIME: CPT

## 2025-05-09 PROCEDURE — 85025 COMPLETE CBC W/AUTO DIFF WBC: CPT

## 2025-05-09 RX ORDER — MIDAZOLAM HYDROCHLORIDE 5 MG/ML
INJECTION, SOLUTION INTRAMUSCULAR; INTRAVENOUS PRN
Status: COMPLETED | OUTPATIENT
Start: 2025-05-09 | End: 2025-05-09

## 2025-05-09 RX ORDER — FENTANYL CITRATE 50 UG/ML
INJECTION, SOLUTION INTRAMUSCULAR; INTRAVENOUS PRN
Status: COMPLETED | OUTPATIENT
Start: 2025-05-09 | End: 2025-05-09

## 2025-05-09 RX ORDER — IOPAMIDOL 755 MG/ML
75 INJECTION, SOLUTION INTRAVASCULAR
Status: COMPLETED | OUTPATIENT
Start: 2025-05-09 | End: 2025-05-09

## 2025-05-09 RX ORDER — LIDOCAINE HYDROCHLORIDE 10 MG/ML
INJECTION, SOLUTION EPIDURAL; INFILTRATION; INTRACAUDAL; PERINEURAL PRN
Status: COMPLETED | OUTPATIENT
Start: 2025-05-09 | End: 2025-05-09

## 2025-05-09 RX ORDER — DIATRIZOATE MEGLUMINE AND DIATRIZOATE SODIUM 660; 100 MG/ML; MG/ML
12 SOLUTION ORAL; RECTAL
Status: DISCONTINUED | OUTPATIENT
Start: 2025-05-09 | End: 2025-05-28 | Stop reason: ALTCHOICE

## 2025-05-09 RX ORDER — POTASSIUM CHLORIDE 1500 MG/1
40 TABLET, EXTENDED RELEASE ORAL PRN
Status: DISCONTINUED | OUTPATIENT
Start: 2025-05-09 | End: 2025-05-24

## 2025-05-09 RX ORDER — MAGNESIUM SULFATE IN WATER 40 MG/ML
2000 INJECTION, SOLUTION INTRAVENOUS PRN
Status: DISCONTINUED | OUTPATIENT
Start: 2025-05-09 | End: 2025-05-27

## 2025-05-09 RX ORDER — MORPHINE SULFATE 2 MG/ML
2 INJECTION, SOLUTION INTRAMUSCULAR; INTRAVENOUS ONCE
Status: COMPLETED | OUTPATIENT
Start: 2025-05-09 | End: 2025-05-09

## 2025-05-09 RX ORDER — POTASSIUM CHLORIDE 7.45 MG/ML
10 INJECTION INTRAVENOUS PRN
Status: DISCONTINUED | OUTPATIENT
Start: 2025-05-09 | End: 2025-05-24

## 2025-05-09 RX ORDER — INSULIN LISPRO 100 [IU]/ML
0-4 INJECTION, SOLUTION INTRAVENOUS; SUBCUTANEOUS
Status: DISCONTINUED | OUTPATIENT
Start: 2025-05-09 | End: 2025-05-30 | Stop reason: HOSPADM

## 2025-05-09 RX ORDER — DEXTROSE MONOHYDRATE 100 MG/ML
INJECTION, SOLUTION INTRAVENOUS CONTINUOUS PRN
Status: DISCONTINUED | OUTPATIENT
Start: 2025-05-09 | End: 2025-05-30 | Stop reason: HOSPADM

## 2025-05-09 RX ORDER — GLUCAGON 1 MG/ML
1 KIT INJECTION PRN
Status: DISCONTINUED | OUTPATIENT
Start: 2025-05-09 | End: 2025-05-30 | Stop reason: HOSPADM

## 2025-05-09 RX ADMIN — MIDAZOLAM HYDROCHLORIDE 1 MG: 5 INJECTION, SOLUTION INTRAMUSCULAR; INTRAVENOUS at 14:17

## 2025-05-09 RX ADMIN — PIPERACILLIN AND TAZOBACTAM 3375 MG: 3; .375 INJECTION, POWDER, LYOPHILIZED, FOR SOLUTION INTRAVENOUS at 17:31

## 2025-05-09 RX ADMIN — Medication 6 MG: at 23:38

## 2025-05-09 RX ADMIN — IOPAMIDOL 75 ML: 755 INJECTION, SOLUTION INTRAVENOUS at 10:34

## 2025-05-09 RX ADMIN — DIATRIZOATE MEGLUMINE AND DIATRIZOATE SODIUM 12 ML: 660; 100 LIQUID ORAL; RECTAL at 08:26

## 2025-05-09 RX ADMIN — LIDOCAINE HYDROCHLORIDE 7 ML: 10 INJECTION, SOLUTION EPIDURAL; INFILTRATION; INTRACAUDAL; PERINEURAL at 14:24

## 2025-05-09 RX ADMIN — SODIUM CHLORIDE, PRESERVATIVE FREE 10 ML: 5 INJECTION INTRAVENOUS at 20:11

## 2025-05-09 RX ADMIN — CALCIUM CARBONATE 500 MG: 500 TABLET, CHEWABLE ORAL at 23:38

## 2025-05-09 RX ADMIN — ASCORBIC ACID, VITAMIN A PALMITATE, CHOLECALCIFEROL, THIAMINE HYDROCHLORIDE, RIBOFLAVIN-5 PHOSPHATE SODIUM, PYRIDOXINE HYDROCHLORIDE, NIACINAMIDE, DEXPANTHENOL, ALPHA-TOCOPHEROL ACETATE, VITAMIN K1, FOLIC ACID, BIOTIN, CYANOCOBALAMIN: 200; 3300; 200; 6; 3.6; 6; 40; 15; 10; 150; 600; 60; 5 INJECTION, SOLUTION INTRAVENOUS at 18:10

## 2025-05-09 RX ADMIN — DICYCLOMINE HYDROCHLORIDE 10 MG: 10 CAPSULE ORAL at 23:37

## 2025-05-09 RX ADMIN — METOPROLOL 12.5 MG: 25 TABLET ORAL at 23:54

## 2025-05-09 RX ADMIN — Medication 400 MG: at 23:42

## 2025-05-09 RX ADMIN — MORPHINE SULFATE 2 MG: 2 INJECTION, SOLUTION INTRAMUSCULAR; INTRAVENOUS at 03:43

## 2025-05-09 RX ADMIN — MORPHINE SULFATE 2 MG: 2 INJECTION, SOLUTION INTRAMUSCULAR; INTRAVENOUS at 20:11

## 2025-05-09 RX ADMIN — POTASSIUM PHOSPHATE, MONOBASIC POTASSIUM PHOSPHATE, DIBASIC 20 MMOL: 224; 236 INJECTION, SOLUTION, CONCENTRATE INTRAVENOUS at 12:06

## 2025-05-09 RX ADMIN — NYSTATIN 500000 UNITS: 100000 SUSPENSION ORAL at 17:25

## 2025-05-09 RX ADMIN — MORPHINE SULFATE 2 MG: 2 INJECTION, SOLUTION INTRAMUSCULAR; INTRAVENOUS at 12:01

## 2025-05-09 RX ADMIN — MORPHINE SULFATE 2 MG: 2 INJECTION, SOLUTION INTRAMUSCULAR; INTRAVENOUS at 08:32

## 2025-05-09 RX ADMIN — FENTANYL CITRATE 50 MCG: 50 INJECTION, SOLUTION INTRAMUSCULAR; INTRAVENOUS at 14:16

## 2025-05-09 RX ADMIN — PIPERACILLIN AND TAZOBACTAM 3375 MG: 3; .375 INJECTION, POWDER, LYOPHILIZED, FOR SOLUTION INTRAVENOUS at 02:45

## 2025-05-09 RX ADMIN — PIPERACILLIN AND TAZOBACTAM 3375 MG: 3; .375 INJECTION, POWDER, LYOPHILIZED, FOR SOLUTION INTRAVENOUS at 11:51

## 2025-05-09 ASSESSMENT — PAIN SCALES - GENERAL
PAINLEVEL_OUTOF10: 8

## 2025-05-09 ASSESSMENT — PAIN DESCRIPTION - DESCRIPTORS
DESCRIPTORS: ACHING
DESCRIPTORS: ACHING
DESCRIPTORS: CRAMPING;SHARP

## 2025-05-09 ASSESSMENT — PAIN DESCRIPTION - ORIENTATION: ORIENTATION: MID

## 2025-05-09 ASSESSMENT — PAIN DESCRIPTION - LOCATION
LOCATION: ABDOMEN
LOCATION: ABDOMEN
LOCATION: ABDOMEN;INCISION

## 2025-05-09 NOTE — OR NURSING
Pt arrived for image guided abscess drain insertion right . Procedure explained including the risk and benefits of the procedure. All questions answered. Pt verbalizes understanding of the procedure and states no more questions. Consent confirmed. Vital signs stable. Labs, allergies, medications, and code status reviewed. No contraindications noted.     Vital Signs  Vitals:    05/09/25 0832   BP: 126/78   Pulse: (!) 103   Resp: 18   Temp: 98.2 °F (36.8 °C)   SpO2: 95%    (vital signs in table format)    Pre Anum Score  2 - Able to move 4 extremities voluntarily on command  2 - BP+/- 20mmHg of normal  2 - Fully awake  2 - Able to maintain oxygen saturation >92% on room air  2 - Able to breathe deeply and cough freely    Allergies  Patient has no known allergies. (allergies)    Labs  Lab Results   Component Value Date    INR 1.13 05/09/2025    PROTIME 14.7 05/09/2025     Lab Results   Component Value Date    CREATININE 0.3 (L) 05/09/2025    BUN 4 (L) 05/09/2025     (L) 05/09/2025    K 3.7 05/09/2025    K 3.7 05/09/2025    CL 99 05/09/2025    CO2 24 05/09/2025     Lab Results   Component Value Date    WBC 8.4 05/09/2025    HGB 8.1 (L) 05/09/2025    HCT 23.8 (L) 05/09/2025    MCV 90.5 05/09/2025     05/09/2025

## 2025-05-09 NOTE — PROGRESS NOTES
[facility] Medicine Progress Note  V 1.6      Date of Admission: 4/23/2025    Hospital Day: 17      Chief Admission Complaint:  Abdominal pain    Subjective:      Patient feels well. She tolerated drain placement and continues on TPN.     Presenting Admission History:       This is a 64 y.o. female who presented to [facility] with abdominal pain.  PMHx significant for HTN.  History obtained from the patient and review of EMR.  The patient stated she had a hemicolectomy in February 2025 in [facility] and since then has been experiencing intermittent abdominal pain.  Per chart review, the patient has been readmitted 1 time since her surgery for electrolyte abnormalities.  However, the patient stated her pain has gotten progressively worse over the last couple of weeks.  She reports speaking with GI and they ordered a CT outpatient today.  Patient  is at the bedside and stated that she was called and told to go to the emergency department due to \"a collection of pus\" seen on the CT. In the emergency department a CT abdomen pelvis was obtained that revealed Fluid and gas collection within the pelvis interposed between the colon and uterus measuring up to 4.1 cm.  Abscess is favored.  This appears largely enveloped by abdominal and adnexal structures.  A [place] uterine fistula is not excluded.  Additional small dependent collection within the region of the pouch of Alfred measuring up to 2.5 cm.  Mild dilation of small bowel loops which may indicate partial obstruction.  There is no high-grade small bowel obstruction.  The patient was given morphine for pain.  She was also started on Zosyn.  Upon further evaluation, the patient was found to be dehydrated with hyponatremia.  This is likely secondary to inadequate p.o. intake.  She was admitted for further evaluation and treatment.  IV fluids have been initiated and GI has been consulted for the a.m. The patient denied any other associated

## 2025-05-09 NOTE — PLAN OF CARE
Problem: Pain  Goal: Verbalizes/displays adequate comfort level or baseline comfort level  Outcome: Progressing  Flowsheets (Taken 5/9/2025 1816)  Verbalizes/displays adequate comfort level or baseline comfort level:   Encourage patient to monitor pain and request assistance   Assess pain using appropriate pain scale     Problem: ABCDS Injury Assessment  Goal: Absence of physical injury  Outcome: Progressing  Flowsheets (Taken 5/9/2025 1816)  Absence of Physical Injury: Implement safety measures based on patient assessment     Problem: Nutrition Deficit:  Goal: Optimize nutritional status  Outcome: Progressing  Flowsheets (Taken 5/9/2025 1816)  Nutrient intake appropriate for improving, restoring, or maintaining nutritional needs:   Assess nutritional status and recommend course of action   Monitor oral intake, labs, and treatment plans   Recommend, monitor, and adjust tube feedings and TPN/PPN based on assessed needs     Problem: Gastrointestinal - Adult  Goal: Minimal or absence of nausea and vomiting  Outcome: Progressing     Problem: Infection - Adult  Goal: Absence of infection at discharge  Outcome: Progressing  Flowsheets (Taken 5/9/2025 1816)  Absence of infection at discharge:   Assess and monitor for signs and symptoms of infection   Monitor lab/diagnostic results   Monitor all insertion sites i.e., indwelling lines, tubes and drains   Administer medications as ordered

## 2025-05-09 NOTE — CARE COORDINATION
Hospital day 16: Patient on C3 re Intra abd abscess care managed by IM, General surgery, and Nephrology. Patient from home with spouse. Patient from home with spouse. CT today. New TODD drain placed. On TPN. SW following.GASTON Alexander

## 2025-05-09 NOTE — BRIEF OP NOTE
Brief Postoperative Note    Tuyet Richter  YOB: 1960  [id number]    Pre-operative Diagnosis: abdominal retroperitoneal abscess    Post-operative Diagnosis: Same    Procedure: CT guided drainage    Anesthesia: IV sedation, local    Surgeons/Assistants: Jesse Green MD    Estimated Blood Loss: Minimal    Complications: none    Specimens: were obtained    10 Fr locking pigtail catheter placed in retroperitoneal abscess right side    Jesse Green MD MD  5/9/2025

## 2025-05-09 NOTE — PROGRESS NOTES
[facility] GENERAL SURGERY    Surgery Progress Note           POD # 10    PATIENT NAME: Tuyet Richter     TODAY'S DATE: 5/9/2025    INTERVAL HISTORY:    Pt with continued distention, stable pain, some bms     OBJECTIVE:   VITALS:  /78   Pulse (!) 103 Comment: RN notified  Temp 98.2 °F (36.8 °C) (Oral)   Resp 18   Ht 1.6 m (5' 3\")   Wt 54.4 kg (119 lb 14.4 oz)   SpO2 95%   BMI 21.24 kg/m²     INTAKE/OUTPUT:    I/O last 3 completed shifts:  In: 1775 [P.O.:280; I.V.:1435; NG/GT:60]  Out: 3500 [Urine:3050; Emesis/NG output:450]  I/O this shift:  In: -   Out: 700 [Urine:700]              CONSTITUTIONAL:  awake and alert  LUNGS: no crackles or wheezes    ABDOMEN:   absent bowel sounds, firm, distended, tenderness noted diffusely  INCISION: clean, dry, healing, creamy rigoberto drainage     Data:  CBC:   Recent Labs     05/07/25 0552 05/08/25 0625 05/09/25 0427   WBC 6.7 7.8 8.4   HGB 7.5* 7.3* 8.1*   HCT 21.8* 21.7* 23.8*   * 429 422     BMP:    Recent Labs     05/08/25 0625 05/08/25 1835 05/09/25 0427   * 129* 132*   K 3.1*  3.1* 3.8 3.7  3.7    98* 99   CO2 23 23 24   BUN 4* 4* 4*   CREATININE 0.3* 0.3* 0.3*   GLUCOSE 124* 112* 112*     Hepatic:   Recent Labs     05/07/25 0552 05/08/25 0625 05/09/25 0427   AST 30 6* 9*   ALT 13 <5* <5*   BILITOT <0.2 <0.2 <0.2   ALKPHOS 133* 90 91     Mag:      Recent Labs     05/08/25 0625 05/08/25 1835 05/09/25 0427   MG 1.45* 2.02 1.64*      Phos:     Recent Labs     05/08/25 0625 05/08/25 1835 05/09/25 0427   PHOS 3.4 2.7 3.0          ASSESSMENT AND PLAN:  65 y.o. female status post laparoscopic lysis of adhesions and abscess drainage with  C showing SBO vs ileus.   CT reviewed with Radiology.  With persistent RLQ/retroperitoneal fluid collection will get perc drain placed.  The possibility of connection to prior ileocolic anastomosis was raised by Radiology and in retrospect also questioned on initial imaging.  As such may need

## 2025-05-09 NOTE — PROGRESS NOTES
[employer]  Nephrology follow-up note           Reason for Consult: Hyponatremia  Requesting Physician:  Dr. Morgan    Sub/interval history  Resting  Patient is getting CT scan this morning    Last 24 h uop 2.9 L charted     ROS: No chest pain/shortness of breath/fever/nausea/vomiting  PSFH: No visitor    Scheduled Meds:   insulin lispro  0-4 Units SubCUTAneous 4x Daily AC & HS    pantoprazole  40 mg IntraVENous Daily    amLODIPine  2.5 mg Per NG tube Daily    calcium carbonate  1 tablet Per NG tube BID    magnesium oxide  400 mg Per NG tube BID    valACYclovir  500 mg Per NG tube BID    melatonin  6 mg Per NG tube Nightly    metoprolol tartrate  12.5 mg Per NG tube BID    nystatin  5 mL Mouth/Throat 4x Daily    fat emulsion  250 mL IntraVENous Once per day on Monday Thursday    piperacillin-tazobactam  3,375 mg IntraVENous Q8H    [Held by provider] metoprolol succinate  25 mg Oral Daily    dicyclomine  10 mg Oral TID    pantoprazole  40 mg Oral QAM AC    sodium chloride flush  5-40 mL IntraVENous 2 times per day    enoxaparin  30 mg SubCUTAneous Daily     Continuous Infusions:   dextrose      PN-Adult Premix 5/15 - Standard Electrolytes - Central Line 55 mL/hr at 05/08/25 1855    sodium chloride      sodium chloride Stopped (05/06/25 1753)     PRN Meds:.diatrizoate meglumine-sodium, potassium chloride **OR** potassium alternative oral replacement **OR** potassium chloride, magnesium sulfate, sodium phosphate 15 mmol in sodium chloride 0.9 % 250 mL IVPB, glucose, dextrose bolus **OR** dextrose bolus, glucagon (rDNA), dextrose, Magic Mouth wash with nystatin, benzocaine, polyethylene glycol, sodium chloride, morphine, midazolam, diatrizoate meglumine-sodium, sodium chloride flush, sodium chloride, ondansetron **OR** ondansetron, acetaminophen **OR** acetaminophen    History of Present Illness on 5/6/2025:    65 y.o. yo female with PMH of hypertension, hiatal hernia who is admitted on 4/23/2025 diverticulitis

## 2025-05-09 NOTE — PRE SEDATION
Sedation Pre-Procedure Note    Patient Name: Tuyet Richter   YOB: 1960  Room/Bed: 0325/0325-01  Medical Record Number: [id number]  Date: 5/9/2025   Time: 2:37 PM       Indication:  Abdominal retroperitoneal abscess    Consent: I have discussed with the patient and/or the patient representative the indication, alternatives, and the possible risks and/or complications of the planned procedure and the anesthesia methods. The patient and/or patient representative appear to understand and agree to proceed.    Vital Signs:   Vitals:    05/09/25 1432   BP: 116/61   Pulse: 99   Resp: 18   Temp:    SpO2: 93%       Past Medical History:   has a past medical history of Acid reflux, Gastritis, Hiatal hernia, Hypertension, and Pinched nerve in neck.    Past Surgical History:   has a past surgical history that includes Endometrial ablation; Nose surgery; Upper gastrointestinal endoscopy (1/23/2013); Colonoscopy (1/23/2013); Colonoscopy (3/18/16); Upper gastrointestinal endoscopy (3/18/16); and Sigmoid Colectomy (N/A, 4/29/2025).    Medications:   Scheduled Meds:    insulin lispro  0-4 Units SubCUTAneous 4x Daily AC & HS    potassium phosphate IVPB (CENTRAL LINE)  20 mmol IntraVENous Once    pantoprazole  40 mg IntraVENous Daily    amLODIPine  2.5 mg Per NG tube Daily    calcium carbonate  1 tablet Per NG tube BID    magnesium oxide  400 mg Per NG tube BID    valACYclovir  500 mg Per NG tube BID    melatonin  6 mg Per NG tube Nightly    metoprolol tartrate  12.5 mg Per NG tube BID    nystatin  5 mL Mouth/Throat 4x Daily    fat emulsion  250 mL IntraVENous Once per day on Monday Thursday    piperacillin-tazobactam  3,375 mg IntraVENous Q8H    [Held by provider] metoprolol succinate  25 mg Oral Daily    dicyclomine  10 mg Oral TID    pantoprazole  40 mg Oral QAM AC    sodium chloride flush  5-40 mL IntraVENous 2 times per day    enoxaparin  30 mg SubCUTAneous Daily     Continuous Infusions:    dextrose      PN-Adult

## 2025-05-09 NOTE — OR NURSING
Image guided abscess drain insertion right completed. Dr. Green 10 Bulgarian 25 cm [facility] M-Drain LOT # [id number] EXP 10/31/28 in the right. Drain/tube secured  with suture. Drain/tube dressing clean, dry, and intact. Pt tolerated procedure without any signs or symptoms of distress. Vital signs stable. Report called to floor RN. Pt transported back to [facility] in stable condition via bed by transport.     Medications  Versed: 1 mg  Fentanyl: 50 mcg  Benadryl: 0 mg    Vital Signs  Vitals:    05/09/25 1422   BP: 116/85   Pulse: 99   Resp: 18   Temp:    SpO2: 93%    (vital signs in table format)    Post Anum  2 - Able to move 4 extremities voluntarily on command  2 - BP+/- 20mmHg of normal  2 - Fully awake  2 - Able to maintain oxygen saturation >92% on room air  2 - Able to breathe deeply and cough freely

## 2025-05-09 NOTE — PROGRESS NOTES
Assessment completed and documented earlier in shift.. VSS. A/ox4. TPN and abx infusing. Pt abd. Distended. Pt NPO and NGT to LWS. Pt currently off floor to IR for drain placement. Bed locked and in lowest position. Bedside table and call light within reach. Denies further needs at this time.

## 2025-05-10 LAB
ALBUMIN SERPL-MCNC: 2.4 G/DL (ref 3.4–5)
ALBUMIN/GLOB SERPL: 0.8 {RATIO} (ref 1.1–2.2)
ALP SERPL-CCNC: 110 U/L (ref 40–129)
ALT SERPL-CCNC: <5 U/L (ref 10–40)
ANION GAP SERPL CALCULATED.3IONS-SCNC: 10 MMOL/L (ref 3–16)
ANION GAP SERPL CALCULATED.3IONS-SCNC: 9 MMOL/L (ref 3–16)
ANISOCYTOSIS BLD QL SMEAR: ABNORMAL
AST SERPL-CCNC: 13 U/L (ref 15–37)
BASOPHILS # BLD: 0 K/UL (ref 0–0.2)
BASOPHILS NFR BLD: 0 %
BILIRUB SERPL-MCNC: <0.2 MG/DL (ref 0–1)
BUN SERPL-MCNC: 5 MG/DL (ref 7–20)
BUN SERPL-MCNC: 7 MG/DL (ref 7–20)
CALCIUM SERPL-MCNC: 7.8 MG/DL (ref 8.3–10.6)
CALCIUM SERPL-MCNC: 8 MG/DL (ref 8.3–10.6)
CHLORIDE SERPL-SCNC: 101 MMOL/L (ref 99–110)
CHLORIDE SERPL-SCNC: 99 MMOL/L (ref 99–110)
CO2 SERPL-SCNC: 23 MMOL/L (ref 21–32)
CO2 SERPL-SCNC: 24 MMOL/L (ref 21–32)
CREAT SERPL-MCNC: 0.3 MG/DL (ref 0.6–1.2)
CREAT SERPL-MCNC: 0.4 MG/DL (ref 0.6–1.2)
DEPRECATED RDW RBC AUTO: 15.9 % (ref 12.4–15.4)
EOSINOPHIL # BLD: 0.3 K/UL (ref 0–0.6)
EOSINOPHIL NFR BLD: 3 %
GFR SERPLBLD CREATININE-BSD FMLA CKD-EPI: >90 ML/MIN/{1.73_M2}
GFR SERPLBLD CREATININE-BSD FMLA CKD-EPI: >90 ML/MIN/{1.73_M2}
GLUCOSE BLD-MCNC: 122 MG/DL (ref 70–99)
GLUCOSE BLD-MCNC: 135 MG/DL (ref 70–99)
GLUCOSE BLD-MCNC: 144 MG/DL (ref 70–99)
GLUCOSE BLD-MCNC: 153 MG/DL (ref 70–99)
GLUCOSE SERPL-MCNC: 107 MG/DL (ref 70–99)
GLUCOSE SERPL-MCNC: 132 MG/DL (ref 70–99)
HCT VFR BLD AUTO: 24.7 % (ref 36–48)
HGB BLD-MCNC: 8.4 G/DL (ref 12–16)
LYMPHOCYTES # BLD: 3.3 K/UL (ref 1–5.1)
LYMPHOCYTES NFR BLD: 32 %
MACROCYTES BLD QL SMEAR: ABNORMAL
MAGNESIUM SERPL-MCNC: 1.47 MG/DL (ref 1.8–2.4)
MAGNESIUM SERPL-MCNC: 2.44 MG/DL (ref 1.8–2.4)
MCH RBC QN AUTO: 30.7 PG (ref 26–34)
MCHC RBC AUTO-ENTMCNC: 34.1 G/DL (ref 31–36)
MCV RBC AUTO: 90 FL (ref 80–100)
MICROCYTES BLD QL SMEAR: ABNORMAL
MONOCYTES # BLD: 1.8 K/UL (ref 0–1.3)
MONOCYTES NFR BLD: 18 %
NEUTROPHILS # BLD: 4.6 K/UL (ref 1.7–7.7)
NEUTROPHILS NFR BLD: 44 %
NEUTS BAND NFR BLD MANUAL: 2 % (ref 0–7)
OVALOCYTES BLD QL SMEAR: ABNORMAL
PATH INTERP BLD-IMP: NO
PERFORMED ON: ABNORMAL
PHOSPHATE SERPL-MCNC: 3.6 MG/DL (ref 2.5–4.9)
PHOSPHATE SERPL-MCNC: 4.8 MG/DL (ref 2.5–4.9)
PLATELET # BLD AUTO: 456 K/UL (ref 135–450)
PLATELET BLD QL SMEAR: ABNORMAL
PMV BLD AUTO: 9.1 FL (ref 5–10.5)
POIKILOCYTOSIS BLD QL SMEAR: ABNORMAL
POLYCHROMASIA BLD QL SMEAR: ABNORMAL
POTASSIUM SERPL-SCNC: 3.7 MMOL/L (ref 3.5–5.1)
POTASSIUM SERPL-SCNC: 3.7 MMOL/L (ref 3.5–5.1)
POTASSIUM SERPL-SCNC: 4.1 MMOL/L (ref 3.5–5.1)
PROT SERPL-MCNC: 5.5 G/DL (ref 6.4–8.2)
RBC # BLD AUTO: 2.75 M/UL (ref 4–5.2)
SLIDE REVIEW: ABNORMAL
SODIUM SERPL-SCNC: 132 MMOL/L (ref 136–145)
SODIUM SERPL-SCNC: 134 MMOL/L (ref 136–145)
VARIANT LYMPHS NFR BLD MANUAL: 1 % (ref 0–6)
WBC # BLD AUTO: 10.1 K/UL (ref 4–11)

## 2025-05-10 PROCEDURE — 6360000002 HC RX W HCPCS: Performed by: INTERNAL MEDICINE

## 2025-05-10 PROCEDURE — 2580000003 HC RX 258: Performed by: INTERNAL MEDICINE

## 2025-05-10 PROCEDURE — 1200000000 HC SEMI PRIVATE

## 2025-05-10 PROCEDURE — 6360000002 HC RX W HCPCS: Performed by: HOSPITALIST

## 2025-05-10 PROCEDURE — 6360000002 HC RX W HCPCS: Performed by: NURSE PRACTITIONER

## 2025-05-10 PROCEDURE — 2500000003 HC RX 250 WO HCPCS: Performed by: SURGERY

## 2025-05-10 PROCEDURE — 85025 COMPLETE CBC W/AUTO DIFF WBC: CPT

## 2025-05-10 PROCEDURE — 80053 COMPREHEN METABOLIC PANEL: CPT

## 2025-05-10 PROCEDURE — 6360000002 HC RX W HCPCS: Performed by: SURGERY

## 2025-05-10 PROCEDURE — 99024 POSTOP FOLLOW-UP VISIT: CPT | Performed by: SURGERY

## 2025-05-10 PROCEDURE — 84100 ASSAY OF PHOSPHORUS: CPT

## 2025-05-10 PROCEDURE — 83036 HEMOGLOBIN GLYCOSYLATED A1C: CPT

## 2025-05-10 PROCEDURE — 6360000002 HC RX W HCPCS: Performed by: STUDENT IN AN ORGANIZED HEALTH CARE EDUCATION/TRAINING PROGRAM

## 2025-05-10 PROCEDURE — 6370000000 HC RX 637 (ALT 250 FOR IP): Performed by: NURSE PRACTITIONER

## 2025-05-10 PROCEDURE — 2580000003 HC RX 258: Performed by: NURSE PRACTITIONER

## 2025-05-10 PROCEDURE — 83735 ASSAY OF MAGNESIUM: CPT

## 2025-05-10 PROCEDURE — 6370000000 HC RX 637 (ALT 250 FOR IP): Performed by: SURGERY

## 2025-05-10 PROCEDURE — 2500000003 HC RX 250 WO HCPCS: Performed by: INTERNAL MEDICINE

## 2025-05-10 RX ORDER — MAGNESIUM SULFATE IN WATER 40 MG/ML
4000 INJECTION, SOLUTION INTRAVENOUS ONCE
Status: COMPLETED | OUTPATIENT
Start: 2025-05-10 | End: 2025-05-10

## 2025-05-10 RX ADMIN — ASCORBIC ACID, VITAMIN A PALMITATE, CHOLECALCIFEROL, THIAMINE HYDROCHLORIDE, RIBOFLAVIN-5 PHOSPHATE SODIUM, PYRIDOXINE HYDROCHLORIDE, NIACINAMIDE, DEXPANTHENOL, ALPHA-TOCOPHEROL ACETATE, VITAMIN K1, FOLIC ACID, BIOTIN, CYANOCOBALAMIN: 200; 3300; 200; 6; 3.6; 6; 40; 15; 10; 150; 600; 60; 5 INJECTION, SOLUTION INTRAVENOUS at 18:22

## 2025-05-10 RX ADMIN — IRON SUCROSE 200 MG: 20 INJECTION, SOLUTION INTRAVENOUS at 11:16

## 2025-05-10 RX ADMIN — ENOXAPARIN SODIUM 30 MG: 100 INJECTION SUBCUTANEOUS at 09:36

## 2025-05-10 RX ADMIN — SODIUM CHLORIDE, PRESERVATIVE FREE 10 ML: 5 INJECTION INTRAVENOUS at 20:17

## 2025-05-10 RX ADMIN — MORPHINE SULFATE 2 MG: 2 INJECTION, SOLUTION INTRAMUSCULAR; INTRAVENOUS at 00:33

## 2025-05-10 RX ADMIN — NYSTATIN 500000 UNITS: 100000 SUSPENSION ORAL at 20:00

## 2025-05-10 RX ADMIN — AMLODIPINE BESYLATE 2.5 MG: 2.5 TABLET ORAL at 09:36

## 2025-05-10 RX ADMIN — NYSTATIN 500000 UNITS: 100000 SUSPENSION ORAL at 17:36

## 2025-05-10 RX ADMIN — DICYCLOMINE HYDROCHLORIDE 10 MG: 10 CAPSULE ORAL at 13:59

## 2025-05-10 RX ADMIN — PIPERACILLIN AND TAZOBACTAM 3375 MG: 3; .375 INJECTION, POWDER, LYOPHILIZED, FOR SOLUTION INTRAVENOUS at 20:16

## 2025-05-10 RX ADMIN — CALCIUM CARBONATE 500 MG: 500 TABLET, CHEWABLE ORAL at 09:36

## 2025-05-10 RX ADMIN — DICYCLOMINE HYDROCHLORIDE 10 MG: 10 CAPSULE ORAL at 20:00

## 2025-05-10 RX ADMIN — MORPHINE SULFATE 2 MG: 2 INJECTION, SOLUTION INTRAMUSCULAR; INTRAVENOUS at 05:59

## 2025-05-10 RX ADMIN — Medication 400 MG: at 20:00

## 2025-05-10 RX ADMIN — CALCIUM CARBONATE 500 MG: 500 TABLET, CHEWABLE ORAL at 20:00

## 2025-05-10 RX ADMIN — METOPROLOL 12.5 MG: 25 TABLET ORAL at 09:36

## 2025-05-10 RX ADMIN — MORPHINE SULFATE 2 MG: 2 INJECTION, SOLUTION INTRAMUSCULAR; INTRAVENOUS at 19:57

## 2025-05-10 RX ADMIN — MORPHINE SULFATE 2 MG: 2 INJECTION, SOLUTION INTRAMUSCULAR; INTRAVENOUS at 13:38

## 2025-05-10 RX ADMIN — POTASSIUM PHOSPHATE, MONOBASIC POTASSIUM PHOSPHATE, DIBASIC 30 MMOL: 224; 236 INJECTION, SOLUTION, CONCENTRATE INTRAVENOUS at 17:29

## 2025-05-10 RX ADMIN — NYSTATIN 500000 UNITS: 100000 SUSPENSION ORAL at 12:39

## 2025-05-10 RX ADMIN — Medication 400 MG: at 09:36

## 2025-05-10 RX ADMIN — NYSTATIN 500000 UNITS: 100000 SUSPENSION ORAL at 09:37

## 2025-05-10 RX ADMIN — PIPERACILLIN AND TAZOBACTAM 3375 MG: 3; .375 INJECTION, POWDER, LYOPHILIZED, FOR SOLUTION INTRAVENOUS at 00:32

## 2025-05-10 RX ADMIN — PANTOPRAZOLE SODIUM 40 MG: 40 INJECTION, POWDER, FOR SOLUTION INTRAVENOUS at 09:37

## 2025-05-10 RX ADMIN — Medication 6 MG: at 20:00

## 2025-05-10 RX ADMIN — PIPERACILLIN AND TAZOBACTAM 3375 MG: 3; .375 INJECTION, POWDER, LYOPHILIZED, FOR SOLUTION INTRAVENOUS at 11:41

## 2025-05-10 RX ADMIN — DICYCLOMINE HYDROCHLORIDE 10 MG: 10 CAPSULE ORAL at 09:36

## 2025-05-10 RX ADMIN — METOPROLOL 12.5 MG: 25 TABLET ORAL at 20:00

## 2025-05-10 RX ADMIN — MAGNESIUM SULFATE HEPTAHYDRATE 4000 MG: 40 INJECTION, SOLUTION INTRAVENOUS at 15:31

## 2025-05-10 ASSESSMENT — PAIN DESCRIPTION - DESCRIPTORS
DESCRIPTORS: SHARP;CRAMPING
DESCRIPTORS: ACHING
DESCRIPTORS: ACHING

## 2025-05-10 ASSESSMENT — PAIN DESCRIPTION - ORIENTATION: ORIENTATION: MID

## 2025-05-10 ASSESSMENT — PAIN SCALES - GENERAL
PAINLEVEL_OUTOF10: 0
PAINLEVEL_OUTOF10: 8

## 2025-05-10 ASSESSMENT — PAIN DESCRIPTION - LOCATION
LOCATION: ABDOMEN

## 2025-05-10 NOTE — PROGRESS NOTES
Assessment completed and documented earlier in shift. VSS. A/ox4. TPN infusing. TODD drain patent with scant milky color output, dressing changed today.  RUQ drain to gravity with no output today.  Pt with NGT CLWS.  Bed locked and in lowest position. Bedside table and call light within reach. Denies further needs at this time.

## 2025-05-10 NOTE — PROGRESS NOTES
[facility]  Nephrology follow-up note           Reason for Consult: Hyponatremia  Requesting Physician:  Dr. Morgan    Sub/interval history  Resting  Patient is getting CT scan this morning    Last 24 h uop 3.4 L charted     ROS: No chest pain/shortness of breath/fever/nausea/vomiting  PSFH: No visitor    Scheduled Meds:   iron sucrose (VENOFER) 200 mg in sodium chloride 0.9 % 100 mL IVPB  200 mg IntraVENous Q24H    insulin lispro  0-4 Units SubCUTAneous 4x Daily AC & HS    pantoprazole  40 mg IntraVENous Daily    amLODIPine  2.5 mg Per NG tube Daily    calcium carbonate  1 tablet Per NG tube BID    magnesium oxide  400 mg Per NG tube BID    valACYclovir  500 mg Per NG tube BID    melatonin  6 mg Per NG tube Nightly    metoprolol tartrate  12.5 mg Per NG tube BID    nystatin  5 mL Mouth/Throat 4x Daily    fat emulsion  250 mL IntraVENous Once per day on Monday Thursday    piperacillin-tazobactam  3,375 mg IntraVENous Q8H    [Held by provider] metoprolol succinate  25 mg Oral Daily    dicyclomine  10 mg Oral TID    pantoprazole  40 mg Oral QAM AC    sodium chloride flush  5-40 mL IntraVENous 2 times per day    enoxaparin  30 mg SubCUTAneous Daily     Continuous Infusions:   dextrose      PN-Adult Premix 5/15 - Standard Electrolytes - Central Line 75 mL/hr at 05/09/25 1810    sodium chloride      sodium chloride Stopped (05/06/25 1753)     PRN Meds:.diatrizoate meglumine-sodium, potassium chloride **OR** potassium alternative oral replacement **OR** potassium chloride, magnesium sulfate, sodium phosphate 15 mmol in sodium chloride 0.9 % 250 mL IVPB, glucose, dextrose bolus **OR** dextrose bolus, glucagon (rDNA), dextrose, Magic Mouth wash with nystatin, benzocaine, polyethylene glycol, sodium chloride, morphine, midazolam, diatrizoate meglumine-sodium, sodium chloride flush, sodium chloride, ondansetron **OR** ondansetron, acetaminophen **OR** acetaminophen    History of Present Illness on 5/6/2025:    65 y.o.

## 2025-05-10 NOTE — PROGRESS NOTES
[facility] GENERAL SURGERY    Surgery Progress Note           POD # 11    PATIENT NAME: Tuyet Richter     TODAY'S DATE: 5/10/2025    INTERVAL HISTORY:    Pt states she feels about the same.  No fevers.  Bowels are working.     OBJECTIVE:   VITALS:  /79   Pulse (!) 102   Temp 98.1 °F (36.7 °C) (Oral)   Resp 18   Ht 1.6 m (5' 3\")   Wt 54.4 kg (119 lb 14.4 oz)   SpO2 97%   BMI 21.24 kg/m²     INTAKE/OUTPUT:    I/O last 3 completed shifts:  In: 480 [P.O.:480]  Out: 4430 [Urine:4400; Drains:30]  I/O this shift:  In: -   Out: 2100 [Urine:700; Emesis/NG output:1400]              CONSTITUTIONAL:  awake and alert  ABDOMEN:   hypoactive bowel sounds, soft, mildly distended, tenderness noted diffusely   INCISION: Clean and dry.  TODD drainage purulent.  Feculent appearing drainage from the new right sided drain    Data:  CBC:   Recent Labs     05/08/25  0625 05/09/25  0427 05/10/25  0632   WBC 7.8 8.4 10.1   HGB 7.3* 8.1* 8.4*   HCT 21.7* 23.8* 24.7*    422 456*     BMP:    Recent Labs     05/09/25  0427 05/09/25  1840 05/10/25  0634   * 132* 132*   K 3.7  3.7 3.7 3.7  3.7   CL 99 100 99   CO2 24 23 24   BUN 4* 5* 5*   CREATININE 0.3* 0.3* 0.3*   GLUCOSE 112* 107* 107*     Hepatic:   Recent Labs     05/08/25  0625 05/09/25  0427 05/10/25  0634   AST 6* 9* 13*   ALT <5* <5* <5*   BILITOT <0.2 <0.2 <0.2   ALKPHOS 90 91 110     Mag:      Recent Labs     05/09/25  0427 05/09/25  1840 05/10/25  0634   MG 1.64* 1.48* 1.47*      Phos:     Recent Labs     05/09/25  0427 05/09/25  1840 05/10/25  0634   PHOS 3.0 4.8 3.6      INR:   Recent Labs     05/09/25  1235   INR 1.13         Radiology Review: N/A    ASSESSMENT AND PLAN:  65 y.o. female status post laparoscopic lysis of adhesions and abscess drainage with new percutaneous drain placed yesterday.  Continue IV antibiotics, NG, and TPN.  She will need a fistulogram from the right drain at some point to evaluate for possible leakage from the ileocolic

## 2025-05-11 LAB
ALBUMIN SERPL-MCNC: 2.2 G/DL (ref 3.4–5)
ALBUMIN/GLOB SERPL: 0.7 {RATIO} (ref 1.1–2.2)
ALP SERPL-CCNC: 80 U/L (ref 40–129)
ALT SERPL-CCNC: <5 U/L (ref 10–40)
ANION GAP SERPL CALCULATED.3IONS-SCNC: 10 MMOL/L (ref 3–16)
AST SERPL-CCNC: 9 U/L (ref 15–37)
BASOPHILS # BLD: 0.1 K/UL (ref 0–0.2)
BASOPHILS NFR BLD: 1 %
BILIRUB SERPL-MCNC: <0.2 MG/DL (ref 0–1)
BUN SERPL-MCNC: 8 MG/DL (ref 7–20)
CALCIUM SERPL-MCNC: 8 MG/DL (ref 8.3–10.6)
CHLORIDE SERPL-SCNC: 103 MMOL/L (ref 99–110)
CO2 SERPL-SCNC: 22 MMOL/L (ref 21–32)
CREAT SERPL-MCNC: 0.4 MG/DL (ref 0.6–1.2)
DEPRECATED RDW RBC AUTO: 16.5 % (ref 12.4–15.4)
EOSINOPHIL # BLD: 0.1 K/UL (ref 0–0.6)
EOSINOPHIL NFR BLD: 1.8 %
EST. AVERAGE GLUCOSE BLD GHB EST-MCNC: 105.4 MG/DL
GFR SERPLBLD CREATININE-BSD FMLA CKD-EPI: >90 ML/MIN/{1.73_M2}
GLUCOSE BLD-MCNC: 100 MG/DL (ref 70–99)
GLUCOSE BLD-MCNC: 105 MG/DL (ref 70–99)
GLUCOSE BLD-MCNC: 117 MG/DL (ref 70–99)
GLUCOSE BLD-MCNC: 121 MG/DL (ref 70–99)
GLUCOSE SERPL-MCNC: 116 MG/DL (ref 70–99)
HBA1C MFR BLD: 5.3 %
HCT VFR BLD AUTO: 23.2 % (ref 36–48)
HGB BLD-MCNC: 7.8 G/DL (ref 12–16)
LYMPHOCYTES # BLD: 1.6 K/UL (ref 1–5.1)
LYMPHOCYTES NFR BLD: 19.7 %
MAGNESIUM SERPL-MCNC: 1.6 MG/DL (ref 1.8–2.4)
MAGNESIUM SERPL-MCNC: 2 MG/DL (ref 1.8–2.4)
MCH RBC QN AUTO: 30.6 PG (ref 26–34)
MCHC RBC AUTO-ENTMCNC: 33.6 G/DL (ref 31–36)
MCV RBC AUTO: 90.9 FL (ref 80–100)
MONOCYTES # BLD: 1.4 K/UL (ref 0–1.3)
MONOCYTES NFR BLD: 16.8 %
NEUTROPHILS # BLD: 5 K/UL (ref 1.7–7.7)
NEUTROPHILS NFR BLD: 60.7 %
PERFORMED ON: ABNORMAL
PHOSPHATE SERPL-MCNC: 3.9 MG/DL (ref 2.5–4.9)
PHOSPHATE SERPL-MCNC: 4.3 MG/DL (ref 2.5–4.9)
PLATELET # BLD AUTO: 445 K/UL (ref 135–450)
PMV BLD AUTO: 8.8 FL (ref 5–10.5)
POTASSIUM SERPL-SCNC: 4 MMOL/L (ref 3.5–5.1)
POTASSIUM SERPL-SCNC: 4 MMOL/L (ref 3.5–5.1)
PROT SERPL-MCNC: 5.3 G/DL (ref 6.4–8.2)
RBC # BLD AUTO: 2.55 M/UL (ref 4–5.2)
SODIUM SERPL-SCNC: 135 MMOL/L (ref 136–145)
WBC # BLD AUTO: 8.3 K/UL (ref 4–11)

## 2025-05-11 PROCEDURE — 6360000002 HC RX W HCPCS: Performed by: HOSPITALIST

## 2025-05-11 PROCEDURE — 6370000000 HC RX 637 (ALT 250 FOR IP): Performed by: NURSE PRACTITIONER

## 2025-05-11 PROCEDURE — 6360000002 HC RX W HCPCS: Performed by: INTERNAL MEDICINE

## 2025-05-11 PROCEDURE — 84100 ASSAY OF PHOSPHORUS: CPT

## 2025-05-11 PROCEDURE — 2500000003 HC RX 250 WO HCPCS: Performed by: SURGERY

## 2025-05-11 PROCEDURE — 6360000002 HC RX W HCPCS: Performed by: NURSE PRACTITIONER

## 2025-05-11 PROCEDURE — 6370000000 HC RX 637 (ALT 250 FOR IP): Performed by: SURGERY

## 2025-05-11 PROCEDURE — 99024 POSTOP FOLLOW-UP VISIT: CPT | Performed by: SURGERY

## 2025-05-11 PROCEDURE — 6360000002 HC RX W HCPCS: Performed by: SURGERY

## 2025-05-11 PROCEDURE — 80053 COMPREHEN METABOLIC PANEL: CPT

## 2025-05-11 PROCEDURE — 2580000003 HC RX 258: Performed by: NURSE PRACTITIONER

## 2025-05-11 PROCEDURE — 85025 COMPLETE CBC W/AUTO DIFF WBC: CPT

## 2025-05-11 PROCEDURE — 6360000002 HC RX W HCPCS: Performed by: STUDENT IN AN ORGANIZED HEALTH CARE EDUCATION/TRAINING PROGRAM

## 2025-05-11 PROCEDURE — 1200000000 HC SEMI PRIVATE

## 2025-05-11 PROCEDURE — 2580000003 HC RX 258: Performed by: INTERNAL MEDICINE

## 2025-05-11 PROCEDURE — 83735 ASSAY OF MAGNESIUM: CPT

## 2025-05-11 RX ADMIN — CALCIUM CARBONATE 500 MG: 500 TABLET, CHEWABLE ORAL at 20:58

## 2025-05-11 RX ADMIN — MORPHINE SULFATE 2 MG: 2 INJECTION, SOLUTION INTRAMUSCULAR; INTRAVENOUS at 11:53

## 2025-05-11 RX ADMIN — Medication 400 MG: at 20:58

## 2025-05-11 RX ADMIN — SODIUM CHLORIDE, PRESERVATIVE FREE 10 ML: 5 INJECTION INTRAVENOUS at 20:58

## 2025-05-11 RX ADMIN — MORPHINE SULFATE 2 MG: 2 INJECTION, SOLUTION INTRAMUSCULAR; INTRAVENOUS at 21:22

## 2025-05-11 RX ADMIN — DICYCLOMINE HYDROCHLORIDE 10 MG: 10 CAPSULE ORAL at 07:49

## 2025-05-11 RX ADMIN — VALACYCLOVIR HYDROCHLORIDE 500 MG: 500 TABLET, FILM COATED ORAL at 21:04

## 2025-05-11 RX ADMIN — PIPERACILLIN AND TAZOBACTAM 3375 MG: 3; .375 INJECTION, POWDER, LYOPHILIZED, FOR SOLUTION INTRAVENOUS at 18:17

## 2025-05-11 RX ADMIN — Medication 6 MG: at 20:58

## 2025-05-11 RX ADMIN — NYSTATIN 500000 UNITS: 100000 SUSPENSION ORAL at 15:46

## 2025-05-11 RX ADMIN — MORPHINE SULFATE 2 MG: 2 INJECTION, SOLUTION INTRAMUSCULAR; INTRAVENOUS at 00:14

## 2025-05-11 RX ADMIN — PANTOPRAZOLE SODIUM 40 MG: 40 INJECTION, POWDER, FOR SOLUTION INTRAVENOUS at 07:46

## 2025-05-11 RX ADMIN — Medication 400 MG: at 07:49

## 2025-05-11 RX ADMIN — NYSTATIN 500000 UNITS: 100000 SUSPENSION ORAL at 21:04

## 2025-05-11 RX ADMIN — METOPROLOL 12.5 MG: 25 TABLET ORAL at 20:58

## 2025-05-11 RX ADMIN — IRON SUCROSE 200 MG: 20 INJECTION, SOLUTION INTRAVENOUS at 11:04

## 2025-05-11 RX ADMIN — PIPERACILLIN AND TAZOBACTAM 3375 MG: 3; .375 INJECTION, POWDER, LYOPHILIZED, FOR SOLUTION INTRAVENOUS at 10:54

## 2025-05-11 RX ADMIN — BENZOCAINE: 200 SPRAY DENTAL; ORAL; PERIODONTAL at 20:58

## 2025-05-11 RX ADMIN — AMLODIPINE BESYLATE 2.5 MG: 2.5 TABLET ORAL at 07:49

## 2025-05-11 RX ADMIN — NYSTATIN 500000 UNITS: 100000 SUSPENSION ORAL at 10:50

## 2025-05-11 RX ADMIN — CALCIUM CARBONATE 500 MG: 500 TABLET, CHEWABLE ORAL at 07:49

## 2025-05-11 RX ADMIN — ASCORBIC ACID, VITAMIN A PALMITATE, CHOLECALCIFEROL, THIAMINE HYDROCHLORIDE, RIBOFLAVIN-5 PHOSPHATE SODIUM, PYRIDOXINE HYDROCHLORIDE, NIACINAMIDE, DEXPANTHENOL, ALPHA-TOCOPHEROL ACETATE, VITAMIN K1, FOLIC ACID, BIOTIN, CYANOCOBALAMIN: 200; 3300; 200; 6; 3.6; 6; 40; 15; 10; 150; 600; 60; 5 INJECTION, SOLUTION INTRAVENOUS at 18:27

## 2025-05-11 RX ADMIN — METOPROLOL 12.5 MG: 25 TABLET ORAL at 07:49

## 2025-05-11 RX ADMIN — DICYCLOMINE HYDROCHLORIDE 10 MG: 10 CAPSULE ORAL at 15:46

## 2025-05-11 RX ADMIN — MORPHINE SULFATE 2 MG: 2 INJECTION, SOLUTION INTRAMUSCULAR; INTRAVENOUS at 16:55

## 2025-05-11 RX ADMIN — NYSTATIN 500000 UNITS: 100000 SUSPENSION ORAL at 18:26

## 2025-05-11 RX ADMIN — PIPERACILLIN AND TAZOBACTAM 3375 MG: 3; .375 INJECTION, POWDER, LYOPHILIZED, FOR SOLUTION INTRAVENOUS at 02:44

## 2025-05-11 RX ADMIN — SODIUM CHLORIDE, PRESERVATIVE FREE 10 ML: 5 INJECTION INTRAVENOUS at 07:46

## 2025-05-11 RX ADMIN — ENOXAPARIN SODIUM 30 MG: 100 INJECTION SUBCUTANEOUS at 10:50

## 2025-05-11 RX ADMIN — MORPHINE SULFATE 2 MG: 2 INJECTION, SOLUTION INTRAMUSCULAR; INTRAVENOUS at 07:42

## 2025-05-11 RX ADMIN — DICYCLOMINE HYDROCHLORIDE 10 MG: 10 CAPSULE ORAL at 20:58

## 2025-05-11 ASSESSMENT — PAIN SCALES - GENERAL
PAINLEVEL_OUTOF10: 7
PAINLEVEL_OUTOF10: 5
PAINLEVEL_OUTOF10: 7
PAINLEVEL_OUTOF10: 0
PAINLEVEL_OUTOF10: 9
PAINLEVEL_OUTOF10: 7
PAINLEVEL_OUTOF10: 0

## 2025-05-11 ASSESSMENT — PAIN DESCRIPTION - LOCATION
LOCATION: ABDOMEN

## 2025-05-11 ASSESSMENT — PAIN DESCRIPTION - ORIENTATION
ORIENTATION: LEFT;MID
ORIENTATION: RIGHT;LEFT

## 2025-05-11 ASSESSMENT — PAIN DESCRIPTION - DESCRIPTORS
DESCRIPTORS: ACHING

## 2025-05-11 ASSESSMENT — PAIN SCALES - WONG BAKER: WONGBAKER_NUMERICALRESPONSE: NO HURT

## 2025-05-11 NOTE — PLAN OF CARE
Problem: Pain  Goal: Verbalizes/displays adequate comfort level or baseline comfort level  Outcome: Progressing     Problem: Safety - Adult  Goal: Free from fall injury  Outcome: Progressing     Problem: ABCDS Injury Assessment  Goal: Absence of physical injury  Outcome: Progressing     Problem: Nutrition Deficit:  Goal: Optimize nutritional status  Outcome: Progressing     Problem: Discharge Planning  Goal: Discharge to home or other facility with appropriate resources  Outcome: Progressing     Problem: Skin/Tissue Integrity - Adult  Goal: Skin integrity remains intact  Outcome: Progressing  Flowsheets (Taken 5/10/2025 2000)  Skin Integrity Remains Intact: Monitor for areas of redness and/or skin breakdown  Goal: Incisions, wounds, or drain sites healing without S/S of infection  Outcome: Progressing     Problem: Gastrointestinal - Adult  Goal: Minimal or absence of nausea and vomiting  Outcome: Progressing  Goal: Maintains or returns to baseline bowel function  Outcome: Progressing  Goal: Maintains adequate nutritional intake  Outcome: Progressing     Problem: Infection - Adult  Goal: Absence of infection at discharge  Outcome: Progressing  Flowsheets (Taken 5/10/2025 2000)  Absence of infection at discharge: Assess and monitor for signs and symptoms of infection  Goal: Absence of infection during hospitalization  Outcome: Progressing  Flowsheets (Taken 5/10/2025 2000)  Absence of infection during hospitalization: Assess and monitor for signs and symptoms of infection     Problem: Skin/Tissue Integrity  Goal: Skin integrity remains intact  Description: 1.  Monitor for areas of redness and/or skin breakdown2.  Assess vascular access sites hourly3.  Every 4-6 hours minimum:  Change oxygen saturation probe site4.  Every 4-6 hours:  If on nasal continuous positive airway pressure, respiratory therapy assess nares and determine need for appliance change or resting period  Outcome: Progressing  Flowsheets (Taken

## 2025-05-11 NOTE — PLAN OF CARE
Problem: Pain  Goal: Verbalizes/displays adequate comfort level or baseline comfort level  Outcome: Progressing  Flowsheets (Taken 5/11/2025 1518)  Verbalizes/displays adequate comfort level or baseline comfort level:   Encourage patient to monitor pain and request assistance   Administer analgesics based on type and severity of pain and evaluate response   Consider cultural and social influences on pain and pain management   Assess pain using appropriate pain scale   Implement non-pharmacological measures as appropriate and evaluate response   Notify Licensed Independent Practitioner if interventions unsuccessful or patient reports new pain     Problem: Nutrition Deficit:  Goal: Optimize nutritional status  Outcome: Progressing  Flowsheets (Taken 5/11/2025 1518)  Nutrient intake appropriate for improving, restoring, or maintaining nutritional needs:   Assess nutritional status and recommend course of action   Recommend appropriate diets, oral nutritional supplements, and vitamin/mineral supplements   Monitor oral intake, labs, and treatment plans   Order, calculate, and assess calorie counts as needed   Provide specific nutrition education to patient or family as appropriate   Recommend, monitor, and adjust tube feedings and TPN/PPN based on assessed needs

## 2025-05-11 NOTE — PROGRESS NOTES
[facility] Medicine Progress Note  V 1.6      Date of Admission: 4/23/2025    Hospital Day: 19      Chief Admission Complaint:  Abdominal pain    Subjective:      Patient sleeping when I Arrive. She is easily awakened and has no complaints today. She does tell me she has had spurts of increased output in her drains when up out of bed.     Presenting Admission History:       This is a 64 y.o. female who presented to [facility] with abdominal pain.  PMHx significant for HTN.  History obtained from the patient and review of EMR.  The patient stated she had a hemicolectomy in February 2025 in [facility] and since then has been experiencing intermittent abdominal pain.  Per chart review, the patient has been readmitted 1 time since her surgery for electrolyte abnormalities.  However, the patient stated her pain has gotten progressively worse over the last couple of weeks.  She reports speaking with GI and they ordered a CT outpatient today.  Patient  is at the bedside and stated that she was called and told to go to the emergency department due to \"a collection of pus\" seen on the CT. In the emergency department a CT abdomen pelvis was obtained that revealed Fluid and gas collection within the pelvis interposed between the colon and uterus measuring up to 4.1 cm.  Abscess is favored.  This appears largely enveloped by abdominal and adnexal structures.  A [place] uterine fistula is not excluded.  Additional small dependent collection within the region of the pouch of Alfred measuring up to 2.5 cm.  Mild dilation of small bowel loops which may indicate partial obstruction.  There is no high-grade small bowel obstruction.  The patient was given morphine for pain.  She was also started on Zosyn.  Upon further evaluation, the patient was found to be dehydrated with hyponatremia.  This is likely secondary to inadequate p.o. intake.  She was admitted for further evaluation and treatment.  IV fluids

## 2025-05-11 NOTE — PROGRESS NOTES
[facility] GENERAL SURGERY    Surgery Progress Note           POD # 12    PATIENT NAME: Tuyet Richter     TODAY'S DATE: 5/11/2025    INTERVAL HISTORY:    Pt patient states a little bit better today.  She feels a little less distended.     OBJECTIVE:   VITALS:  /75   Pulse 100   Temp 97.7 °F (36.5 °C) (Oral)   Resp 18   Ht 1.6 m (5' 3\")   Wt 49.6 kg (109 lb 6.4 oz)   SpO2 100%   BMI 19.38 kg/m²     INTAKE/OUTPUT:    I/O last 3 completed shifts:  In: 0   Out: 5565 [Urine:4000; Emesis/NG output:1400; Drains:165]  I/O this shift:  In: 0   Out: 50 [Drains:50]              CONSTITUTIONAL:  awake and alert  ABDOMEN:   hypoactive bowel sounds, soft, less distended, tenderness noted diffusely   INCISION: clean, dry, TODD with purulent drainage.  Right sided drain with feculent appearing drainage    Data:  CBC:   Recent Labs     05/09/25  0427 05/10/25  0632 05/11/25  0535   WBC 8.4 10.1 8.3   HGB 8.1* 8.4* 7.8*   HCT 23.8* 24.7* 23.2*    456* 445     BMP:    Recent Labs     05/10/25  0634 05/10/25  2200 05/11/25  0535   * 134* 135*   K 3.7  3.7 4.1 4.0  4.0   CL 99 101 103   CO2 24 23 22   BUN 5* 7 8   CREATININE 0.3* 0.4* 0.4*   GLUCOSE 107* 132* 116*     Hepatic:   Recent Labs     05/09/25  0427 05/10/25  0634 05/11/25  0535   AST 9* 13* 9*   ALT <5* <5* <5*   BILITOT <0.2 <0.2 <0.2   ALKPHOS 91 110 80     Mag:      Recent Labs     05/10/25  0634 05/10/25  2200 05/11/25  0535   MG 1.47* 2.44* 2.00      Phos:     Recent Labs     05/10/25  0634 05/10/25  2200 05/11/25  0535   PHOS 3.6 4.8 4.3      INR:   Recent Labs     05/09/25  1235   INR 1.13         Radiology Review: N/A    ASSESSMENT AND PLAN:  65 y.o. female status post Laparoscopic lysis of adhesions and abscess drainage with new percutaneous drain placed on Friday.  Continue IV antibiotics, NG, and TPN.  She will need a fistulogram from the right drain at some point to evaluate the possibility of leakage from the ileocolic

## 2025-05-11 NOTE — PROGRESS NOTES
Assessment completed and documented. VSS. A/ox4. Pt c/o of more LLQ pain of abdomin prn morphine given per order and request. TODD drain LUQ milky/green in color, less than 5ml out. RUQ drain to gravity, tan in color, patent with 50ml output. NGT to CLWS. TPN, and IV abiotic infusing. Pt up to chair for a few hours today.  Bed locked and in lowest position. Bedside table and call light within reach. Denies further needs at this time.

## 2025-05-11 NOTE — PROGRESS NOTES
female with PMH of hypertension, hiatal hernia who is admitted on 4/23/2025 diverticulitis and abdominal abscesses, underwent laparoscopic lysis of adhesions and I&D of the abscesses on 4/29/2025.  She has been having ileus and is only advanced to liquid diet from 5/5/2025  Sodium was 129 on 5/2 then increased to 137 on 5/4 but dropped to 132 on 5/5 and 126 on 5/6 along with hypokalemia  She has chronic history of hyponatremia with sodium in the low 130s and was recently admitted in [place] in March during when she was put on salt tablets 2 g 3 times daily    Physical exam:   Constitutional:  VITALS:  /75   Pulse 100   Temp 97.7 °F (36.5 °C) (Oral)   Resp 18   Ht 1.6 m (5' 3\")   Wt 49.6 kg (109 lb 6.4 oz)   SpO2 100%   BMI 19.38 kg/m²   Gen: alert, awake  Neck: No JVD  Skin: Unremarkable  Cardiovascular:  S1, S2 without m/r/g   Respiratory: CTA B without w/r/r; respiratory effort normal  Abdomen:  soft, mildly tender and distended, drain in situ  Extremities: no lower extremity edema  Neuro/Psy: AAoriented times 3 ; moves all 4 ext    Data/  Recent Labs     05/09/25  0427 05/10/25  0632 05/11/25  0535   WBC 8.4 10.1 8.3   HGB 8.1* 8.4* 7.8*   HCT 23.8* 24.7* 23.2*   MCV 90.5 90.0 90.9    456* 445     Recent Labs     05/10/25  0634 05/10/25  2200 05/11/25  0535   * 134* 135*   K 3.7  3.7 4.1 4.0  4.0   CL 99 101 103   CO2 24 23 22   GLUCOSE 107* 132* 116*   PHOS 3.6 4.8 4.3   MG 1.47* 2.44* 2.00   BUN 5* 7 8   CREATININE 0.3* 0.4* 0.4*   LABGLOM >90 >90 >90     CT scan of the abdomen pelvis with IV contrast  IMPRESSION:  High-grade small bowel obstruction, with a transition point not well seen but  likely in the right lower abdomen given collapsed small bowel in the right lower  abdomen and pelvis.     Interval development of focal peritoneal fluid in the left upper abdomen with  differential of developing abscess, bowel perforation, anastomotic leak or focal  ascites.     Possible  extraluminal fluid collection versus tethered loop of small bowel in  the right lower abdomen and upper pelvis.     Small residual fluid and gas collection around a drainage catheter previously  placed intraoperatively.     Other findings of sigmoid diverticulosis, mild distention of the gallbladder  without wall thickening and small bilateral pleural effusions with dependent  atelectasis.    Urine sodium less than 20, potassium 23, osmolality 293  Uric acid 1.6  TSH 2.3    Assessment  -Hyponatremia likely related to decreased solute intake in the setting of ileus and recent initiation of liquid diet.  She was recently treated for similar presentation in March 2025 in [place] where she was managed with salt tablets  Hypokalemia also adds to the problem of hyponatremia  Patient was receiving normal saline at 50 mL/h which has been stopped on 5/6    -Hypokalemia, hypomagnesemia    -Diverticulitis with intra-abdominal abscess status post laparoscopic adhesiolysis and drainage on 4/29/2025 per general surgery    Plan  - Replace potassium, magnesium, phosphorus prn as ordered  -serial labs as ordered    Thank you for the consultation.  Please do not hesitate to call with questions.    Kirk Vincent MD  Office: [phone]  Fax:    [phone]  [facility]

## 2025-05-11 NOTE — PROGRESS NOTES
[facility] Medicine Progress Note  V 1.6      Date of Admission: 4/23/2025    Hospital Day: 19      Chief Admission Complaint:  Abdominal pain    Subjective:      Patient up in the chair. She tells me she is feeling okay today. No fevers and she had 2 BMs this am.     Presenting Admission History:       This is a 64 y.o. female who presented to [facility] with abdominal pain.  PMHx significant for HTN.  History obtained from the patient and review of EMR.  The patient stated she had a hemicolectomy in February 2025 in [facility] and since then has been experiencing intermittent abdominal pain.  Per chart review, the patient has been readmitted 1 time since her surgery for electrolyte abnormalities.  However, the patient stated her pain has gotten progressively worse over the last couple of weeks.  She reports speaking with GI and they ordered a CT outpatient today.  Patient  is at the bedside and stated that she was called and told to go to the emergency department due to \"a collection of pus\" seen on the CT. In the emergency department a CT abdomen pelvis was obtained that revealed Fluid and gas collection within the pelvis interposed between the colon and uterus measuring up to 4.1 cm.  Abscess is favored.  This appears largely enveloped by abdominal and adnexal structures.  A [place] uterine fistula is not excluded.  Additional small dependent collection within the region of the pouch of Alfred measuring up to 2.5 cm.  Mild dilation of small bowel loops which may indicate partial obstruction.  There is no high-grade small bowel obstruction.  The patient was given morphine for pain.  She was also started on Zosyn.  Upon further evaluation, the patient was found to be dehydrated with hyponatremia.  This is likely secondary to inadequate p.o. intake.  She was admitted for further evaluation and treatment.  IV fluids have been initiated and GI has been consulted for the a.m. The patient

## 2025-05-12 ENCOUNTER — APPOINTMENT (OUTPATIENT)
Dept: GENERAL RADIOLOGY | Age: 65
DRG: 856 | End: 2025-05-12
Payer: MEDICARE

## 2025-05-12 LAB
ALBUMIN SERPL-MCNC: 2.8 G/DL (ref 3.4–5)
ALBUMIN/GLOB SERPL: 0.9 {RATIO} (ref 1.1–2.2)
ALP SERPL-CCNC: 92 U/L (ref 40–129)
ALT SERPL-CCNC: <5 U/L (ref 10–40)
ANION GAP SERPL CALCULATED.3IONS-SCNC: 9 MMOL/L (ref 3–16)
AST SERPL-CCNC: 12 U/L (ref 15–37)
BASOPHILS # BLD: 0.2 K/UL (ref 0–0.2)
BASOPHILS NFR BLD: 1 %
BILIRUB DIRECT SERPL-MCNC: <0.1 MG/DL (ref 0–0.3)
BILIRUB INDIRECT SERPL-MCNC: NORMAL MG/DL (ref 0–1)
BILIRUB SERPL-MCNC: <0.2 MG/DL (ref 0–1)
BUN SERPL-MCNC: 11 MG/DL (ref 7–20)
CALCIUM SERPL-MCNC: 8.6 MG/DL (ref 8.3–10.6)
CHLORIDE SERPL-SCNC: 101 MMOL/L (ref 99–110)
CO2 SERPL-SCNC: 23 MMOL/L (ref 21–32)
CREAT SERPL-MCNC: 0.4 MG/DL (ref 0.6–1.2)
DEPRECATED RDW RBC AUTO: 16.5 % (ref 12.4–15.4)
EOSINOPHIL # BLD: 0.1 K/UL (ref 0–0.6)
EOSINOPHIL NFR BLD: 0.9 %
FOLATE SERPL-MCNC: 6.25 NG/ML (ref 4.78–24.2)
GFR SERPLBLD CREATININE-BSD FMLA CKD-EPI: >90 ML/MIN/{1.73_M2}
GLUCOSE BLD-MCNC: 112 MG/DL (ref 70–99)
GLUCOSE BLD-MCNC: 116 MG/DL (ref 70–99)
GLUCOSE BLD-MCNC: 121 MG/DL (ref 70–99)
GLUCOSE BLD-MCNC: 136 MG/DL (ref 70–99)
GLUCOSE SERPL-MCNC: 89 MG/DL (ref 70–99)
HCT VFR BLD AUTO: 24.1 % (ref 36–48)
HGB BLD-MCNC: 7.9 G/DL (ref 12–16)
LYMPHOCYTES # BLD: 1.8 K/UL (ref 1–5.1)
LYMPHOCYTES NFR BLD: 10.2 %
MAGNESIUM SERPL-MCNC: 1.56 MG/DL (ref 1.8–2.4)
MCH RBC QN AUTO: 29.5 PG (ref 26–34)
MCHC RBC AUTO-ENTMCNC: 32.6 G/DL (ref 31–36)
MCV RBC AUTO: 90.5 FL (ref 80–100)
MONOCYTES # BLD: 1.5 K/UL (ref 0–1.3)
MONOCYTES NFR BLD: 8.6 %
NEUTROPHILS # BLD: 13.6 K/UL (ref 1.7–7.7)
NEUTROPHILS NFR BLD: 79.3 %
PERFORMED ON: ABNORMAL
PHOSPHATE SERPL-MCNC: 4.1 MG/DL (ref 2.5–4.9)
PLATELET # BLD AUTO: 586 K/UL (ref 135–450)
PMV BLD AUTO: 8.6 FL (ref 5–10.5)
POTASSIUM SERPL-SCNC: 4 MMOL/L (ref 3.5–5.1)
PROT SERPL-MCNC: 5.8 G/DL (ref 6.4–8.2)
RBC # BLD AUTO: 2.67 M/UL (ref 4–5.2)
SODIUM SERPL-SCNC: 133 MMOL/L (ref 136–145)
VIT B12 SERPL-MCNC: 946 PG/ML (ref 211–911)
WBC # BLD AUTO: 17.2 K/UL (ref 4–11)

## 2025-05-12 PROCEDURE — 6360000002 HC RX W HCPCS: Performed by: HOSPITALIST

## 2025-05-12 PROCEDURE — 6370000000 HC RX 637 (ALT 250 FOR IP): Performed by: NURSE PRACTITIONER

## 2025-05-12 PROCEDURE — 2500000003 HC RX 250 WO HCPCS: Performed by: SURGERY

## 2025-05-12 PROCEDURE — 6360000002 HC RX W HCPCS: Performed by: NURSE PRACTITIONER

## 2025-05-12 PROCEDURE — 74018 RADEX ABDOMEN 1 VIEW: CPT

## 2025-05-12 PROCEDURE — 80053 COMPREHEN METABOLIC PANEL: CPT

## 2025-05-12 PROCEDURE — 1200000000 HC SEMI PRIVATE

## 2025-05-12 PROCEDURE — 6370000000 HC RX 637 (ALT 250 FOR IP): Performed by: SURGERY

## 2025-05-12 PROCEDURE — 2580000003 HC RX 258: Performed by: NURSE PRACTITIONER

## 2025-05-12 PROCEDURE — 82746 ASSAY OF FOLIC ACID SERUM: CPT

## 2025-05-12 PROCEDURE — 6360000002 HC RX W HCPCS: Performed by: STUDENT IN AN ORGANIZED HEALTH CARE EDUCATION/TRAINING PROGRAM

## 2025-05-12 PROCEDURE — 6360000002 HC RX W HCPCS: Performed by: INTERNAL MEDICINE

## 2025-05-12 PROCEDURE — 85025 COMPLETE CBC W/AUTO DIFF WBC: CPT

## 2025-05-12 PROCEDURE — 83735 ASSAY OF MAGNESIUM: CPT

## 2025-05-12 PROCEDURE — 82607 VITAMIN B-12: CPT

## 2025-05-12 PROCEDURE — 84100 ASSAY OF PHOSPHORUS: CPT

## 2025-05-12 PROCEDURE — 6360000002 HC RX W HCPCS: Performed by: SURGERY

## 2025-05-12 PROCEDURE — 99024 POSTOP FOLLOW-UP VISIT: CPT | Performed by: SURGERY

## 2025-05-12 PROCEDURE — 2580000003 HC RX 258: Performed by: INTERNAL MEDICINE

## 2025-05-12 RX ADMIN — Medication 6 MG: at 21:13

## 2025-05-12 RX ADMIN — ENOXAPARIN SODIUM 30 MG: 100 INJECTION SUBCUTANEOUS at 10:39

## 2025-05-12 RX ADMIN — PIPERACILLIN AND TAZOBACTAM 3375 MG: 3; .375 INJECTION, POWDER, LYOPHILIZED, FOR SOLUTION INTRAVENOUS at 01:49

## 2025-05-12 RX ADMIN — ASCORBIC ACID, VITAMIN A PALMITATE, CHOLECALCIFEROL, THIAMINE HYDROCHLORIDE, RIBOFLAVIN-5 PHOSPHATE SODIUM, PYRIDOXINE HYDROCHLORIDE, NIACINAMIDE, DEXPANTHENOL, ALPHA-TOCOPHEROL ACETATE, VITAMIN K1, FOLIC ACID, BIOTIN, CYANOCOBALAMIN: 200; 3300; 200; 6; 3.6; 6; 40; 15; 10; 150; 600; 60; 5 INJECTION, SOLUTION INTRAVENOUS at 18:20

## 2025-05-12 RX ADMIN — MORPHINE SULFATE 2 MG: 2 INJECTION, SOLUTION INTRAMUSCULAR; INTRAVENOUS at 16:08

## 2025-05-12 RX ADMIN — SODIUM CHLORIDE, PRESERVATIVE FREE 10 ML: 5 INJECTION INTRAVENOUS at 21:00

## 2025-05-12 RX ADMIN — DICYCLOMINE HYDROCHLORIDE 10 MG: 10 CAPSULE ORAL at 21:12

## 2025-05-12 RX ADMIN — PIPERACILLIN AND TAZOBACTAM 3375 MG: 3; .375 INJECTION, POWDER, LYOPHILIZED, FOR SOLUTION INTRAVENOUS at 10:56

## 2025-05-12 RX ADMIN — NYSTATIN 500000 UNITS: 100000 SUSPENSION ORAL at 18:20

## 2025-05-12 RX ADMIN — IRON SUCROSE 200 MG: 20 INJECTION, SOLUTION INTRAVENOUS at 16:19

## 2025-05-12 RX ADMIN — CALCIUM CARBONATE 500 MG: 500 TABLET, CHEWABLE ORAL at 21:12

## 2025-05-12 RX ADMIN — MORPHINE SULFATE 2 MG: 2 INJECTION, SOLUTION INTRAMUSCULAR; INTRAVENOUS at 05:49

## 2025-05-12 RX ADMIN — DICYCLOMINE HYDROCHLORIDE 10 MG: 10 CAPSULE ORAL at 10:39

## 2025-05-12 RX ADMIN — NYSTATIN 500000 UNITS: 100000 SUSPENSION ORAL at 16:18

## 2025-05-12 RX ADMIN — Medication 400 MG: at 21:12

## 2025-05-12 RX ADMIN — PANTOPRAZOLE SODIUM 40 MG: 40 TABLET, DELAYED RELEASE ORAL at 05:28

## 2025-05-12 RX ADMIN — Medication 400 MG: at 10:39

## 2025-05-12 RX ADMIN — NYSTATIN 500000 UNITS: 100000 SUSPENSION ORAL at 21:12

## 2025-05-12 RX ADMIN — MORPHINE SULFATE 2 MG: 2 INJECTION, SOLUTION INTRAMUSCULAR; INTRAVENOUS at 01:53

## 2025-05-12 RX ADMIN — SODIUM CHLORIDE, PRESERVATIVE FREE 10 ML: 5 INJECTION INTRAVENOUS at 10:05

## 2025-05-12 RX ADMIN — PANTOPRAZOLE SODIUM 40 MG: 40 INJECTION, POWDER, FOR SOLUTION INTRAVENOUS at 10:39

## 2025-05-12 RX ADMIN — MORPHINE SULFATE 2 MG: 2 INJECTION, SOLUTION INTRAMUSCULAR; INTRAVENOUS at 10:01

## 2025-05-12 RX ADMIN — MORPHINE SULFATE 2 MG: 2 INJECTION, SOLUTION INTRAMUSCULAR; INTRAVENOUS at 21:05

## 2025-05-12 RX ADMIN — AMLODIPINE BESYLATE 2.5 MG: 2.5 TABLET ORAL at 10:39

## 2025-05-12 RX ADMIN — I.V. FAT EMULSION 250 ML: 20 EMULSION INTRAVENOUS at 18:24

## 2025-05-12 RX ADMIN — DICYCLOMINE HYDROCHLORIDE 10 MG: 10 CAPSULE ORAL at 16:18

## 2025-05-12 RX ADMIN — METOPROLOL 12.5 MG: 25 TABLET ORAL at 10:39

## 2025-05-12 RX ADMIN — METOPROLOL 12.5 MG: 25 TABLET ORAL at 21:12

## 2025-05-12 RX ADMIN — NYSTATIN 500000 UNITS: 100000 SUSPENSION ORAL at 10:39

## 2025-05-12 RX ADMIN — PIPERACILLIN AND TAZOBACTAM 3375 MG: 3; .375 INJECTION, POWDER, LYOPHILIZED, FOR SOLUTION INTRAVENOUS at 16:20

## 2025-05-12 ASSESSMENT — PAIN SCALES - GENERAL
PAINLEVEL_OUTOF10: 2
PAINLEVEL_OUTOF10: 7
PAINLEVEL_OUTOF10: 6
PAINLEVEL_OUTOF10: 7
PAINLEVEL_OUTOF10: 0
PAINLEVEL_OUTOF10: 2
PAINLEVEL_OUTOF10: 8
PAINLEVEL_OUTOF10: 6

## 2025-05-12 ASSESSMENT — PAIN DESCRIPTION - ORIENTATION
ORIENTATION: RIGHT;LEFT
ORIENTATION: RIGHT;LEFT

## 2025-05-12 ASSESSMENT — PAIN DESCRIPTION - LOCATION
LOCATION: GENERALIZED
LOCATION: ABDOMEN

## 2025-05-12 ASSESSMENT — PAIN DESCRIPTION - DESCRIPTORS
DESCRIPTORS: ACHING
DESCRIPTORS: ACHING

## 2025-05-12 ASSESSMENT — PAIN SCALES - WONG BAKER
WONGBAKER_NUMERICALRESPONSE: HURTS A LITTLE BIT
WONGBAKER_NUMERICALRESPONSE: HURTS A LITTLE BIT

## 2025-05-12 NOTE — PROGRESS NOTES
[facility] Medicine Progress Note  V 1.6      Date of Admission: 4/23/2025    Hospital Day: 20      Chief Admission Complaint:  Abdominal pain    Subjective:      Patient is lying in bed. She has no complaints today    Presenting Admission History:       This is a 64 y.o. female who presented to [facility] with abdominal pain.  PMHx significant for HTN.  History obtained from the patient and review of EMR.  The patient stated she had a hemicolectomy in February [facility] in [facility] and since then has been experiencing intermittent abdominal pain.  Per chart review, the patient has been readmitted 1 time since her surgery for electrolyte abnormalities.  However, the patient stated her pain has gotten progressively worse over the last couple of weeks.  She reports speaking with GI and they ordered a CT outpatient today.  Patient  is at the bedside and stated that she was called and told to go to the emergency department due to \"a collection of pus\" seen on the CT. In the emergency department a CT abdomen pelvis was obtained that revealed Fluid and gas collection within the pelvis interposed between the colon and uterus measuring up to 4.1 cm.  Abscess is favored.  This appears largely enveloped by abdominal and adnexal structures.  A [place] uterine fistula is not excluded.  Additional small dependent collection within the region of the pouch of Alfred measuring up to 2.5 cm.  Mild dilation of small bowel loops which may indicate partial obstruction.  There is no high-grade small bowel obstruction.  The patient was given morphine for pain.  She was also started on Zosyn.  Upon further evaluation, the patient was found to be dehydrated with hyponatremia.  This is likely secondary to inadequate p.o. intake.  She was admitted for further evaluation and treatment.  IV fluids have been initiated and GI has been consulted for the a.m. The patient denied any other associated symptoms as well as  mL IVPB  200 mg IntraVENous Q24H    insulin lispro  0-4 Units SubCUTAneous 4x Daily AC & HS    pantoprazole  40 mg IntraVENous Daily    amLODIPine  2.5 mg Per NG tube Daily    calcium carbonate  1 tablet Per NG tube BID    magnesium oxide  400 mg Per NG tube BID    valACYclovir  500 mg Per NG tube BID    melatonin  6 mg Per NG tube Nightly    metoprolol tartrate  12.5 mg Per NG tube BID    nystatin  5 mL Mouth/Throat 4x Daily    fat emulsion  250 mL IntraVENous Once per day on Monday Thursday    piperacillin-tazobactam  3,375 mg IntraVENous Q8H    [Held by provider] metoprolol succinate  25 mg Oral Daily    dicyclomine  10 mg Oral TID    pantoprazole  40 mg Oral QAM AC    sodium chloride flush  5-40 mL IntraVENous 2 times per day    enoxaparin  30 mg SubCUTAneous Daily     PRN Meds: diatrizoate meglumine-sodium, potassium chloride **OR** potassium alternative oral replacement **OR** potassium chloride, magnesium sulfate, sodium phosphate 15 mmol in sodium chloride 0.9 % 250 mL IVPB, glucose, dextrose bolus **OR** dextrose bolus, glucagon (rDNA), dextrose, Magic Mouth wash with nystatin, benzocaine, polyethylene glycol, sodium chloride, morphine, midazolam, diatrizoate meglumine-sodium, sodium chloride flush, sodium chloride, ondansetron **OR** ondansetron, acetaminophen **OR** acetaminophen      Physical Exam Performed:      General appearance:  No apparent distress  Respiratory:  Normal respiratory effort.   Cardiovascular:  Normal, regular rhythm.  Abdomen:  Soft, mildly tender, distended. Incision well approximated, staples in place. R sided drain with yellowish purulent drainage  Musculoskeletal:  No edema  Neurologic:  Non-focal  Psychiatric:  Alert and oriented    BP (!) 140/73   Pulse 96   Temp 98 °F (36.7 °C) (Oral)   Resp 17   Ht 1.6 m (5' 3\")   Wt 49 kg (108 lb)   SpO2 99%   BMI 19.13 kg/m²     Diet: Diet NPO  PN-Adult Premix 5/15 - Standard Electrolytes - Central Line    DVT Prophylaxis: [x]PPx

## 2025-05-12 NOTE — PROGRESS NOTES
[facility] GENERAL SURGERY    Surgery Progress Note           POD # 13    PATIENT NAME: Tuyet Richter     TODAY'S DATE: 5/12/2025    INTERVAL HISTORY:    Some flatus, distended still     OBJECTIVE:   VITALS:  BP (!) 149/82   Pulse 100   Temp 98.2 °F (36.8 °C) (Oral)   Resp 16   Ht 1.6 m (5' 3\")   Wt 49 kg (108 lb)   SpO2 98%   BMI 19.13 kg/m²     INTAKE/OUTPUT:    I/O last 3 completed shifts:  In: 8072.3 [IV Piggyback:961]  Out: 2185 [Urine:2000; Drains:185]  No intake/output data recorded.              CONSTITUTIONAL:  awake and alert  ABDOMEN:   hypoactive bowel sounds, soft, distended, tenderness noted diffusely   INCISION: clean, dry, TODD with purulent drainage.  Right sided drain with purulent drainage    Data:  CBC:   Recent Labs     05/10/25  0632 05/11/25  0535   WBC 10.1 8.3   HGB 8.4* 7.8*   HCT 24.7* 23.2*   * 445     BMP:    Recent Labs     05/10/25  0634 05/10/25  2200 05/11/25  0535   * 134* 135*   K 3.7  3.7 4.1 4.0  4.0   CL 99 101 103   CO2 24 23 22   BUN 5* 7 8   CREATININE 0.3* 0.4* 0.4*   GLUCOSE 107* 132* 116*     Hepatic:   Recent Labs     05/10/25  0634 05/11/25  0535   AST 13* 9*   ALT <5* <5*   BILITOT <0.2 <0.2   ALKPHOS 110 80     Mag:      Recent Labs     05/10/25  2200 05/11/25  0535 05/11/25  1835   MG 2.44* 2.00 1.60*      Phos:     Recent Labs     05/10/25  2200 05/11/25  0535 05/11/25  1835   PHOS 4.8 4.3 3.9      INR:   Recent Labs     05/09/25  1235   INR 1.13         Radiology Review: N/A    ASSESSMENT AND PLAN:  65 y.o. female status post Laparoscopic lysis of adhesions and abscess drainage with new percutaneous drain placed on Friday.    Continue IV antibiotics, NG, and TPN.    Flushed perc drain with increased output - start doing qshift  fistulogram from the right drain likely Wednesday to assess the possibility of leakage from the ileocolic anastomosis        Electronically signed by Dallas Guerrero MD

## 2025-05-12 NOTE — PROGRESS NOTES
Pt NG tube came out 20 cm, this RN advanced the NG tube to the 53 bud. XR ordered to confirm placement, medications and suction will be held until confirmation by the MD for placement of the NG tube. Yamilex Major notified.

## 2025-05-12 NOTE — PROGRESS NOTES
[facility].[place]  Nephrology follow-up note           Reason for Consult: Hyponatremia  Requesting Physician:  Dr. Morgan    Sub/interval history  Sodium stabilizing in mid 130 range.  C/o abdominal pain.  Intake reduced, on TPN.    ROS:   +weak, no shortness of breath, on RA.    Scheduled Meds:   iron sucrose (VENOFER) 200 mg in sodium chloride 0.9 % 100 mL IVPB  200 mg IntraVENous Q24H    insulin lispro  0-4 Units SubCUTAneous 4x Daily AC & HS    pantoprazole  40 mg IntraVENous Daily    amLODIPine  2.5 mg Per NG tube Daily    calcium carbonate  1 tablet Per NG tube BID    magnesium oxide  400 mg Per NG tube BID    valACYclovir  500 mg Per NG tube BID    melatonin  6 mg Per NG tube Nightly    metoprolol tartrate  12.5 mg Per NG tube BID    nystatin  5 mL Mouth/Throat 4x Daily    fat emulsion  250 mL IntraVENous Once per day on Monday Thursday    piperacillin-tazobactam  3,375 mg IntraVENous Q8H    [Held by provider] metoprolol succinate  25 mg Oral Daily    dicyclomine  10 mg Oral TID    pantoprazole  40 mg Oral QAM AC    sodium chloride flush  5-40 mL IntraVENous 2 times per day    enoxaparin  30 mg SubCUTAneous Daily     Continuous Infusions:   PN-Adult Premix 5/15 - Standard Electrolytes - Central Line      PN-Adult Premix 5/15 - Standard Electrolytes - Central Line 75 mL/hr at 05/11/25 1827    dextrose      sodium chloride      sodium chloride Stopped (05/06/25 1753)     PRN Meds:.diatrizoate meglumine-sodium, potassium chloride **OR** potassium alternative oral replacement **OR** potassium chloride, magnesium sulfate, sodium phosphate 15 mmol in sodium chloride 0.9 % 250 mL IVPB, glucose, dextrose bolus **OR** dextrose bolus, glucagon (rDNA), dextrose, Magic Mouth wash with nystatin, benzocaine, polyethylene glycol, sodium chloride, morphine, midazolam, diatrizoate meglumine-sodium, sodium chloride flush, sodium chloride, ondansetron **OR** ondansetron, acetaminophen **OR** acetaminophen    History of Present

## 2025-05-12 NOTE — CARE COORDINATION
Hospital day 19: Patient on C3 re Intra abdominal abscess care managed by IM, General Surgery, and Nephrology.  Patient from home with spouse IPTA. Met with Patient and spouse bedside ( he is very supportive) reports doing fair, trying to get up to chair daily, limited mobility re NGT.  NGT out/advanced this am XR to confirm placement. Patient with TODD drain x2. PICC for TPN. Patient on IV ATB. Declined therapy need may reconsider once NGT can come out.GASTON Alexander

## 2025-05-12 NOTE — PROGRESS NOTES
Comprehensive Nutrition Assessment    Type and Reason for Visit:  Reassess    Nutrition Recommendations/Plan:   Continue Clinimix 5/15 at goal rate of 75 mL/hour.  Recommend 250 mL bags of 20% lipids 2 times per week.  Physician/LIP to monitor closely and correct electrolytes (Phos,Mg,K+) due to risk of refeeding syndrome   Recommend FSBS, monitor glucose, need for insulin.  Pharmacy to adjust MVI and Trace Elements as needed   When PN to be discontinued, cut rate by 50% and let current bag run out.      Malnutrition Assessment:  Malnutrition Status:  Severe malnutrition (04/24/25 1120)    Context:  Acute Illness     Findings of the 6 clinical characteristics of malnutrition:  Energy Intake:  75% or less of estimated energy requirements for 7 or more days  Weight Loss:  Mild weight loss     Body Fat Loss:  Moderate body fat loss Orbital, Triceps   Muscle Mass Loss:  Moderate muscle mass loss Temples (temporalis), Clavicles (pectoralis & deltoids)  Fluid Accumulation:  No fluid accumulation     Strength:  Not Performed    Nutrition Assessment:    Follow up: Pt nutritionally compromised AEB need for alternative nutrition. Currently NPO on TPN of Clinimix 5/15 at goal rate of 75 ml/hr. Continues to have NG to suction. Patient reports she feels \"about the same.\" Distended. LBM 5/10. Pt denied having any questions reguarding TPN at this time. Pts body weight is down -11lbs x3 days. However, question weight accuracies due to weights fluctuating 108-135lbs throughout admission per EMR. Per GenSurg, \"fistulogram from the right drain likely Wednesday to assess the possibility of leakage from the ileocolic anastomosis.\" Will continue to monitor.    Nutrition Related Findings:    Na 135, Mg 1.60. Trace edema LLE. LBM 5/10. TPN at goal of 75ml/hr. Wound Type: Surgical Incision       Current Nutrition Intake & Therapies:    Average Meal Intake: NPO  Average Supplements Intake: NPO  Diet NPO  PN-Adult Premix 5/15 - Standard

## 2025-05-13 LAB
ALBUMIN SERPL-MCNC: 2.5 G/DL (ref 3.4–5)
ALBUMIN/GLOB SERPL: 0.8 {RATIO} (ref 1.1–2.2)
ALP SERPL-CCNC: 81 U/L (ref 40–129)
ALT SERPL-CCNC: <5 U/L (ref 10–40)
ANION GAP SERPL CALCULATED.3IONS-SCNC: 10 MMOL/L (ref 3–16)
ANISOCYTOSIS BLD QL SMEAR: ABNORMAL
AST SERPL-CCNC: 11 U/L (ref 15–37)
BASOPHILS # BLD: 0 K/UL (ref 0–0.2)
BASOPHILS NFR BLD: 0 %
BILIRUB SERPL-MCNC: <0.2 MG/DL (ref 0–1)
BILIRUB UR QL STRIP.AUTO: NEGATIVE
BUN SERPL-MCNC: 10 MG/DL (ref 7–20)
CALCIUM SERPL-MCNC: 8.5 MG/DL (ref 8.3–10.6)
CHLORIDE SERPL-SCNC: 102 MMOL/L (ref 99–110)
CLARITY UR: CLEAR
CO2 SERPL-SCNC: 23 MMOL/L (ref 21–32)
COLOR UR: YELLOW
CREAT SERPL-MCNC: 0.4 MG/DL (ref 0.6–1.2)
DEPRECATED RDW RBC AUTO: 16.5 % (ref 12.4–15.4)
EOSINOPHIL # BLD: 0 K/UL (ref 0–0.6)
EOSINOPHIL NFR BLD: 0 %
GFR SERPLBLD CREATININE-BSD FMLA CKD-EPI: >90 ML/MIN/{1.73_M2}
GLUCOSE BLD-MCNC: 109 MG/DL (ref 70–99)
GLUCOSE BLD-MCNC: 111 MG/DL (ref 70–99)
GLUCOSE BLD-MCNC: 126 MG/DL (ref 70–99)
GLUCOSE BLD-MCNC: 131 MG/DL (ref 70–99)
GLUCOSE BLD-MCNC: 92 MG/DL (ref 70–99)
GLUCOSE SERPL-MCNC: 119 MG/DL (ref 70–99)
GLUCOSE UR STRIP.AUTO-MCNC: NEGATIVE MG/DL
HCT VFR BLD AUTO: 22.9 % (ref 36–48)
HGB BLD-MCNC: 7.6 G/DL (ref 12–16)
HGB UR QL STRIP.AUTO: NEGATIVE
HYPOCHROMIA BLD QL SMEAR: ABNORMAL
KETONES UR STRIP.AUTO-MCNC: NEGATIVE MG/DL
LEUKOCYTE ESTERASE UR QL STRIP.AUTO: NEGATIVE
LYMPHOCYTES # BLD: 1 K/UL (ref 1–5.1)
LYMPHOCYTES NFR BLD: 7 %
MAGNESIUM SERPL-MCNC: 1.4 MG/DL (ref 1.8–2.4)
MAGNESIUM SERPL-MCNC: 1.58 MG/DL (ref 1.8–2.4)
MCH RBC QN AUTO: 30.2 PG (ref 26–34)
MCHC RBC AUTO-ENTMCNC: 33.1 G/DL (ref 31–36)
MCV RBC AUTO: 91.1 FL (ref 80–100)
MONOCYTES # BLD: 0.4 K/UL (ref 0–1.3)
MONOCYTES NFR BLD: 3 %
NEUTROPHILS # BLD: 12.5 K/UL (ref 1.7–7.7)
NEUTROPHILS NFR BLD: 85 %
NEUTS BAND NFR BLD MANUAL: 5 % (ref 0–7)
NITRITE UR QL STRIP.AUTO: NEGATIVE
OVALOCYTES BLD QL SMEAR: ABNORMAL
PATH INTERP BLD-IMP: NO
PERFORMED ON: ABNORMAL
PERFORMED ON: NORMAL
PH UR STRIP.AUTO: 7 [PH] (ref 5–8)
PLATELET # BLD AUTO: 607 K/UL (ref 135–450)
PLATELET BLD QL SMEAR: ABNORMAL
PMV BLD AUTO: 8.5 FL (ref 5–10.5)
POIKILOCYTOSIS BLD QL SMEAR: ABNORMAL
POLYCHROMASIA BLD QL SMEAR: ABNORMAL
POTASSIUM SERPL-SCNC: 3.5 MMOL/L (ref 3.5–5.1)
PROT SERPL-MCNC: 5.8 G/DL (ref 6.4–8.2)
PROT UR STRIP.AUTO-MCNC: NEGATIVE MG/DL
RBC # BLD AUTO: 2.52 M/UL (ref 4–5.2)
SLIDE REVIEW: ABNORMAL
SODIUM SERPL-SCNC: 135 MMOL/L (ref 136–145)
SP GR UR STRIP.AUTO: 1.01 (ref 1–1.03)
UA COMPLETE W REFLEX CULTURE PNL UR: NORMAL
UA DIPSTICK W REFLEX MICRO PNL UR: NORMAL
URN SPEC COLLECT METH UR: NORMAL
UROBILINOGEN UR STRIP-ACNC: 0.2 E.U./DL
WBC # BLD AUTO: 13.9 K/UL (ref 4–11)

## 2025-05-13 PROCEDURE — 1200000000 HC SEMI PRIVATE

## 2025-05-13 PROCEDURE — 99024 POSTOP FOLLOW-UP VISIT: CPT | Performed by: SURGERY

## 2025-05-13 PROCEDURE — 6360000002 HC RX W HCPCS: Performed by: HOSPITALIST

## 2025-05-13 PROCEDURE — 6370000000 HC RX 637 (ALT 250 FOR IP): Performed by: NURSE PRACTITIONER

## 2025-05-13 PROCEDURE — 6360000002 HC RX W HCPCS: Performed by: NURSE PRACTITIONER

## 2025-05-13 PROCEDURE — 80053 COMPREHEN METABOLIC PANEL: CPT

## 2025-05-13 PROCEDURE — 6370000000 HC RX 637 (ALT 250 FOR IP): Performed by: SURGERY

## 2025-05-13 PROCEDURE — 2500000003 HC RX 250 WO HCPCS: Performed by: SURGERY

## 2025-05-13 PROCEDURE — 6360000002 HC RX W HCPCS: Performed by: SURGERY

## 2025-05-13 PROCEDURE — 85025 COMPLETE CBC W/AUTO DIFF WBC: CPT

## 2025-05-13 PROCEDURE — 2580000003 HC RX 258: Performed by: INTERNAL MEDICINE

## 2025-05-13 PROCEDURE — 2580000003 HC RX 258: Performed by: NURSE PRACTITIONER

## 2025-05-13 PROCEDURE — 6360000002 HC RX W HCPCS: Performed by: STUDENT IN AN ORGANIZED HEALTH CARE EDUCATION/TRAINING PROGRAM

## 2025-05-13 PROCEDURE — 6360000002 HC RX W HCPCS: Performed by: INTERNAL MEDICINE

## 2025-05-13 PROCEDURE — 2580000003 HC RX 258: Performed by: SURGERY

## 2025-05-13 PROCEDURE — 81003 URINALYSIS AUTO W/O SCOPE: CPT

## 2025-05-13 PROCEDURE — 83735 ASSAY OF MAGNESIUM: CPT

## 2025-05-13 RX ADMIN — Medication 1 LOZENGE: at 12:47

## 2025-05-13 RX ADMIN — PIPERACILLIN AND TAZOBACTAM 3375 MG: 3; .375 INJECTION, POWDER, LYOPHILIZED, FOR SOLUTION INTRAVENOUS at 01:14

## 2025-05-13 RX ADMIN — PIPERACILLIN AND TAZOBACTAM 3375 MG: 3; .375 INJECTION, POWDER, LYOPHILIZED, FOR SOLUTION INTRAVENOUS at 19:03

## 2025-05-13 RX ADMIN — MAGNESIUM SULFATE HEPTAHYDRATE 2000 MG: 40 INJECTION, SOLUTION INTRAVENOUS at 20:58

## 2025-05-13 RX ADMIN — Medication 1 LOZENGE: at 19:09

## 2025-05-13 RX ADMIN — CALCIUM CARBONATE 500 MG: 500 TABLET, CHEWABLE ORAL at 10:37

## 2025-05-13 RX ADMIN — Medication 400 MG: at 20:16

## 2025-05-13 RX ADMIN — MORPHINE SULFATE 2 MG: 2 INJECTION, SOLUTION INTRAMUSCULAR; INTRAVENOUS at 05:28

## 2025-05-13 RX ADMIN — Medication 6 MG: at 20:16

## 2025-05-13 RX ADMIN — MORPHINE SULFATE 2 MG: 2 INJECTION, SOLUTION INTRAMUSCULAR; INTRAVENOUS at 15:21

## 2025-05-13 RX ADMIN — DICYCLOMINE HYDROCHLORIDE 10 MG: 10 CAPSULE ORAL at 15:19

## 2025-05-13 RX ADMIN — DICYCLOMINE HYDROCHLORIDE 10 MG: 10 CAPSULE ORAL at 10:37

## 2025-05-13 RX ADMIN — AMLODIPINE BESYLATE 2.5 MG: 2.5 TABLET ORAL at 10:37

## 2025-05-13 RX ADMIN — SODIUM CHLORIDE, PRESERVATIVE FREE 10 ML: 5 INJECTION INTRAVENOUS at 10:53

## 2025-05-13 RX ADMIN — PANTOPRAZOLE SODIUM 40 MG: 40 INJECTION, POWDER, FOR SOLUTION INTRAVENOUS at 10:38

## 2025-05-13 RX ADMIN — SODIUM CHLORIDE, PRESERVATIVE FREE 10 ML: 5 INJECTION INTRAVENOUS at 20:17

## 2025-05-13 RX ADMIN — ASCORBIC ACID, VITAMIN A PALMITATE, CHOLECALCIFEROL, THIAMINE HYDROCHLORIDE, RIBOFLAVIN-5 PHOSPHATE SODIUM, PYRIDOXINE HYDROCHLORIDE, NIACINAMIDE, DEXPANTHENOL, ALPHA-TOCOPHEROL ACETATE, VITAMIN K1, FOLIC ACID, BIOTIN, CYANOCOBALAMIN: 200; 3300; 200; 6; 3.6; 6; 40; 15; 10; 150; 600; 60; 5 INJECTION, SOLUTION INTRAVENOUS at 18:56

## 2025-05-13 RX ADMIN — NYSTATIN 500000 UNITS: 100000 SUSPENSION ORAL at 20:16

## 2025-05-13 RX ADMIN — SODIUM CHLORIDE: 0.9 INJECTION, SOLUTION INTRAVENOUS at 12:28

## 2025-05-13 RX ADMIN — IRON SUCROSE 200 MG: 20 INJECTION, SOLUTION INTRAVENOUS at 12:30

## 2025-05-13 RX ADMIN — Medication 400 MG: at 10:37

## 2025-05-13 RX ADMIN — MORPHINE SULFATE 2 MG: 2 INJECTION, SOLUTION INTRAMUSCULAR; INTRAVENOUS at 20:09

## 2025-05-13 RX ADMIN — MORPHINE SULFATE 2 MG: 2 INJECTION, SOLUTION INTRAMUSCULAR; INTRAVENOUS at 01:14

## 2025-05-13 RX ADMIN — NYSTATIN 500000 UNITS: 100000 SUSPENSION ORAL at 10:52

## 2025-05-13 RX ADMIN — Medication 10 ML: at 10:38

## 2025-05-13 RX ADMIN — ENOXAPARIN SODIUM 30 MG: 100 INJECTION SUBCUTANEOUS at 10:37

## 2025-05-13 RX ADMIN — MORPHINE SULFATE 2 MG: 2 INJECTION, SOLUTION INTRAMUSCULAR; INTRAVENOUS at 11:04

## 2025-05-13 RX ADMIN — PIPERACILLIN AND TAZOBACTAM 3375 MG: 3; .375 INJECTION, POWDER, LYOPHILIZED, FOR SOLUTION INTRAVENOUS at 10:52

## 2025-05-13 RX ADMIN — METOPROLOL 12.5 MG: 25 TABLET ORAL at 20:16

## 2025-05-13 RX ADMIN — CALCIUM CARBONATE 500 MG: 500 TABLET, CHEWABLE ORAL at 20:16

## 2025-05-13 RX ADMIN — METOPROLOL 12.5 MG: 25 TABLET ORAL at 10:37

## 2025-05-13 RX ADMIN — DICYCLOMINE HYDROCHLORIDE 10 MG: 10 CAPSULE ORAL at 20:17

## 2025-05-13 ASSESSMENT — PAIN SCALES - GENERAL
PAINLEVEL_OUTOF10: 0
PAINLEVEL_OUTOF10: 7
PAINLEVEL_OUTOF10: 0
PAINLEVEL_OUTOF10: 0
PAINLEVEL_OUTOF10: 7
PAINLEVEL_OUTOF10: 7
PAINLEVEL_OUTOF10: 0
PAINLEVEL_OUTOF10: 7
PAINLEVEL_OUTOF10: 7

## 2025-05-13 NOTE — PLAN OF CARE
Problem: Pain  Goal: Verbalizes/displays adequate comfort level or baseline comfort level  5/13/2025 1115 by Crystal Grace RN  Outcome: Progressing  Flowsheets (Taken 5/11/2025 2020 by Faiza Foster, RN)  Verbalizes/displays adequate comfort level or baseline comfort level:   Assess pain using appropriate pain scale   Encourage patient to monitor pain and request assistance   Implement non-pharmacological measures as appropriate and evaluate response   Consider cultural and social influences on pain and pain management   Administer analgesics based on type and severity of pain and evaluate response  5/12/2025 2215 by Linda Weston RN  Outcome: Progressing     Problem: Safety - Adult  Goal: Free from fall injury  5/13/2025 1115 by Crystal Grace RN  Outcome: Progressing  Flowsheets (Taken 5/6/2025 1255 by Viri Owen, RN)  Free From Fall Injury:   Instruct family/caregiver on patient safety   Based on caregiver fall risk screen, instruct family/caregiver to ask for assistance with transferring infant if caregiver noted to have fall risk factors  5/12/2025 2215 by Linda Weston RN  Outcome: Progressing     Problem: ABCDS Injury Assessment  Goal: Absence of physical injury  5/13/2025 1115 by Crystal Grace RN  Outcome: Progressing  Flowsheets (Taken 5/9/2025 1816 by Shelli Lakhani, RN)  Absence of Physical Injury: Implement safety measures based on patient assessment  5/12/2025 2215 by Linda Weston RN  Outcome: Progressing     Problem: Nutrition Deficit:  Goal: Optimize nutritional status  5/13/2025 1115 by Crystal Grace RN  Outcome: Progressing  Flowsheets (Taken 5/12/2025 1158 by Bita Suero, IVY)  Nutrient intake appropriate for improving, restoring, or maintaining nutritional needs:   Assess nutritional status and recommend course of action   Monitor oral intake, labs, and treatment plans   Recommend, monitor, and adjust tube feedings and TPN/PPN based on assessed

## 2025-05-13 NOTE — PROGRESS NOTES
[facility] GENERAL SURGERY    Surgery Progress Note           POD # 14    PATIENT NAME: Tuyet Richter     TODAY'S DATE: 5/13/2025    INTERVAL HISTORY:    Less pain, had bm     OBJECTIVE:   VITALS:  /84   Pulse (!) 110   Temp 97.5 °F (36.4 °C) (Oral)   Resp 18   Ht 1.6 m (5' 3\")   Wt 49.5 kg (109 lb 1.6 oz)   SpO2 99%   BMI 19.33 kg/m²     INTAKE/OUTPUT:    I/O last 3 completed shifts:  In: 74741.1 [IV Piggyback:1220.6]  Out: 855 [Emesis/NG output:750; Drains:105]  I/O this shift:  In: 90 [I.V.:10; NG/GT:80]  Out: 450 [Urine:450]              CONSTITUTIONAL:  awake and alert  ABDOMEN:   hypoactive bowel sounds, soft, distended, tenderness noted diffusely   INCISION: clean, dry, TODD with purulent drainage.  Right sided drain with purulent/feculent drainage    Data:  CBC:   Recent Labs     05/11/25  0535 05/12/25  1402 05/13/25  0535   WBC 8.3 17.2* 13.9*   HGB 7.8* 7.9* 7.6*   HCT 23.2* 24.1* 22.9*    586* 607*     BMP:    Recent Labs     05/11/25  0535 05/12/25  1402 05/13/25  0535   * 133* 135*   K 4.0  4.0 4.0 3.5    101 102   CO2 22 23 23   BUN 8 11 10   CREATININE 0.4* 0.4* 0.4*   GLUCOSE 116* 89 119*     Hepatic:   Recent Labs     05/11/25  0535 05/12/25  1402 05/13/25  0535   AST 9* 12* 11*   ALT <5* <5* <5*   BILITOT <0.2 <0.2 <0.2   ALKPHOS 80 92 81     Mag:      Recent Labs     05/12/25  1406 05/13/25  0535 05/13/25  1215   MG 1.56* 1.58* 1.40*      Phos:     Recent Labs     05/11/25  0535 05/11/25  1835 05/12/25  1402   PHOS 4.3 3.9 4.1      INR:   No results for input(s): \"INR\" in the last 72 hours.        Radiology Review: N/A    ASSESSMENT AND PLAN:  65 y.o. female status post Laparoscopic lysis of adhesions and abscess drainage with new percutaneous drain placed on Friday.    Continue IV antibiotics, NG, and TPN.    fistulogram from the right drain likely Wednesday to assess the possibility of leakage from the ileocolic anastomosis        Electronically signed by

## 2025-05-13 NOTE — CARE COORDINATION
Hospital day 20: Patient on C3 re Intra abd abscess care managed by IM, General surgery, and Nephrology. Patient from home with spouse. Patient getting up to chair daily, limited mobility re NGT. Patient with drains x2. Patient on TPN and IV ATB. Plans for fistulogram in near future. Pending urine cx. SW following for DCP Needs.GASTON Alexander

## 2025-05-13 NOTE — PROGRESS NOTES
[facility]  Nephrology follow-up note           Reason for Consult: Hyponatremia  Requesting Physician:  Dr. Morgan    Sub/interval history  Sodium stabilizing in mid 130 range.  C/o abdominal pain.  Intake reduced, on TPN.    ROS:   +weak, no shortness of breath, on RA.    Scheduled Meds:   iron sucrose (VENOFER) 200 mg in sodium chloride 0.9 % 100 mL IVPB  200 mg IntraVENous Q24H    insulin lispro  0-4 Units SubCUTAneous 4x Daily AC & HS    pantoprazole  40 mg IntraVENous Daily    amLODIPine  2.5 mg Per NG tube Daily    calcium carbonate  1 tablet Per NG tube BID    magnesium oxide  400 mg Per NG tube BID    valACYclovir  500 mg Per NG tube BID    melatonin  6 mg Per NG tube Nightly    metoprolol tartrate  12.5 mg Per NG tube BID    nystatin  5 mL Mouth/Throat 4x Daily    fat emulsion  250 mL IntraVENous Once per day on Monday Thursday    piperacillin-tazobactam  3,375 mg IntraVENous Q8H    [Held by provider] metoprolol succinate  25 mg Oral Daily    dicyclomine  10 mg Oral TID    pantoprazole  40 mg Oral QAM AC    sodium chloride flush  5-40 mL IntraVENous 2 times per day    enoxaparin  30 mg SubCUTAneous Daily     Continuous Infusions:   PN-Adult Premix 5/15 - Standard Electrolytes - Central Line 75 mL/hr at 05/13/25 0624    dextrose      sodium chloride      sodium chloride Stopped (05/06/25 1753)     PRN Meds:.benzocaine-menthol, diatrizoate meglumine-sodium, potassium chloride **OR** potassium alternative oral replacement **OR** potassium chloride, magnesium sulfate, sodium phosphate 15 mmol in sodium chloride 0.9 % 250 mL IVPB, glucose, dextrose bolus **OR** dextrose bolus, glucagon (rDNA), dextrose, Magic Mouth wash with nystatin, benzocaine, polyethylene glycol, sodium chloride, morphine, midazolam, diatrizoate meglumine-sodium, sodium chloride flush, sodium chloride, ondansetron **OR** ondansetron, acetaminophen **OR** acetaminophen    History of Present Illness on 5/6/2025:    65 y.o. yo female

## 2025-05-13 NOTE — PROGRESS NOTES
Patient educated on use of IS and demonstrates use Yes    Patient and family educated on incisional care and s/s of infection Yes    Patient and family educated on hand hygiene Yes    Patient and family educated on post operative care Yes   - NG tube maintenance    - Pain control    - rooney Removal    - Insulin Protocol       POD 1 until discharge, patient up to chair by 9 am and TID. Goal to increase mobility  Pt up to chair by 12 noon.    Patient performs oral hygiene with CHG oral solution until NG tube discontinued. Yes    If patient does not have NG tube in place, patient to brush teeth and use mouth wash Q shift Yes    Patient receives daily bath and linen changes Yes    Patient receives CHG bath until rooney discontinued Yes    SCDs in place Pt ambulating to RR with assistance    Patient receiving chemical VTE Yes

## 2025-05-13 NOTE — PROGRESS NOTES
[facility] Medicine Progress Note  V 1.6      Date of Admission: 4/23/2025    Hospital Day: 21      Chief Admission Complaint:  Abdominal pain    Subjective:      Patient c/o of frequent urination and urgency with urination. Also she states her throat is very sore and painful. Otherwise she is doing well.     Plan for fistulagram tomorrow    Presenting Admission History:       This is a 64 y.o. female who presented to [facility] with abdominal pain.  PMHx significant for HTN.  History obtained from the patient and review of EMR.  The patient stated she had a hemicolectomy in February 2025 in [facility] and since then has been experiencing intermittent abdominal pain.  Per chart review, the patient has been readmitted 1 time since her surgery for electrolyte abnormalities.  However, the patient stated her pain has gotten progressively worse over the last couple of weeks.  She reports speaking with GI and they ordered a CT outpatient today.  Patient  is at the bedside and stated that she was called and told to go to the emergency department due to \"a collection of pus\" seen on the CT. In the emergency department a CT abdomen pelvis was obtained that revealed Fluid and gas collection within the pelvis interposed between the colon and uterus measuring up to 4.1 cm.  Abscess is favored.  This appears largely enveloped by abdominal and adnexal structures.  A [place] uterine fistula is not excluded.  Additional small dependent collection within the region of the pouch of Alfred measuring up to 2.5 cm.  Mild dilation of small bowel loops which may indicate partial obstruction.  There is no high-grade small bowel obstruction.  The patient was given morphine for pain.  She was also started on Zosyn.  Upon further evaluation, the patient was found to be dehydrated with hyponatremia.  This is likely secondary to inadequate p.o. intake.  She was admitted for further evaluation and treatment.  IV  (any 3)    [x] Consultant notes from yesterday/today    [x] All available current labs reviewed interpreted for clinical significance    [x] Appropriate follow-up labs were ordered  [] Collateral history obtained     [x] Independent Interpretation of tests (any 1)    [] Telemetry (Rhythm Strip) personally reviewed and interpreted        [x] Imaging personally reviewed and interpreted     [x] Discussion (any 1)  [x] Multi-Disciplinary Rounds with Case Management  [] Discussed management of the case with       Labs:  Personally reviewed on 5/13/2025 and interpreted for clinical significance as documented above.     Recent Labs     05/11/25 0535 05/12/25 1402 05/13/25 0535   WBC 8.3 17.2* 13.9*   HGB 7.8* 7.9* 7.6*   HCT 23.2* 24.1* 22.9*    586* 607*     Recent Labs     05/11/25 0535 05/11/25  1835 05/12/25 1402 05/12/25 1406 05/13/25  0535   *  --  133*  --  135*   K 4.0  4.0  --  4.0  --  3.5     --  101  --  102   CO2 22  --  23  --  23   BUN 8  --  11  --  10   CREATININE 0.4*  --  0.4*  --  0.4*   CALCIUM 8.0*  --  8.6  --  8.5   MG 2.00 1.60*  --  1.56* 1.58*   PHOS 4.3 3.9 4.1  --   --      No results for input(s): \"PROBNP\", \"TROPHS\" in the last 72 hours.  No results for input(s): \"LABA1C\" in the last 72 hours.    Recent Labs     05/11/25 0535 05/12/25  1402 05/13/25  0535   AST 9* 12* 11*   ALT <5* <5* <5*   BILIDIR  --  <0.1  --    BILITOT <0.2 <0.2 <0.2   ALKPHOS 80 92 81         No results for input(s): \"INR\", \"LACTA\", \"TSH\" in the last 72 hours.        Urine Cultures: No results found for: \"LABURIN\"  Blood Cultures: No results found for: \"BC\"  No results found for: \"BLOODCULT2\"  Organism: No results found for: \"ORG\"      Yamilex Major, SARAI - CNP

## 2025-05-13 NOTE — PLAN OF CARE
Problem: Pain  Goal: Verbalizes/displays adequate comfort level or baseline comfort level  Outcome: Progressing     Problem: Safety - Adult  Goal: Free from fall injury  Outcome: Progressing     Problem: ABCDS Injury Assessment  Goal: Absence of physical injury  Outcome: Progressing     Problem: Nutrition Deficit:  Goal: Optimize nutritional status  Outcome: Progressing  Flowsheets (Taken 5/12/2025 1158 by Bita Suero RD)  Nutrient intake appropriate for improving, restoring, or maintaining nutritional needs:   Assess nutritional status and recommend course of action   Monitor oral intake, labs, and treatment plans   Recommend, monitor, and adjust tube feedings and TPN/PPN based on assessed needs     Problem: Skin/Tissue Integrity - Adult  Goal: Skin integrity remains intact  Outcome: Progressing     Problem: Gastrointestinal - Adult  Goal: Minimal or absence of nausea and vomiting  Outcome: Progressing     Problem: Infection - Adult  Goal: Absence of infection at discharge  Outcome: Progressing

## 2025-05-14 ENCOUNTER — APPOINTMENT (OUTPATIENT)
Dept: CT IMAGING | Age: 65
DRG: 856 | End: 2025-05-14
Payer: MEDICARE

## 2025-05-14 ENCOUNTER — APPOINTMENT (OUTPATIENT)
Dept: INTERVENTIONAL RADIOLOGY/VASCULAR | Age: 65
DRG: 856 | End: 2025-05-14
Attending: SURGERY
Payer: MEDICARE

## 2025-05-14 LAB
ALBUMIN SERPL-MCNC: 2.5 G/DL (ref 3.4–5)
ALBUMIN SERPL-MCNC: 2.6 G/DL (ref 3.4–5)
ALBUMIN/GLOB SERPL: 0.8 {RATIO} (ref 1.1–2.2)
ALP SERPL-CCNC: 87 U/L (ref 40–129)
ALP SERPL-CCNC: 87 U/L (ref 40–129)
ALT SERPL-CCNC: <5 U/L (ref 10–40)
ALT SERPL-CCNC: <5 U/L (ref 10–40)
ANION GAP SERPL CALCULATED.3IONS-SCNC: 14 MMOL/L (ref 3–16)
ANISOCYTOSIS BLD QL SMEAR: ABNORMAL
AST SERPL-CCNC: 11 U/L (ref 15–37)
AST SERPL-CCNC: 13 U/L (ref 15–37)
BASOPHILS # BLD: 0 K/UL (ref 0–0.2)
BASOPHILS NFR BLD: 0 %
BILIRUB DIRECT SERPL-MCNC: <0.1 MG/DL (ref 0–0.3)
BILIRUB INDIRECT SERPL-MCNC: ABNORMAL MG/DL (ref 0–1)
BILIRUB SERPL-MCNC: <0.2 MG/DL (ref 0–1)
BILIRUB SERPL-MCNC: <0.2 MG/DL (ref 0–1)
BUN SERPL-MCNC: 13 MG/DL (ref 7–20)
CALCIUM SERPL-MCNC: 8.5 MG/DL (ref 8.3–10.6)
CHLORIDE SERPL-SCNC: 102 MMOL/L (ref 99–110)
CO2 SERPL-SCNC: 22 MMOL/L (ref 21–32)
CREAT SERPL-MCNC: 0.5 MG/DL (ref 0.6–1.2)
DEPRECATED RDW RBC AUTO: 16.7 % (ref 12.4–15.4)
EOSINOPHIL # BLD: 0.2 K/UL (ref 0–0.6)
EOSINOPHIL NFR BLD: 1 %
GFR SERPLBLD CREATININE-BSD FMLA CKD-EPI: >90 ML/MIN/{1.73_M2}
GLUCOSE BLD-MCNC: 103 MG/DL (ref 70–99)
GLUCOSE BLD-MCNC: 107 MG/DL (ref 70–99)
GLUCOSE BLD-MCNC: 122 MG/DL (ref 70–99)
GLUCOSE BLD-MCNC: 123 MG/DL (ref 70–99)
GLUCOSE SERPL-MCNC: 114 MG/DL (ref 70–99)
HCT VFR BLD AUTO: 23.6 % (ref 36–48)
HGB BLD-MCNC: 7.8 G/DL (ref 12–16)
HYPOCHROMIA BLD QL SMEAR: ABNORMAL
LYMPHOCYTES # BLD: 2.5 K/UL (ref 1–5.1)
LYMPHOCYTES NFR BLD: 14 %
MACROCYTES BLD QL SMEAR: ABNORMAL
MAGNESIUM SERPL-MCNC: 1.62 MG/DL (ref 1.8–2.4)
MAGNESIUM SERPL-MCNC: 1.86 MG/DL (ref 1.8–2.4)
MCH RBC QN AUTO: 29.8 PG (ref 26–34)
MCHC RBC AUTO-ENTMCNC: 32.9 G/DL (ref 31–36)
MCV RBC AUTO: 90.8 FL (ref 80–100)
MICROCYTES BLD QL SMEAR: ABNORMAL
MONOCYTES # BLD: 1.1 K/UL (ref 0–1.3)
MONOCYTES NFR BLD: 6 %
NEUTROPHILS # BLD: 13.8 K/UL (ref 1.7–7.7)
NEUTROPHILS NFR BLD: 78 %
NEUTS BAND NFR BLD MANUAL: 1 % (ref 0–7)
OVALOCYTES BLD QL SMEAR: ABNORMAL
PATH INTERP BLD-IMP: NO
PERFORMED ON: ABNORMAL
PHOSPHATE SERPL-MCNC: 4.2 MG/DL (ref 2.5–4.9)
PLATELET # BLD AUTO: 708 K/UL (ref 135–450)
PLATELET BLD QL SMEAR: ABNORMAL
PMV BLD AUTO: 8.7 FL (ref 5–10.5)
POTASSIUM SERPL-SCNC: 3.7 MMOL/L (ref 3.5–5.1)
PROT SERPL-MCNC: 5.9 G/DL (ref 6.4–8.2)
PROT SERPL-MCNC: 5.9 G/DL (ref 6.4–8.2)
RBC # BLD AUTO: 2.6 M/UL (ref 4–5.2)
SCHISTOCYTES BLD QL SMEAR: ABNORMAL
SLIDE REVIEW: ABNORMAL
SODIUM SERPL-SCNC: 138 MMOL/L (ref 136–145)
SPHEROCYTES BLD QL SMEAR: ABNORMAL
WBC # BLD AUTO: 17.5 K/UL (ref 4–11)

## 2025-05-14 PROCEDURE — 6370000000 HC RX 637 (ALT 250 FOR IP): Performed by: NURSE PRACTITIONER

## 2025-05-14 PROCEDURE — 2500000003 HC RX 250 WO HCPCS: Performed by: SURGERY

## 2025-05-14 PROCEDURE — 74176 CT ABD & PELVIS W/O CONTRAST: CPT

## 2025-05-14 PROCEDURE — 83735 ASSAY OF MAGNESIUM: CPT

## 2025-05-14 PROCEDURE — 85025 COMPLETE CBC W/AUTO DIFF WBC: CPT

## 2025-05-14 PROCEDURE — 6360000002 HC RX W HCPCS: Performed by: INTERNAL MEDICINE

## 2025-05-14 PROCEDURE — 6360000002 HC RX W HCPCS: Performed by: NURSE PRACTITIONER

## 2025-05-14 PROCEDURE — 0W9G00Z DRAINAGE OF PERITONEAL CAVITY WITH DRAINAGE DEVICE, OPEN APPROACH: ICD-10-PCS | Performed by: SURGERY

## 2025-05-14 PROCEDURE — 80053 COMPREHEN METABOLIC PANEL: CPT

## 2025-05-14 PROCEDURE — 99024 POSTOP FOLLOW-UP VISIT: CPT | Performed by: SURGERY

## 2025-05-14 PROCEDURE — 76080 X-RAY EXAM OF FISTULA: CPT

## 2025-05-14 PROCEDURE — 2580000003 HC RX 258: Performed by: INTERNAL MEDICINE

## 2025-05-14 PROCEDURE — 6360000002 HC RX W HCPCS: Performed by: HOSPITALIST

## 2025-05-14 PROCEDURE — 84100 ASSAY OF PHOSPHORUS: CPT

## 2025-05-14 PROCEDURE — 2580000003 HC RX 258: Performed by: NURSE PRACTITIONER

## 2025-05-14 PROCEDURE — 6360000004 HC RX CONTRAST MEDICATION: Performed by: SURGERY

## 2025-05-14 PROCEDURE — 6370000000 HC RX 637 (ALT 250 FOR IP): Performed by: SURGERY

## 2025-05-14 PROCEDURE — 6360000002 HC RX W HCPCS: Performed by: SURGERY

## 2025-05-14 PROCEDURE — 6360000002 HC RX W HCPCS: Performed by: STUDENT IN AN ORGANIZED HEALTH CARE EDUCATION/TRAINING PROGRAM

## 2025-05-14 PROCEDURE — 1200000000 HC SEMI PRIVATE

## 2025-05-14 PROCEDURE — 49424 ASSESS CYST CONTRAST INJECT: CPT

## 2025-05-14 RX ADMIN — MORPHINE SULFATE 2 MG: 2 INJECTION, SOLUTION INTRAMUSCULAR; INTRAVENOUS at 12:41

## 2025-05-14 RX ADMIN — DICYCLOMINE HYDROCHLORIDE 10 MG: 10 CAPSULE ORAL at 21:17

## 2025-05-14 RX ADMIN — Medication 1 LOZENGE: at 12:41

## 2025-05-14 RX ADMIN — MORPHINE SULFATE 2 MG: 2 INJECTION, SOLUTION INTRAMUSCULAR; INTRAVENOUS at 17:10

## 2025-05-14 RX ADMIN — MORPHINE SULFATE 2 MG: 2 INJECTION, SOLUTION INTRAMUSCULAR; INTRAVENOUS at 00:20

## 2025-05-14 RX ADMIN — IOVERSOL 15 ML: 678 INJECTION INTRA-ARTERIAL; INTRAVENOUS at 09:14

## 2025-05-14 RX ADMIN — SODIUM CHLORIDE, PRESERVATIVE FREE 10 ML: 5 INJECTION INTRAVENOUS at 10:09

## 2025-05-14 RX ADMIN — ONDANSETRON 4 MG: 2 INJECTION, SOLUTION INTRAMUSCULAR; INTRAVENOUS at 17:17

## 2025-05-14 RX ADMIN — DICYCLOMINE HYDROCHLORIDE 10 MG: 10 CAPSULE ORAL at 10:09

## 2025-05-14 RX ADMIN — DICYCLOMINE HYDROCHLORIDE 10 MG: 10 CAPSULE ORAL at 14:11

## 2025-05-14 RX ADMIN — MORPHINE SULFATE 2 MG: 2 INJECTION, SOLUTION INTRAMUSCULAR; INTRAVENOUS at 21:11

## 2025-05-14 RX ADMIN — METOPROLOL 12.5 MG: 25 TABLET ORAL at 21:17

## 2025-05-14 RX ADMIN — ASCORBIC ACID, VITAMIN A PALMITATE, CHOLECALCIFEROL, THIAMINE HYDROCHLORIDE, RIBOFLAVIN-5 PHOSPHATE SODIUM, PYRIDOXINE HYDROCHLORIDE, NIACINAMIDE, DEXPANTHENOL, ALPHA-TOCOPHEROL ACETATE, VITAMIN K1, FOLIC ACID, BIOTIN, CYANOCOBALAMIN: 200; 3300; 200; 6; 3.6; 6; 40; 15; 10; 150; 600; 60; 5 INJECTION, SOLUTION INTRAVENOUS at 18:49

## 2025-05-14 RX ADMIN — CALCIUM CARBONATE 500 MG: 500 TABLET, CHEWABLE ORAL at 21:16

## 2025-05-14 RX ADMIN — Medication 400 MG: at 10:09

## 2025-05-14 RX ADMIN — ENOXAPARIN SODIUM 30 MG: 100 INJECTION SUBCUTANEOUS at 10:08

## 2025-05-14 RX ADMIN — Medication 6 MG: at 21:17

## 2025-05-14 RX ADMIN — SODIUM CHLORIDE, PRESERVATIVE FREE 10 ML: 5 INJECTION INTRAVENOUS at 21:11

## 2025-05-14 RX ADMIN — PIPERACILLIN AND TAZOBACTAM 3375 MG: 3; .375 INJECTION, POWDER, LYOPHILIZED, FOR SOLUTION INTRAVENOUS at 10:25

## 2025-05-14 RX ADMIN — NYSTATIN 500000 UNITS: 100000 SUSPENSION ORAL at 10:08

## 2025-05-14 RX ADMIN — METOPROLOL 12.5 MG: 25 TABLET ORAL at 10:08

## 2025-05-14 RX ADMIN — MORPHINE SULFATE 2 MG: 2 INJECTION, SOLUTION INTRAMUSCULAR; INTRAVENOUS at 08:11

## 2025-05-14 RX ADMIN — Medication 400 MG: at 21:17

## 2025-05-14 RX ADMIN — CALCIUM CARBONATE 500 MG: 500 TABLET, CHEWABLE ORAL at 10:09

## 2025-05-14 RX ADMIN — AMLODIPINE BESYLATE 2.5 MG: 2.5 TABLET ORAL at 10:09

## 2025-05-14 RX ADMIN — PANTOPRAZOLE SODIUM 40 MG: 40 INJECTION, POWDER, FOR SOLUTION INTRAVENOUS at 10:08

## 2025-05-14 RX ADMIN — IRON SUCROSE 200 MG: 20 INJECTION, SOLUTION INTRAVENOUS at 12:38

## 2025-05-14 RX ADMIN — PIPERACILLIN AND TAZOBACTAM 3375 MG: 3; .375 INJECTION, POWDER, LYOPHILIZED, FOR SOLUTION INTRAVENOUS at 02:10

## 2025-05-14 RX ADMIN — PIPERACILLIN AND TAZOBACTAM 3375 MG: 3; .375 INJECTION, POWDER, LYOPHILIZED, FOR SOLUTION INTRAVENOUS at 18:41

## 2025-05-14 ASSESSMENT — PAIN - FUNCTIONAL ASSESSMENT: PAIN_FUNCTIONAL_ASSESSMENT: PREVENTS OR INTERFERES SOME ACTIVE ACTIVITIES AND ADLS

## 2025-05-14 ASSESSMENT — PAIN DESCRIPTION - LOCATION
LOCATION: ABDOMEN
LOCATION: ABDOMEN

## 2025-05-14 ASSESSMENT — PAIN SCALES - GENERAL
PAINLEVEL_OUTOF10: 8
PAINLEVEL_OUTOF10: 6
PAINLEVEL_OUTOF10: 8
PAINLEVEL_OUTOF10: 7
PAINLEVEL_OUTOF10: 0

## 2025-05-14 ASSESSMENT — PAIN DESCRIPTION - DESCRIPTORS: DESCRIPTORS: SHARP;STABBING

## 2025-05-14 NOTE — PROGRESS NOTES
Pt off unit to IR at this time. Electronically signed by YORDY CAMARENA RN on 5/14/25 at 8:34 AM EDT

## 2025-05-14 NOTE — CARE COORDINATION
Hospital day 21: Patient on c3 care managed by IM, Nephrology, and General Surgery. Patient from home with spouse- ambulating in room, gets up to chair. Patient to IR for fistula gram. Pending CT abd. Patient with NGT, drains x2. Patient still getting TPN. SW following.GASTON Alexander

## 2025-05-14 NOTE — PROGRESS NOTES
Present Diagnosis and Illness: 65 y.o. female who presents with abdominal abscess drain.    1. Intra-abdominal abscess (HCC)    2. Diverticulitis        No Known Allergies    Current Facility-Administered Medications   Medication Dose Route Frequency Provider Last Rate Last Admin    benzocaine-menthol (CEPACOL SORE THROAT) lozenge 1 lozenge  1 lozenge Oral Q2H PRN Yamilex Major APRN - CNP   1 lozenge at 05/13/25 1909    PN-Adult Premix 5/15 - Standard Electrolytes - Central Line   IntraVENous Continuous TPN Dallas Guerrero MD 75 mL/hr at 05/13/25 1856 New Bag at 05/13/25 1856    iron sucrose (VENOFER) 200 mg in sodium chloride 0.9 % 100 mL IVPB  200 mg IntraVENous Q24H Yamilex Major APRN - CNP   Stopped at 05/13/25 1246    diatrizoate meglumine-sodium (GASTROGRAFIN) 66-10 % solution 12 mL  12 mL Oral ONCE PRN Javier Pinto MD   12 mL at 05/09/25 0826    potassium chloride (KLOR-CON M) extended release tablet 40 mEq  40 mEq Oral PRN Yamilex Major APRN - CNP        Or    potassium bicarb-citric acid (EFFER-K) effervescent tablet 40 mEq  40 mEq Oral PRN Yamilex Major APRN - PAMELLA        Or    potassium chloride 10 mEq/100 mL IVPB (Peripheral Line)  10 mEq IntraVENous PRN Yamilex Major APRN - CNP        magnesium sulfate 2000 mg in 50 mL IVPB premix  2,000 mg IntraVENous PRN Yamilex Major APRN - CNP   Stopped at 05/13/25 2258    sodium phosphate 15 mmol in sodium chloride 0.9 % 250 mL IVPB  15 mmol IntraVENous PRN Yamilex Major APRN - CNP        glucose chewable tablet 16 g  4 tablet Oral PRN Yamilex Major APRN - CNP        dextrose bolus 10% 125 mL  125 mL IntraVENous PRN Yamilex Major APRN - CNP        Or    dextrose bolus 10% 250 mL  250 mL IntraVENous PRN Yamilex Major, APRN - CNP        glucagon injection 1 mg  1 mg SubCUTAneous PRN Yamilex Major,

## 2025-05-14 NOTE — OR NURSING
Image guided fistulagram completed by Dr. Lewis. Pt tolerated procedure without any signs or symptoms of distress. Report called to floor RN . Pt transported to C3.

## 2025-05-14 NOTE — BRIEF OP NOTE
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

## 2025-05-14 NOTE — PLAN OF CARE
Problem: Pain  Goal: Verbalizes/displays adequate comfort level or baseline comfort level  5/14/2025 0050 by Linda Weston RN  Outcome: Progressing  Flowsheets (Taken 5/11/2025 2020 by Faiza Foster, RN)  Verbalizes/displays adequate comfort level or baseline comfort level:   Assess pain using appropriate pain scale   Encourage patient to monitor pain and request assistance   Implement non-pharmacological measures as appropriate and evaluate response   Consider cultural and social influences on pain and pain management   Administer analgesics based on type and severity of pain and evaluate response     Problem: Safety - Adult  Goal: Free from fall injury  5/14/2025 0050 by Linda Weston RN  Outcome: Progressing   Instruct family/caregiver on patient safety   Based on caregiver fall risk screen, instruct family/caregiver to ask for assistance with transferring infant if caregiver noted to have fall risk factors     Problem: ABCDS Injury Assessment  Goal: Absence of physical injury  5/14/2025 0050 by Linda Weston RN  Outcome: Progressing  Flowsheets (Taken 5/9/2025 1816 by Shelil Lakhani RN)  Absence of Physical Injury: Implement safety measures based on patient assessment     Problem: Skin/Tissue Integrity - Adult  Goal: Skin integrity remains intact  5/14/2025 0050 by Linda Weston RN  Outcome: Progressing    Flowsheets (Taken 5/13/2025 1113)  Skin Integrity Remains Intact: Monitor for areas of redness and/or skin breakdown     Problem: Infection - Adult  Goal: Absence of infection at discharge  5/14/2025 0050 by Linda Weston RN  Outcome: Progressing  Flowsheets (Taken 5/11/2025 2000 by Faiza Foster RN)  Absence of infection at discharge:   Assess and monitor for signs and symptoms of infection   Monitor lab/diagnostic results   Monitor all insertion sites i.e., indwelling lines, tubes and drains

## 2025-05-14 NOTE — PROGRESS NOTES
[facility] Medicine Progress Note  V 1.6      Date of Admission: 4/23/2025    Hospital Day: 22      Chief Admission Complaint:  Abdominal pain    Subjective:    EMR notes reviewed patient seen and examined sitting up in chair in no acute distress, patient underwent a repeat fistulogram this a.m. denies any fevers chills nausea vomiting or diarrhea.       Presenting Admission History:       This is a 64 y.o. female who presented to [facility] with abdominal pain.  PMHx significant for HTN.  History obtained from the patient and review of EMR.  The patient stated she had a hemicolectomy in February 2025 in [facility] and since then has been experiencing intermittent abdominal pain.  Per chart review, the patient has been readmitted 1 time since her surgery for electrolyte abnormalities.  However, the patient stated her pain has gotten progressively worse over the last couple of weeks.  She reports speaking with GI and they ordered a CT outpatient today.  Patient  is at the bedside and stated that she was called and told to go to the emergency department due to \"a collection of pus\" seen on the CT. In the emergency department a CT abdomen pelvis was obtained that revealed Fluid and gas collection within the pelvis interposed between the colon and uterus measuring up to 4.1 cm.  Abscess is favored.  This appears largely enveloped by abdominal and adnexal structures.  A [place] uterine fistula is not excluded.  Additional small dependent collection within the region of the pouch of Alfred measuring up to 2.5 cm.  Mild dilation of small bowel loops which may indicate partial obstruction.  There is no high-grade small bowel obstruction.  The patient was given morphine for pain.  She was also started on Zosyn.  Upon further evaluation, the patient was found to be dehydrated with hyponatremia.  This is likely secondary to inadequate p.o. intake.  She was admitted for further evaluation and

## 2025-05-14 NOTE — OR NURSING
Pt arrived for image guided fistulagram of right lower quadrant drain  by Dr. Lewis. Procedure explained including the risk and benefits of the procedure. All questions answered. Pt verbalizes understanding of the procedure and states no more questions. Consent confirmed. Vital signs stable. Labs, allergies, medications, and code status reviewed. No contraindications noted. Patient was placed supine on the IR table. Time out completed prior to procedure start.     Vital Signs  Vitals:    05/14/25 0800   BP: 105/70   Pulse: 97   Resp: 16   Temp: 98.6 °F (37 °C)   SpO2: 98%    (vital signs in table format)    Allergies  Patient has no known allergies. (allergies)    Labs  Lab Results   Component Value Date    INR 1.13 05/09/2025    PROTIME 14.7 05/09/2025     Lab Results   Component Value Date    CREATININE 0.5 (L) 05/14/2025    BUN 13 05/14/2025     05/14/2025    K 3.7 05/14/2025     05/14/2025    CO2 22 05/14/2025     Lab Results   Component Value Date    WBC 17.5 (H) 05/14/2025    HGB 7.8 (L) 05/14/2025    HCT 23.6 (L) 05/14/2025    MCV 90.8 05/14/2025     (H) 05/14/2025

## 2025-05-14 NOTE — PROGRESS NOTES
[facility] GENERAL SURGERY    Surgery Progress Note           POD # 15    PATIENT NAME: Tuyet Richter     TODAY'S DATE: 5/14/2025    INTERVAL HISTORY:    Stable amount of pain, no bms     OBJECTIVE:   VITALS:  /76   Pulse 94   Temp 97.7 °F (36.5 °C) (Oral)   Resp 17   Ht 1.6 m (5' 3\")   Wt 47.9 kg (105 lb 8 oz)   SpO2 98%   BMI 18.69 kg/m²     INTAKE/OUTPUT:    I/O last 3 completed shifts:  In: 2342.8 [I.V.:10; NG/GT:160; IV Piggyback:259.6]  Out: 1155 [Urine:450; Emesis/NG output:650; Drains:55]  I/O this shift:  In: 2279.6 [I.V.:21.5; NG/GT:60; IV Piggyback:179.9]  Out: -               CONSTITUTIONAL:  awake and alert  ABDOMEN:   hypoactive bowel sounds, soft, distended, tenderness mild diffusely   INCISION: clean, dry, TODD with purulent drainage.  Right sided drain with purulent draomage    Data:  CBC:   Recent Labs     05/12/25  1402 05/13/25  0535 05/14/25  0530   WBC 17.2* 13.9* 17.5*   HGB 7.9* 7.6* 7.8*   HCT 24.1* 22.9* 23.6*   * 607* 708*     BMP:    Recent Labs     05/12/25  1402 05/13/25  0535 05/14/25  0530   * 135* 138   K 4.0 3.5 3.7    102 102   CO2 23 23 22   BUN 11 10 13   CREATININE 0.4* 0.4* 0.5*   GLUCOSE 89 119* 114*     Hepatic:   Recent Labs     05/12/25  1402 05/13/25  0535 05/14/25  0530   AST 12* 11* 13*  11*   ALT <5* <5* <5*  <5*   BILITOT <0.2 <0.2 <0.2  <0.2   ALKPHOS 92 81 87  87     Mag:      Recent Labs     05/13/25  0535 05/13/25  1215 05/14/25  0530   MG 1.58* 1.40* 1.86      Phos:     Recent Labs     05/11/25  1835 05/12/25  1402 05/14/25  0530   PHOS 3.9 4.1 4.2      INR:   No results for input(s): \"INR\" in the last 72 hours.        Radiology Review:  Fistulagram and CT - smaller abscesses of perc and operative drains, perc drain with fistulous communication to prior right colon anastomosis; sbo    ASSESSMENT AND PLAN:  65 y.o. female status post Laparoscopic lysis of adhesions and abscess drainage with new percutaneous drain placed on

## 2025-05-15 LAB
ANION GAP SERPL CALCULATED.3IONS-SCNC: 10 MMOL/L (ref 3–16)
BUN SERPL-MCNC: 16 MG/DL (ref 7–20)
CALCIUM SERPL-MCNC: 8.7 MG/DL (ref 8.3–10.6)
CHLORIDE SERPL-SCNC: 102 MMOL/L (ref 99–110)
CO2 SERPL-SCNC: 24 MMOL/L (ref 21–32)
CREAT SERPL-MCNC: 0.4 MG/DL (ref 0.6–1.2)
DEPRECATED RDW RBC AUTO: 17.4 % (ref 12.4–15.4)
GFR SERPLBLD CREATININE-BSD FMLA CKD-EPI: >90 ML/MIN/{1.73_M2}
GLUCOSE BLD-MCNC: 109 MG/DL (ref 70–99)
GLUCOSE BLD-MCNC: 110 MG/DL (ref 70–99)
GLUCOSE BLD-MCNC: 119 MG/DL (ref 70–99)
GLUCOSE BLD-MCNC: 124 MG/DL (ref 70–99)
GLUCOSE BLD-MCNC: 91 MG/DL (ref 70–99)
GLUCOSE SERPL-MCNC: 97 MG/DL (ref 70–99)
HCT VFR BLD AUTO: 29.8 % (ref 36–48)
HGB BLD-MCNC: 9.6 G/DL (ref 12–16)
MAGNESIUM SERPL-MCNC: 2.03 MG/DL (ref 1.8–2.4)
MCH RBC QN AUTO: 29.3 PG (ref 26–34)
MCHC RBC AUTO-ENTMCNC: 32.2 G/DL (ref 31–36)
MCV RBC AUTO: 91.1 FL (ref 80–100)
PERFORMED ON: ABNORMAL
PERFORMED ON: NORMAL
PLATELET # BLD AUTO: 667 K/UL (ref 135–450)
PMV BLD AUTO: 8.5 FL (ref 5–10.5)
POTASSIUM SERPL-SCNC: 3.9 MMOL/L (ref 3.5–5.1)
RBC # BLD AUTO: 3.27 M/UL (ref 4–5.2)
SODIUM SERPL-SCNC: 136 MMOL/L (ref 136–145)
WBC # BLD AUTO: 13.7 K/UL (ref 4–11)

## 2025-05-15 PROCEDURE — 80048 BASIC METABOLIC PNL TOTAL CA: CPT

## 2025-05-15 PROCEDURE — 2500000003 HC RX 250 WO HCPCS: Performed by: CLINICAL NURSE SPECIALIST

## 2025-05-15 PROCEDURE — 6360000002 HC RX W HCPCS: Performed by: HOSPITALIST

## 2025-05-15 PROCEDURE — 83735 ASSAY OF MAGNESIUM: CPT

## 2025-05-15 PROCEDURE — 6360000002 HC RX W HCPCS: Performed by: INTERNAL MEDICINE

## 2025-05-15 PROCEDURE — 2500000003 HC RX 250 WO HCPCS: Performed by: SURGERY

## 2025-05-15 PROCEDURE — 6370000000 HC RX 637 (ALT 250 FOR IP): Performed by: NURSE PRACTITIONER

## 2025-05-15 PROCEDURE — 6360000002 HC RX W HCPCS: Performed by: SURGERY

## 2025-05-15 PROCEDURE — 6370000000 HC RX 637 (ALT 250 FOR IP): Performed by: SURGERY

## 2025-05-15 PROCEDURE — 2580000003 HC RX 258: Performed by: INTERNAL MEDICINE

## 2025-05-15 PROCEDURE — 85027 COMPLETE CBC AUTOMATED: CPT

## 2025-05-15 PROCEDURE — 99024 POSTOP FOLLOW-UP VISIT: CPT | Performed by: SURGERY

## 2025-05-15 PROCEDURE — 6360000002 HC RX W HCPCS: Performed by: STUDENT IN AN ORGANIZED HEALTH CARE EDUCATION/TRAINING PROGRAM

## 2025-05-15 PROCEDURE — 1200000000 HC SEMI PRIVATE

## 2025-05-15 RX ADMIN — MORPHINE SULFATE 2 MG: 2 INJECTION, SOLUTION INTRAMUSCULAR; INTRAVENOUS at 07:57

## 2025-05-15 RX ADMIN — Medication 6 MG: at 21:21

## 2025-05-15 RX ADMIN — CALCIUM CARBONATE 500 MG: 500 TABLET, CHEWABLE ORAL at 08:02

## 2025-05-15 RX ADMIN — ASCORBIC ACID, VITAMIN A PALMITATE, CHOLECALCIFEROL, THIAMINE HYDROCHLORIDE, RIBOFLAVIN-5 PHOSPHATE SODIUM, PYRIDOXINE HYDROCHLORIDE, NIACINAMIDE, DEXPANTHENOL, ALPHA-TOCOPHEROL ACETATE, VITAMIN K1, FOLIC ACID, BIOTIN, CYANOCOBALAMIN: 200; 3300; 200; 6; 3.6; 6; 40; 15; 10; 150; 600; 60; 5 INJECTION, SOLUTION INTRAVENOUS at 20:01

## 2025-05-15 RX ADMIN — AMLODIPINE BESYLATE 2.5 MG: 2.5 TABLET ORAL at 08:02

## 2025-05-15 RX ADMIN — Medication 400 MG: at 08:01

## 2025-05-15 RX ADMIN — PIPERACILLIN AND TAZOBACTAM 3375 MG: 3; .375 INJECTION, POWDER, LYOPHILIZED, FOR SOLUTION INTRAVENOUS at 10:47

## 2025-05-15 RX ADMIN — PIPERACILLIN AND TAZOBACTAM 3375 MG: 3; .375 INJECTION, POWDER, LYOPHILIZED, FOR SOLUTION INTRAVENOUS at 19:58

## 2025-05-15 RX ADMIN — MORPHINE SULFATE 2 MG: 2 INJECTION, SOLUTION INTRAMUSCULAR; INTRAVENOUS at 13:16

## 2025-05-15 RX ADMIN — METOPROLOL 12.5 MG: 25 TABLET ORAL at 08:01

## 2025-05-15 RX ADMIN — Medication 400 MG: at 21:21

## 2025-05-15 RX ADMIN — DICYCLOMINE HYDROCHLORIDE 10 MG: 10 CAPSULE ORAL at 13:30

## 2025-05-15 RX ADMIN — MORPHINE SULFATE 2 MG: 2 INJECTION, SOLUTION INTRAMUSCULAR; INTRAVENOUS at 23:59

## 2025-05-15 RX ADMIN — ENOXAPARIN SODIUM 30 MG: 100 INJECTION SUBCUTANEOUS at 08:02

## 2025-05-15 RX ADMIN — Medication 1 LOZENGE: at 15:15

## 2025-05-15 RX ADMIN — DICYCLOMINE HYDROCHLORIDE 10 MG: 10 CAPSULE ORAL at 21:21

## 2025-05-15 RX ADMIN — PIPERACILLIN AND TAZOBACTAM 3375 MG: 3; .375 INJECTION, POWDER, LYOPHILIZED, FOR SOLUTION INTRAVENOUS at 03:16

## 2025-05-15 RX ADMIN — CALCIUM CARBONATE 500 MG: 500 TABLET, CHEWABLE ORAL at 21:21

## 2025-05-15 RX ADMIN — MORPHINE SULFATE 2 MG: 2 INJECTION, SOLUTION INTRAMUSCULAR; INTRAVENOUS at 20:03

## 2025-05-15 RX ADMIN — PANTOPRAZOLE SODIUM 40 MG: 40 INJECTION, POWDER, FOR SOLUTION INTRAVENOUS at 08:02

## 2025-05-15 RX ADMIN — MORPHINE SULFATE 2 MG: 2 INJECTION, SOLUTION INTRAMUSCULAR; INTRAVENOUS at 01:24

## 2025-05-15 RX ADMIN — DICYCLOMINE HYDROCHLORIDE 10 MG: 10 CAPSULE ORAL at 08:02

## 2025-05-15 RX ADMIN — I.V. FAT EMULSION 250 ML: 20 EMULSION INTRAVENOUS at 19:57

## 2025-05-15 RX ADMIN — NYSTATIN 500000 UNITS: 100000 SUSPENSION ORAL at 08:02

## 2025-05-15 RX ADMIN — METOPROLOL 12.5 MG: 25 TABLET ORAL at 21:21

## 2025-05-15 RX ADMIN — SODIUM CHLORIDE, PRESERVATIVE FREE 10 ML: 5 INJECTION INTRAVENOUS at 08:02

## 2025-05-15 ASSESSMENT — PAIN DESCRIPTION - LOCATION
LOCATION: ABDOMEN
LOCATION: ABDOMEN

## 2025-05-15 ASSESSMENT — PAIN SCALES - GENERAL
PAINLEVEL_OUTOF10: 8
PAINLEVEL_OUTOF10: 5
PAINLEVEL_OUTOF10: 9

## 2025-05-15 ASSESSMENT — PAIN DESCRIPTION - PAIN TYPE: TYPE: ACUTE PAIN

## 2025-05-15 ASSESSMENT — PAIN DESCRIPTION - ORIENTATION: ORIENTATION: ANTERIOR

## 2025-05-15 ASSESSMENT — PAIN DESCRIPTION - DESCRIPTORS: DESCRIPTORS: DISCOMFORT

## 2025-05-15 NOTE — CARE COORDINATION
Hospital day 22: Patient on C3 re Intra abdominal abscess care managed by IM, Nephrology, and General Surgery. Patient from home with spouse. Patient with NGT, drains x2. Patient on TPN and IV ATB. May need OR, if not improved.GASTON Alexander

## 2025-05-15 NOTE — PLAN OF CARE
Problem: Pain  Goal: Verbalizes/displays adequate comfort level or baseline comfort level  Outcome: Progressing     Problem: Safety - Adult  Goal: Free from fall injury  Outcome: Progressing     Problem: Skin/Tissue Integrity - Adult  Goal: Skin integrity remains intact  Outcome: Progressing  Goal: Incisions, wounds, or drain sites healing without S/S of infection  Outcome: Progressing     Problem: Gastrointestinal - Adult  Goal: Minimal or absence of nausea and vomiting  Outcome: Progressing

## 2025-05-15 NOTE — PROGRESS NOTES
[facility] GENERAL SURGERY    Surgery Progress Note           POD #  16    PATIENT NAME: Tuyet Richter     TODAY'S DATE: 5/15/2025    INTERVAL HISTORY:    Pt  resting comfortably, no acute pains currently.  No BM in 3 days, and rare flatus..  Still feels somewhat bloated.     OBJECTIVE:   VITALS:  /74   Pulse 98   Temp 97.3 °F (36.3 °C) (Oral)   Resp 18   Ht 1.6 m (5' 3\")   Wt 48.1 kg (106 lb 1.6 oz)   SpO2 99%   BMI 18.79 kg/m²     INTAKE/OUTPUT:    I/O last 3 completed shifts:  In: 3908.9 [P.O.:120; I.V.:21.5; NG/GT:120; IV Piggyback:263.5]  Out: 1160 [Urine:250; Emesis/NG output:900; Drains:10]  I/O this shift:  In: -   Out: 790 [Urine:780; Drains:10]              CONSTITUTIONAL:  awake and alert  LUNGS:     ABDOMEN:    , soft, distended,     INCISION: clean, dry, TODD with purulent drainage.  Right sided drain with purulent draomage     Data:  CBC:   Recent Labs     05/13/25  0535 05/14/25  0530 05/15/25  1345   WBC 13.9* 17.5* 13.7*   HGB 7.6* 7.8* 9.6*   HCT 22.9* 23.6* 29.8*   * 708* 667*     BMP:    Recent Labs     05/13/25  0535 05/14/25  0530 05/15/25  1345   * 138 136   K 3.5 3.7 3.9    102 102   CO2 23 22 24   BUN 10 13 16   CREATININE 0.4* 0.5* 0.4*   GLUCOSE 119* 114* 97     Hepatic:   Recent Labs     05/13/25  0535 05/14/25  0530   AST 11* 13*  11*   ALT <5* <5*  <5*   BILITOT <0.2 <0.2  <0.2   ALKPHOS 81 87  87     Mag:      Recent Labs     05/14/25  0530 05/14/25  1849 05/15/25  0553   MG 1.86 1.62* 2.03      Phos:     Recent Labs     05/14/25  0530   PHOS 4.2      INR: No results for input(s): \"INR\" in the last 72 hours.      Radiology Review:       ASSESSMENT AND PLAN:  65 y.o. female status post Laparoscopic lysis of adhesions and abscess drainage with new percutaneous drain placed on Friday.    Continue IV antibiotics, NG, and TPN.    Will see if any improvement over the next 4-5 days but if not getting better then may need OR next week.       Electronically

## 2025-05-15 NOTE — PROGRESS NOTES
[facility] Medicine Progress Note  V 1.6      Date of Admission: 4/23/2025    Hospital Day: 23      Chief Admission Complaint:  Abdominal pain    Subjective:    EMR notes reviewed patient seen and examined sitting up acute distress, No new complaints       Presenting Admission History:       This is a 64 y.o. female who presented to [facility] with abdominal pain.  PMHx significant for HTN.  History obtained from the patient and review of EMR.  The patient stated she had a hemicolectomy in February 2025 in [facility] and since then has been experiencing intermittent abdominal pain.  Per chart review, the patient has been readmitted 1 time since her surgery for electrolyte abnormalities.  However, the patient stated her pain has gotten progressively worse over the last couple of weeks.  She reports speaking with GI and they ordered a CT outpatient today.  Patient  is at the bedside and stated that she was called and told to go to the emergency department due to \"a collection of pus\" seen on the CT. In the emergency department a CT abdomen pelvis was obtained that revealed Fluid and gas collection within the pelvis interposed between the colon and uterus measuring up to 4.1 cm.  Abscess is favored.  This appears largely enveloped by abdominal and adnexal structures.  A [place] uterine fistula is not excluded.  Additional small dependent collection within the region of the pouch of Alfred measuring up to 2.5 cm.  Mild dilation of small bowel loops which may indicate partial obstruction.  There is no high-grade small bowel obstruction.  The patient was given morphine for pain.  She was also started on Zosyn.  Upon further evaluation, the patient was found to be dehydrated with hyponatremia.  This is likely secondary to inadequate p.o. intake.  She was admitted for further evaluation and treatment.  IV fluids have been initiated and GI has been consulted for the a.m. The patient denied any  interpreted for clinical significance    [x] Appropriate follow-up labs were ordered  [] Collateral history obtained     [x] Independent Interpretation of tests (any 1)    [] Telemetry (Rhythm Strip) personally reviewed and interpreted        [x] Imaging personally reviewed and interpreted     [x] Discussion (any 1)  [x] Multi-Disciplinary Rounds with Case Management  [] Discussed management of the case with       Labs:  Personally reviewed on 5/15/2025 and interpreted for clinical significance as documented above.     Recent Labs     05/13/25  0535 05/14/25  0530 05/15/25  1345   WBC 13.9* 17.5* 13.7*   HGB 7.6* 7.8* 9.6*   HCT 22.9* 23.6* 29.8*   * 708* 667*     Recent Labs     05/13/25 0535 05/13/25  1215 05/14/25 0530 05/14/25  1849 05/15/25  0553 05/15/25  1345   *  --  138  --   --  136   K 3.5  --  3.7  --   --  3.9     --  102  --   --  102   CO2 23  --  22  --   --  24   BUN 10  --  13  --   --  16   CREATININE 0.4*  --  0.5*  --   --  0.4*   CALCIUM 8.5  --  8.5  --   --  8.7   MG 1.58*   < > 1.86 1.62* 2.03  --    PHOS  --   --  4.2  --   --   --     < > = values in this interval not displayed.     No results for input(s): \"PROBNP\", \"TROPHS\" in the last 72 hours.  No results for input(s): \"LABA1C\" in the last 72 hours.    Recent Labs     05/13/25 0535 05/14/25 0530   AST 11* 13*  11*   ALT <5* <5*  <5*   BILIDIR  --  <0.1   BILITOT <0.2 <0.2  <0.2   ALKPHOS 81 87  87         No results for input(s): \"INR\", \"LACTA\", \"TSH\" in the last 72 hours.        Urine Cultures: No results found for: \"LABURIN\"  Blood Cultures: No results found for: \"BC\"  No results found for: \"BLOODCULT2\"  Organism: No results found for: \"ORG\"      Dayan Mccray, APRN - CNP

## 2025-05-15 NOTE — PROGRESS NOTES
Patient educated on use of IS and demonstrates use Yes    Patient and family educated on incisional care and s/s of infection Yes    Patient and family educated on hand hygiene Yes    Patient and family educated on post operative care Yes   - NG tube maintenance    - Pain control    - Insulin Protocol     Day of surgery patient to side of bed or up to chair for minimum of 30 minutes NA    POD 1 until discharge, patient up to chair by 9 am and TID. Goal to increase mobility  Yes    Patient performs oral hygiene with CHG oral solution until NG tube discontinued. Yes    If patient does not have NG tube in place, patient to brush teeth and use mouth wash Q shift NA    Patient receives daily bath and linen changes Yes    Patient receives CHG bath until rooney discontinued Yes for PICC    SCDs in place No    Patient receiving chemical VTE Yes

## 2025-05-16 LAB
ALBUMIN SERPL-MCNC: 2.7 G/DL (ref 3.4–5)
ALBUMIN SERPL-MCNC: 2.7 G/DL (ref 3.4–5)
ALBUMIN/GLOB SERPL: 0.8 {RATIO} (ref 1.1–2.2)
ALP SERPL-CCNC: 97 U/L (ref 40–129)
ALP SERPL-CCNC: 97 U/L (ref 40–129)
ALT SERPL-CCNC: <5 U/L (ref 10–40)
ALT SERPL-CCNC: <5 U/L (ref 10–40)
ANION GAP SERPL CALCULATED.3IONS-SCNC: 12 MMOL/L (ref 3–16)
AST SERPL-CCNC: 13 U/L (ref 15–37)
AST SERPL-CCNC: 14 U/L (ref 15–37)
BILIRUB DIRECT SERPL-MCNC: <0.1 MG/DL (ref 0–0.3)
BILIRUB INDIRECT SERPL-MCNC: ABNORMAL MG/DL (ref 0–1)
BILIRUB SERPL-MCNC: <0.2 MG/DL (ref 0–1)
BILIRUB SERPL-MCNC: <0.2 MG/DL (ref 0–1)
BUN SERPL-MCNC: 16 MG/DL (ref 7–20)
CALCIUM SERPL-MCNC: 8.8 MG/DL (ref 8.3–10.6)
CHLORIDE SERPL-SCNC: 101 MMOL/L (ref 99–110)
CO2 SERPL-SCNC: 23 MMOL/L (ref 21–32)
CREAT SERPL-MCNC: 0.5 MG/DL (ref 0.6–1.2)
DEPRECATED RDW RBC AUTO: 16.8 % (ref 12.4–15.4)
GFR SERPLBLD CREATININE-BSD FMLA CKD-EPI: >90 ML/MIN/{1.73_M2}
GLUCOSE BLD-MCNC: 108 MG/DL (ref 70–99)
GLUCOSE BLD-MCNC: 124 MG/DL (ref 70–99)
GLUCOSE BLD-MCNC: 127 MG/DL (ref 70–99)
GLUCOSE BLD-MCNC: 97 MG/DL (ref 70–99)
GLUCOSE SERPL-MCNC: 97 MG/DL (ref 70–99)
HCT VFR BLD AUTO: 23.3 % (ref 36–48)
HGB BLD-MCNC: 7.7 G/DL (ref 12–16)
MCH RBC QN AUTO: 30.3 PG (ref 26–34)
MCHC RBC AUTO-ENTMCNC: 33.1 G/DL (ref 31–36)
MCV RBC AUTO: 91.4 FL (ref 80–100)
PERFORMED ON: ABNORMAL
PERFORMED ON: NORMAL
PHOSPHATE SERPL-MCNC: 3.8 MG/DL (ref 2.5–4.9)
PLATELET # BLD AUTO: 783 K/UL (ref 135–450)
PMV BLD AUTO: 8.3 FL (ref 5–10.5)
POTASSIUM SERPL-SCNC: 3.8 MMOL/L (ref 3.5–5.1)
PROT SERPL-MCNC: 6 G/DL (ref 6.4–8.2)
PROT SERPL-MCNC: 6 G/DL (ref 6.4–8.2)
RBC # BLD AUTO: 2.55 M/UL (ref 4–5.2)
SODIUM SERPL-SCNC: 136 MMOL/L (ref 136–145)
WBC # BLD AUTO: 19.7 K/UL (ref 4–11)

## 2025-05-16 PROCEDURE — 6370000000 HC RX 637 (ALT 250 FOR IP): Performed by: NURSE PRACTITIONER

## 2025-05-16 PROCEDURE — 1200000000 HC SEMI PRIVATE

## 2025-05-16 PROCEDURE — 2500000003 HC RX 250 WO HCPCS: Performed by: SURGERY

## 2025-05-16 PROCEDURE — 6360000002 HC RX W HCPCS: Performed by: SURGERY

## 2025-05-16 PROCEDURE — 6360000002 HC RX W HCPCS: Performed by: STUDENT IN AN ORGANIZED HEALTH CARE EDUCATION/TRAINING PROGRAM

## 2025-05-16 PROCEDURE — 80053 COMPREHEN METABOLIC PANEL: CPT

## 2025-05-16 PROCEDURE — 2580000003 HC RX 258: Performed by: INTERNAL MEDICINE

## 2025-05-16 PROCEDURE — 6360000002 HC RX W HCPCS: Performed by: HOSPITALIST

## 2025-05-16 PROCEDURE — 84100 ASSAY OF PHOSPHORUS: CPT

## 2025-05-16 PROCEDURE — 85027 COMPLETE CBC AUTOMATED: CPT

## 2025-05-16 PROCEDURE — 99024 POSTOP FOLLOW-UP VISIT: CPT | Performed by: SURGERY

## 2025-05-16 PROCEDURE — 6370000000 HC RX 637 (ALT 250 FOR IP): Performed by: SURGERY

## 2025-05-16 PROCEDURE — 6360000002 HC RX W HCPCS: Performed by: INTERNAL MEDICINE

## 2025-05-16 RX ADMIN — PIPERACILLIN AND TAZOBACTAM 3375 MG: 3; .375 INJECTION, POWDER, LYOPHILIZED, FOR SOLUTION INTRAVENOUS at 08:36

## 2025-05-16 RX ADMIN — METOPROLOL 12.5 MG: 25 TABLET ORAL at 08:38

## 2025-05-16 RX ADMIN — Medication 6 MG: at 21:01

## 2025-05-16 RX ADMIN — CALCIUM CARBONATE 500 MG: 500 TABLET, CHEWABLE ORAL at 21:00

## 2025-05-16 RX ADMIN — DICYCLOMINE HYDROCHLORIDE 10 MG: 10 CAPSULE ORAL at 14:02

## 2025-05-16 RX ADMIN — DICYCLOMINE HYDROCHLORIDE 10 MG: 10 CAPSULE ORAL at 08:38

## 2025-05-16 RX ADMIN — ASCORBIC ACID, VITAMIN A PALMITATE, CHOLECALCIFEROL, THIAMINE HYDROCHLORIDE, RIBOFLAVIN-5 PHOSPHATE SODIUM, PYRIDOXINE HYDROCHLORIDE, NIACINAMIDE, DEXPANTHENOL, ALPHA-TOCOPHEROL ACETATE, VITAMIN K1, FOLIC ACID, BIOTIN, CYANOCOBALAMIN: 200; 3300; 200; 6; 3.6; 6; 40; 15; 10; 150; 600; 60; 5 INJECTION, SOLUTION INTRAVENOUS at 18:28

## 2025-05-16 RX ADMIN — MORPHINE SULFATE 2 MG: 2 INJECTION, SOLUTION INTRAMUSCULAR; INTRAVENOUS at 18:07

## 2025-05-16 RX ADMIN — Medication 400 MG: at 08:38

## 2025-05-16 RX ADMIN — MORPHINE SULFATE 2 MG: 2 INJECTION, SOLUTION INTRAMUSCULAR; INTRAVENOUS at 04:58

## 2025-05-16 RX ADMIN — SODIUM CHLORIDE, PRESERVATIVE FREE 10 ML: 5 INJECTION INTRAVENOUS at 21:00

## 2025-05-16 RX ADMIN — NYSTATIN 500000 UNITS: 100000 SUSPENSION ORAL at 08:37

## 2025-05-16 RX ADMIN — ENOXAPARIN SODIUM 30 MG: 100 INJECTION SUBCUTANEOUS at 08:37

## 2025-05-16 RX ADMIN — NYSTATIN 500000 UNITS: 100000 SUSPENSION ORAL at 14:02

## 2025-05-16 RX ADMIN — PIPERACILLIN AND TAZOBACTAM 3375 MG: 3; .375 INJECTION, POWDER, LYOPHILIZED, FOR SOLUTION INTRAVENOUS at 18:17

## 2025-05-16 RX ADMIN — METOPROLOL 12.5 MG: 25 TABLET ORAL at 21:01

## 2025-05-16 RX ADMIN — MORPHINE SULFATE 2 MG: 2 INJECTION, SOLUTION INTRAMUSCULAR; INTRAVENOUS at 22:20

## 2025-05-16 RX ADMIN — CALCIUM CARBONATE 500 MG: 500 TABLET, CHEWABLE ORAL at 08:38

## 2025-05-16 RX ADMIN — MORPHINE SULFATE 2 MG: 2 INJECTION, SOLUTION INTRAMUSCULAR; INTRAVENOUS at 08:44

## 2025-05-16 RX ADMIN — PIPERACILLIN AND TAZOBACTAM 3375 MG: 3; .375 INJECTION, POWDER, LYOPHILIZED, FOR SOLUTION INTRAVENOUS at 01:51

## 2025-05-16 RX ADMIN — AMLODIPINE BESYLATE 2.5 MG: 2.5 TABLET ORAL at 08:37

## 2025-05-16 RX ADMIN — DICYCLOMINE HYDROCHLORIDE 10 MG: 10 CAPSULE ORAL at 21:00

## 2025-05-16 RX ADMIN — PANTOPRAZOLE SODIUM 40 MG: 40 INJECTION, POWDER, FOR SOLUTION INTRAVENOUS at 08:37

## 2025-05-16 RX ADMIN — MORPHINE SULFATE 2 MG: 2 INJECTION, SOLUTION INTRAMUSCULAR; INTRAVENOUS at 14:02

## 2025-05-16 RX ADMIN — Medication 400 MG: at 21:01

## 2025-05-16 RX ADMIN — SODIUM CHLORIDE, PRESERVATIVE FREE 10 ML: 5 INJECTION INTRAVENOUS at 08:39

## 2025-05-16 ASSESSMENT — PAIN DESCRIPTION - PAIN TYPE
TYPE: ACUTE PAIN
TYPE: ACUTE PAIN

## 2025-05-16 ASSESSMENT — PAIN SCALES - GENERAL
PAINLEVEL_OUTOF10: 8
PAINLEVEL_OUTOF10: 0
PAINLEVEL_OUTOF10: 0
PAINLEVEL_OUTOF10: 8
PAINLEVEL_OUTOF10: 7
PAINLEVEL_OUTOF10: 8
PAINLEVEL_OUTOF10: 8

## 2025-05-16 ASSESSMENT — PAIN DESCRIPTION - ORIENTATION
ORIENTATION: ANTERIOR
ORIENTATION: LOWER;MID

## 2025-05-16 ASSESSMENT — PAIN DESCRIPTION - LOCATION
LOCATION: ABDOMEN
LOCATION: BACK
LOCATION: ABDOMEN
LOCATION: ABDOMEN

## 2025-05-16 ASSESSMENT — PAIN DESCRIPTION - DESCRIPTORS
DESCRIPTORS: ACHING
DESCRIPTORS: DISCOMFORT

## 2025-05-16 NOTE — PLAN OF CARE
Problem: Pain  Goal: Verbalizes/displays adequate comfort level or baseline comfort level  Outcome: Progressing     Problem: Safety - Adult  Goal: Free from fall injury  Outcome: Progressing     Problem: ABCDS Injury Assessment  Goal: Absence of physical injury  Outcome: Progressing     Problem: Nutrition Deficit:  Goal: Optimize nutritional status  Outcome: Progressing     Problem: Discharge Planning  Goal: Discharge to home or other facility with appropriate resources  Outcome: Progressing     Problem: Skin/Tissue Integrity - Adult  Goal: Skin integrity remains intact  Outcome: Progressing     Problem: Skin/Tissue Integrity - Adult  Goal: Incisions, wounds, or drain sites healing without S/S of infection  Outcome: Progressing

## 2025-05-16 NOTE — PROGRESS NOTES
Comprehensive Nutrition Assessment    Type and Reason for Visit:  Reassess    Nutrition Recommendations/Plan:   Continue Clinimix 5/15 at goal rate of 75 mL/hour.  Recommend 250 mL bags of 20% lipids 2 times per week.  Physician/LIP to monitor closely and correct electrolytes (Phos,Mg,K+) due to risk of refeeding syndrome   Recommend FSBS, monitor glucose, need for insulin.  Pharmacy to adjust MVI and Trace Elements as needed.  When PN to be discontinued, cut rate by 50% and let current bag run out.      Malnutrition Assessment:  Malnutrition Status:  Severe malnutrition (04/24/25 1120)    Context:  Acute Illness     Findings of the 6 clinical characteristics of malnutrition:  Energy Intake:  75% or less of estimated energy requirements for 7 or more days  Weight Loss:  Mild weight loss     Body Fat Loss:  Moderate body fat loss Orbital, Triceps   Muscle Mass Loss:  Moderate muscle mass loss Temples (temporalis), Clavicles (pectoralis & deltoids)  Fluid Accumulation:  No fluid accumulation     Strength:  Not Performed    Nutrition Assessment:    Followup: Patient continues to have NG tube to suction. TPN running at goal rate of 75ml/hr. Patient with liquid BM this AM. Per [place], \"Will see if any improvement over the next 3-4 days but if not getting better then may need OR next week.\" Noted weight trending down -5lbs the past x4 days. Labs stable per flowsheets. Will continue to monitor.    Nutrition Related Findings:    Glucose 124. Electrolytes WDL. Trace edema LLE. LBM 5/16. TPN at goal of 75ml/hr. Wound Type: Surgical Incision       Current Nutrition Intake & Therapies:    Average Meal Intake: NPO  Average Supplements Intake: NPO  Diet NPO  PN-Adult Premix 5/15 - Standard Electrolytes - Central Line  PN-Adult Premix 5/15 - Standard Electrolytes - Central Line    Anthropometric Measures:  Height: 160 cm (5' 3\")  Ideal Body Weight (IBW): 115 lbs (52 kg)       Current Body Weight: 48 kg (105 lb 13.1 oz)

## 2025-05-16 NOTE — PROGRESS NOTES
[facility] GENERAL SURGERY    Surgery Progress Note           POD # 17    PATIENT NAME: Tuyet Richter     TODAY'S DATE: 5/16/2025    INTERVAL HISTORY:    Pt had liquid bm, mild pain stable     OBJECTIVE:   VITALS:  /76   Pulse (!) 111   Temp 97.7 °F (36.5 °C)   Resp 16   Ht 1.6 m (5' 3\")   Wt 48 kg (105 lb 12.8 oz)   SpO2 99%   BMI 18.74 kg/m²     INTAKE/OUTPUT:    I/O last 3 completed shifts:  In: 1689.3 [P.O.:120; NG/GT:120; IV Piggyback:83.7]  Out: 2610 [Urine:1530; Emesis/NG output:1000; Drains:80]  No intake/output data recorded.              CONSTITUTIONAL:  awake and alert  LUNGS:     ABDOMEN:    , soft, distended,     INCISION: clean, dry, TODD with purulent drainage.  Right sided drain with purulent draomage     Data:  CBC:   Recent Labs     05/14/25  0530 05/15/25  1345 05/16/25  0522   WBC 17.5* 13.7* 19.7*   HGB 7.8* 9.6* 7.7*   HCT 23.6* 29.8* 23.3*   * 667* 783*     BMP:    Recent Labs     05/14/25  0530 05/15/25  1345 05/16/25  0522    136 136   K 3.7 3.9 3.8    102 101   CO2 22 24 23   BUN 13 16 16   CREATININE 0.5* 0.4* 0.5*   GLUCOSE 114* 97 97     Hepatic:   Recent Labs     05/14/25  0530 05/16/25  0522   AST 13*  11* 14*  13*   ALT <5*  <5* <5*  <5*   BILITOT <0.2  <0.2 <0.2  <0.2   ALKPHOS 87  87 97  97     Mag:      Recent Labs     05/14/25  0530 05/14/25  1849 05/15/25  0553   MG 1.86 1.62* 2.03      Phos:     Recent Labs     05/14/25  0530 05/16/25  0522   PHOS 4.2 3.8      INR: No results for input(s): \"INR\" in the last 72 hours.      Radiology Review:       ASSESSMENT AND PLAN:  65 y.o. female status post Laparoscopic lysis of adhesions and abscess drainage with new percutaneous drain placed on Friday.    Continue IV antibiotics, NG, and TPN.    Will see if any improvement over the next 3-4 days but if not getting better then may need OR next week.       Electronically signed by Dallas Guerrero MD

## 2025-05-16 NOTE — CARE COORDINATION
Hospital day 23: Patient on C3 re intra abd abscess care managed by IM, General Surgery, and ID. Patient from home with spouse. Appears fro sluggish this date, but remains in good spirits. Patient with NGT, drains x2, TPN and IV ATB. SW will ct to follow.GASTON Alexander

## 2025-05-16 NOTE — PROGRESS NOTES
[facility] Medicine Progress Note  V 1.6      Date of Admission: 4/23/2025    Hospital Day: 24      Chief Admission Complaint:  Abdominal pain    Subjective:    EMR notes reviewed patient seen and examined sitting up acute distress, No new complaints       Presenting Admission History:       This is a 64 y.o. female who presented to [facility] with abdominal pain.  PMHx significant for HTN.  History obtained from the patient and review of EMR.  The patient stated she had a hemicolectomy in February 2025 in [facility] and since then has been experiencing intermittent abdominal pain.  Per chart review, the patient has been readmitted 1 time since her surgery for electrolyte abnormalities.  However, the patient stated her pain has gotten progressively worse over the last couple of weeks.  She reports speaking with GI and they ordered a CT outpatient today.  Patient  is at the bedside and stated that she was called and told to go to the emergency department due to \"a collection of pus\" seen on the CT. In the emergency department a CT abdomen pelvis was obtained that revealed Fluid and gas collection within the pelvis interposed between the colon and uterus measuring up to 4.1 cm.  Abscess is favored.  This appears largely enveloped by abdominal and adnexal structures.  A [place] uterine fistula is not excluded.  Additional small dependent collection within the region of the pouch of Alfred measuring up to 2.5 cm.  Mild dilation of small bowel loops which may indicate partial obstruction.  There is no high-grade small bowel obstruction.  The patient was given morphine for pain.  She was also started on Zosyn.  Upon further evaluation, the patient was found to be dehydrated with hyponatremia.  This is likely secondary to inadequate p.o. intake.  She was admitted for further evaluation and treatment.  IV fluids have been initiated and GI has been consulted for the a.m. The patient denied any  48 kg (105 lb 12.8 oz)   SpO2 99%   BMI 18.74 kg/m²     Diet: Diet NPO  PN-Adult Premix 5/15 - Standard Electrolytes - Central Line  PN-Adult Premix 5/15 - Standard Electrolytes - Central Line    DVT Prophylaxis: [x]PPx LMWH  []SQ Heparin  []IPC/SCDs  []Eliquis  []Xarelto  []Coumadin  [] Heparin Drip  []Other -      Code status: Full Code    PT/OT Eval Status:   [x]NOT yet ordered  []Ordered and Pending   []Seen with Recommendations for:   []Home independently  []Home w/ assist  []HHC  []SNF  []Acute Rehab    Multi-Disciplinary Rounds with Case Management completed on 5/16/2025 with the following recommendations:  Anticipated Discharge Location: [x]Home w/ [x]HHC vs [x]SNF  []Acute Rehab  []LTAC  []Hospice  []Other -    Anticipated Discharge Day/Date: 5/20/2025  Barriers to Discharge: Clinical improvement    --------------------------------------------------    MDM (any 2 required for High level billing)    A. Problems (any 1)  [x] Acute/Chronic Illness/injury posing ongoing threat to life and/or bodily function without ongoing treatment - Intra-abdominal abscess  [] Severe exacerbation of chronic illness    --------------------------------------------------  B. Risk of Treatment (any 1)    [x] Drugs/treatments that require intensive monitoring for toxicity    [x] IV ABX (Vancomycin, Aminoglycosides, etc)     [] Post-Cath/Contrast study requiring serial monitoring    [x] IV Narcotic analgesia    [] Aggressive IV diuresis    [] Hypertonic Saline    [x] Critical electrolyte abnormalities requiring IV replacement    [] Insulin - Scheduled/SSI or Insulin gtt    [] Anticoagulation (Heparin gtt or Coumadin - other anticoagulants in special circumstances)    [] Cardiac Medications (IV Amiodarone/Diltiazem, Tikosyn, etc)    [] Hemodialysis    [x] Other -  NG tube  [] Change in code status    [] Decision to escalate care    [] Major surgery/procedure with associated risk factors

## 2025-05-17 LAB
ALBUMIN SERPL-MCNC: 2.5 G/DL (ref 3.4–5)
ALBUMIN/GLOB SERPL: 0.7 {RATIO} (ref 1.1–2.2)
ALP SERPL-CCNC: 93 U/L (ref 40–129)
ALT SERPL-CCNC: <5 U/L (ref 10–40)
ANION GAP SERPL CALCULATED.3IONS-SCNC: 8 MMOL/L (ref 3–16)
AST SERPL-CCNC: 11 U/L (ref 15–37)
BILIRUB SERPL-MCNC: <0.2 MG/DL (ref 0–1)
BUN SERPL-MCNC: 18 MG/DL (ref 7–20)
CALCIUM SERPL-MCNC: 8.7 MG/DL (ref 8.3–10.6)
CHLORIDE SERPL-SCNC: 104 MMOL/L (ref 99–110)
CO2 SERPL-SCNC: 23 MMOL/L (ref 21–32)
CREAT SERPL-MCNC: 0.5 MG/DL (ref 0.6–1.2)
DEPRECATED RDW RBC AUTO: 17.1 % (ref 12.4–15.4)
GFR SERPLBLD CREATININE-BSD FMLA CKD-EPI: >90 ML/MIN/{1.73_M2}
GLUCOSE BLD-MCNC: 111 MG/DL (ref 70–99)
GLUCOSE BLD-MCNC: 124 MG/DL (ref 70–99)
GLUCOSE BLD-MCNC: 127 MG/DL (ref 70–99)
GLUCOSE BLD-MCNC: 98 MG/DL (ref 70–99)
GLUCOSE SERPL-MCNC: 111 MG/DL (ref 70–99)
HCT VFR BLD AUTO: 22 % (ref 36–48)
HGB BLD-MCNC: 7.3 G/DL (ref 12–16)
MCH RBC QN AUTO: 30.6 PG (ref 26–34)
MCHC RBC AUTO-ENTMCNC: 33.3 G/DL (ref 31–36)
MCV RBC AUTO: 91.9 FL (ref 80–100)
PERFORMED ON: ABNORMAL
PERFORMED ON: NORMAL
PLATELET # BLD AUTO: 701 K/UL (ref 135–450)
PMV BLD AUTO: 8.4 FL (ref 5–10.5)
POTASSIUM SERPL-SCNC: 3.8 MMOL/L (ref 3.5–5.1)
PROT SERPL-MCNC: 5.9 G/DL (ref 6.4–8.2)
RBC # BLD AUTO: 2.39 M/UL (ref 4–5.2)
SODIUM SERPL-SCNC: 135 MMOL/L (ref 136–145)
WBC # BLD AUTO: 14.3 K/UL (ref 4–11)

## 2025-05-17 PROCEDURE — 1200000000 HC SEMI PRIVATE

## 2025-05-17 PROCEDURE — 2500000003 HC RX 250 WO HCPCS: Performed by: SURGERY

## 2025-05-17 PROCEDURE — 80053 COMPREHEN METABOLIC PANEL: CPT

## 2025-05-17 PROCEDURE — 85027 COMPLETE CBC AUTOMATED: CPT

## 2025-05-17 PROCEDURE — 2580000003 HC RX 258: Performed by: INTERNAL MEDICINE

## 2025-05-17 PROCEDURE — 99024 POSTOP FOLLOW-UP VISIT: CPT | Performed by: SURGERY

## 2025-05-17 PROCEDURE — 6370000000 HC RX 637 (ALT 250 FOR IP): Performed by: NURSE PRACTITIONER

## 2025-05-17 PROCEDURE — 6360000002 HC RX W HCPCS: Performed by: SURGERY

## 2025-05-17 PROCEDURE — 6360000002 HC RX W HCPCS: Performed by: HOSPITALIST

## 2025-05-17 PROCEDURE — 6360000002 HC RX W HCPCS: Performed by: INTERNAL MEDICINE

## 2025-05-17 PROCEDURE — 6370000000 HC RX 637 (ALT 250 FOR IP): Performed by: SURGERY

## 2025-05-17 PROCEDURE — 6360000002 HC RX W HCPCS: Performed by: STUDENT IN AN ORGANIZED HEALTH CARE EDUCATION/TRAINING PROGRAM

## 2025-05-17 RX ADMIN — DICYCLOMINE HYDROCHLORIDE 10 MG: 10 CAPSULE ORAL at 11:11

## 2025-05-17 RX ADMIN — ENOXAPARIN SODIUM 30 MG: 100 INJECTION SUBCUTANEOUS at 11:12

## 2025-05-17 RX ADMIN — DICYCLOMINE HYDROCHLORIDE 10 MG: 10 CAPSULE ORAL at 15:21

## 2025-05-17 RX ADMIN — MORPHINE SULFATE 2 MG: 2 INJECTION, SOLUTION INTRAMUSCULAR; INTRAVENOUS at 22:24

## 2025-05-17 RX ADMIN — PIPERACILLIN AND TAZOBACTAM 3375 MG: 3; .375 INJECTION, POWDER, LYOPHILIZED, FOR SOLUTION INTRAVENOUS at 17:53

## 2025-05-17 RX ADMIN — MORPHINE SULFATE 2 MG: 2 INJECTION, SOLUTION INTRAMUSCULAR; INTRAVENOUS at 02:29

## 2025-05-17 RX ADMIN — Medication 400 MG: at 11:11

## 2025-05-17 RX ADMIN — PIPERACILLIN AND TAZOBACTAM 3375 MG: 3; .375 INJECTION, POWDER, LYOPHILIZED, FOR SOLUTION INTRAVENOUS at 02:29

## 2025-05-17 RX ADMIN — Medication 6 MG: at 21:39

## 2025-05-17 RX ADMIN — ASCORBIC ACID, VITAMIN A PALMITATE, CHOLECALCIFEROL, THIAMINE HYDROCHLORIDE, RIBOFLAVIN-5 PHOSPHATE SODIUM, PYRIDOXINE HYDROCHLORIDE, NIACINAMIDE, DEXPANTHENOL, ALPHA-TOCOPHEROL ACETATE, VITAMIN K1, FOLIC ACID, BIOTIN, CYANOCOBALAMIN: 200; 3300; 200; 6; 3.6; 6; 40; 15; 10; 150; 600; 60; 5 INJECTION, SOLUTION INTRAVENOUS at 17:49

## 2025-05-17 RX ADMIN — METOPROLOL 12.5 MG: 25 TABLET ORAL at 21:40

## 2025-05-17 RX ADMIN — CALCIUM CARBONATE 500 MG: 500 TABLET, CHEWABLE ORAL at 11:11

## 2025-05-17 RX ADMIN — PANTOPRAZOLE SODIUM 40 MG: 40 INJECTION, POWDER, FOR SOLUTION INTRAVENOUS at 11:12

## 2025-05-17 RX ADMIN — MORPHINE SULFATE 2 MG: 2 INJECTION, SOLUTION INTRAMUSCULAR; INTRAVENOUS at 13:00

## 2025-05-17 RX ADMIN — CALCIUM CARBONATE 500 MG: 500 TABLET, CHEWABLE ORAL at 21:39

## 2025-05-17 RX ADMIN — PIPERACILLIN AND TAZOBACTAM 3375 MG: 3; .375 INJECTION, POWDER, LYOPHILIZED, FOR SOLUTION INTRAVENOUS at 11:11

## 2025-05-17 RX ADMIN — DICYCLOMINE HYDROCHLORIDE 10 MG: 10 CAPSULE ORAL at 21:39

## 2025-05-17 RX ADMIN — Medication 400 MG: at 21:40

## 2025-05-17 RX ADMIN — MORPHINE SULFATE 2 MG: 2 INJECTION, SOLUTION INTRAMUSCULAR; INTRAVENOUS at 08:19

## 2025-05-17 RX ADMIN — BENZOCAINE: 200 SPRAY DENTAL; ORAL; PERIODONTAL at 23:11

## 2025-05-17 RX ADMIN — BENZOCAINE: 200 SPRAY DENTAL; ORAL; PERIODONTAL at 17:11

## 2025-05-17 RX ADMIN — METOPROLOL 12.5 MG: 25 TABLET ORAL at 11:11

## 2025-05-17 RX ADMIN — SODIUM CHLORIDE, PRESERVATIVE FREE 10 ML: 5 INJECTION INTRAVENOUS at 21:53

## 2025-05-17 RX ADMIN — MORPHINE SULFATE 2 MG: 2 INJECTION, SOLUTION INTRAMUSCULAR; INTRAVENOUS at 17:11

## 2025-05-17 RX ADMIN — AMLODIPINE BESYLATE 2.5 MG: 2.5 TABLET ORAL at 11:11

## 2025-05-17 ASSESSMENT — PAIN DESCRIPTION - ORIENTATION
ORIENTATION: LOWER

## 2025-05-17 ASSESSMENT — PAIN DESCRIPTION - DESCRIPTORS
DESCRIPTORS: DISCOMFORT

## 2025-05-17 ASSESSMENT — PAIN SCALES - GENERAL
PAINLEVEL_OUTOF10: 9
PAINLEVEL_OUTOF10: 7
PAINLEVEL_OUTOF10: 8
PAINLEVEL_OUTOF10: 5
PAINLEVEL_OUTOF10: 8
PAINLEVEL_OUTOF10: 7
PAINLEVEL_OUTOF10: 8
PAINLEVEL_OUTOF10: 6

## 2025-05-17 ASSESSMENT — PAIN DESCRIPTION - LOCATION
LOCATION: ABDOMEN

## 2025-05-17 NOTE — PLAN OF CARE
Problem: Pain  Goal: Verbalizes/displays adequate comfort level or baseline comfort level  Outcome: Progressing  Flowsheets (Taken 5/17/2025 1522)  Verbalizes/displays adequate comfort level or baseline comfort level:   Encourage patient to monitor pain and request assistance   Assess pain using appropriate pain scale   Administer analgesics based on type and severity of pain and evaluate response   Implement non-pharmacological measures as appropriate and evaluate response     Problem: Safety - Adult  Goal: Free from fall injury  Outcome: Progressing  Flowsheets (Taken 5/17/2025 1522)  Free From Fall Injury: Instruct family/caregiver on patient safety     Problem: ABCDS Injury Assessment  Goal: Absence of physical injury  Outcome: Progressing  Flowsheets (Taken 5/17/2025 1522)  Absence of Physical Injury: Implement safety measures based on patient assessment     Problem: Gastrointestinal - Adult  Goal: Minimal or absence of nausea and vomiting  Outcome: Progressing  Flowsheets (Taken 5/17/2025 1522)  Minimal or absence of nausea and vomiting:   Administer IV fluids as ordered to ensure adequate hydration   Maintain NPO status until nausea and vomiting are resolved   Nasogastric tube to low intermittent suction as ordered   Provide nonpharmacologic comfort measures as appropriate

## 2025-05-17 NOTE — PLAN OF CARE
Problem: Pain  Goal: Verbalizes/displays adequate comfort level or baseline comfort level  Outcome: Progressing     Problem: Safety - Adult  Goal: Free from fall injury  Outcome: Progressing     Problem: ABCDS Injury Assessment  Goal: Absence of physical injury  Outcome: Progressing     Problem: Gastrointestinal - Adult  Goal: Maintains adequate nutritional intake  Outcome: Progressing     Problem: Gastrointestinal - Adult  Goal: Maintains or returns to baseline bowel function  Outcome: Progressing     Problem: Gastrointestinal - Adult  Goal: Minimal or absence of nausea and vomiting  Outcome: Progressing

## 2025-05-17 NOTE — PROGRESS NOTES
[facility] GENERAL SURGERY    Surgery Progress Note           POD # 18    PATIENT NAME: Tuyet Richter     TODAY'S DATE: 5/17/2025    INTERVAL HISTORY:    Pt had liquid bm, mild pain     OBJECTIVE:   VITALS:  /72   Pulse 99   Temp 97.5 °F (36.4 °C) (Oral)   Resp 18   Ht 1.6 m (5' 3\")   Wt 48 kg (105 lb 12.8 oz)   SpO2 97%   BMI 18.74 kg/m²     INTAKE/OUTPUT:    I/O last 3 completed shifts:  In: 120 [NG/GT:120]  Out: 1380 [Urine:500; Emesis/NG output:700; Drains:180]  I/O this shift:  In: 4091.8 [IV Piggyback:346.6]  Out: -               CONSTITUTIONAL:  awake and alert  LUNGS:     ABDOMEN:    , soft, distended,     INCISION: clean, dry, TODD with purulent drainage.  Right sided drain with feculent drainage     Data:  CBC:   Recent Labs     05/15/25  1345 05/16/25  0522 05/17/25  0504   WBC 13.7* 19.7* 14.3*   HGB 9.6* 7.7* 7.3*   HCT 29.8* 23.3* 22.0*   * 783* 701*     BMP:    Recent Labs     05/15/25  1345 05/16/25  0522 05/17/25  0504    136 135*   K 3.9 3.8 3.8    101 104   CO2 24 23 23   BUN 16 16 18   CREATININE 0.4* 0.5* 0.5*   GLUCOSE 97 97 111*     Hepatic:   Recent Labs     05/16/25  0522 05/17/25  0504   AST 14*  13* 11*   ALT <5*  <5* <5*   BILITOT <0.2  <0.2 <0.2   ALKPHOS 97  97 93     Mag:      Recent Labs     05/14/25  1849 05/15/25  0553   MG 1.62* 2.03      Phos:     Recent Labs     05/16/25  0522   PHOS 3.8      INR: No results for input(s): \"INR\" in the last 72 hours.      Radiology Review:       ASSESSMENT AND PLAN:  65 y.o. female status post Laparoscopic lysis of adhesions and abscess drainage with new percutaneous drain placed on Friday.    Continue IV antibiotics, NG, and TPN.    CT Monday - if not improving then OR this week      Electronically signed by Dallas Guerrero MD

## 2025-05-18 LAB
ALBUMIN SERPL-MCNC: 2.7 G/DL (ref 3.4–5)
ALBUMIN/GLOB SERPL: 0.8 {RATIO} (ref 1.1–2.2)
ALP SERPL-CCNC: 102 U/L (ref 40–129)
ALT SERPL-CCNC: <5 U/L (ref 10–40)
ANION GAP SERPL CALCULATED.3IONS-SCNC: 12 MMOL/L (ref 3–16)
AST SERPL-CCNC: 11 U/L (ref 15–37)
BILIRUB SERPL-MCNC: <0.2 MG/DL (ref 0–1)
BUN SERPL-MCNC: 17 MG/DL (ref 7–20)
CALCIUM SERPL-MCNC: 8.7 MG/DL (ref 8.3–10.6)
CHLORIDE SERPL-SCNC: 104 MMOL/L (ref 99–110)
CO2 SERPL-SCNC: 24 MMOL/L (ref 21–32)
CREAT SERPL-MCNC: 0.5 MG/DL (ref 0.6–1.2)
DEPRECATED RDW RBC AUTO: 18.2 % (ref 12.4–15.4)
GFR SERPLBLD CREATININE-BSD FMLA CKD-EPI: >90 ML/MIN/{1.73_M2}
GLUCOSE BLD-MCNC: 105 MG/DL (ref 70–99)
GLUCOSE BLD-MCNC: 119 MG/DL (ref 70–99)
GLUCOSE BLD-MCNC: 125 MG/DL (ref 70–99)
GLUCOSE BLD-MCNC: 93 MG/DL (ref 70–99)
GLUCOSE SERPL-MCNC: 71 MG/DL (ref 70–99)
HCT VFR BLD AUTO: 22.3 % (ref 36–48)
HGB BLD-MCNC: 7.4 G/DL (ref 12–16)
MCH RBC QN AUTO: 30.5 PG (ref 26–34)
MCHC RBC AUTO-ENTMCNC: 33.3 G/DL (ref 31–36)
MCV RBC AUTO: 91.6 FL (ref 80–100)
PERFORMED ON: ABNORMAL
PERFORMED ON: NORMAL
PLATELET # BLD AUTO: 738 K/UL (ref 135–450)
PMV BLD AUTO: 8.7 FL (ref 5–10.5)
POTASSIUM SERPL-SCNC: 3.7 MMOL/L (ref 3.5–5.1)
PROT SERPL-MCNC: 6.1 G/DL (ref 6.4–8.2)
RBC # BLD AUTO: 2.43 M/UL (ref 4–5.2)
SODIUM SERPL-SCNC: 140 MMOL/L (ref 136–145)
WBC # BLD AUTO: 10.7 K/UL (ref 4–11)

## 2025-05-18 PROCEDURE — 6370000000 HC RX 637 (ALT 250 FOR IP): Performed by: NURSE PRACTITIONER

## 2025-05-18 PROCEDURE — 2580000003 HC RX 258: Performed by: INTERNAL MEDICINE

## 2025-05-18 PROCEDURE — 99024 POSTOP FOLLOW-UP VISIT: CPT | Performed by: SURGERY

## 2025-05-18 PROCEDURE — 2500000003 HC RX 250 WO HCPCS: Performed by: SURGERY

## 2025-05-18 PROCEDURE — 6360000002 HC RX W HCPCS: Performed by: INTERNAL MEDICINE

## 2025-05-18 PROCEDURE — 80053 COMPREHEN METABOLIC PANEL: CPT

## 2025-05-18 PROCEDURE — 6360000002 HC RX W HCPCS: Performed by: HOSPITALIST

## 2025-05-18 PROCEDURE — 85027 COMPLETE CBC AUTOMATED: CPT

## 2025-05-18 PROCEDURE — 6370000000 HC RX 637 (ALT 250 FOR IP): Performed by: SURGERY

## 2025-05-18 PROCEDURE — 1200000000 HC SEMI PRIVATE

## 2025-05-18 PROCEDURE — 6360000002 HC RX W HCPCS: Performed by: NURSE PRACTITIONER

## 2025-05-18 PROCEDURE — 6360000002 HC RX W HCPCS: Performed by: STUDENT IN AN ORGANIZED HEALTH CARE EDUCATION/TRAINING PROGRAM

## 2025-05-18 PROCEDURE — 6360000002 HC RX W HCPCS: Performed by: SURGERY

## 2025-05-18 RX ORDER — LIDOCAINE HYDROCHLORIDE 20 MG/ML
15 SOLUTION OROPHARYNGEAL
Status: DISCONTINUED | OUTPATIENT
Start: 2025-05-18 | End: 2025-05-30 | Stop reason: HOSPADM

## 2025-05-18 RX ADMIN — DICYCLOMINE HYDROCHLORIDE 10 MG: 10 CAPSULE ORAL at 21:30

## 2025-05-18 RX ADMIN — METOPROLOL 12.5 MG: 25 TABLET ORAL at 21:30

## 2025-05-18 RX ADMIN — LIDOCAINE HYDROCHLORIDE 15 ML: 20 SOLUTION ORAL at 21:57

## 2025-05-18 RX ADMIN — MORPHINE SULFATE 2 MG: 2 INJECTION, SOLUTION INTRAMUSCULAR; INTRAVENOUS at 23:46

## 2025-05-18 RX ADMIN — MORPHINE SULFATE 2 MG: 2 INJECTION, SOLUTION INTRAMUSCULAR; INTRAVENOUS at 19:45

## 2025-05-18 RX ADMIN — BENZOCAINE: 200 SPRAY DENTAL; ORAL; PERIODONTAL at 10:18

## 2025-05-18 RX ADMIN — Medication 400 MG: at 21:30

## 2025-05-18 RX ADMIN — METOPROLOL 12.5 MG: 25 TABLET ORAL at 10:17

## 2025-05-18 RX ADMIN — PIPERACILLIN AND TAZOBACTAM 3375 MG: 3; .375 INJECTION, POWDER, LYOPHILIZED, FOR SOLUTION INTRAVENOUS at 10:17

## 2025-05-18 RX ADMIN — ALTEPLASE 1 MG: 2.2 INJECTION, POWDER, LYOPHILIZED, FOR SOLUTION INTRAVENOUS at 12:13

## 2025-05-18 RX ADMIN — NYSTATIN 500000 UNITS: 100000 SUSPENSION ORAL at 10:19

## 2025-05-18 RX ADMIN — CALCIUM CARBONATE 500 MG: 500 TABLET, CHEWABLE ORAL at 21:30

## 2025-05-18 RX ADMIN — CALCIUM CARBONATE 500 MG: 500 TABLET, CHEWABLE ORAL at 10:17

## 2025-05-18 RX ADMIN — ENOXAPARIN SODIUM 30 MG: 100 INJECTION SUBCUTANEOUS at 10:17

## 2025-05-18 RX ADMIN — DICYCLOMINE HYDROCHLORIDE 10 MG: 10 CAPSULE ORAL at 14:21

## 2025-05-18 RX ADMIN — MORPHINE SULFATE 2 MG: 2 INJECTION, SOLUTION INTRAMUSCULAR; INTRAVENOUS at 06:50

## 2025-05-18 RX ADMIN — LIDOCAINE HYDROCHLORIDE 15 ML: 20 SOLUTION ORAL at 15:15

## 2025-05-18 RX ADMIN — MORPHINE SULFATE 2 MG: 2 INJECTION, SOLUTION INTRAMUSCULAR; INTRAVENOUS at 10:36

## 2025-05-18 RX ADMIN — MORPHINE SULFATE 2 MG: 2 INJECTION, SOLUTION INTRAMUSCULAR; INTRAVENOUS at 15:15

## 2025-05-18 RX ADMIN — Medication 400 MG: at 10:17

## 2025-05-18 RX ADMIN — MORPHINE SULFATE 2 MG: 2 INJECTION, SOLUTION INTRAMUSCULAR; INTRAVENOUS at 02:47

## 2025-05-18 RX ADMIN — ASCORBIC ACID, VITAMIN A PALMITATE, CHOLECALCIFEROL, THIAMINE HYDROCHLORIDE, RIBOFLAVIN-5 PHOSPHATE SODIUM, PYRIDOXINE HYDROCHLORIDE, NIACINAMIDE, DEXPANTHENOL, ALPHA-TOCOPHEROL ACETATE, VITAMIN K1, FOLIC ACID, BIOTIN, CYANOCOBALAMIN: 200; 3300; 200; 6; 3.6; 6; 40; 15; 10; 150; 600; 60; 5 INJECTION, SOLUTION INTRAVENOUS at 18:08

## 2025-05-18 RX ADMIN — Medication 6 MG: at 21:30

## 2025-05-18 RX ADMIN — PANTOPRAZOLE SODIUM 40 MG: 40 INJECTION, POWDER, FOR SOLUTION INTRAVENOUS at 10:18

## 2025-05-18 RX ADMIN — PIPERACILLIN AND TAZOBACTAM 3375 MG: 3; .375 INJECTION, POWDER, LYOPHILIZED, FOR SOLUTION INTRAVENOUS at 01:52

## 2025-05-18 RX ADMIN — DICYCLOMINE HYDROCHLORIDE 10 MG: 10 CAPSULE ORAL at 10:17

## 2025-05-18 RX ADMIN — PIPERACILLIN AND TAZOBACTAM 3375 MG: 3; .375 INJECTION, POWDER, LYOPHILIZED, FOR SOLUTION INTRAVENOUS at 18:06

## 2025-05-18 RX ADMIN — AMLODIPINE BESYLATE 2.5 MG: 2.5 TABLET ORAL at 10:17

## 2025-05-18 RX ADMIN — BENZOCAINE: 200 SPRAY DENTAL; ORAL; PERIODONTAL at 14:21

## 2025-05-18 RX ADMIN — SODIUM CHLORIDE, PRESERVATIVE FREE 10 ML: 5 INJECTION INTRAVENOUS at 10:18

## 2025-05-18 RX ADMIN — SODIUM CHLORIDE, PRESERVATIVE FREE 10 ML: 5 INJECTION INTRAVENOUS at 19:45

## 2025-05-18 ASSESSMENT — PAIN DESCRIPTION - LOCATION
LOCATION: ABDOMEN
LOCATION: THROAT
LOCATION: ABDOMEN

## 2025-05-18 ASSESSMENT — PAIN SCALES - GENERAL
PAINLEVEL_OUTOF10: 3
PAINLEVEL_OUTOF10: 7
PAINLEVEL_OUTOF10: 4
PAINLEVEL_OUTOF10: 5

## 2025-05-18 NOTE — PLAN OF CARE
Problem: Pain  Goal: Verbalizes/displays adequate comfort level or baseline comfort level  5/18/2025 0112 by Beni Marmolejo RN  Outcome: Progressing     Problem: Safety - Adult  Goal: Free from fall injury  5/18/2025 0111 by Beni Marmolejo RN  Outcome: Progressing     Problem: ABCDS Injury Assessment  Goal: Absence of physical injury  5/18/2025 0112 by Beni Marmolejo RN  Outcome: Progressing     Problem: Skin/Tissue Integrity - Adult  Goal: Skin integrity remains intact  Outcome: Progressing     Problem: Skin/Tissue Integrity  Goal: Skin integrity remains intact  Description: 1.  Monitor for areas of redness and/or skin breakdown2.  Assess vascular access sites hourly3.  Every 4-6 hours minimum:  Change oxygen saturation probe site4.  Every 4-6 hours:  If on nasal continuous positive airway pressure, respiratory therapy assess nares and determine need for appliance change or resting period  Outcome: Progressing

## 2025-05-18 NOTE — PROGRESS NOTES
[facility] GENERAL SURGERY    Surgery Progress Note           POD # 19    PATIENT NAME: Tuyet Richter     TODAY'S DATE: 5/18/2025    INTERVAL HISTORY:    Pt had liquid bm, stable  pain     OBJECTIVE:   VITALS:  /74   Pulse 98   Temp 97.5 °F (36.4 °C) (Oral)   Resp 18   Ht 1.6 m (5' 3\")   Wt 47.2 kg (104 lb)   SpO2 98%   BMI 18.42 kg/m²     INTAKE/OUTPUT:    I/O last 3 completed shifts:  In: 5932.2 [P.O.:50; NG/GT:120; IV Piggyback:490]  Out: 580 [Emesis/NG output:450; Drains:130]  I/O this shift:  In: 194.4 [IV Piggyback:6.1]  Out: -               CONSTITUTIONAL:  awake and alert  LUNGS:     ABDOMEN:    , soft, distended,     INCISION: clean, dry, TODD with purulent drainage.  Right sided drain with feculent drainage     Data:  CBC:   Recent Labs     05/16/25 0522 05/17/25  0504 05/18/25 0518   WBC 19.7* 14.3* 10.7   HGB 7.7* 7.3* 7.4*   HCT 23.3* 22.0* 22.3*   * 701* 738*     BMP:    Recent Labs     05/16/25 0522 05/17/25  0504 05/18/25 0518    135* 140   K 3.8 3.8 3.7    104 104   CO2 23 23 24   BUN 16 18 17   CREATININE 0.5* 0.5* 0.5*   GLUCOSE 97 111* 71     Hepatic:   Recent Labs     05/16/25 0522 05/17/25  0504 05/18/25 0518   AST 14*  13* 11* 11*   ALT <5*  <5* <5* <5*   BILITOT <0.2  <0.2 <0.2 <0.2   ALKPHOS 97  97 93 102     Mag:      No results for input(s): \"MG\" in the last 72 hours.     Phos:     Recent Labs     05/16/25 0522   PHOS 3.8      INR: No results for input(s): \"INR\" in the last 72 hours.      Radiology Review:       ASSESSMENT AND PLAN:  65 y.o. female status post Laparoscopic lysis of adhesions and abscess drainage with new percutaneous drain placed on Friday.    Continue IV antibiotics, NG, and TPN.    CT Monday - if not improving then OR this week      Electronically signed by Dallas Guerrero MD

## 2025-05-18 NOTE — PROGRESS NOTES
[facility] Medicine Progress Note  V 1.6      Date of Admission: 4/23/2025    Hospital Day: 26      Chief Admission Complaint:  Abdominal pain    Subjective:    EMR and notes reviewed pt seen and examined. Biggest complaint is her sore throat, hurricane spray with straw attachment is better bit still very sore, will try viscous lidocaine       Presenting Admission History:       This is a 64 y.o. female who presented to [facility] with abdominal pain.  PMHx significant for HTN.  History obtained from the patient and review of EMR.  The patient stated she had a hemicolectomy in February 2025 in [facility] and since then has been experiencing intermittent abdominal pain.  Per chart review, the patient has been readmitted 1 time since her surgery for electrolyte abnormalities.  However, the patient stated her pain has gotten progressively worse over the last couple of weeks.  She reports speaking with GI and they ordered a CT outpatient today.  Patient  is at the bedside and stated that she was called and told to go to the emergency department due to \"a collection of pus\" seen on the CT. In the emergency department a CT abdomen pelvis was obtained that revealed Fluid and gas collection within the pelvis interposed between the colon and uterus measuring up to 4.1 cm.  Abscess is favored.  This appears largely enveloped by abdominal and adnexal structures.  A [place] uterine fistula is not excluded.  Additional small dependent collection within the region of the pouch of Alfred measuring up to 2.5 cm.  Mild dilation of small bowel loops which may indicate partial obstruction.  There is no high-grade small bowel obstruction.  The patient was given morphine for pain.  She was also started on Zosyn.  Upon further evaluation, the patient was found to be dehydrated with hyponatremia.  This is likely secondary to inadequate p.o. intake.  She was admitted for further evaluation and treatment.  IV

## 2025-05-18 NOTE — PLAN OF CARE
Problem: Pain  Goal: Verbalizes/displays adequate comfort level or baseline comfort level  Outcome: Progressing  Flowsheets (Taken 5/18/2025 1551)  Verbalizes/displays adequate comfort level or baseline comfort level:   Encourage patient to monitor pain and request assistance   Assess pain using appropriate pain scale   Administer analgesics based on type and severity of pain and evaluate response   Implement non-pharmacological measures as appropriate and evaluate response     Problem: Safety - Adult  Goal: Free from fall injury  Outcome: Progressing  Flowsheets (Taken 5/18/2025 1551)  Free From Fall Injury: Instruct family/caregiver on patient safety     Problem: ABCDS Injury Assessment  Goal: Absence of physical injury  Outcome: Progressing  Flowsheets (Taken 5/18/2025 1551)  Absence of Physical Injury: Implement safety measures based on patient assessment     Problem: Gastrointestinal - Adult  Goal: Minimal or absence of nausea and vomiting  Outcome: Progressing  Flowsheets (Taken 5/18/2025 1551)  Minimal or absence of nausea and vomiting:   Administer IV fluids as ordered to ensure adequate hydration   Maintain NPO status until nausea and vomiting are resolved   Nasogastric tube to low intermittent suction as ordered   Provide nonpharmacologic comfort measures as appropriate     Problem: Infection - Adult  Goal: Absence of infection during hospitalization  Outcome: Progressing  Flowsheets (Taken 5/18/2025 1551)  Absence of infection during hospitalization:   Assess and monitor for signs and symptoms of infection   Monitor lab/diagnostic results   Monitor all insertion sites i.e., indwelling lines, tubes and drains   Administer medications as ordered

## 2025-05-19 ENCOUNTER — APPOINTMENT (OUTPATIENT)
Dept: CT IMAGING | Age: 65
DRG: 856 | End: 2025-05-19
Payer: MEDICARE

## 2025-05-19 PROBLEM — K56.609 SBO (SMALL BOWEL OBSTRUCTION) (HCC): Status: ACTIVE | Noted: 2025-04-23

## 2025-05-19 LAB
ALBUMIN SERPL-MCNC: 2.6 G/DL (ref 3.4–5)
ALP SERPL-CCNC: 105 U/L (ref 40–129)
ALT SERPL-CCNC: 7 U/L (ref 10–40)
AST SERPL-CCNC: 11 U/L (ref 15–37)
BILIRUB DIRECT SERPL-MCNC: <0.1 MG/DL (ref 0–0.3)
BILIRUB INDIRECT SERPL-MCNC: ABNORMAL MG/DL (ref 0–1)
BILIRUB SERPL-MCNC: <0.2 MG/DL (ref 0–1)
GLUCOSE BLD-MCNC: 117 MG/DL (ref 70–99)
GLUCOSE BLD-MCNC: 93 MG/DL (ref 70–99)
GLUCOSE BLD-MCNC: 96 MG/DL (ref 70–99)
PERFORMED ON: ABNORMAL
PERFORMED ON: NORMAL
PERFORMED ON: NORMAL
PHOSPHATE SERPL-MCNC: 7.7 MG/DL (ref 2.5–4.9)
PROT SERPL-MCNC: 6 G/DL (ref 6.4–8.2)
REASON FOR REJECTION: NORMAL
REJECTED TEST: NORMAL

## 2025-05-19 PROCEDURE — 6360000002 HC RX W HCPCS: Performed by: INTERNAL MEDICINE

## 2025-05-19 PROCEDURE — 6360000004 HC RX CONTRAST MEDICATION: Performed by: SURGERY

## 2025-05-19 PROCEDURE — 6370000000 HC RX 637 (ALT 250 FOR IP): Performed by: SURGERY

## 2025-05-19 PROCEDURE — 2500000003 HC RX 250 WO HCPCS: Performed by: SURGERY

## 2025-05-19 PROCEDURE — 6370000000 HC RX 637 (ALT 250 FOR IP): Performed by: NURSE PRACTITIONER

## 2025-05-19 PROCEDURE — 6360000002 HC RX W HCPCS: Performed by: SURGERY

## 2025-05-19 PROCEDURE — 74177 CT ABD & PELVIS W/CONTRAST: CPT

## 2025-05-19 PROCEDURE — 84100 ASSAY OF PHOSPHORUS: CPT

## 2025-05-19 PROCEDURE — 6360000002 HC RX W HCPCS: Performed by: STUDENT IN AN ORGANIZED HEALTH CARE EDUCATION/TRAINING PROGRAM

## 2025-05-19 PROCEDURE — 80076 HEPATIC FUNCTION PANEL: CPT

## 2025-05-19 PROCEDURE — 99024 POSTOP FOLLOW-UP VISIT: CPT | Performed by: SURGERY

## 2025-05-19 PROCEDURE — 2580000003 HC RX 258: Performed by: INTERNAL MEDICINE

## 2025-05-19 PROCEDURE — 6360000002 HC RX W HCPCS: Performed by: HOSPITALIST

## 2025-05-19 PROCEDURE — 1200000000 HC SEMI PRIVATE

## 2025-05-19 RX ORDER — IOPAMIDOL 755 MG/ML
75 INJECTION, SOLUTION INTRAVASCULAR
Status: COMPLETED | OUTPATIENT
Start: 2025-05-19 | End: 2025-05-19

## 2025-05-19 RX ORDER — DIATRIZOATE MEGLUMINE AND DIATRIZOATE SODIUM 660; 100 MG/ML; MG/ML
12 SOLUTION ORAL; RECTAL
Status: DISCONTINUED | OUTPATIENT
Start: 2025-05-19 | End: 2025-05-28 | Stop reason: ALTCHOICE

## 2025-05-19 RX ADMIN — DIATRIZOATE MEGLUMINE AND DIATRIZOATE SODIUM 12 ML: 660; 100 LIQUID ORAL; RECTAL at 09:54

## 2025-05-19 RX ADMIN — ENOXAPARIN SODIUM 30 MG: 100 INJECTION SUBCUTANEOUS at 09:51

## 2025-05-19 RX ADMIN — METOPROLOL 12.5 MG: 25 TABLET ORAL at 09:50

## 2025-05-19 RX ADMIN — SODIUM CHLORIDE, PRESERVATIVE FREE 10 ML: 5 INJECTION INTRAVENOUS at 21:21

## 2025-05-19 RX ADMIN — PANTOPRAZOLE SODIUM 40 MG: 40 INJECTION, POWDER, FOR SOLUTION INTRAVENOUS at 09:53

## 2025-05-19 RX ADMIN — CALCIUM CARBONATE 500 MG: 500 TABLET, CHEWABLE ORAL at 09:51

## 2025-05-19 RX ADMIN — MORPHINE SULFATE 2 MG: 2 INJECTION, SOLUTION INTRAMUSCULAR; INTRAVENOUS at 04:20

## 2025-05-19 RX ADMIN — MORPHINE SULFATE 2 MG: 2 INJECTION, SOLUTION INTRAMUSCULAR; INTRAVENOUS at 17:16

## 2025-05-19 RX ADMIN — Medication 400 MG: at 09:50

## 2025-05-19 RX ADMIN — ASCORBIC ACID, VITAMIN A PALMITATE, CHOLECALCIFEROL, THIAMINE HYDROCHLORIDE, RIBOFLAVIN-5 PHOSPHATE SODIUM, PYRIDOXINE HYDROCHLORIDE, NIACINAMIDE, DEXPANTHENOL, ALPHA-TOCOPHEROL ACETATE, VITAMIN K1, FOLIC ACID, BIOTIN, CYANOCOBALAMIN: 200; 3300; 200; 6; 3.6; 6; 40; 15; 10; 150; 600; 60; 5 INJECTION, SOLUTION INTRAVENOUS at 17:47

## 2025-05-19 RX ADMIN — BENZOCAINE: 200 SPRAY DENTAL; ORAL; PERIODONTAL at 17:29

## 2025-05-19 RX ADMIN — Medication 400 MG: at 21:10

## 2025-05-19 RX ADMIN — METOPROLOL 12.5 MG: 25 TABLET ORAL at 21:10

## 2025-05-19 RX ADMIN — PIPERACILLIN AND TAZOBACTAM 3375 MG: 3; .375 INJECTION, POWDER, LYOPHILIZED, FOR SOLUTION INTRAVENOUS at 09:56

## 2025-05-19 RX ADMIN — DICYCLOMINE HYDROCHLORIDE 10 MG: 10 CAPSULE ORAL at 21:10

## 2025-05-19 RX ADMIN — PIPERACILLIN AND TAZOBACTAM 3375 MG: 3; .375 INJECTION, POWDER, LYOPHILIZED, FOR SOLUTION INTRAVENOUS at 02:05

## 2025-05-19 RX ADMIN — Medication 6 MG: at 21:10

## 2025-05-19 RX ADMIN — CALCIUM CARBONATE 500 MG: 500 TABLET, CHEWABLE ORAL at 21:10

## 2025-05-19 RX ADMIN — PIPERACILLIN AND TAZOBACTAM 3375 MG: 3; .375 INJECTION, POWDER, LYOPHILIZED, FOR SOLUTION INTRAVENOUS at 17:27

## 2025-05-19 RX ADMIN — BENZOCAINE: 200 SPRAY DENTAL; ORAL; PERIODONTAL at 09:52

## 2025-05-19 RX ADMIN — SODIUM CHLORIDE, PRESERVATIVE FREE 10 ML: 5 INJECTION INTRAVENOUS at 09:51

## 2025-05-19 RX ADMIN — DICYCLOMINE HYDROCHLORIDE 10 MG: 10 CAPSULE ORAL at 09:51

## 2025-05-19 RX ADMIN — IOPAMIDOL 75 ML: 755 INJECTION, SOLUTION INTRAVENOUS at 11:33

## 2025-05-19 RX ADMIN — MORPHINE SULFATE 2 MG: 2 INJECTION, SOLUTION INTRAMUSCULAR; INTRAVENOUS at 21:33

## 2025-05-19 RX ADMIN — AMLODIPINE BESYLATE 2.5 MG: 2.5 TABLET ORAL at 09:51

## 2025-05-19 RX ADMIN — I.V. FAT EMULSION 250 ML: 20 EMULSION INTRAVENOUS at 17:23

## 2025-05-19 RX ADMIN — MORPHINE SULFATE 2 MG: 2 INJECTION, SOLUTION INTRAMUSCULAR; INTRAVENOUS at 12:09

## 2025-05-19 RX ADMIN — Medication 1 LOZENGE: at 21:09

## 2025-05-19 ASSESSMENT — PAIN SCALES - GENERAL
PAINLEVEL_OUTOF10: 7
PAINLEVEL_OUTOF10: 8
PAINLEVEL_OUTOF10: 8

## 2025-05-19 ASSESSMENT — PAIN DESCRIPTION - LOCATION
LOCATION: ABDOMEN
LOCATION: ABDOMEN

## 2025-05-19 ASSESSMENT — PAIN DESCRIPTION - DESCRIPTORS: DESCRIPTORS: ACHING

## 2025-05-19 NOTE — CARE COORDINATION
Hospital day 26: Patient on C3 re intra abdominal abscess care managed by IM and General Surgery. Patient from home IPTA, up in chair this am. Patient with NGT, drains x2, PICC. Patient getting IV ATB and TPN. Plans for repeat CT this date. SW following.GASTON Alexander  Per General Surgery OR tomorrow.GASTON Alexander

## 2025-05-19 NOTE — PROGRESS NOTES
Patient educated on use of IS and demonstrates use Yes    Patient and family educated on incisional care and s/s of infection Yes    Patient and family educated on hand hygiene Yes    Patient and family educated on post operative care Yes   - NG tube maintenance    - Pain control    - Insulin Protocol     Day of surgery patient to side of bed or up to chair for minimum of 30 minutes Yes    POD 1 until discharge, patient up to chair by 9 am and TID. Goal to increase mobility  Yes    Patient performs oral hygiene with CHG oral solution until NG tube discontinued. Yes    If patient does not have NG tube in place, patient to brush teeth and use mouth wash Q shift Yes    Patient receives daily bath and linen changes Yes    Patient receives CHG bath until rooney discontinued NA    SCDs in place NA    Patient receiving chemical VTE Yes

## 2025-05-19 NOTE — PLAN OF CARE
Problem: Pain  Goal: Verbalizes/displays adequate comfort level or baseline comfort level  5/18/2025 2220 by Beni Marmolejo RN  Outcome: Progressing     Problem: Safety - Adult  Goal: Free from fall injury  5/18/2025 2220 by Beni Marmolejo RN  Outcome: Progressing     Problem: ABCDS Injury Assessment  Goal: Absence of physical injury  5/18/2025 2220 by Beni Marmolejo RN  Outcome: Progressing     Problem: Nutrition Deficit:  Goal: Optimize nutritional status  Outcome: Progressing     Problem: Skin/Tissue Integrity  Goal: Skin integrity remains intact  Description: 1.  Monitor for areas of redness and/or skin breakdown2.  Assess vascular access sites hourly3.  Every 4-6 hours minimum:  Change oxygen saturation probe site4.  Every 4-6 hours:  If on nasal continuous positive airway pressure, respiratory therapy assess nares and determine need for appliance change or resting period  Outcome: Progressing

## 2025-05-19 NOTE — PROGRESS NOTES
Pt assessment completed and charted. VSS, pt on RA. Patient is a/o. IV site patent/flushed, saline locked.  Medication given per MAR.     Safety Measures in place:   Bed in lowest position and wheels locked.   Call light within reach.   Bedside table within reach.   Non-skid socks in place.   Pt denies any other needs at this time.    Pt calls out appropriately.  Patient in stable condition when RN left room.

## 2025-05-19 NOTE — PROGRESS NOTES
[facility] GENERAL SURGERY    Surgery Progress Note           POD # 20    PATIENT NAME: Tuyet Richter     TODAY'S DATE: 5/19/2025    INTERVAL HISTORY:    Pt had liquid bm, stable  pain     OBJECTIVE:   VITALS:  /77   Pulse 100   Temp 97.7 °F (36.5 °C) (Oral)   Resp 18   Ht 1.6 m (5' 3\")   Wt 47 kg (103 lb 9.6 oz)   SpO2 98%   BMI 18.35 kg/m²     INTAKE/OUTPUT:    I/O last 3 completed shifts:  In: 2952.6 [P.O.:50; NG/GT:60; IV Piggyback:227.4]  Out: 455 [Emesis/NG output:400; Drains:55]  No intake/output data recorded.              CONSTITUTIONAL:  awake and alert  LUNGS:     ABDOMEN:    , soft, distended,     INCISION: clean, dry, TODD with purulent drainage.  Right sided drain with feculent drainage     Data:  CBC:   Recent Labs     05/17/25  0504 05/18/25  0518   WBC 14.3* 10.7   HGB 7.3* 7.4*   HCT 22.0* 22.3*   * 738*     BMP:    Recent Labs     05/17/25  0504 05/18/25  0518   * 140   K 3.8 3.7    104   CO2 23 24   BUN 18 17   CREATININE 0.5* 0.5*   GLUCOSE 111* 71     Hepatic:   Recent Labs     05/17/25  0504 05/18/25  0518 05/19/25  0945   AST 11* 11* 11*   ALT <5* <5* 7*   BILITOT <0.2 <0.2 <0.2   ALKPHOS 93 102 105     Mag:      No results for input(s): \"MG\" in the last 72 hours.     Phos:     Recent Labs     05/19/25  0945   PHOS 7.7*      INR: No results for input(s): \"INR\" in the last 72 hours.      Radiology Review:     CT - persistent fluid collections, one possible new, sbo    ASSESSMENT AND PLAN:  65 y.o. female status post Laparoscopic lysis of adhesions and abscess drainage with new percutaneous drain placed on Friday.    Continue IV antibiotics, NG, and TPN.    CT without improvement.  Discussed with patient and family need for OR tomorrow.        Electronically signed by Dallas Guerrero MD

## 2025-05-19 NOTE — PROGRESS NOTES
[facility] Medicine Progress Note  V 1.6      Date of Admission: 4/23/2025    Hospital Day: 27      Chief Admission Complaint:  Abdominal pain     Subjective:       Up in chair, CT planned for today    Presenting Admission History:       \"This is a 64 y.o. female who presented to [facility] with abdominal pain.  PMHx significant for HTN.  History obtained from the patient and review of EMR.  The patient stated she had a hemicolectomy in February 2025 in [facility] and since then has been experiencing intermittent abdominal pain.  Per chart review, the patient has been readmitted 1 time since her surgery for electrolyte abnormalities.  However, the patient stated her pain has gotten progressively worse over the last couple of weeks.  She reports speaking with GI and they ordered a CT outpatient today.  Patient  is at the bedside and stated that she was called and told to go to the emergency department due to \"a collection of pus\" seen on the CT. In the emergency department a CT abdomen pelvis was obtained that revealed Fluid and gas collection within the pelvis interposed between the colon and uterus measuring up to 4.1 cm.  Abscess is favored.  This appears largely enveloped by abdominal and adnexal structures.  A [place] uterine fistula is not excluded.  Additional small dependent collection within the region of the pouch of Alfred measuring up to 2.5 cm.  Mild dilation of small bowel loops which may indicate partial obstruction.  There is no high-grade small bowel obstruction.  The patient was given morphine for pain.  She was also started on Zosyn.  Upon further evaluation, the patient was found to be dehydrated with hyponatremia.  This is likely secondary to inadequate p.o. intake.  She was admitted for further evaluation and treatment.  IV fluids have been initiated and GI has been consulted for the a.m. The patient denied any other associated symptoms as well as any aggravating  Daily AC & HS    pantoprazole  40 mg IntraVENous Daily    amLODIPine  2.5 mg Per NG tube Daily    calcium carbonate  1 tablet Per NG tube BID    magnesium oxide  400 mg Per NG tube BID    valACYclovir  500 mg Per NG tube BID    melatonin  6 mg Per NG tube Nightly    metoprolol tartrate  12.5 mg Per NG tube BID    nystatin  5 mL Mouth/Throat 4x Daily    fat emulsion  250 mL IntraVENous Once per day on Monday Thursday    piperacillin-tazobactam  3,375 mg IntraVENous Q8H    [Held by provider] metoprolol succinate  25 mg Oral Daily    dicyclomine  10 mg Oral TID    [Held by provider] pantoprazole  40 mg Oral QAM AC    sodium chloride flush  5-40 mL IntraVENous 2 times per day    enoxaparin  30 mg SubCUTAneous Daily     PRN Meds: diatrizoate meglumine-sodium, lidocaine viscous hcl, benzocaine-menthol, diatrizoate meglumine-sodium, potassium chloride **OR** potassium alternative oral replacement **OR** potassium chloride, magnesium sulfate, sodium phosphate 15 mmol in sodium chloride 0.9 % 250 mL IVPB, glucose, dextrose bolus **OR** dextrose bolus, glucagon (rDNA), dextrose, Magic Mouth wash with nystatin, benzocaine, polyethylene glycol, sodium chloride, morphine, midazolam, diatrizoate meglumine-sodium, sodium chloride flush, sodium chloride, ondansetron **OR** ondansetron, acetaminophen **OR** acetaminophen      Physical Exam Performed:      General appearance: NAD  Respiratory:  Normal respiratory effort.   Cardiovascular:  Normal, regular rhythm.  Abdomen:  Soft, mildly tender, distended. Incision well approximated.  R sided drain with yellowish purulent drainage  Musculoskeletal:  No edema  Neurologic:  Non-focal  Psychiatric:  Alert and oriented    /65   Pulse 87   Temp 97.9 °F (36.6 °C) (Oral)   Resp 16   Ht 1.6 m (5' 3\")   Wt 47 kg (103 lb 9.6 oz)   SpO2 98%   BMI 18.35 kg/m²     Diet: Diet NPO  PN-Adult Premix 5/15 - Standard Electrolytes - Central Line  PN-Adult Premix 5/15 - Standard Electrolytes

## 2025-05-20 ENCOUNTER — ANESTHESIA EVENT (OUTPATIENT)
Dept: OPERATING ROOM | Age: 65
End: 2025-05-20
Payer: MEDICARE

## 2025-05-20 ENCOUNTER — ANESTHESIA (OUTPATIENT)
Dept: OPERATING ROOM | Age: 65
End: 2025-05-20
Payer: MEDICARE

## 2025-05-20 LAB
ABO/RH: NORMAL
ANION GAP SERPL CALCULATED.3IONS-SCNC: 9 MMOL/L (ref 3–16)
ANTIBODY SCREEN: NORMAL
BASE EXCESS BLDV CALC-SCNC: -3 MMOL/L (ref -3–3)
BASOPHILS # BLD: 0.1 K/UL (ref 0–0.2)
BASOPHILS # BLD: 0.1 K/UL (ref 0–0.2)
BASOPHILS # BLD: 0.2 K/UL (ref 0–0.2)
BASOPHILS NFR BLD: 0.2 %
BASOPHILS NFR BLD: 1.8 %
BASOPHILS NFR BLD: 1.9 %
BUN SERPL-MCNC: 18 MG/DL (ref 7–20)
CA-I BLD-SCNC: 1.27 MMOL/L (ref 1.12–1.32)
CALCIUM SERPL-MCNC: 9 MG/DL (ref 8.3–10.6)
CHLORIDE SERPL-SCNC: 104 MMOL/L (ref 99–110)
CO2 BLDV-SCNC: 25 MMOL/L
CO2 SERPL-SCNC: 24 MMOL/L (ref 21–32)
CREAT SERPL-MCNC: 0.6 MG/DL (ref 0.6–1.2)
DEPRECATED RDW RBC AUTO: 18.5 % (ref 12.4–15.4)
DEPRECATED RDW RBC AUTO: 19.9 % (ref 12.4–15.4)
DEPRECATED RDW RBC AUTO: 20.2 % (ref 12.4–15.4)
EOSINOPHIL # BLD: 0 K/UL (ref 0–0.6)
EOSINOPHIL # BLD: 0.2 K/UL (ref 0–0.6)
EOSINOPHIL # BLD: 0.2 K/UL (ref 0–0.6)
EOSINOPHIL NFR BLD: 0 %
EOSINOPHIL NFR BLD: 2.7 %
EOSINOPHIL NFR BLD: 2.7 %
GFR SERPLBLD CREATININE-BSD FMLA CKD-EPI: >90 ML/MIN/{1.73_M2}
GLUCOSE BLD-MCNC: 106 MG/DL (ref 70–99)
GLUCOSE BLD-MCNC: 110 MG/DL (ref 70–99)
GLUCOSE BLD-MCNC: 131 MG/DL (ref 70–99)
GLUCOSE BLD-MCNC: 133 MG/DL (ref 70–99)
GLUCOSE BLD-MCNC: 181 MG/DL (ref 70–99)
GLUCOSE BLD-MCNC: 187 MG/DL (ref 70–99)
GLUCOSE BLD-MCNC: 208 MG/DL (ref 70–99)
GLUCOSE BLD-MCNC: 218 MG/DL (ref 70–99)
GLUCOSE BLD-MCNC: 245 MG/DL (ref 70–99)
GLUCOSE SERPL-MCNC: 115 MG/DL (ref 70–99)
HCO3 BLDV-SCNC: 23.4 MMOL/L (ref 23–29)
HCT VFR BLD AUTO: 20.3 % (ref 36–48)
HCT VFR BLD AUTO: 22.2 % (ref 36–48)
HCT VFR BLD AUTO: 25 % (ref 36–48)
HCT VFR BLD AUTO: 26 % (ref 36–48)
HGB BLD CALC-MCNC: 8.9 GM/DL (ref 12–16)
HGB BLD-MCNC: 6.5 G/DL (ref 12–16)
HGB BLD-MCNC: 7.3 G/DL (ref 12–16)
HGB BLD-MCNC: 7.4 G/DL (ref 12–16)
INR PPP: 1.38 (ref 0.85–1.15)
LACTATE BLD-SCNC: 0.8 MMOL/L (ref 0.4–2)
LYMPHOCYTES # BLD: 0.5 K/UL (ref 1–5.1)
LYMPHOCYTES # BLD: 1.1 K/UL (ref 1–5.1)
LYMPHOCYTES # BLD: 1.2 K/UL (ref 1–5.1)
LYMPHOCYTES NFR BLD: 1.9 %
LYMPHOCYTES NFR BLD: 13.8 %
LYMPHOCYTES NFR BLD: 15 %
MCH RBC QN AUTO: 29.7 PG (ref 26–34)
MCH RBC QN AUTO: 30.8 PG (ref 26–34)
MCH RBC QN AUTO: 35.3 PG (ref 26–34)
MCHC RBC AUTO-ENTMCNC: 29 G/DL (ref 31–36)
MCHC RBC AUTO-ENTMCNC: 32.2 G/DL (ref 31–36)
MCHC RBC AUTO-ENTMCNC: 33.6 G/DL (ref 31–36)
MCV RBC AUTO: 102.3 FL (ref 80–100)
MCV RBC AUTO: 109.9 FL (ref 80–100)
MCV RBC AUTO: 91.6 FL (ref 80–100)
MONOCYTES # BLD: 0.4 K/UL (ref 0–1.3)
MONOCYTES # BLD: 0.6 K/UL (ref 0–1.3)
MONOCYTES # BLD: 0.7 K/UL (ref 0–1.3)
MONOCYTES NFR BLD: 1.6 %
MONOCYTES NFR BLD: 8.3 %
MONOCYTES NFR BLD: 8.3 %
NEUTROPHILS # BLD: 25.8 K/UL (ref 1.7–7.7)
NEUTROPHILS # BLD: 5.1 K/UL (ref 1.7–7.7)
NEUTROPHILS # BLD: 6.6 K/UL (ref 1.7–7.7)
NEUTROPHILS NFR BLD: 72.2 %
NEUTROPHILS NFR BLD: 73.3 %
NEUTROPHILS NFR BLD: 96.3 %
PCO2 BLDV: 46.4 MM HG (ref 40–50)
PERFORMED ON: ABNORMAL
PH BLDV: 7.31 [PH] (ref 7.35–7.45)
PHOSPHATE SERPL-MCNC: 3.8 MG/DL (ref 2.5–4.9)
PLATELET # BLD AUTO: 503 K/UL (ref 135–450)
PLATELET # BLD AUTO: 606 K/UL (ref 135–450)
PLATELET # BLD AUTO: 635 K/UL (ref 135–450)
PMV BLD AUTO: 8.6 FL (ref 5–10.5)
PMV BLD AUTO: 8.8 FL (ref 5–10.5)
PMV BLD AUTO: 9 FL (ref 5–10.5)
PO2 BLDV: 58 MM HG
POC SAMPLE TYPE: ABNORMAL
POTASSIUM BLD-SCNC: 3.7 MMOL/L (ref 3.5–5.1)
POTASSIUM SERPL-SCNC: 4.3 MMOL/L (ref 3.5–5.1)
PROTHROMBIN TIME: 17.2 SEC (ref 11.9–14.9)
RBC # BLD AUTO: 1.85 M/UL (ref 4–5.2)
RBC # BLD AUTO: 2.42 M/UL (ref 4–5.2)
RBC # BLD AUTO: 2.44 M/UL (ref 4–5.2)
REASON FOR REJECTION: NORMAL
REJECTED TEST: NORMAL
SAO2 % BLDV: 87 %
SODIUM BLD-SCNC: 141 MMOL/L (ref 136–145)
SODIUM SERPL-SCNC: 137 MMOL/L (ref 136–145)
WBC # BLD AUTO: 26.8 K/UL (ref 4–11)
WBC # BLD AUTO: 7 K/UL (ref 4–11)
WBC # BLD AUTO: 8.9 K/UL (ref 4–11)

## 2025-05-20 PROCEDURE — 2500000003 HC RX 250 WO HCPCS: Performed by: SURGERY

## 2025-05-20 PROCEDURE — 6360000002 HC RX W HCPCS: Performed by: STUDENT IN AN ORGANIZED HEALTH CARE EDUCATION/TRAINING PROGRAM

## 2025-05-20 PROCEDURE — 36415 COLL VENOUS BLD VENIPUNCTURE: CPT

## 2025-05-20 PROCEDURE — 2580000003 HC RX 258: Performed by: HOSPITALIST

## 2025-05-20 PROCEDURE — 7100000001 HC PACU RECOVERY - ADDTL 15 MIN: Performed by: SURGERY

## 2025-05-20 PROCEDURE — 3600000015 HC SURGERY LEVEL 5 ADDTL 15MIN: Performed by: SURGERY

## 2025-05-20 PROCEDURE — 84100 ASSAY OF PHOSPHORUS: CPT

## 2025-05-20 PROCEDURE — 82330 ASSAY OF CALCIUM: CPT

## 2025-05-20 PROCEDURE — 6360000002 HC RX W HCPCS: Performed by: HOSPITALIST

## 2025-05-20 PROCEDURE — 86900 BLOOD TYPING SEROLOGIC ABO: CPT

## 2025-05-20 PROCEDURE — 6370000000 HC RX 637 (ALT 250 FOR IP): Performed by: SURGERY

## 2025-05-20 PROCEDURE — 82803 BLOOD GASES ANY COMBINATION: CPT

## 2025-05-20 PROCEDURE — 2500000003 HC RX 250 WO HCPCS: Performed by: ANESTHESIOLOGY

## 2025-05-20 PROCEDURE — 44160 REMOVAL OF COLON: CPT | Performed by: SURGERY

## 2025-05-20 PROCEDURE — 3700000000 HC ANESTHESIA ATTENDED CARE: Performed by: SURGERY

## 2025-05-20 PROCEDURE — 88307 TISSUE EXAM BY PATHOLOGIST: CPT

## 2025-05-20 PROCEDURE — 2580000003 HC RX 258

## 2025-05-20 PROCEDURE — 85014 HEMATOCRIT: CPT

## 2025-05-20 PROCEDURE — 86901 BLOOD TYPING SEROLOGIC RH(D): CPT

## 2025-05-20 PROCEDURE — 6360000002 HC RX W HCPCS: Performed by: SURGERY

## 2025-05-20 PROCEDURE — 6360000002 HC RX W HCPCS: Performed by: INTERNAL MEDICINE

## 2025-05-20 PROCEDURE — 6360000002 HC RX W HCPCS: Performed by: ANESTHESIOLOGY

## 2025-05-20 PROCEDURE — 2580000003 HC RX 258: Performed by: SURGERY

## 2025-05-20 PROCEDURE — 3E0T3BZ INTRODUCTION OF ANESTHETIC AGENT INTO PERIPHERAL NERVES AND PLEXI, PERCUTANEOUS APPROACH: ICD-10-PCS | Performed by: SURGERY

## 2025-05-20 PROCEDURE — 3600000005 HC SURGERY LEVEL 5 BASE: Performed by: SURGERY

## 2025-05-20 PROCEDURE — 80048 BASIC METABOLIC PNL TOTAL CA: CPT

## 2025-05-20 PROCEDURE — 82947 ASSAY GLUCOSE BLOOD QUANT: CPT

## 2025-05-20 PROCEDURE — P9016 RBC LEUKOCYTES REDUCED: HCPCS

## 2025-05-20 PROCEDURE — 84295 ASSAY OF SERUM SODIUM: CPT

## 2025-05-20 PROCEDURE — 1200000000 HC SEMI PRIVATE

## 2025-05-20 PROCEDURE — 2720000010 HC SURG SUPPLY STERILE: Performed by: SURGERY

## 2025-05-20 PROCEDURE — 86923 COMPATIBILITY TEST ELECTRIC: CPT

## 2025-05-20 PROCEDURE — 2580000003 HC RX 258: Performed by: INTERNAL MEDICINE

## 2025-05-20 PROCEDURE — 85610 PROTHROMBIN TIME: CPT

## 2025-05-20 PROCEDURE — 0DBL0ZZ EXCISION OF TRANSVERSE COLON, OPEN APPROACH: ICD-10-PCS | Performed by: SURGERY

## 2025-05-20 PROCEDURE — 3700000001 HC ADD 15 MINUTES (ANESTHESIA): Performed by: SURGERY

## 2025-05-20 PROCEDURE — 83605 ASSAY OF LACTIC ACID: CPT

## 2025-05-20 PROCEDURE — 86850 RBC ANTIBODY SCREEN: CPT

## 2025-05-20 PROCEDURE — 0D9N30Z DRAINAGE OF SIGMOID COLON WITH DRAINAGE DEVICE, PERCUTANEOUS APPROACH: ICD-10-PCS | Performed by: SURGERY

## 2025-05-20 PROCEDURE — 64488 TAP BLOCK BI INJECTION: CPT | Performed by: ANESTHESIOLOGY

## 2025-05-20 PROCEDURE — 2709999900 HC NON-CHARGEABLE SUPPLY: Performed by: SURGERY

## 2025-05-20 PROCEDURE — 7100000000 HC PACU RECOVERY - FIRST 15 MIN: Performed by: SURGERY

## 2025-05-20 PROCEDURE — 6360000002 HC RX W HCPCS

## 2025-05-20 PROCEDURE — 84132 ASSAY OF SERUM POTASSIUM: CPT

## 2025-05-20 PROCEDURE — 2500000003 HC RX 250 WO HCPCS

## 2025-05-20 PROCEDURE — 6370000000 HC RX 637 (ALT 250 FOR IP): Performed by: NURSE PRACTITIONER

## 2025-05-20 PROCEDURE — 85025 COMPLETE CBC W/AUTO DIFF WBC: CPT

## 2025-05-20 RX ORDER — DEXAMETHASONE SODIUM PHOSPHATE 4 MG/ML
INJECTION, SOLUTION INTRA-ARTICULAR; INTRALESIONAL; INTRAMUSCULAR; INTRAVENOUS; SOFT TISSUE
Status: DISCONTINUED | OUTPATIENT
Start: 2025-05-20 | End: 2025-05-20 | Stop reason: SDUPTHER

## 2025-05-20 RX ORDER — ONDANSETRON 2 MG/ML
INJECTION INTRAMUSCULAR; INTRAVENOUS
Status: DISCONTINUED | OUTPATIENT
Start: 2025-05-20 | End: 2025-05-20 | Stop reason: SDUPTHER

## 2025-05-20 RX ORDER — PROCHLORPERAZINE EDISYLATE 5 MG/ML
5 INJECTION INTRAMUSCULAR; INTRAVENOUS ONCE
Status: DISCONTINUED | OUTPATIENT
Start: 2025-05-20 | End: 2025-05-30 | Stop reason: HOSPADM

## 2025-05-20 RX ORDER — MAGNESIUM HYDROXIDE 1200 MG/15ML
LIQUID ORAL CONTINUOUS PRN
Status: COMPLETED | OUTPATIENT
Start: 2025-05-20 | End: 2025-05-20

## 2025-05-20 RX ORDER — DIPHENHYDRAMINE HYDROCHLORIDE 50 MG/ML
12.5 INJECTION, SOLUTION INTRAMUSCULAR; INTRAVENOUS
Status: COMPLETED | OUTPATIENT
Start: 2025-05-20 | End: 2025-05-20

## 2025-05-20 RX ORDER — MEPERIDINE HYDROCHLORIDE 50 MG/ML
12.5 INJECTION INTRAMUSCULAR; INTRAVENOUS; SUBCUTANEOUS EVERY 5 MIN PRN
Status: DISCONTINUED | OUTPATIENT
Start: 2025-05-20 | End: 2025-05-20 | Stop reason: HOSPADM

## 2025-05-20 RX ORDER — SODIUM CHLORIDE 9 MG/ML
INJECTION, SOLUTION INTRAVENOUS PRN
Status: DISCONTINUED | OUTPATIENT
Start: 2025-05-20 | End: 2025-05-30 | Stop reason: HOSPADM

## 2025-05-20 RX ORDER — NALOXONE HYDROCHLORIDE 0.4 MG/ML
INJECTION, SOLUTION INTRAMUSCULAR; INTRAVENOUS; SUBCUTANEOUS PRN
Status: DISCONTINUED | OUTPATIENT
Start: 2025-05-20 | End: 2025-05-30 | Stop reason: HOSPADM

## 2025-05-20 RX ORDER — METOPROLOL TARTRATE 1 MG/ML
1 INJECTION, SOLUTION INTRAVENOUS ONCE
Status: COMPLETED | OUTPATIENT
Start: 2025-05-20 | End: 2025-05-20

## 2025-05-20 RX ORDER — SODIUM CHLORIDE 0.9 % (FLUSH) 0.9 %
5-40 SYRINGE (ML) INJECTION EVERY 12 HOURS SCHEDULED
Status: DISCONTINUED | OUTPATIENT
Start: 2025-05-20 | End: 2025-05-20 | Stop reason: HOSPADM

## 2025-05-20 RX ORDER — FENTANYL CITRATE 50 UG/ML
INJECTION, SOLUTION INTRAMUSCULAR; INTRAVENOUS
Status: DISCONTINUED | OUTPATIENT
Start: 2025-05-20 | End: 2025-05-20 | Stop reason: SDUPTHER

## 2025-05-20 RX ORDER — PROCHLORPERAZINE EDISYLATE 5 MG/ML
5 INJECTION INTRAMUSCULAR; INTRAVENOUS
Status: COMPLETED | OUTPATIENT
Start: 2025-05-20 | End: 2025-05-20

## 2025-05-20 RX ORDER — SODIUM CHLORIDE 0.9 % (FLUSH) 0.9 %
5-40 SYRINGE (ML) INJECTION PRN
Status: DISCONTINUED | OUTPATIENT
Start: 2025-05-20 | End: 2025-05-20 | Stop reason: HOSPADM

## 2025-05-20 RX ORDER — LORAZEPAM 2 MG/ML
0.5 INJECTION INTRAMUSCULAR
Status: DISCONTINUED | OUTPATIENT
Start: 2025-05-20 | End: 2025-05-20 | Stop reason: HOSPADM

## 2025-05-20 RX ORDER — ESMOLOL HYDROCHLORIDE 10 MG/ML
INJECTION INTRAVENOUS
Status: DISCONTINUED | OUTPATIENT
Start: 2025-05-20 | End: 2025-05-20 | Stop reason: SDUPTHER

## 2025-05-20 RX ORDER — SODIUM CHLORIDE 9 MG/ML
INJECTION, SOLUTION INTRAVENOUS CONTINUOUS
Status: DISCONTINUED | OUTPATIENT
Start: 2025-05-20 | End: 2025-05-26

## 2025-05-20 RX ORDER — LABETALOL HYDROCHLORIDE 5 MG/ML
10 INJECTION, SOLUTION INTRAVENOUS
Status: DISCONTINUED | OUTPATIENT
Start: 2025-05-20 | End: 2025-05-20 | Stop reason: HOSPADM

## 2025-05-20 RX ORDER — NALOXONE HYDROCHLORIDE 0.4 MG/ML
INJECTION, SOLUTION INTRAMUSCULAR; INTRAVENOUS; SUBCUTANEOUS PRN
Status: DISCONTINUED | OUTPATIENT
Start: 2025-05-20 | End: 2025-05-20 | Stop reason: HOSPADM

## 2025-05-20 RX ORDER — POLYETHYLENE GLYCOL 3350 17 G/17G
17 POWDER, FOR SOLUTION ORAL DAILY PRN
Status: DISCONTINUED | OUTPATIENT
Start: 2025-05-20 | End: 2025-05-30 | Stop reason: HOSPADM

## 2025-05-20 RX ORDER — ROCURONIUM BROMIDE 10 MG/ML
INJECTION, SOLUTION INTRAVENOUS
Status: DISCONTINUED | OUTPATIENT
Start: 2025-05-20 | End: 2025-05-20 | Stop reason: SDUPTHER

## 2025-05-20 RX ORDER — BUPIVACAINE HYDROCHLORIDE 2.5 MG/ML
INJECTION, SOLUTION EPIDURAL; INFILTRATION; INTRACAUDAL; PERINEURAL
Status: COMPLETED | OUTPATIENT
Start: 2025-05-20 | End: 2025-05-20

## 2025-05-20 RX ORDER — SODIUM CHLORIDE 9 MG/ML
INJECTION, SOLUTION INTRAVENOUS PRN
Status: COMPLETED | OUTPATIENT
Start: 2025-05-20 | End: 2025-05-20

## 2025-05-20 RX ORDER — LIDOCAINE HYDROCHLORIDE 20 MG/ML
INJECTION, SOLUTION INFILTRATION; PERINEURAL
Status: DISCONTINUED | OUTPATIENT
Start: 2025-05-20 | End: 2025-05-20 | Stop reason: SDUPTHER

## 2025-05-20 RX ORDER — ONDANSETRON 2 MG/ML
4 INJECTION INTRAMUSCULAR; INTRAVENOUS
Status: COMPLETED | OUTPATIENT
Start: 2025-05-20 | End: 2025-05-20

## 2025-05-20 RX ORDER — OXYCODONE HYDROCHLORIDE 5 MG/1
5 TABLET ORAL
Status: DISCONTINUED | OUTPATIENT
Start: 2025-05-20 | End: 2025-05-20

## 2025-05-20 RX ORDER — PROPOFOL 10 MG/ML
INJECTION, EMULSION INTRAVENOUS
Status: DISCONTINUED | OUTPATIENT
Start: 2025-05-20 | End: 2025-05-20 | Stop reason: SDUPTHER

## 2025-05-20 RX ORDER — SODIUM CHLORIDE 9 MG/ML
INJECTION, SOLUTION INTRAVENOUS PRN
Status: DISCONTINUED | OUTPATIENT
Start: 2025-05-20 | End: 2025-05-20 | Stop reason: HOSPADM

## 2025-05-20 RX ADMIN — METOPROLOL 12.5 MG: 25 TABLET ORAL at 08:47

## 2025-05-20 RX ADMIN — ESMOLOL HYDROCHLORIDE 10 MG: 100 INJECTION, SOLUTION INTRAVENOUS at 13:03

## 2025-05-20 RX ADMIN — FENTANYL CITRATE 25 MCG: 50 INJECTION, SOLUTION INTRAMUSCULAR; INTRAVENOUS at 12:26

## 2025-05-20 RX ADMIN — SODIUM CHLORIDE, PRESERVATIVE FREE 10 ML: 5 INJECTION INTRAVENOUS at 08:48

## 2025-05-20 RX ADMIN — FENTANYL CITRATE 25 MCG: 50 INJECTION, SOLUTION INTRAMUSCULAR; INTRAVENOUS at 12:07

## 2025-05-20 RX ADMIN — NYSTATIN 500000 UNITS: 100000 SUSPENSION ORAL at 08:48

## 2025-05-20 RX ADMIN — ONDANSETRON 4 MG: 2 INJECTION, SOLUTION INTRAMUSCULAR; INTRAVENOUS at 15:05

## 2025-05-20 RX ADMIN — ONDANSETRON 4 MG: 2 INJECTION INTRAMUSCULAR; INTRAVENOUS at 12:15

## 2025-05-20 RX ADMIN — DEXAMETHASONE SODIUM PHOSPHATE 8 MG: 4 INJECTION, SOLUTION INTRAMUSCULAR; INTRAVENOUS at 12:15

## 2025-05-20 RX ADMIN — Medication 400 MG: at 21:56

## 2025-05-20 RX ADMIN — FENTANYL CITRATE 25 MCG: 50 INJECTION, SOLUTION INTRAMUSCULAR; INTRAVENOUS at 12:48

## 2025-05-20 RX ADMIN — DEXMEDETOMIDINE HYDROCHLORIDE 4 MCG: 100 INJECTION, SOLUTION INTRAVENOUS at 14:31

## 2025-05-20 RX ADMIN — PIPERACILLIN AND TAZOBACTAM 3375 MG: 3; .375 INJECTION, POWDER, LYOPHILIZED, FOR SOLUTION INTRAVENOUS at 08:51

## 2025-05-20 RX ADMIN — AMLODIPINE BESYLATE 2.5 MG: 2.5 TABLET ORAL at 08:47

## 2025-05-20 RX ADMIN — ROCURONIUM BROMIDE 20 MG: 50 INJECTION, SOLUTION INTRAVENOUS at 13:18

## 2025-05-20 RX ADMIN — SUGAMMADEX 200 MG: 100 INJECTION, SOLUTION INTRAVENOUS at 14:34

## 2025-05-20 RX ADMIN — FENTANYL CITRATE 25 MCG: 50 INJECTION, SOLUTION INTRAMUSCULAR; INTRAVENOUS at 12:35

## 2025-05-20 RX ADMIN — BENZOCAINE: 200 SPRAY DENTAL; ORAL; PERIODONTAL at 08:48

## 2025-05-20 RX ADMIN — MORPHINE SULFATE 2 MG: 2 INJECTION, SOLUTION INTRAMUSCULAR; INTRAVENOUS at 05:50

## 2025-05-20 RX ADMIN — METOPROLOL TARTRATE 1 MG: 5 INJECTION INTRAVENOUS at 17:07

## 2025-05-20 RX ADMIN — ESMOLOL HYDROCHLORIDE 10 MG: 100 INJECTION, SOLUTION INTRAVENOUS at 12:51

## 2025-05-20 RX ADMIN — ESMOLOL HYDROCHLORIDE 10 MG: 100 INJECTION, SOLUTION INTRAVENOUS at 13:20

## 2025-05-20 RX ADMIN — PANTOPRAZOLE SODIUM 40 MG: 40 INJECTION, POWDER, FOR SOLUTION INTRAVENOUS at 08:48

## 2025-05-20 RX ADMIN — PROPOFOL 30 MG: 10 INJECTION, EMULSION INTRAVENOUS at 14:27

## 2025-05-20 RX ADMIN — DIPHENHYDRAMINE HYDROCHLORIDE 12.5 MG: 50 INJECTION INTRAMUSCULAR; INTRAVENOUS at 15:01

## 2025-05-20 RX ADMIN — ROCURONIUM BROMIDE 50 MG: 50 INJECTION, SOLUTION INTRAVENOUS at 12:08

## 2025-05-20 RX ADMIN — BUPIVACAINE HYDROCHLORIDE 30 ML: 2.5 INJECTION, SOLUTION EPIDURAL; INFILTRATION; INTRACAUDAL; PERINEURAL at 14:32

## 2025-05-20 RX ADMIN — DEXMEDETOMIDINE HYDROCHLORIDE 4 MCG: 100 INJECTION, SOLUTION INTRAVENOUS at 12:59

## 2025-05-20 RX ADMIN — DICYCLOMINE HYDROCHLORIDE 10 MG: 10 CAPSULE ORAL at 08:47

## 2025-05-20 RX ADMIN — HYDROMORPHONE HYDROCHLORIDE 0.25 MG: 1 INJECTION, SOLUTION INTRAMUSCULAR; INTRAVENOUS; SUBCUTANEOUS at 17:37

## 2025-05-20 RX ADMIN — DEXMEDETOMIDINE HYDROCHLORIDE 4 MCG: 100 INJECTION, SOLUTION INTRAVENOUS at 12:41

## 2025-05-20 RX ADMIN — Medication: at 15:24

## 2025-05-20 RX ADMIN — PIPERACILLIN AND TAZOBACTAM 3375 MG: 3; .375 INJECTION, POWDER, LYOPHILIZED, FOR SOLUTION INTRAVENOUS at 01:51

## 2025-05-20 RX ADMIN — SODIUM CHLORIDE: 9 INJECTION, SOLUTION INTRAVENOUS at 14:03

## 2025-05-20 RX ADMIN — CALCIUM CARBONATE 500 MG: 500 TABLET, CHEWABLE ORAL at 21:55

## 2025-05-20 RX ADMIN — ROCURONIUM BROMIDE 20 MG: 50 INJECTION, SOLUTION INTRAVENOUS at 12:35

## 2025-05-20 RX ADMIN — CALCIUM CARBONATE 500 MG: 500 TABLET, CHEWABLE ORAL at 08:47

## 2025-05-20 RX ADMIN — Medication 400 MG: at 08:47

## 2025-05-20 RX ADMIN — DICYCLOMINE HYDROCHLORIDE 10 MG: 10 CAPSULE ORAL at 21:56

## 2025-05-20 RX ADMIN — HYDROMORPHONE HYDROCHLORIDE 0.5 MG: 1 INJECTION, SOLUTION INTRAMUSCULAR; INTRAVENOUS; SUBCUTANEOUS at 14:56

## 2025-05-20 RX ADMIN — PROPOFOL 150 MG: 10 INJECTION, EMULSION INTRAVENOUS at 12:07

## 2025-05-20 RX ADMIN — SODIUM CHLORIDE: 9 INJECTION, SOLUTION INTRAVENOUS at 12:07

## 2025-05-20 RX ADMIN — ASCORBIC ACID, VITAMIN A PALMITATE, CHOLECALCIFEROL, THIAMINE HYDROCHLORIDE, RIBOFLAVIN-5 PHOSPHATE SODIUM, PYRIDOXINE HYDROCHLORIDE, NIACINAMIDE, DEXPANTHENOL, ALPHA-TOCOPHEROL ACETATE, VITAMIN K1, FOLIC ACID, BIOTIN, CYANOCOBALAMIN: 200; 3300; 200; 6; 3.6; 6; 40; 15; 10; 150; 600; 60; 5 INJECTION, SOLUTION INTRAVENOUS at 21:19

## 2025-05-20 RX ADMIN — INSULIN LISPRO 1 UNITS: 100 INJECTION, SOLUTION INTRAVENOUS; SUBCUTANEOUS at 21:53

## 2025-05-20 RX ADMIN — MORPHINE SULFATE 2 MG: 2 INJECTION, SOLUTION INTRAMUSCULAR; INTRAVENOUS at 01:40

## 2025-05-20 RX ADMIN — LIDOCAINE HYDROCHLORIDE 50 MG: 20 INJECTION, SOLUTION INFILTRATION; PERINEURAL at 12:07

## 2025-05-20 RX ADMIN — SODIUM CHLORIDE, PRESERVATIVE FREE 10 ML: 5 INJECTION INTRAVENOUS at 22:18

## 2025-05-20 RX ADMIN — PROCHLORPERAZINE EDISYLATE 5 MG: 5 INJECTION INTRAMUSCULAR; INTRAVENOUS at 15:15

## 2025-05-20 RX ADMIN — HYDROMORPHONE HYDROCHLORIDE 0.25 MG: 1 INJECTION, SOLUTION INTRAMUSCULAR; INTRAVENOUS; SUBCUTANEOUS at 16:42

## 2025-05-20 RX ADMIN — PIPERACILLIN AND TAZOBACTAM 3375 MG: 3; .375 INJECTION, POWDER, LYOPHILIZED, FOR SOLUTION INTRAVENOUS at 21:35

## 2025-05-20 RX ADMIN — METOPROLOL 12.5 MG: 25 TABLET ORAL at 21:55

## 2025-05-20 ASSESSMENT — PAIN DESCRIPTION - LOCATION
LOCATION: ABDOMEN

## 2025-05-20 ASSESSMENT — PAIN SCALES - GENERAL
PAINLEVEL_OUTOF10: 10
PAINLEVEL_OUTOF10: 7
PAINLEVEL_OUTOF10: 9
PAINLEVEL_OUTOF10: 10
PAINLEVEL_OUTOF10: 7
PAINLEVEL_OUTOF10: 9

## 2025-05-20 ASSESSMENT — PAIN DESCRIPTION - ORIENTATION
ORIENTATION: MID
ORIENTATION: MID

## 2025-05-20 ASSESSMENT — PAIN DESCRIPTION - DESCRIPTORS
DESCRIPTORS: STABBING
DESCRIPTORS: STABBING

## 2025-05-20 NOTE — PROGRESS NOTES
Dr. Pinto notified of heartrate, as well as hemoglobin at 7.3. No new orders at this time. Dr. Pinto gave the okay for her to go back to her room on the floor.

## 2025-05-20 NOTE — PROGRESS NOTES
Shift assessment completed. Patient is A&O x4.  VSS. PICC site patent/ flushed, and infusing/ saline locked. Patient on RA. NGT left nare. TODD drain compressed to bulb suction. RUQ drain to gravity, patent. Patient's last BM- 5/19/25.  Medication given per MAR.     Safety Measures in place:   Denies any needs at this time.   Bed locked and in lowest position.    Call light within reach.   Gripper socks applied.   Patient in stable condition when RN leaving room.

## 2025-05-20 NOTE — OP NOTE
Operative Note      Patient: Tuyet Richter  YOB: 1960  MRN: [id number]    Date of Procedure: 5/20/2025    Pre-Op Diagnosis Codes:      * SBO (small bowel obstruction) ([facility]) [K56.609]    Post-Op Diagnosis: Same       Procedure(s):  EXPLORATORY LAPAROTOMY, LYSIS OF ADHESIONS, ABSCESS DRAINAGE AND PARTIAL COLECTOMY    Surgeon(s):  Dallas Guerrero MD    Assistant:   Surgical Assistant: Chris Pride Shawna    Anesthesia: General    Estimated Blood Loss (mL): less than 100     Complications: None    Specimens:   ID Type Source Tests Collected by Time Destination   A : CHRONIC ANASTAMOSIS Specimen Abdomen SURGICAL PATHOLOGY Dallas Guerrero MD 5/20/2025 1345        Implants:  * No implants in log *      Drains:   Closed/Suction Drain LUQ Bulb (Active)   Site Description Clean, dry & intact 05/19/25 2133   Dressing Status Old drainage noted 05/19/25 2133   Drainage Appearance None 05/19/25 2133   Drain Status To bulb suction;Compressed 05/19/25 2133   Output (ml) 0 ml 05/17/25 0251       Closed/Suction Drain Right RLQ Bag (Active)   Site Description Clean, dry & intact 05/19/25 2133   Dressing Status Clean, dry & intact 05/19/25 2133   Drainage Appearance Urrutia 05/19/25 2133   Drain Status Open to gravity drainage 05/19/25 2133   Output (ml) 15 ml 05/19/25 0448       NG/OG/NJ/NE Tube Nasogastric 16 fr Left nostril (Active)   Surrounding Skin Clean, dry & intact 05/20/25 1133   Securement device Adhesive based mcclain 05/20/25 1133   Status Suction-low continuous 05/19/25 2133   Placement Verified X-Ray (Initial) 05/15/25 1100   NG/OG/NJ/NE External Measurement (cm) 57 cm 05/14/25 1100   Drainage Appearance Brown 05/19/25 2133   Free Water/Flush (mL) 120 mL 05/19/25 2133   Output (mL) 350 ml 05/18/25 1950   Action Taken Placement verified (comment) 05/15/25 2000       Urinary Catheter 05/20/25 Weldon (Active)       [REMOVED] Urinary Catheter 04/29/25 Weldon (Removed)       Findings:  Infection

## 2025-05-20 NOTE — PROGRESS NOTES
[facility] Medicine Progress Note  V 1.6      Date of Admission: 4/23/2025    Hospital Day: 28      Chief Admission Complaint:  Abdominal pain     Subjective:       Resting in bed, no pain, family at bedside-plan for OR 5/20    Presenting Admission History:       \"This is a 64 y.o. female who presented to [facility] with abdominal pain.  PMHx significant for HTN.  History obtained from the patient and review of EMR.  The patient stated she had a hemicolectomy in February 2025 in [facility] and since then has been experiencing intermittent abdominal pain.  Per chart review, the patient has been readmitted 1 time since her surgery for electrolyte abnormalities.  However, the patient stated her pain has gotten progressively worse over the last couple of weeks.  She reports speaking with GI and they ordered a CT outpatient today.  Patient  is at the bedside and stated that she was called and told to go to the emergency department due to \"a collection of pus\" seen on the CT. In the emergency department a CT abdomen pelvis was obtained that revealed Fluid and gas collection within the pelvis interposed between the colon and uterus measuring up to 4.1 cm.  Abscess is favored.  This appears largely enveloped by abdominal and adnexal structures.  A [place] uterine fistula is not excluded.  Additional small dependent collection within the region of the pouch of Alfred measuring up to 2.5 cm.  Mild dilation of small bowel loops which may indicate partial obstruction.  There is no high-grade small bowel obstruction.  The patient was given morphine for pain.  She was also started on Zosyn.  Upon further evaluation, the patient was found to be dehydrated with hyponatremia.  This is likely secondary to inadequate p.o. intake.  She was admitted for further evaluation and treatment.  IV fluids have been initiated and GI has been consulted for the a.m. The patient denied any other associated symptoms as

## 2025-05-20 NOTE — PROGRESS NOTES
Comprehensive Nutrition Assessment    Type and Reason for Visit:  Reassess    Nutrition Recommendations/Plan:   Resume TPN at goal rate of 75ml/hr as medically appropriate per MD.   Recommend 250 mL bags of 20% lipids 2 times per week.  Physician/LIP to monitor closely and correct electrolytes (Phos,Mg,K+) due to risk of refeeding syndrome   Recommend FSBS, monitor glucose, need for insulin.  Pharmacy to adjust MVI and Trace Elements as needed.  When PN to be discontinued, cut rate by 50% and let current bag run out.      Malnutrition Assessment:  Malnutrition Status:  Severe malnutrition (04/24/25 1120)    Context:  Acute Illness     Findings of the 6 clinical characteristics of malnutrition:  Energy Intake:  75% or less of estimated energy requirements for 7 or more days  Weight Loss:  Mild weight loss     Body Fat Loss:  Moderate body fat loss Orbital, Triceps   Muscle Mass Loss:  Moderate muscle mass loss Temples (temporalis), Clavicles (pectoralis & deltoids)  Fluid Accumulation:  No fluid accumulation     Strength:  Not Performed    Nutrition Assessment:    Followup: Patient continues to have NG tube to suction. TPN on hold, was running at goal rate of 75ml/hr until 5/19. Patient to return to OR today for surgery, off floor for surgery at time of visit. Weight continues to downtrend (-2lbs x4 days). Pt with loose BM 5/19. Electrolytes WDL. Will continue to monitor.    Nutrition Related Findings:    Glucose 115. Electrolytes WDL. Trace edema LLE. LBM 5/19. TPN on hold for surgery. 400ml output NGT. Wound Type: Surgical Incision       Current Nutrition Intake & Therapies:    Average Meal Intake: NPO  Average Supplements Intake: NPO  Diet NPO  PN-Adult Premix 5/15 - Standard Electrolytes - Central Line  PN-Adult Premix 5/15 - Standard Electrolytes - Central Line  Current Parenteral Nutrition Orders:  Type and Formula: Premix Central   Lipids: Two times weekly, 250ml  Duration: Continuous  Rate/Volume:

## 2025-05-20 NOTE — ANESTHESIA PROCEDURE NOTES
Peripheral Block    Patient location during procedure: OR  Reason for block: post-op pain management and at surgeon's request  Start time: 5/20/2025 2:32 PM  End time: 5/20/2025 2:35 PM  Staffing  Performed: anesthesiologist   Anesthesiologist: Hesham Lou MD  Performed by: Hesham Lou MD  Authorized by: Hesham Lou MD    Preanesthetic Checklist  Completed: patient identified, IV checked, site marked, risks and benefits discussed, surgical/procedural consents, equipment checked, pre-op evaluation, timeout performed, anesthesia consent given, oxygen available, monitors applied/VS acknowledged, fire risk safety assessment completed and verbalized and blood product R/B/A discussed and consented  Peripheral Block   Patient position: supine  Prep: ChloraPrep  Provider prep: mask and sterile gloves  Patient monitoring: cardiac monitor, continuous pulse ox, frequent blood pressure checks, oxygen, IV access, responsive to questions and continuous capnometry  Block type: TAP  Laterality: bilateral  Injection technique: single-shot  Guidance: ultrasound guided    Needle   Needle type: insulated echogenic nerve stimulator needle   Needle gauge: 22 G  Needle localization: ultrasound guidance and anatomical landmarks  Needle length: 10 cm  Assessment   Injection assessment: negative aspiration for heme, no paresthesia on injection, local visualized surrounding nerve on ultrasound and no intravascular symptoms  Paresthesia pain: none  Slow fractionated injection: yes  Hemodynamics: stable  Outcomes: uncomplicated and patient tolerated procedure well    Medications Administered  BUPivacaine (MARCAINE) PF injection 0.25% - Perineural   30 mL - 5/20/2025 2:32:00 PM

## 2025-05-20 NOTE — PROGRESS NOTES
Day of surgery patient to side of bed or up to chair for minimum of 30 minutes Yes    POD 1 until discharge, patient up to chair by 9 am and TID. Goal to increase mobility  NA    Patient performs oral hygiene with CHG oral solution until NG tube discontinued. NA    If patient does not have NG tube in place, patient to brush teeth and use mouth wash Q shift NA    Patient receives daily bath and linen changes Yes    Patient receives CHG bath until rooney discontinued Yes    Patient refused oral hygiene

## 2025-05-20 NOTE — ANESTHESIA PRE PROCEDURE
Department of Anesthesiology  Preprocedure Note       Name:  Tuyet Richter   Age:  65 y.o.  :  1960                                          MRN:  [id number]         Date:  2025      Surgeon: Surgeon(s):  Dallas Guerrero MD    Procedure: Procedure(s):  EXPLORATORY LAPAROTOMY    Medications prior to admission:   Prior to Admission medications    Medication Sig Start Date End Date Taking? Authorizing Provider   Magnesium 100 MG CAPS Take 400 mg by mouth in the morning and at bedtime   Yes Emilee Freed MD   Estradiol (ESTROGEL) 0.75 MG/1.25 GM (0.06%) GEL Place onto the skin daily   Yes Emilee Freed MD   dicyclomine (BENTYL) 10 MG capsule Take 1 capsule by mouth 3 times daily   Yes Emilee Freed MD   calcium carbonate (TUMS) 500 MG chewable tablet Take 1 tablet by mouth 2 times daily   Yes Emilee Freed MD   omeprazole (PRILOSEC) 40 MG capsule Take 20 mg by mouth daily   Yes Emilee Freed MD   cholestyramine (QUESTRAN) 4 g packet Take 1 packet by mouth daily Pt has not started yes  Patient not taking: Reported on 2025    Emilee Freed MD   predniSONE (DELTASONE) 10 MG tablet SIG: iii po BID x 2 days then ii po BID x 2 days then i po BID x 2 days then i po qd x 2 days  Patient not taking: Reported on 19   DAVID Harrison MD   Nebivolol HCl (BYSTOLIC PO) Take by mouth  Patient not taking: Reported on 2025    Emilee Freed MD   diclofenac sodium (VOLTAREN) 1 % GEL Apply 4 g topically 4 times daily 18  Mckenna Chand PA-C   doxycycline (VIBRAMYCIN) 50 MG capsule  17   Emilee Freed MD   Nutritional Supplements (JUICE PLUS FIBRE PO) Take  by mouth.  Patient not taking: Reported on 2025    Emilee Freed MD   Multiple Vitamins-Minerals (CENTRUM SILVER PO) Take  by mouth.  Patient not taking: Reported on 2025    Emilee Freed MD       Current medications:

## 2025-05-20 NOTE — PROGRESS NOTES
Another additional 0.25mg of Dilaudid given per Dr. Rosario due to patients elevated pain level and HR.  Awaiting results on CBC from lab

## 2025-05-20 NOTE — PLAN OF CARE
Problem: Pain  Goal: Verbalizes/displays adequate comfort level or baseline comfort level  Outcome: Progressing  Flowsheets (Taken 5/19/2025 2321)  Verbalizes/displays adequate comfort level or baseline comfort level:   Encourage patient to monitor pain and request assistance   Assess pain using appropriate pain scale   Administer analgesics based on type and severity of pain and evaluate response   Implement non-pharmacological measures as appropriate and evaluate response     Problem: Safety - Adult  Goal: Free from fall injury  Outcome: Progressing  Flowsheets (Taken 5/19/2025 2321)  Free From Fall Injury:   Instruct family/caregiver on patient safety   Based on caregiver fall risk screen, instruct family/caregiver to ask for assistance with transferring infant if caregiver noted to have fall risk factors     Problem: Nutrition Deficit:  Goal: Optimize nutritional status  5/19/2025 2322 by Keya Vanegas RN  Outcome: Progressing  Flowsheets (Taken 5/19/2025 2321)  Nutrient intake appropriate for improving, restoring, or maintaining nutritional needs:   Assess nutritional status and recommend course of action   Monitor oral intake, labs, and treatment plans   Recommend, monitor, and adjust tube feedings and TPN/PPN based on assessed needs

## 2025-05-20 NOTE — PROGRESS NOTES
0.25mg Dilaudid given to patient in addition to what patient has received from PCA pump per Dr. Rosario due to patients elevated pain level and HR in 120's.  Patient rating pain at 10/10

## 2025-05-21 LAB
ALBUMIN SERPL-MCNC: 2.6 G/DL (ref 3.4–5)
ALBUMIN/GLOB SERPL: 0.8 {RATIO} (ref 1.1–2.2)
ALP SERPL-CCNC: 100 U/L (ref 40–129)
ALT SERPL-CCNC: 6 U/L (ref 10–40)
ANION GAP SERPL CALCULATED.3IONS-SCNC: 13 MMOL/L (ref 3–16)
AST SERPL-CCNC: 15 U/L (ref 15–37)
BILIRUB DIRECT SERPL-MCNC: <0.1 MG/DL (ref 0–0.3)
BILIRUB INDIRECT SERPL-MCNC: NORMAL MG/DL (ref 0–1)
BILIRUB SERPL-MCNC: <0.2 MG/DL (ref 0–1)
BUN SERPL-MCNC: 24 MG/DL (ref 7–20)
CALCIUM SERPL-MCNC: 8.8 MG/DL (ref 8.3–10.6)
CHLORIDE SERPL-SCNC: 107 MMOL/L (ref 99–110)
CO2 SERPL-SCNC: 21 MMOL/L (ref 21–32)
CREAT SERPL-MCNC: 0.7 MG/DL (ref 0.6–1.2)
DEPRECATED RDW RBC AUTO: 18.7 % (ref 12.4–15.4)
GFR SERPLBLD CREATININE-BSD FMLA CKD-EPI: >90 ML/MIN/{1.73_M2}
GLUCOSE BLD-MCNC: 137 MG/DL (ref 70–99)
GLUCOSE BLD-MCNC: 138 MG/DL (ref 70–99)
GLUCOSE BLD-MCNC: 163 MG/DL (ref 70–99)
GLUCOSE BLD-MCNC: 171 MG/DL (ref 70–99)
GLUCOSE BLD-MCNC: 196 MG/DL (ref 70–99)
GLUCOSE SERPL-MCNC: 191 MG/DL (ref 70–99)
HCT VFR BLD AUTO: 24.2 % (ref 36–48)
HGB BLD-MCNC: 8 G/DL (ref 12–16)
MCH RBC QN AUTO: 30.4 PG (ref 26–34)
MCHC RBC AUTO-ENTMCNC: 32.9 G/DL (ref 31–36)
MCV RBC AUTO: 92.6 FL (ref 80–100)
PERFORMED ON: ABNORMAL
PHOSPHATE SERPL-MCNC: 2.9 MG/DL (ref 2.5–4.9)
PLATELET # BLD AUTO: 659 K/UL (ref 135–450)
PMV BLD AUTO: 9.3 FL (ref 5–10.5)
POTASSIUM SERPL-SCNC: 4 MMOL/L (ref 3.5–5.1)
PROT SERPL-MCNC: 5.9 G/DL (ref 6.4–8.2)
RBC # BLD AUTO: 2.62 M/UL (ref 4–5.2)
SODIUM SERPL-SCNC: 141 MMOL/L (ref 136–145)
WBC # BLD AUTO: 25.2 K/UL (ref 4–11)

## 2025-05-21 PROCEDURE — 84100 ASSAY OF PHOSPHORUS: CPT

## 2025-05-21 PROCEDURE — 97167 OT EVAL HIGH COMPLEX 60 MIN: CPT

## 2025-05-21 PROCEDURE — 6360000002 HC RX W HCPCS: Performed by: HOSPITALIST

## 2025-05-21 PROCEDURE — 99024 POSTOP FOLLOW-UP VISIT: CPT | Performed by: SURGERY

## 2025-05-21 PROCEDURE — 1200000000 HC SEMI PRIVATE

## 2025-05-21 PROCEDURE — 97530 THERAPEUTIC ACTIVITIES: CPT

## 2025-05-21 PROCEDURE — 2500000003 HC RX 250 WO HCPCS: Performed by: SURGERY

## 2025-05-21 PROCEDURE — 2580000003 HC RX 258: Performed by: SURGERY

## 2025-05-21 PROCEDURE — APPNB45 APP NON BILLABLE 31-45 MINUTES: Performed by: CLINICAL NURSE SPECIALIST

## 2025-05-21 PROCEDURE — 80053 COMPREHEN METABOLIC PANEL: CPT

## 2025-05-21 PROCEDURE — 6360000002 HC RX W HCPCS: Performed by: CLINICAL NURSE SPECIALIST

## 2025-05-21 PROCEDURE — 97163 PT EVAL HIGH COMPLEX 45 MIN: CPT

## 2025-05-21 PROCEDURE — 6360000002 HC RX W HCPCS: Performed by: SURGERY

## 2025-05-21 PROCEDURE — 85027 COMPLETE CBC AUTOMATED: CPT

## 2025-05-21 PROCEDURE — 6370000000 HC RX 637 (ALT 250 FOR IP): Performed by: SURGERY

## 2025-05-21 RX ORDER — MORPHINE SULFATE 4 MG/ML
4 INJECTION, SOLUTION INTRAMUSCULAR; INTRAVENOUS
Status: DISCONTINUED | OUTPATIENT
Start: 2025-05-21 | End: 2025-05-30

## 2025-05-21 RX ORDER — MORPHINE SULFATE 2 MG/ML
2 INJECTION, SOLUTION INTRAMUSCULAR; INTRAVENOUS
Status: DISCONTINUED | OUTPATIENT
Start: 2025-05-21 | End: 2025-05-30

## 2025-05-21 RX ORDER — LORAZEPAM 2 MG/ML
0.5 INJECTION INTRAMUSCULAR ONCE
Status: COMPLETED | OUTPATIENT
Start: 2025-05-21 | End: 2025-05-21

## 2025-05-21 RX ADMIN — DICYCLOMINE HYDROCHLORIDE 10 MG: 10 CAPSULE ORAL at 09:36

## 2025-05-21 RX ADMIN — PIPERACILLIN AND TAZOBACTAM 3375 MG: 3; .375 INJECTION, POWDER, LYOPHILIZED, FOR SOLUTION INTRAVENOUS at 22:00

## 2025-05-21 RX ADMIN — DICYCLOMINE HYDROCHLORIDE 10 MG: 10 CAPSULE ORAL at 22:07

## 2025-05-21 RX ADMIN — PIPERACILLIN AND TAZOBACTAM 3375 MG: 3; .375 INJECTION, POWDER, LYOPHILIZED, FOR SOLUTION INTRAVENOUS at 14:56

## 2025-05-21 RX ADMIN — DICYCLOMINE HYDROCHLORIDE 10 MG: 10 CAPSULE ORAL at 14:54

## 2025-05-21 RX ADMIN — SODIUM CHLORIDE: 9 INJECTION, SOLUTION INTRAVENOUS at 21:56

## 2025-05-21 RX ADMIN — PANTOPRAZOLE SODIUM 40 MG: 40 INJECTION, POWDER, FOR SOLUTION INTRAVENOUS at 09:22

## 2025-05-21 RX ADMIN — Medication 400 MG: at 09:36

## 2025-05-21 RX ADMIN — Medication 400 MG: at 22:08

## 2025-05-21 RX ADMIN — VALACYCLOVIR HYDROCHLORIDE 500 MG: 500 TABLET, FILM COATED ORAL at 09:38

## 2025-05-21 RX ADMIN — CALCIUM CARBONATE 500 MG: 500 TABLET, CHEWABLE ORAL at 22:08

## 2025-05-21 RX ADMIN — CALCIUM CARBONATE 500 MG: 500 TABLET, CHEWABLE ORAL at 09:35

## 2025-05-21 RX ADMIN — MORPHINE SULFATE 4 MG: 4 INJECTION, SOLUTION INTRAMUSCULAR; INTRAVENOUS at 21:04

## 2025-05-21 RX ADMIN — ASCORBIC ACID, VITAMIN A PALMITATE, CHOLECALCIFEROL, THIAMINE HYDROCHLORIDE, RIBOFLAVIN-5 PHOSPHATE SODIUM, PYRIDOXINE HYDROCHLORIDE, NIACINAMIDE, DEXPANTHENOL, ALPHA-TOCOPHEROL ACETATE, VITAMIN K1, FOLIC ACID, BIOTIN, CYANOCOBALAMIN: 200; 3300; 200; 6; 3.6; 6; 40; 15; 10; 150; 600; 60; 5 INJECTION, SOLUTION INTRAVENOUS at 21:15

## 2025-05-21 RX ADMIN — METOPROLOL 12.5 MG: 25 TABLET ORAL at 22:07

## 2025-05-21 RX ADMIN — LORAZEPAM 0.5 MG: 2 INJECTION INTRAMUSCULAR; INTRAVENOUS at 05:35

## 2025-05-21 RX ADMIN — SODIUM CHLORIDE, PRESERVATIVE FREE 10 ML: 5 INJECTION INTRAVENOUS at 21:05

## 2025-05-21 RX ADMIN — PIPERACILLIN AND TAZOBACTAM 3375 MG: 3; .375 INJECTION, POWDER, LYOPHILIZED, FOR SOLUTION INTRAVENOUS at 05:31

## 2025-05-21 RX ADMIN — METOPROLOL 12.5 MG: 25 TABLET ORAL at 09:35

## 2025-05-21 RX ADMIN — MORPHINE SULFATE 4 MG: 4 INJECTION, SOLUTION INTRAMUSCULAR; INTRAVENOUS at 16:14

## 2025-05-21 RX ADMIN — SODIUM CHLORIDE, PRESERVATIVE FREE 10 ML: 5 INJECTION INTRAVENOUS at 09:38

## 2025-05-21 RX ADMIN — Medication 6 MG: at 22:07

## 2025-05-21 RX ADMIN — AMLODIPINE BESYLATE 2.5 MG: 2.5 TABLET ORAL at 09:35

## 2025-05-21 ASSESSMENT — PAIN DESCRIPTION - LOCATION: LOCATION: BACK

## 2025-05-21 ASSESSMENT — PAIN SCALES - GENERAL
PAINLEVEL_OUTOF10: 7
PAINLEVEL_OUTOF10: 3

## 2025-05-21 NOTE — PLAN OF CARE
Problem: Pain  Goal: Verbalizes/displays adequate comfort level or baseline comfort level  5/21/2025 1000 by Damion Donato RN  Outcome: Progressing  5/21/2025 0104 by Crystal Grace RN  Outcome: Progressing     Problem: Safety - Adult  Goal: Free from fall injury  5/21/2025 1000 by Damion Donato RN  Outcome: Progressing  5/21/2025 0104 by Crysatl Grace RN  Outcome: Progressing     Problem: ABCDS Injury Assessment  Goal: Absence of physical injury  5/21/2025 1000 by Damion Donato RN  Outcome: Progressing  5/21/2025 0104 by Crystal Grace RN  Outcome: Progressing     Problem: Nutrition Deficit:  Goal: Optimize nutritional status  5/21/2025 1000 by Damion Donato RN  Outcome: Progressing  5/21/2025 0104 by Crystal Grace RN  Outcome: Progressing     Problem: Discharge Planning  Goal: Discharge to home or other facility with appropriate resources  5/21/2025 1000 by Damion Donato RN  Outcome: Progressing  5/21/2025 0104 by Crystal Grcae RN  Outcome: Progressing     Problem: Skin/Tissue Integrity - Adult  Goal: Skin integrity remains intact  5/21/2025 1000 by Damion Donato RN  Outcome: Progressing  5/21/2025 0104 by Crystal Grace RN  Outcome: Progressing  Goal: Incisions, wounds, or drain sites healing without S/S of infection  5/21/2025 1000 by Damion Donato RN  Outcome: Progressing  5/21/2025 0104 by Crystal Grace RN  Outcome: Progressing     Problem: Gastrointestinal - Adult  Goal: Minimal or absence of nausea and vomiting  5/21/2025 1000 by Damion Donato RN  Outcome: Progressing  5/21/2025 0104 by Crystal Grace RN  Outcome: Progressing  Goal: Maintains or returns to baseline bowel function  5/21/2025 1000 by Damion Donato RN  Outcome: Progressing  5/21/2025 0104 by Crystal Grace RN  Outcome: Progressing  Goal: Maintains adequate nutritional intake  5/21/2025 1000 by Damion Donato RN  Outcome: Progressing  5/21/2025 0104 by Crystal Grace

## 2025-05-21 NOTE — PROGRESS NOTES
Took over care of pt from Crystal WEINSTEIN  . Pt was sleepy but easily arousable.NGT tube , Weldon , perc and TODD drain in place and draining. IV infusion , TPN , Zosyn and  Normal Saline and IV Dilaudid infusing. Pt reminded to use her Pain button whenever in pain. Incisions site checked and all clean dry and intact.

## 2025-05-21 NOTE — PROGRESS NOTES
Pt assessment completed and charted. VSS, pt on RA. Patient is a/o X3 with intermittent delirium. PCA pump d/c today d/t cognitive abnormalities. Now on prn morphine Q3. IV site patent/flushed, saline locked.         Safety Measures in place:   Bed in lowest position and wheels locked.   Call light within reach.   Bedside table within reach.   Non-skid socks in place.   Pt denies any other needs at this time.    Pt calls out appropriately.  Patient in stable condition when RN left room.

## 2025-05-21 NOTE — CARE COORDINATION
Hospital day 28, POD 22, 1: Patient on C3 care managed by IM, and General Surgery. Patient from home with Spouse IPTA. Therapy has been ordered post op will follow for recs/improvement. NGT remains. Drains x2. Patient with PICC for TPN, IV ATB. PCA for pain control. Will ct to follow.GASTON Alexander

## 2025-05-21 NOTE — PROGRESS NOTES
[facility] Medicine Progress Note  V 1.6      Date of Admission: 4/23/2025    Hospital Day: 29      Chief Admission Complaint:  Abdominal pain     Subjective:       A/o x2, confused about situation, received ativan overnight for agitation    Presenting Admission History:       \"This is a 64 y.o. female who presented to [facility] with abdominal pain.  PMHx significant for HTN.  History obtained from the patient and review of EMR.  The patient stated she had a hemicolectomy in February 2025 in [facility] and since then has been experiencing intermittent abdominal pain.  Per chart review, the patient has been readmitted 1 time since her surgery for electrolyte abnormalities.  However, the patient stated her pain has gotten progressively worse over the last couple of weeks.  She reports speaking with GI and they ordered a CT outpatient today.  Patient  is at the bedside and stated that she was called and told to go to the emergency department due to \"a collection of pus\" seen on the CT. In the emergency department a CT abdomen pelvis was obtained that revealed Fluid and gas collection within the pelvis interposed between the colon and uterus measuring up to 4.1 cm.  Abscess is favored.  This appears largely enveloped by abdominal and adnexal structures.  A [place] uterine fistula is not excluded.  Additional small dependent collection within the region of the pouch of Alfred measuring up to 2.5 cm.  Mild dilation of small bowel loops which may indicate partial obstruction.  There is no high-grade small bowel obstruction.  The patient was given morphine for pain.  She was also started on Zosyn.  Upon further evaluation, the patient was found to be dehydrated with hyponatremia.  This is likely secondary to inadequate p.o. intake.  She was admitted for further evaluation and treatment.  IV fluids have been initiated and GI has been consulted for the a.m. The patient denied any other  DRAINAGE AND PARTIAL COLECTOMY   --------------------------------------------------  C. Data (any 2)    [x] Data Review (any 3)    [x] Consultant notes from yesterday/today    [x] All available current labs reviewed interpreted for clinical significance    [x] Appropriate follow-up labs were ordered  [] Collateral history obtained     [] Independent Interpretation of tests (any 1)    [] Telemetry (Rhythm Strip) personally reviewed and interpreted        [] Imaging personally reviewed and interpreted     [x] Discussion (any 1)  [x] Multi-Disciplinary Rounds with Case Management  [] Discussed management of the case with       Labs:  Personally reviewed on 5/21/2025 and interpreted for clinical significance as documented above.     Recent Labs     05/20/25  0640 05/20/25  1325 05/20/25  1722 05/21/25  0635   WBC 8.9  --  26.8* 25.2*   HGB 7.4* 8.9* 7.3* 8.0*   HCT 22.2*  --  25.0* 24.2*   *  --  635* 659*     Recent Labs     05/19/25  0945 05/20/25  0732 05/21/25  0635   NA  --  137 141   K  --  4.3 4.0   CL  --  104 107   CO2  --  24 21   BUN  --  18 24*   CREATININE  --  0.6 0.7   CALCIUM  --  9.0 8.8   PHOS 7.7* 3.8 2.9     No results for input(s): \"PROBNP\", \"TROPHS\" in the last 72 hours.  No results for input(s): \"LABA1C\" in the last 72 hours.    Recent Labs     05/19/25  0945 05/21/25  0635   AST 11* 15   ALT 7* 6*   BILIDIR <0.1 <0.1   BILITOT <0.2 <0.2   ALKPHOS 105 100         Recent Labs     05/20/25  0550   INR 1.38*           Urine Cultures: No results found for: \"LABURIN\"  Blood Cultures: No results found for: \"BC\"  No results found for: \"BLOODCULT2\"  Organism: No results found for: \"ORG\"      Emery Norris, APRN - CNP

## 2025-05-21 NOTE — PROGRESS NOTES
Wasted 18ml dilaudid with damion heck from pca     Electronically signed by Shannon Hastings RN on 5/21/2025 at 3:57 PM      Electronically signed by Damion Donato RN on 5/21/2025 at 3:58 PM

## 2025-05-21 NOTE — PROGRESS NOTES
[facility] GENERAL SURGERY    Surgery Progress Note           POD # 22 & 1    PATIENT NAME: Tuyet Richter     TODAY'S DATE: 5/21/2025    INTERVAL HISTORY:    Pt had agitation early this morning, now with confusion after having ativan.      OBJECTIVE:   VITALS:  BP (!) 159/87   Pulse (!) 116   Temp 97.6 °F (36.4 °C) (Oral)   Resp 21   Ht 1.6 m (5' 3\")   Wt 47.6 kg (104 lb 15 oz)   SpO2 98%   BMI 18.59 kg/m²     INTAKE/OUTPUT:    I/O last 3 completed shifts:  In: 1380 [I.V.:1200; NG/GT:180]  Out: 1710 [Urine:1100; Emesis/NG output:250; Drains:210; Blood:150]  No intake/output data recorded.              CONSTITUTIONAL:  awake and alert  LUNGS: no crackles or wheezes    ABDOMEN: soft, tender incision, jps in place serosanguinous    INCISION: clean, dry    Data:  CBC:   Recent Labs     05/20/25  0640 05/20/25  1325 05/20/25  1722 05/21/25  0635   WBC 8.9  --  26.8* 25.2*   HGB 7.4* 8.9* 7.3* 8.0*   HCT 22.2*  --  25.0* 24.2*   *  --  635* 659*     BMP:    Recent Labs     05/20/25  0732 05/21/25  0635    141   K 4.3 4.0    107   CO2 24 21   BUN 18 24*   CREATININE 0.6 0.7   GLUCOSE 115* 191*     Hepatic:   Recent Labs     05/19/25  0945 05/21/25  0635   AST 11* 15   ALT 7* 6*   BILITOT <0.2 <0.2   ALKPHOS 105 100     Mag:      No results for input(s): \"MG\" in the last 72 hours.     Phos:     Recent Labs     05/19/25  0945 05/20/25  0732 05/21/25  0635   PHOS 7.7* 3.8 2.9      INR:   Recent Labs     05/20/25  0550   INR 1.38*       ASSESSMENT AND PLAN:  65 y.o. female status post 1st surgery: Laparoscopic lysis of adhesions and abscess drainage, 2nd surgery: EXPLORATORY LAPAROTOMY, LYSIS OF ADHESIONS, ABSCESS DRAINAGE AND PARTIAL COLECTOMY       GI: continue with ngt to suction  Nutrition: continue with TPN  ID: continue with antibiotics x 5 days  Confusion: likely from ativan early this AM - should avoid, monitor and can stop PCA if persists  Pain: continue PCA for now, consider stopping if

## 2025-05-21 NOTE — PROGRESS NOTES
Pt A/O x4, VSS. NG tube in place draining light brown fluid. Pt using PCA pump, handoff documented. Weldon in place draining clear yellow urine. L TODD drain and right drain in place, both draining bright red fluid. Pt anxious and restless. Repositioned pt and gave cool washcloth for comfort as well as provided aromatherapy. Pt currently resting comfortably. Full assessment completed and charted. Standard safety precautions in place. Pt voices no other needs at this time. All care per orders. Electronically signed by SHON SHERIDAN RN on 5/21/2025 at 1:08 AM

## 2025-05-21 NOTE — PROGRESS NOTES
Pt woke up this morning very anxious and restless. Started hyperventilating and sweating despite attempts to talk and reassure her. Had to reach out to cross cover for meds order to calm her anxiety. Gave her a 0.5mg of Ativan. Will recheck vitals when she is calmer.

## 2025-05-21 NOTE — PROGRESS NOTES
Physical Therapy  Facility/Department: [facility] C3 TELE/MED SURG/ONC  Physical Therapy Initial Assessment/Treatment    Name: Tuyet Richter  : 1960  MRN: [id number]  Date of Service: 2025    Discharge Recommendations:  Subacute/Skilled Nursing Facility   PT Equipment Recommendations  Equipment Needed: No  Other: Defer      Therapy discharge recommendations are subject to collaboration from the patient’s interdisciplinary healthcare team, including MD and case management recommendations.    Barriers to Home Discharge:   [x] Steps to access home entry or bed/bath:   [x] Unable to transfer, ambulate, or propel wheelchair household distances without assist   [] Limited available assist at home upon discharge    [] Patient or family requests d/c to post-acute facility    [x] Poor cognition/safety awareness for d/c to home alone    [] Unable to maintain ordered weight bearing status    [x] Patient with salient signs of long-standing immobility   [] Decreased independence with ADLs   [x] Increased risk for falls   [] Other:    If pt is unable to be seen after this session, please let this note serve as discharge summary.  Please see case management note for discharge disposition.  Thank you.     Patient Diagnosis(es): The primary encounter diagnosis was Intra-abdominal abscess (HCC). Diagnoses of Diverticulitis and SBO (small bowel obstruction) (HCC) were also pertinent to this visit.  Past Medical History:  has a past medical history of Acid reflux, Gastritis, Hiatal hernia, Hypertension, and Pinched nerve in neck.  Past Surgical History:  has a past surgical history that includes Endometrial ablation; Nose surgery; Upper gastrointestinal endoscopy (2013); Colonoscopy (2013); Colonoscopy (3/18/16); Upper gastrointestinal endoscopy (3/18/16); and Sigmoid Colectomy (N/A, 2025).    Assessment  Body Structures, Functions, Activity Limitations Requiring Skilled Therapeutic Intervention: Decreased  increased time;Follows one step commands with repetition  Attention Span: Difficulty dividing attention;Difficulty attending to directions  Safety Judgement: Impaired  Problem Solving: Assistance required to correct errors made;Assistance required to identify errors made  Insights: Decreased awareness of deficits  Initiation: Requires cues for all  Sequencing: Requires cues for all  Cognition Comment: Pt reports significant pain, educated to press button on PCA pump as needed, pt reports she thought the PCA pump button was to make the toilet flush. Pt aggravated later in session that she feels therapists are initiating movement/providing assist before informing her of next steps for mobility, after she was already thoroughly informed of plan for mobility and each subsequent step.    Objective  Pulse: (!) 116  Heart Rate Source: Monitor  SpO2: 98 %  O2 Device: None (Room air)  BP: (!) 159/87  MAP (Calculated): 111  BP Location: Right upper arm  BP Method: Automatic  Patient Position: Semi fowlers        Gross Assessment  AROM: Within functional limits  Strength: Generally decreased, functional   Pre-Pain: 9  Pain Location: Abdomen  Pain Interventions: Nurse notified                Bed Mobility Training  Bed Mobility Training: Yes  Interventions: Tactile cues;Verbal cues;Safety awareness training  Rolling: Substantial/Maximal assistance;2 Person assistance (To L; HOB slightly elevated; unable to maintain position w/o assist)  Supine to Sit: Substantial/Maximal assistance;2 Person assistance (Antalgic; poor bodily awareness; VC for technique and self-symptom monitoring)  Scooting: Substantial/Maximal assistance;2 Person assistance (Scooting hips forward at EOB)  Balance  Sitting: High guard (CGA progressing to SBA at EOB)  Standing: Impaired  Standing - Static: Constant support;Occasional;Fair  Standing - Dynamic: Constant support;Occasional  Transfer Training  Transfer Training: Yes  Interventions: Safety awareness

## 2025-05-21 NOTE — PROGRESS NOTES
Patient educated on use of IS and demonstrates use Pt sleeping    Patient and family educated on incisional care and s/s of infection Yes    Patient and family educated on hand hygiene Yes    Patient and family educated on post operative care Yes   - NG tube maintenance    - Pain control    - rooney Removal    - Insulin Protocol     Day of surgery patient to side of bed or up to chair for minimum of 30 minutes No, pt drowsy and unable to do so.    POD 1 until discharge, patient up to chair by 9 am and TID. Goal to increase mobility  NA    Patient performs oral hygiene with CHG oral solution until NG tube discontinued. No, pt drowsy and refusing oral medications at this time.     If patient does not have NG tube in place, patient to brush teeth and use mouth wash Q shift NA    Patient receives daily bath and linen changes CHG bath and rooney care completed. Gown changed.     Patient receives CHG bath until rooney discontinued Yes    Patient receiving chemical VTE Yes

## 2025-05-21 NOTE — PROGRESS NOTES
Occupational Therapy  Facility/Department: [facility] C3 TELE/MED SURG/ONC  Occupational Therapy Initial Assessment    Name: Tuyet Richter  : 1960  MRN: [id number]  Date of Service: 2025    Discharge Recommendations:  Subacute/Skilled Nursing Facility  OT Equipment Recommendations  Equipment Needed: No       Patient Diagnosis(es): The primary encounter diagnosis was Intra-abdominal abscess (HCC). Diagnoses of Diverticulitis and SBO (small bowel obstruction) (HCC) were also pertinent to this visit.  Past Medical History:  has a past medical history of Acid reflux, Gastritis, Hiatal hernia, Hypertension, and Pinched nerve in neck.  Past Surgical History:  has a past surgical history that includes Endometrial ablation; Nose surgery; Upper gastrointestinal endoscopy (2013); Colonoscopy (2013); Colonoscopy (3/18/16); Upper gastrointestinal endoscopy (3/18/16); Sigmoid Colectomy (N/A, 2025); and laparotomy (N/A, 2025).     Therapy discharge recommendations are subject to collaboration from the patient’s interdisciplinary healthcare team, including MD and case management recommendations.    Barriers to Home Discharge:   [x] Steps to access home entry or bed/bath:   [x] Unable to transfer, ambulate, or propel wheelchair household distances without assist   [x] Limited available assist at home upon discharge    [] Patient or family requests d/c to post-acute facility    [x] Poor cognition/safety awareness for d/c to home alone    [] Unable to maintain ordered weight bearing status    [] Patient with salient signs of long-standing immobility   [x] Decreased independence with ADLs   [x] Increased risk for falls   [] Other:    If pt is unable to be seen after this session, please let this note serve as discharge summary.  Please see case management note for discharge disposition.  Thank you.    Assessment  Performance deficits / Impairments: Decreased functional mobility ;Decreased ADL  Physical Therapy Visit    Visit Type: Daily Treatment Note  Visit: 3  Referring Provider: Marissa Angelo PA-C  Medical Diagnosis (from order): Diagnosis Information    Diagnosis  719.41 (ICD-9-CM) - M25.511 (ICD-10-CM) - Right shoulder pain, unspecified chronicity         SUBJECTIVE                                                                                                               Patient reports he has stopped wearing his sling starting yesterday and this has been going well. He reports some popping in his shoulder when he walks and swings his arms, but this is not painful.   Functional Change: Able to drink a cup of coffee with his right hand.     Pain / Symptoms  - Pain rating (out of 10): Current: 1       OBJECTIVE                                                                                                                                       Treatment     Therapeutic Exercise  Supine:  -passive range of motion into flexion, abduction, internal rotation and external rotation at 45 degrees abduction within parameters of protocol  -chest press with dowel x 10 active assist range of motion on right   -right shoulder external rotation active assist range of motion with dowel to 40 degrees x 10  -overhead flexion active assist range of motion on right shoulder with dowel x 10  -table slides into flexion x 10 with roller  -table slides into abduction x 10 with roller    Manual Therapy   Supine:  -inferior and posterior glenohumeral grade 1-2 mobilizations     Skilled input: verbal instruction/cues    Writer verbally educated and received verbal consent for hand placement, positioning of patient, and techniques to be performed today from patient for hand placement and palpation for techniques and therapist position for techniques as described above and how they are pertinent to the patient's plan of care.    Home Exercise Program  Access Code: [email or web address]  Circular Shoulder Pendulum with Table Support - 3  x daily - 7 x weekly - 10 reps  Seated Scapular Retraction - 3 x daily - 7 x weekly - 10 reps - 3 hold  Seated Bilateral Shoulder Flexion Towel Slide at Table Top - 3 x daily - 7 x weekly - 3 sets - 10 reps  Seated Shoulder Abduction Towel Slide at Table Top (Mirrored) - 3 x daily - 7 x weekly - 10 reps  Seated Elbow Flexion and Extension AROM - 3 x daily - 7 x weekly - 10 reps  Supine Shoulder Press with Dowel - 3 x daily - 7 x weekly - 10 reps  Supine Shoulder External Rotation in 45 Degrees Abduction AAROM with Dowel (Mirrored) - 3 x daily - 7 x weekly - 10 reps  Supine Shoulder Flexion Extension AAROM with Dowel - 3 x daily - 7 x weekly - 10 reps      ASSESSMENT                                                                                                            Bill continues to progress well, as his range of motion is where it should be at this point. He has minimal pain and is tolerating being out of the sling well. He showed good form with the dowel active assist range of motion exercises. He continues to struggle with sleeping, but has no other complaints at this time.   Pain/symptoms after session (out of 10): 1  Education:   - Results of above outlined education: Verbalizes understanding and Demonstrates understanding    PLAN                                                                                                                           Suggestions for next session as indicated: Progress per plan of care  Per type 3 rotator cuff protocol        Care approved by and performed under the direction of on-site therapist. Student’s note read and approved.  Jie Carpenter        Therapy procedure time and total treatment time can be found documented on the Time Entry flowsheet

## 2025-05-21 NOTE — PLAN OF CARE
Problem: Pain  Goal: Verbalizes/displays adequate comfort level or baseline comfort level  Outcome: Progressing     Problem: Safety - Adult  Goal: Free from fall injury  Outcome: Progressing     Problem: ABCDS Injury Assessment  Goal: Absence of physical injury  Outcome: Progressing     Problem: Nutrition Deficit:  Goal: Optimize nutritional status  Outcome: Progressing     Problem: Discharge Planning  Goal: Discharge to home or other facility with appropriate resources  Outcome: Progressing     Problem: Skin/Tissue Integrity - Adult  Goal: Skin integrity remains intact  Outcome: Progressing  Goal: Incisions, wounds, or drain sites healing without S/S of infection  Outcome: Progressing     Problem: Gastrointestinal - Adult  Goal: Minimal or absence of nausea and vomiting  Outcome: Progressing  Goal: Maintains or returns to baseline bowel function  Outcome: Progressing  Goal: Maintains adequate nutritional intake  Outcome: Progressing     Problem: Infection - Adult  Goal: Absence of infection at discharge  Outcome: Progressing  Goal: Absence of infection during hospitalization  Outcome: Progressing     Problem: Skin/Tissue Integrity  Goal: Skin integrity remains intact  Description: 1.  Monitor for areas of redness and/or skin breakdown2.  Assess vascular access sites hourly3.  Every 4-6 hours minimum:  Change oxygen saturation probe site4.  Every 4-6 hours:  If on nasal continuous positive airway pressure, respiratory therapy assess nares and determine need for appliance change or resting period  Outcome: Progressing

## 2025-05-22 LAB
ALBUMIN SERPL-MCNC: 2.8 G/DL (ref 3.4–5)
ALBUMIN/GLOB SERPL: 1 {RATIO} (ref 1.1–2.2)
ALP SERPL-CCNC: 90 U/L (ref 40–129)
ALT SERPL-CCNC: 6 U/L (ref 10–40)
ANION GAP SERPL CALCULATED.3IONS-SCNC: 10 MMOL/L (ref 3–16)
AST SERPL-CCNC: 10 U/L (ref 15–37)
BASOPHILS # BLD: 0 K/UL (ref 0–0.2)
BASOPHILS NFR BLD: 0.3 %
BILIRUB SERPL-MCNC: <0.2 MG/DL (ref 0–1)
BLOOD BANK DISPENSE STATUS: NORMAL
BLOOD BANK PRODUCT CODE: NORMAL
BPU ID: NORMAL
BUN SERPL-MCNC: 23 MG/DL (ref 7–20)
CALCIUM SERPL-MCNC: 8.8 MG/DL (ref 8.3–10.6)
CHLORIDE SERPL-SCNC: 105 MMOL/L (ref 99–110)
CO2 SERPL-SCNC: 22 MMOL/L (ref 21–32)
CREAT SERPL-MCNC: 0.7 MG/DL (ref 0.6–1.2)
DEPRECATED RDW RBC AUTO: 18.4 % (ref 12.4–15.4)
DESCRIPTION BLOOD BANK: NORMAL
EOSINOPHIL # BLD: 0 K/UL (ref 0–0.6)
EOSINOPHIL NFR BLD: 0.1 %
GFR SERPLBLD CREATININE-BSD FMLA CKD-EPI: >90 ML/MIN/{1.73_M2}
GLUCOSE BLD-MCNC: 103 MG/DL (ref 70–99)
GLUCOSE BLD-MCNC: 130 MG/DL (ref 70–99)
GLUCOSE BLD-MCNC: 92 MG/DL (ref 70–99)
GLUCOSE SERPL-MCNC: 100 MG/DL (ref 70–99)
HCT VFR BLD AUTO: 17.7 % (ref 36–48)
HCT VFR BLD AUTO: 20.9 % (ref 36–48)
HCT VFR BLD AUTO: 21.8 % (ref 36–48)
HGB BLD-MCNC: 5.8 G/DL (ref 12–16)
HGB BLD-MCNC: 6.9 G/DL (ref 12–16)
HGB BLD-MCNC: 7.3 G/DL (ref 12–16)
LYMPHOCYTES # BLD: 1.2 K/UL (ref 1–5.1)
LYMPHOCYTES NFR BLD: 7.7 %
MCH RBC QN AUTO: 30.3 PG (ref 26–34)
MCHC RBC AUTO-ENTMCNC: 32.9 G/DL (ref 31–36)
MCV RBC AUTO: 92.1 FL (ref 80–100)
MONOCYTES # BLD: 1.3 K/UL (ref 0–1.3)
MONOCYTES NFR BLD: 8.5 %
NEUTROPHILS # BLD: 12.8 K/UL (ref 1.7–7.7)
NEUTROPHILS NFR BLD: 83.4 %
PERFORMED ON: ABNORMAL
PERFORMED ON: ABNORMAL
PERFORMED ON: NORMAL
PLATELET # BLD AUTO: 465 K/UL (ref 135–450)
PMV BLD AUTO: 9.4 FL (ref 5–10.5)
POTASSIUM SERPL-SCNC: 3.6 MMOL/L (ref 3.5–5.1)
PROT SERPL-MCNC: 5.7 G/DL (ref 6.4–8.2)
RBC # BLD AUTO: 2.27 M/UL (ref 4–5.2)
REASON FOR REJECTION: NORMAL
REASON FOR REJECTION: NORMAL
REJECTED TEST: NORMAL
REJECTED TEST: NORMAL
SODIUM SERPL-SCNC: 137 MMOL/L (ref 136–145)
WBC # BLD AUTO: 15.4 K/UL (ref 4–11)

## 2025-05-22 PROCEDURE — APPNB30 APP NON BILLABLE TIME 0-30 MINS: Performed by: CLINICAL NURSE SPECIALIST

## 2025-05-22 PROCEDURE — 36430 TRANSFUSION BLD/BLD COMPNT: CPT

## 2025-05-22 PROCEDURE — 6360000002 HC RX W HCPCS: Performed by: SURGERY

## 2025-05-22 PROCEDURE — 6370000000 HC RX 637 (ALT 250 FOR IP): Performed by: SURGERY

## 2025-05-22 PROCEDURE — 2500000003 HC RX 250 WO HCPCS: Performed by: CLINICAL NURSE SPECIALIST

## 2025-05-22 PROCEDURE — 36592 COLLECT BLOOD FROM PICC: CPT

## 2025-05-22 PROCEDURE — 2500000003 HC RX 250 WO HCPCS: Performed by: SURGERY

## 2025-05-22 PROCEDURE — 1200000000 HC SEMI PRIVATE

## 2025-05-22 PROCEDURE — 36415 COLL VENOUS BLD VENIPUNCTURE: CPT

## 2025-05-22 PROCEDURE — 2580000003 HC RX 258: Performed by: SURGERY

## 2025-05-22 PROCEDURE — 80053 COMPREHEN METABOLIC PANEL: CPT

## 2025-05-22 PROCEDURE — 85014 HEMATOCRIT: CPT

## 2025-05-22 PROCEDURE — 6360000002 HC RX W HCPCS: Performed by: CLINICAL NURSE SPECIALIST

## 2025-05-22 PROCEDURE — 85025 COMPLETE CBC W/AUTO DIFF WBC: CPT

## 2025-05-22 PROCEDURE — 85018 HEMOGLOBIN: CPT

## 2025-05-22 PROCEDURE — 99024 POSTOP FOLLOW-UP VISIT: CPT | Performed by: SURGERY

## 2025-05-22 PROCEDURE — APPNB45 APP NON BILLABLE 31-45 MINUTES: Performed by: CLINICAL NURSE SPECIALIST

## 2025-05-22 RX ORDER — SODIUM CHLORIDE 9 MG/ML
INJECTION, SOLUTION INTRAVENOUS PRN
Status: DISCONTINUED | OUTPATIENT
Start: 2025-05-22 | End: 2025-05-25

## 2025-05-22 RX ORDER — KETOROLAC TROMETHAMINE 30 MG/ML
15 INJECTION, SOLUTION INTRAMUSCULAR; INTRAVENOUS EVERY 6 HOURS
Status: DISCONTINUED | OUTPATIENT
Start: 2025-05-22 | End: 2025-05-22

## 2025-05-22 RX ADMIN — MORPHINE SULFATE 4 MG: 4 INJECTION, SOLUTION INTRAMUSCULAR; INTRAVENOUS at 21:33

## 2025-05-22 RX ADMIN — I.V. FAT EMULSION 250 ML: 20 EMULSION INTRAVENOUS at 18:10

## 2025-05-22 RX ADMIN — METOPROLOL 12.5 MG: 25 TABLET ORAL at 21:41

## 2025-05-22 RX ADMIN — AMLODIPINE BESYLATE 2.5 MG: 2.5 TABLET ORAL at 08:15

## 2025-05-22 RX ADMIN — DICYCLOMINE HYDROCHLORIDE 10 MG: 10 CAPSULE ORAL at 08:15

## 2025-05-22 RX ADMIN — Medication 6 MG: at 21:40

## 2025-05-22 RX ADMIN — MORPHINE SULFATE 4 MG: 4 INJECTION, SOLUTION INTRAMUSCULAR; INTRAVENOUS at 00:25

## 2025-05-22 RX ADMIN — MORPHINE SULFATE 4 MG: 4 INJECTION, SOLUTION INTRAMUSCULAR; INTRAVENOUS at 08:08

## 2025-05-22 RX ADMIN — MORPHINE SULFATE 4 MG: 4 INJECTION, SOLUTION INTRAMUSCULAR; INTRAVENOUS at 04:22

## 2025-05-22 RX ADMIN — CALCIUM CARBONATE 500 MG: 500 TABLET, CHEWABLE ORAL at 21:40

## 2025-05-22 RX ADMIN — SODIUM CHLORIDE: 9 INJECTION, SOLUTION INTRAVENOUS at 18:52

## 2025-05-22 RX ADMIN — Medication 400 MG: at 08:15

## 2025-05-22 RX ADMIN — PIPERACILLIN AND TAZOBACTAM 3375 MG: 3; .375 INJECTION, POWDER, LYOPHILIZED, FOR SOLUTION INTRAVENOUS at 05:03

## 2025-05-22 RX ADMIN — DICYCLOMINE HYDROCHLORIDE 10 MG: 10 CAPSULE ORAL at 21:40

## 2025-05-22 RX ADMIN — MORPHINE SULFATE 4 MG: 4 INJECTION, SOLUTION INTRAMUSCULAR; INTRAVENOUS at 11:19

## 2025-05-22 RX ADMIN — PIPERACILLIN AND TAZOBACTAM 3375 MG: 3; .375 INJECTION, POWDER, LYOPHILIZED, FOR SOLUTION INTRAVENOUS at 14:47

## 2025-05-22 RX ADMIN — Medication 400 MG: at 21:40

## 2025-05-22 RX ADMIN — PIPERACILLIN AND TAZOBACTAM 3375 MG: 3; .375 INJECTION, POWDER, LYOPHILIZED, FOR SOLUTION INTRAVENOUS at 21:30

## 2025-05-22 RX ADMIN — CALCIUM CARBONATE 500 MG: 500 TABLET, CHEWABLE ORAL at 08:15

## 2025-05-22 RX ADMIN — PANTOPRAZOLE SODIUM 40 MG: 40 INJECTION, POWDER, FOR SOLUTION INTRAVENOUS at 08:12

## 2025-05-22 RX ADMIN — LIDOCAINE HYDROCHLORIDE 15 ML: 20 SOLUTION ORAL at 08:32

## 2025-05-22 RX ADMIN — SODIUM CHLORIDE, PRESERVATIVE FREE 10 ML: 5 INJECTION INTRAVENOUS at 08:12

## 2025-05-22 RX ADMIN — METOPROLOL 12.5 MG: 25 TABLET ORAL at 08:15

## 2025-05-22 RX ADMIN — ASCORBIC ACID, VITAMIN A PALMITATE, CHOLECALCIFEROL, THIAMINE HYDROCHLORIDE, RIBOFLAVIN-5 PHOSPHATE SODIUM, PYRIDOXINE HYDROCHLORIDE, NIACINAMIDE, DEXPANTHENOL, ALPHA-TOCOPHEROL ACETATE, VITAMIN K1, FOLIC ACID, BIOTIN, CYANOCOBALAMIN: 200; 3300; 200; 6; 3.6; 6; 40; 15; 10; 150; 600; 60; 5 INJECTION, SOLUTION INTRAVENOUS at 18:00

## 2025-05-22 RX ADMIN — KETOROLAC TROMETHAMINE 15 MG: 30 INJECTION, SOLUTION INTRAMUSCULAR at 10:40

## 2025-05-22 RX ADMIN — MORPHINE SULFATE 4 MG: 4 INJECTION, SOLUTION INTRAMUSCULAR; INTRAVENOUS at 17:52

## 2025-05-22 RX ADMIN — MORPHINE SULFATE 2 MG: 2 INJECTION, SOLUTION INTRAMUSCULAR; INTRAVENOUS at 14:45

## 2025-05-22 ASSESSMENT — PAIN DESCRIPTION - LOCATION
LOCATION: ABDOMEN
LOCATION: THROAT
LOCATION: ABDOMEN

## 2025-05-22 ASSESSMENT — PAIN SCALES - WONG BAKER: WONGBAKER_NUMERICALRESPONSE: NO HURT

## 2025-05-22 ASSESSMENT — PAIN SCALES - GENERAL
PAINLEVEL_OUTOF10: 4
PAINLEVEL_OUTOF10: 9
PAINLEVEL_OUTOF10: 8
PAINLEVEL_OUTOF10: 7
PAINLEVEL_OUTOF10: 7
PAINLEVEL_OUTOF10: 8

## 2025-05-22 ASSESSMENT — PAIN DESCRIPTION - DESCRIPTORS
DESCRIPTORS: ACHING
DESCRIPTORS: DISCOMFORT
DESCRIPTORS: ACHING;DISCOMFORT
DESCRIPTORS: CRUSHING;PRESSURE;SHARP
DESCRIPTORS: DISCOMFORT

## 2025-05-22 ASSESSMENT — PAIN DESCRIPTION - ORIENTATION
ORIENTATION: MID

## 2025-05-22 NOTE — ANESTHESIA POSTPROCEDURE EVALUATION
Department of Anesthesiology  Postprocedure Note    Patient: Tuyet Richter  MRN: [id number]  YOB: 1960  Date of evaluation: 5/22/2025    Procedure Summary       Date: 05/20/25 Room / Location: [address]    Anesthesia Start: 1159 Anesthesia Stop: 1451    Procedure: EXPLORATORY LAPAROTOMY, LYSIS OF ADHESIONS, ABSCESS DRAINAGE AND PARTIAL COLECTOMY (Abdomen) Diagnosis:       SBO (small bowel obstruction) (HCC)      (SBO (small bowel obstruction) (HCC) [K56.609])    Surgeons: Dallas Guerrero MD Responsible Provider: Hesham Lou MD    Anesthesia Type: general ASA Status: 3            Anesthesia Type: No value filed.    Anum Phase I: Anum Score: 9    Anum Phase II:      Anesthesia Post Evaluation    Patient location during evaluation: PACU  Patient participation: complete - patient participated  Level of consciousness: awake and alert  Airway patency: patent  Nausea & Vomiting: no vomiting and no nausea  Cardiovascular status: hemodynamically stable and blood pressure returned to baseline  Respiratory status: acceptable  Hydration status: euvolemic  Comments: ---------------------------               05/22/25                      0806         ---------------------------   BP:        (!) 141/82       Pulse:       (!) 115        Resp:          16           Temp:   97.9 °F (36.6 °C)   SpO2:          96%         ---------------------------    Pain management: adequate    No notable events documented.

## 2025-05-22 NOTE — PROGRESS NOTES
[facility] GENERAL SURGERY    Surgery Progress Note           POD # 23 & 2    PATIENT NAME: Tuyet Richter     TODAY'S DATE: 5/22/2025    INTERVAL HISTORY:    Pt reports moving this morning and having increased abd tightness. Confusion resolved.     OBJECTIVE:   VITALS:  BP (!) 141/82   Pulse (!) 115   Temp 97.9 °F (36.6 °C) (Oral)   Resp 16   Ht 1.6 m (5' 3\")   Wt 50.2 kg (110 lb 10.7 oz)   SpO2 96%   BMI 19.60 kg/m²     INTAKE/OUTPUT:    I/O last 3 completed shifts:  In: 5913.2 [I.V.:324.7; NG/GT:60; IV Piggyback:397.4]  Out: 2270 [Urine:1480; Emesis/NG output:550; Drains:240]  I/O this shift:  In: 10 [I.V.:10]  Out: -               CONSTITUTIONAL:  awake and alert  LUNGS: no crackles or wheezes    ABDOMEN: soft, mod distention, tender incision, jps in place serosanguinous    INCISION: clean, dry    Data:  CBC:   Recent Labs     05/20/25  1722 05/21/25  0635 05/22/25  0550   WBC 26.8* 25.2* 15.4*   HGB 7.3* 8.0* 6.9*   HCT 25.0* 24.2* 20.9*   * 659* 465*     BMP:    Recent Labs     05/20/25  0732 05/21/25  0635 05/22/25  0550    141 137   K 4.3 4.0 3.6    107 105   CO2 24 21 22   BUN 18 24* 23*   CREATININE 0.6 0.7 0.7   GLUCOSE 115* 191* 100*     Hepatic:   Recent Labs     05/21/25  0635 05/22/25  0550   AST 15 10*   ALT 6* 6*   BILITOT <0.2 <0.2   ALKPHOS 100 90     Mag:      No results for input(s): \"MG\" in the last 72 hours.     Phos:     Recent Labs     05/20/25  0732 05/21/25  0635   PHOS 3.8 2.9      INR:   Recent Labs     05/20/25  0550   INR 1.38*       ASSESSMENT AND PLAN:  65 y.o. female status post 1st surgery: Laparoscopic lysis of adhesions and abscess drainage, 2nd surgery: EXPLORATORY LAPAROTOMY, LYSIS OF ADHESIONS, ABSCESS DRAINAGE AND PARTIAL COLECTOMY       GI: continue with ngt to suction, await return of bowel function  Nutrition: continue with TPN  ID: continue with antibiotics x 5 days  Confusion: likely from ativan early this AM - should avoid, monitor and can

## 2025-05-22 NOTE — PROGRESS NOTES
Patient educated on use of IS and demonstrates use Yes    Patient and family educated on incisional care and s/s of infection Yes    Patient and family educated on hand hygiene Yes    Patient and family educated on post operative care Yes   - NG tube maintenance    - Pain control    - rooney Removal    - Insulin Protocol     Day of surgery patient to side of bed or up to chair for minimum of 30 minutes Yes    POD 1 until discharge, patient up to chair by 9 am and TID. Goal to increase mobility  Yes    Patient performs oral hygiene with CHG oral solution until NG tube discontinued. Yes    If patient does not have NG tube in place, patient to brush teeth and use mouth wash Q shift Yes    Patient receives daily bath and linen changes Yes    Patient receives CHG bath until rooney discontinued Yes    SCDs in place No    Patient receiving chemical VTE Yes

## 2025-05-22 NOTE — PLAN OF CARE
Problem: Pain  Goal: Verbalizes/displays adequate comfort level or baseline comfort level  5/22/2025 1227 by Sarah Ellis RN  Outcome: Progressing  Flowsheets (Taken 5/22/2025 1227)  Verbalizes/displays adequate comfort level or baseline comfort level:   Encourage patient to monitor pain and request assistance   Administer analgesics based on type and severity of pain and evaluate response   Assess pain using appropriate pain scale   Implement non-pharmacological measures as appropriate and evaluate response

## 2025-05-22 NOTE — PROGRESS NOTES
[facility] Medicine Progress Note  V 1.6      Date of Admission: 4/23/2025    Hospital Day: 30      Chief Admission Complaint:  Abdominal pain     Subjective:  c/o hiccups, family at bedside     Presenting Admission History:       \"This is a 64 y.o. female who presented to [facility] with abdominal pain.  PMHx significant for HTN.  History obtained from the patient and review of EMR.  The patient stated she had a hemicolectomy in February 2025 in [facility] and since then has been experiencing intermittent abdominal pain.  Per chart review, the patient has been readmitted 1 time since her surgery for electrolyte abnormalities.  However, the patient stated her pain has gotten progressively worse over the last couple of weeks.  She reports speaking with GI and they ordered a CT outpatient today.  Patient  is at the bedside and stated that she was called and told to go to the emergency department due to \"a collection of pus\" seen on the CT. In the emergency department a CT abdomen pelvis was obtained that revealed Fluid and gas collection within the pelvis interposed between the colon and uterus measuring up to 4.1 cm.  Abscess is favored.  This appears largely enveloped by abdominal and adnexal structures.  A [place] uterine fistula is not excluded.  Additional small dependent collection within the region of the pouch of Alfred measuring up to 2.5 cm.  Mild dilation of small bowel loops which may indicate partial obstruction.  There is no high-grade small bowel obstruction.  The patient was given morphine for pain.  She was also started on Zosyn.  Upon further evaluation, the patient was found to be dehydrated with hyponatremia.  This is likely secondary to inadequate p.o. intake.  She was admitted for further evaluation and treatment.  IV fluids have been initiated and GI has been consulted for the a.m. The patient denied any other associated symptoms as well as any aggravating and/or  Per NG tube BID    magnesium oxide  400 mg Per NG tube BID    valACYclovir  500 mg Per NG tube BID    melatonin  6 mg Per NG tube Nightly    metoprolol tartrate  12.5 mg Per NG tube BID    nystatin  5 mL Mouth/Throat 4x Daily    fat emulsion  250 mL IntraVENous Once per day on Monday Thursday    piperacillin-tazobactam  3,375 mg IntraVENous Q8H    dicyclomine  10 mg Oral TID    sodium chloride flush  5-40 mL IntraVENous 2 times per day    enoxaparin  30 mg SubCUTAneous Daily     PRN Meds: morphine **OR** morphine, sodium chloride, naloxone 0.4 mg in 10 mL sodium chloride syringe, polyethylene glycol, diatrizoate meglumine-sodium, lidocaine viscous hcl, benzocaine-menthol, diatrizoate meglumine-sodium, potassium chloride **OR** potassium alternative oral replacement **OR** potassium chloride, magnesium sulfate, sodium phosphate 15 mmol in sodium chloride 0.9 % 250 mL IVPB, glucose, dextrose bolus **OR** dextrose bolus, glucagon (rDNA), dextrose, nystatin (MYCOSTATIN) 3,000,000 Units, lidocaine viscous hcl (XYLOCAINE) 30 mL, diphenhydrAMINE (BENYLIN) 75 mg, aluminum & magnesium hydroxide-simethicone (MAALOX PLUS) 30 mL compounded oral suspension, benzocaine, sodium chloride, diatrizoate meglumine-sodium, sodium chloride flush, sodium chloride, ondansetron **OR** ondansetron, acetaminophen **OR** acetaminophen      Physical Exam Performed:      General appearance: NAD  Respiratory:  Normal respiratory effort.   Cardiovascular:  Normal, regular rhythm.  Abdomen:  Soft, mildly tender, distended. Incision well approximated.  Hypoactive bs, [place]-serosanguinous drainage  Musculoskeletal:  No edema  Neurologic:  Non-focal  Psychiatric:  A/Ox3    BP (!) 141/82   Pulse (!) 115   Temp 97.9 °F (36.6 °C) (Oral)   Resp 16   Ht 1.6 m (5' 3\")   Wt 50.2 kg (110 lb 10.7 oz)   SpO2 96%   BMI 19.60 kg/m²     Diet: Diet NPO  PN-Adult Premix 5/15 - Standard Electrolytes - Central [place]  PN-Adult Premix 5/15 - Standard Electrolytes -

## 2025-05-22 NOTE — CONSENT
Informed Consent for Blood Component Transfusion Note    I have discussed with the patient the rationale for blood component transfusion; its benefits in treating or preventing fatigue, organ damage, or death; and its risk which includes mild transfusion reactions, rare risk of blood borne infection, or more serious but rare reactions. I have discussed the alternatives to transfusion, including the risk and consequences of not receiving transfusion. The patient had an opportunity to ask questions and had agreed to proceed with transfusion of blood components.    Electronically signed by SARAI Santos CNP on 5/22/25 at 3:25 PM EDT

## 2025-05-22 NOTE — CARE COORDINATION
Hospital day 29, POD 23, 2: Patient on C3 re abscess care managed by IM and General Surgery. Patient form home will review SNF recs in am. Patient getting blood this date. Patient with drains x2, NGT, PICC line. Patient with IV ATB and TPN. SW following.GASTON Alexander

## 2025-05-22 NOTE — PROGRESS NOTES
Shift assessment completed.  Pt A&O x4, VSS. Weldon catheter intact draining clear, yellow urine. Midline incision C/D/I with foam dressing in place. NG tube to CLWS. Meds given through NG tube. Right and left drains in place and draining. Pt reporting increased pressure in abd, PRN pain medication given per MAR. Bed locked and in lowest position. Call light and bedside table within reach. Will continue to monitor.

## 2025-05-22 NOTE — PLAN OF CARE
Problem: Pain  Goal: Verbalizes/displays adequate comfort level or baseline comfort level  Outcome: Progressing     Problem: Safety - Adult  Goal: Free from fall injury  Outcome: Progressing     Problem: ABCDS Injury Assessment  Goal: Absence of physical injury  Outcome: Progressing     Problem: Nutrition Deficit:  Goal: Optimize nutritional status  Outcome: Progressing     Problem: Skin/Tissue Integrity - Adult  Goal: Incisions, wounds, or drain sites healing without S/S of infection  Outcome: Progressing     Problem: Gastrointestinal - Adult  Goal: Minimal or absence of nausea and vomiting  Outcome: Progressing     Problem: Gastrointestinal - Adult  Goal: Maintains or returns to baseline bowel function  Outcome: Progressing     Problem: Gastrointestinal - Adult  Goal: Maintains adequate nutritional intake  Outcome: Progressing

## 2025-05-23 LAB
ALBUMIN SERPL-MCNC: 2.4 G/DL (ref 3.4–5)
ALP SERPL-CCNC: 95 U/L (ref 40–129)
ALT SERPL-CCNC: 9 U/L (ref 10–40)
AST SERPL-CCNC: 13 U/L (ref 15–37)
BILIRUB DIRECT SERPL-MCNC: <0.1 MG/DL (ref 0–0.3)
BILIRUB INDIRECT SERPL-MCNC: ABNORMAL MG/DL (ref 0–1)
BILIRUB SERPL-MCNC: <0.2 MG/DL (ref 0–1)
GLUCOSE BLD-MCNC: 105 MG/DL (ref 70–99)
GLUCOSE BLD-MCNC: 119 MG/DL (ref 70–99)
GLUCOSE BLD-MCNC: 120 MG/DL (ref 70–99)
GLUCOSE BLD-MCNC: 128 MG/DL (ref 70–99)
GLUCOSE BLD-MCNC: 87 MG/DL (ref 70–99)
HCT VFR BLD AUTO: 22.2 % (ref 36–48)
HCT VFR BLD AUTO: 24.9 % (ref 36–48)
HGB BLD-MCNC: 7.5 G/DL (ref 12–16)
HGB BLD-MCNC: 8.4 G/DL (ref 12–16)
PERFORMED ON: ABNORMAL
PERFORMED ON: NORMAL
PHOSPHATE SERPL-MCNC: 3.1 MG/DL (ref 2.5–4.9)
PROT SERPL-MCNC: 5.3 G/DL (ref 6.4–8.2)
TRIGL SERPL-MCNC: 121 MG/DL (ref 0–150)

## 2025-05-23 PROCEDURE — 84478 ASSAY OF TRIGLYCERIDES: CPT

## 2025-05-23 PROCEDURE — 80076 HEPATIC FUNCTION PANEL: CPT

## 2025-05-23 PROCEDURE — 6370000000 HC RX 637 (ALT 250 FOR IP): Performed by: SURGERY

## 2025-05-23 PROCEDURE — 99024 POSTOP FOLLOW-UP VISIT: CPT | Performed by: SURGERY

## 2025-05-23 PROCEDURE — 6360000002 HC RX W HCPCS: Performed by: INTERNAL MEDICINE

## 2025-05-23 PROCEDURE — 6360000002 HC RX W HCPCS: Performed by: SURGERY

## 2025-05-23 PROCEDURE — 2500000003 HC RX 250 WO HCPCS: Performed by: CLINICAL NURSE SPECIALIST

## 2025-05-23 PROCEDURE — 1200000000 HC SEMI PRIVATE

## 2025-05-23 PROCEDURE — 85018 HEMOGLOBIN: CPT

## 2025-05-23 PROCEDURE — APPNB45 APP NON BILLABLE 31-45 MINUTES: Performed by: CLINICAL NURSE SPECIALIST

## 2025-05-23 PROCEDURE — 2580000003 HC RX 258: Performed by: SURGERY

## 2025-05-23 PROCEDURE — 85014 HEMATOCRIT: CPT

## 2025-05-23 PROCEDURE — 2500000003 HC RX 250 WO HCPCS: Performed by: SURGERY

## 2025-05-23 PROCEDURE — 84100 ASSAY OF PHOSPHORUS: CPT

## 2025-05-23 PROCEDURE — 6360000002 HC RX W HCPCS: Performed by: CLINICAL NURSE SPECIALIST

## 2025-05-23 RX ORDER — BENZOCAINE/MENTHOL 6 MG-10 MG
LOZENGE MUCOUS MEMBRANE 2 TIMES DAILY
Status: DISCONTINUED | OUTPATIENT
Start: 2025-05-23 | End: 2025-05-30 | Stop reason: HOSPADM

## 2025-05-23 RX ORDER — KETOROLAC TROMETHAMINE 30 MG/ML
15 INJECTION, SOLUTION INTRAMUSCULAR; INTRAVENOUS EVERY 6 HOURS PRN
Status: DISPENSED | OUTPATIENT
Start: 2025-05-23 | End: 2025-05-27

## 2025-05-23 RX ADMIN — PIPERACILLIN AND TAZOBACTAM 3375 MG: 3; .375 INJECTION, POWDER, LYOPHILIZED, FOR SOLUTION INTRAVENOUS at 05:44

## 2025-05-23 RX ADMIN — MORPHINE SULFATE 4 MG: 4 INJECTION, SOLUTION INTRAMUSCULAR; INTRAVENOUS at 10:27

## 2025-05-23 RX ADMIN — PIPERACILLIN AND TAZOBACTAM 3375 MG: 3; .375 INJECTION, POWDER, LYOPHILIZED, FOR SOLUTION INTRAVENOUS at 21:46

## 2025-05-23 RX ADMIN — METOPROLOL 12.5 MG: 25 TABLET ORAL at 21:26

## 2025-05-23 RX ADMIN — MORPHINE SULFATE 2 MG: 2 INJECTION, SOLUTION INTRAMUSCULAR; INTRAVENOUS at 21:21

## 2025-05-23 RX ADMIN — KETOROLAC TROMETHAMINE 15 MG: 30 INJECTION, SOLUTION INTRAMUSCULAR at 13:04

## 2025-05-23 RX ADMIN — DICYCLOMINE HYDROCHLORIDE 10 MG: 10 CAPSULE ORAL at 21:25

## 2025-05-23 RX ADMIN — MORPHINE SULFATE 4 MG: 4 INJECTION, SOLUTION INTRAMUSCULAR; INTRAVENOUS at 07:01

## 2025-05-23 RX ADMIN — METOPROLOL 12.5 MG: 25 TABLET ORAL at 10:15

## 2025-05-23 RX ADMIN — ALTEPLASE 1 MG: 2.2 INJECTION, POWDER, LYOPHILIZED, FOR SOLUTION INTRAVENOUS at 18:59

## 2025-05-23 RX ADMIN — MORPHINE SULFATE 4 MG: 4 INJECTION, SOLUTION INTRAMUSCULAR; INTRAVENOUS at 03:49

## 2025-05-23 RX ADMIN — ENOXAPARIN SODIUM 30 MG: 100 INJECTION SUBCUTANEOUS at 10:14

## 2025-05-23 RX ADMIN — PIPERACILLIN AND TAZOBACTAM 3375 MG: 3; .375 INJECTION, POWDER, LYOPHILIZED, FOR SOLUTION INTRAVENOUS at 13:21

## 2025-05-23 RX ADMIN — SODIUM CHLORIDE: 9 INJECTION, SOLUTION INTRAVENOUS at 15:52

## 2025-05-23 RX ADMIN — LIDOCAINE HYDROCHLORIDE 15 ML: 20 SOLUTION ORAL at 10:53

## 2025-05-23 RX ADMIN — DICYCLOMINE HYDROCHLORIDE 10 MG: 10 CAPSULE ORAL at 15:42

## 2025-05-23 RX ADMIN — ASCORBIC ACID, VITAMIN A PALMITATE, CHOLECALCIFEROL, THIAMINE HYDROCHLORIDE, RIBOFLAVIN-5 PHOSPHATE SODIUM, PYRIDOXINE HYDROCHLORIDE, NIACINAMIDE, DEXPANTHENOL, ALPHA-TOCOPHEROL ACETATE, VITAMIN K1, FOLIC ACID, BIOTIN, CYANOCOBALAMIN: 200; 3300; 200; 6; 3.6; 6; 40; 15; 10; 150; 600; 60; 5 INJECTION, SOLUTION INTRAVENOUS at 18:15

## 2025-05-23 RX ADMIN — Medication 400 MG: at 10:15

## 2025-05-23 RX ADMIN — MORPHINE SULFATE 4 MG: 4 INJECTION, SOLUTION INTRAMUSCULAR; INTRAVENOUS at 17:55

## 2025-05-23 RX ADMIN — DICYCLOMINE HYDROCHLORIDE 10 MG: 10 CAPSULE ORAL at 10:15

## 2025-05-23 RX ADMIN — MORPHINE SULFATE 4 MG: 4 INJECTION, SOLUTION INTRAMUSCULAR; INTRAVENOUS at 13:48

## 2025-05-23 RX ADMIN — KETOROLAC TROMETHAMINE 15 MG: 30 INJECTION, SOLUTION INTRAMUSCULAR at 19:04

## 2025-05-23 RX ADMIN — SODIUM CHLORIDE, PRESERVATIVE FREE 10 ML: 5 INJECTION INTRAVENOUS at 10:50

## 2025-05-23 RX ADMIN — PANTOPRAZOLE SODIUM 40 MG: 40 INJECTION, POWDER, FOR SOLUTION INTRAVENOUS at 10:15

## 2025-05-23 RX ADMIN — MORPHINE SULFATE 4 MG: 4 INJECTION, SOLUTION INTRAMUSCULAR; INTRAVENOUS at 00:38

## 2025-05-23 RX ADMIN — CALCIUM CARBONATE 500 MG: 500 TABLET, CHEWABLE ORAL at 21:25

## 2025-05-23 RX ADMIN — Medication 6 MG: at 21:24

## 2025-05-23 RX ADMIN — AMLODIPINE BESYLATE 2.5 MG: 2.5 TABLET ORAL at 10:15

## 2025-05-23 RX ADMIN — Medication 400 MG: at 21:25

## 2025-05-23 RX ADMIN — CALCIUM CARBONATE 500 MG: 500 TABLET, CHEWABLE ORAL at 10:15

## 2025-05-23 RX ADMIN — NYSTATIN 500000 UNITS: 100000 SUSPENSION ORAL at 10:15

## 2025-05-23 ASSESSMENT — PAIN DESCRIPTION - LOCATION
LOCATION: ABDOMEN

## 2025-05-23 ASSESSMENT — PAIN DESCRIPTION - DESCRIPTORS: DESCRIPTORS: ACHING;DISCOMFORT

## 2025-05-23 ASSESSMENT — PAIN SCALES - GENERAL
PAINLEVEL_OUTOF10: 8
PAINLEVEL_OUTOF10: 8
PAINLEVEL_OUTOF10: 5
PAINLEVEL_OUTOF10: 8
PAINLEVEL_OUTOF10: 7
PAINLEVEL_OUTOF10: 8
PAINLEVEL_OUTOF10: 7
PAINLEVEL_OUTOF10: 8
PAINLEVEL_OUTOF10: 4

## 2025-05-23 ASSESSMENT — PAIN SCALES - WONG BAKER: WONGBAKER_NUMERICALRESPONSE: NO HURT

## 2025-05-23 ASSESSMENT — PAIN DESCRIPTION - ORIENTATION: ORIENTATION: MID;LOWER;UPPER

## 2025-05-23 NOTE — PROGRESS NOTES
[facility] Medicine Progress Note  V 1.6      Date of Admission: 4/23/2025    Hospital Day: 31      Chief Admission Complaint:  Abdominal pain     Subjective:  intermittent abd cramps,      Presenting Admission History:       \"This is a 64 y.o. female who presented to [facility] with abdominal pain.  PMHx significant for HTN.  History obtained from the patient and review of EMR.  The patient stated she had a hemicolectomy in February 2025 in [facility] and since then has been experiencing intermittent abdominal pain.  Per chart review, the patient has been readmitted 1 time since her surgery for electrolyte abnormalities.  However, the patient stated her pain has gotten progressively worse over the last couple of weeks.  She reports speaking with GI and they ordered a CT outpatient today.  Patient  is at the bedside and stated that she was called and told to go to the emergency department due to \"a collection of pus\" seen on the CT. In the emergency department a CT abdomen pelvis was obtained that revealed Fluid and gas collection within the pelvis interposed between the colon and uterus measuring up to 4.1 cm.  Abscess is favored.  This appears largely enveloped by abdominal and adnexal structures.  A [place] uterine fistula is not excluded.  Additional small dependent collection within the region of the pouch of Alfred measuring up to 2.5 cm.  Mild dilation of small bowel loops which may indicate partial obstruction.  There is no high-grade small bowel obstruction.  The patient was given morphine for pain.  She was also started on Zosyn.  Upon further evaluation, the patient was found to be dehydrated with hyponatremia.  This is likely secondary to inadequate p.o. intake.  She was admitted for further evaluation and treatment.  IV fluids have been initiated and GI has been consulted for the a.m. The patient denied any other associated symptoms as well as any aggravating and/or      Code status: Full Code    PT/OT Eval Status:   []NOT yet ordered  []Ordered and Pending   []Seen with Recommendations for:   []Home independently  []Home w/ assist  []HHC  [x]SNF  []Acute Rehab    Multi-Disciplinary Rounds with Case Management completed on 5/23/2025 with the following recommendations:  Anticipated Discharge Location: []Home w/ []HHC vs [x]SNF  []Acute Rehab  []LTAC  []Hospice  []Other -    Anticipated Discharge Day/Date: tbd  Barriers to Discharge: Clinical improvement    --------------------------------------------------    MDM (any 2 required for High level billing)    A. Problems (any 1)  [x] Acute/Chronic Illness/injury posing ongoing threat to life and/or bodily function without ongoing treatment - Intra-abdominal abscess, sbo  [] Severe exacerbation of chronic illness    --------------------------------------------------  B. Risk of Treatment (any 1)    [x] Drugs/treatments that require intensive monitoring for toxicity    [x] IV ABX (Vancomycin, Aminoglycosides, etc)     [] Post-Cath/Contrast study requiring serial monitoring    [x] IV Narcotic analgesia    [] Aggressive IV diuresis    [] Hypertonic Saline    [x] Critical electrolyte abnormalities requiring IV replacement    [] Insulin - Scheduled/SSI or Insulin gtt    [] Anticoagulation (Heparin gtt or Coumadin - other anticoagulants in special circumstances)    [] Cardiac Medications (IV Amiodarone/Diltiazem, Tikosyn, etc)    [] Hemodialysis    [x] Other -  NG tube, TPN  [] Change in code status    [] Decision to escalate care    [x] Major surgery/procedure with associated risk factors  5/20 EXPLORATORY LAPAROTOMY, LYSIS OF ADHESIONS, ABSCESS DRAINAGE AND PARTIAL COLECTOMY   --------------------------------------------------  C. Data (any 2)    [x] Data Review (any 3)    [x] Consultant notes from yesterday/today    [x] All available current labs reviewed interpreted for clinical significance    [x] Appropriate follow-up labs were

## 2025-05-23 NOTE — CARE COORDINATION
Hospital day 30, POD 24, 3: Patient on C3 care managed by IM and General Surgery. Patient from home with spouse. Patient seen by therapy with SNF recs. Reviewed with Patient, declined SNF at this time. Patient reports spouse took care of her after back fusion has DME and and supports to stay home. Reviewed HHC, declining this but open to discussion later on. Patient reports getting up to chair daily. Very thankful she did not need ostomy. Remains in good spirits. Patient with NGT, drains x2, PICC line. Patient on IV ATB and TPN. SW will ct to follow for DCP needs.GASTON Alexander

## 2025-05-23 NOTE — PROGRESS NOTES
Patient educated on use of IS and demonstrates use Yes    Patient and family educated on incisional care and s/s of infection Yes    Patient and family educated on hand hygiene Yes    Patient and family educated on post operative care Yes   - NG tube maintenance    - Pain control    - rooney Removal    - Insulin Protocol     Day of surgery patient to side of bed or up to chair for minimum of 30 minutes Yes    POD 1 until discharge, patient up to chair by 9 am and TID. Goal to increase mobility  No    Patient performs oral hygiene with CHG oral solution until NG tube discontinued. Yes    If patient does not have NG tube in place, patient to brush teeth and use mouth wash Q shift Yes    Patient receives daily bath and linen changes Yes    Patient receives CHG bath until rooney discontinued Yes    SCDs in place pt refused    Patient receiving chemical VTE Yes

## 2025-05-23 NOTE — PLAN OF CARE
Problem: Pain  Goal: Verbalizes/displays adequate comfort level or baseline comfort level  5/23/2025 0219 by Marion Fairchild, RN  Outcome: Progressing  5/22/2025 1227 by Sarah Ellis RN  Outcome: Progressing  Flowsheets (Taken 5/22/2025 1227)  Verbalizes/displays adequate comfort level or baseline comfort level:   Encourage patient to monitor pain and request assistance   Administer analgesics based on type and severity of pain and evaluate response   Assess pain using appropriate pain scale   Implement non-pharmacological measures as appropriate and evaluate response     Problem: Safety - Adult  Goal: Free from fall injury  Outcome: Progressing     Problem: ABCDS Injury Assessment  Goal: Absence of physical injury  Outcome: Progressing     Problem: Nutrition Deficit:  Goal: Optimize nutritional status  Outcome: Progressing     Problem: Discharge Planning  Goal: Discharge to home or other facility with appropriate resources  Outcome: Progressing

## 2025-05-23 NOTE — PROGRESS NOTES
Comprehensive Nutrition Assessment    Type and Reason for Visit:  Reassess    Nutrition Recommendations/Plan:   Continue TPN at goal rate of 75ml/hr.   250 mL of 20% lipids 2 times per week.  Physician/LIP to monitor closely and correct electrolytes (Phos,Mg,K+)   Recommend FSBS, monitor glucose, need for insulin.  Pharmacy to adjust MVI and Trace Elements as needed.  When PN to be discontinued, cut rate by 50% and let current bag run out.      Malnutrition Assessment:  Malnutrition Status:  Severe malnutrition (04/24/25 1120)    Context:  Acute Illness     Findings of the 6 clinical characteristics of malnutrition:  Energy Intake:  75% or less of estimated energy requirements for 7 or more days  Weight Loss:  Mild weight loss     Body Fat Loss:  Moderate body fat loss Orbital, Triceps   Muscle Mass Loss:  Moderate muscle mass loss Temples (temporalis), Clavicles (pectoralis & deltoids)  Fluid Accumulation:  No fluid accumulation     Strength:  Not Performed    Nutrition Assessment:    Follow up: Pt continues to be NPO with nutrition via TPN. Clinimix 5/15 goal rate of 75 ml/hr. NG to LCWS. BM on 5/19. Labs reviewed. Updated triglyceride ordered on 5/23. Plan to continue TPN for now per general surgery note. Will monitor.    Nutrition Related Findings:    BG  x 24 hr. Updated TG ordered. Wound Type: Surgical Incision       Current Nutrition Intake & Therapies:    Average Meal Intake: NPO  Average Supplements Intake: NPO  Diet NPO  PN-Adult Premix 5/15 - Standard Electrolytes - Central Line  PN-Adult Premix 5/15 - Standard Electrolytes - Central Line  Current Parenteral Nutrition Orders:  Type and Formula: Premix Central   Lipids: Two times weekly, 250ml  Duration: Continuous  Rate/Volume: Clinimix 5/15 @ 75ml/hr to provide TV 1800ml, 1278kcal, 90g protein, 270g dextrose. + biweekly 20% 250ml lipids to provide an additional 143kcal per day. GIR = 3.74mg/kg/min.  Current PN Order Provides:    Goal PN Orders

## 2025-05-23 NOTE — PROGRESS NOTES
Pt A/O x 4, VSS. Pt up to chair for several hours today and tolerating well. NG tube in place, draining brownish-green fluid. Weldon in place draining clear yellow urine. Pt complaining of pain throughout the day 8/10. PRN morphine given per MAR. In addition, PRN Toradol given. Pt states satisfaction with pain control. R and L drains in place with minimal output. CHG bath given, linens changed, and new central line dressing in place. Alteplase ordered by hospitalist for occluded grey lumen. TPN and normal saline currently infusing. IS at bedside. Standard safety precautions in place. Pt voices no other needs at this time. All care per orders. Electronically signed by SHON SHERIDAN RN on 5/23/2025 at 8:03 PM

## 2025-05-23 NOTE — PLAN OF CARE
Problem: Pain  Goal: Verbalizes/displays adequate comfort level or baseline comfort level  5/23/2025 1121 by Crystal Grace RN  Outcome: Not Progressing  Flowsheets (Taken 5/22/2025 1227 by Sarah Ellis, RN)  Verbalizes/displays adequate comfort level or baseline comfort level:   Encourage patient to monitor pain and request assistance   Administer analgesics based on type and severity of pain and evaluate response   Assess pain using appropriate pain scale   Implement non-pharmacological measures as appropriate and evaluate response  5/23/2025 0219 by Marion Fairchild RN  Outcome: Progressing     Problem: Safety - Adult  Goal: Free from fall injury  5/23/2025 1121 by Crystal Grace RN  Outcome: Not Progressing  Flowsheets (Taken 5/19/2025 2321 by Keya Vanegas, RN)  Free From Fall Injury:   Instruct family/caregiver on patient safety   Based on caregiver fall risk screen, instruct family/caregiver to ask for assistance with transferring infant if caregiver noted to have fall risk factors  5/23/2025 0219 by Marion Fairchild RN  Outcome: Progressing     Problem: ABCDS Injury Assessment  Goal: Absence of physical injury  5/23/2025 1121 by Crystal Grace RN  Outcome: Not Progressing  Flowsheets (Taken 5/18/2025 1551 by Karuna Trejo, RN)  Absence of Physical Injury: Implement safety measures based on patient assessment  5/23/2025 0219 by Marion Fairchild RN  Outcome: Progressing     Problem: Nutrition Deficit:  Goal: Optimize nutritional status  5/23/2025 1121 by Crystal Grace RN  Outcome: Not Progressing  Flowsheets (Taken 5/20/2025 1021 by Bita Suero, IVY)  Nutrient intake appropriate for improving, restoring, or maintaining nutritional needs:   Assess nutritional status and recommend course of action   Monitor oral intake, labs, and treatment plans   Recommend appropriate diets, oral nutritional supplements, and vitamin/mineral supplements   Recommend, monitor, and adjust tube  discharge learning needs (meds, wound care, etc)   Arrange for interpreters to assist at discharge as needed   Refer to discharge planning if patient needs post-hospital services based on physician order or complex needs related to functional status, cognitive ability or social support system  5/23/2025 0219 by Marion Fairchild, RN  Outcome: Progressing     Problem: Skin/Tissue Integrity - Adult  Goal: Skin integrity remains intact  Outcome: Not Progressing  Flowsheets (Taken 5/23/2025 1120)  Skin Integrity Remains Intact: Monitor for areas of redness and/or skin breakdown  Goal: Incisions, wounds, or drain sites healing without S/S of infection  Outcome: Not Progressing  Flowsheets (Taken 5/11/2025 1213 by Bettie Cary, RN)  Incisions, Wounds, or Drain Sites Healing Without Sign and Symptoms of Infection:   ADMISSION and DAILY: Assess and document risk factors for pressure ulcer development   TWICE DAILY: Assess and document dressing/incision, wound bed, drain sites and surrounding tissue   TWICE DAILY: Assess and document skin integrity   Implement wound care per orders   Initiate isolation precautions as appropriate   Initiate pressure ulcer prevention bundle as indicated     Problem: Gastrointestinal - Adult  Goal: Minimal or absence of nausea and vomiting  Outcome: Not Progressing  Flowsheets (Taken 5/18/2025 1551 by Karuna Trejo, RN)  Minimal or absence of nausea and vomiting:   Administer IV fluids as ordered to ensure adequate hydration   Maintain NPO status until nausea and vomiting are resolved   Nasogastric tube to low intermittent suction as ordered   Provide nonpharmacologic comfort measures as appropriate  Goal: Maintains or returns to baseline bowel function  Outcome: Not Progressing  Goal: Maintains adequate nutritional intake  Outcome: Not Progressing     Problem: Infection - Adult  Goal: Absence of infection at discharge  Outcome: Not Progressing  Flowsheets (Taken 5/11/2025 2000 by Cristian

## 2025-05-23 NOTE — PROGRESS NOTES
[facility] GENERAL SURGERY    Surgery Progress Note           POD # 24 & 3    PATIENT NAME: Tuyet Richter     TODAY'S DATE: 5/23/2025    INTERVAL HISTORY:    Pt without acute events. Abd pain some better.     OBJECTIVE:   VITALS:  /72   Pulse 92   Temp 97.9 °F (36.6 °C) (Oral)   Resp 18   Ht 1.6 m (5' 3\")   Wt 50.3 kg (110 lb 14.3 oz)   SpO2 98%   BMI 19.64 kg/m²     INTAKE/OUTPUT:    I/O last 3 completed shifts:  In: 8326.3 [P.O.:120; I.V.:1138.1; Blood:376.1; IV Piggyback:517.8]  Out: 2820 [Urine:2200; Emesis/NG output:600; Drains:20]  No intake/output data recorded.              CONSTITUTIONAL:  awake and alert  LUNGS: no crackles or wheezes    ABDOMEN: soft, mod distention, tender incision, jps in place serosanguinous    INCISION: clean, dry    Data:  CBC:   Recent Labs     05/20/25  1722 05/21/25  0635 05/22/25  0550 05/22/25  1351 05/22/25  2010 05/23/25  0244 05/23/25  1100   WBC 26.8* 25.2* 15.4*  --   --   --   --    HGB 7.3* 8.0* 6.9*   < > 7.3* 8.4* 7.5*   HCT 25.0* 24.2* 20.9*   < > 21.8* 24.9* 22.2*   * 659* 465*  --   --   --   --     < > = values in this interval not displayed.     BMP:    Recent Labs     05/21/25  0635 05/22/25  0550    137   K 4.0 3.6    105   CO2 21 22   BUN 24* 23*   CREATININE 0.7 0.7   GLUCOSE 191* 100*     Hepatic:   Recent Labs     05/21/25  0635 05/22/25  0550 05/23/25  0530   AST 15 10* 13*   ALT 6* 6* 9*   BILITOT <0.2 <0.2 <0.2   ALKPHOS 100 90 95     Mag:      No results for input(s): \"MG\" in the last 72 hours.     Phos:     Recent Labs     05/21/25  0635 05/23/25  0530   PHOS 2.9 3.1      INR:   No results for input(s): \"INR\" in the last 72 hours.      ASSESSMENT AND PLAN:  65 y.o. female status post 1st surgery: Laparoscopic lysis of adhesions and abscess drainage, 2nd surgery: EXPLORATORY LAPAROTOMY, LYSIS OF ADHESIONS, ABSCESS DRAINAGE AND PARTIAL COLECTOMY       GI: continue with ngt to suction, await return of bowel

## 2025-05-24 LAB
ANION GAP SERPL CALCULATED.3IONS-SCNC: 9 MMOL/L (ref 3–16)
BASOPHILS # BLD: 0.5 K/UL (ref 0–0.2)
BASOPHILS NFR BLD: 5.1 %
BLOOD BANK DISPENSE STATUS: NORMAL
BLOOD BANK PRODUCT CODE: NORMAL
BPU ID: NORMAL
BUN SERPL-MCNC: 15 MG/DL (ref 7–20)
CALCIUM SERPL-MCNC: 8.8 MG/DL (ref 8.3–10.6)
CHLORIDE SERPL-SCNC: 101 MMOL/L (ref 99–110)
CO2 SERPL-SCNC: 24 MMOL/L (ref 21–32)
CREAT SERPL-MCNC: 0.4 MG/DL (ref 0.6–1.2)
DEPRECATED RDW RBC AUTO: 17.2 % (ref 12.4–15.4)
DESCRIPTION BLOOD BANK: NORMAL
EOSINOPHIL # BLD: 0.2 K/UL (ref 0–0.6)
EOSINOPHIL NFR BLD: 2.2 %
GFR SERPLBLD CREATININE-BSD FMLA CKD-EPI: >90 ML/MIN/{1.73_M2}
GLUCOSE BLD-MCNC: 116 MG/DL (ref 70–99)
GLUCOSE BLD-MCNC: 125 MG/DL (ref 70–99)
GLUCOSE BLD-MCNC: 78 MG/DL (ref 70–99)
GLUCOSE SERPL-MCNC: 77 MG/DL (ref 70–99)
HCT VFR BLD AUTO: 24.3 % (ref 36–48)
HGB BLD-MCNC: 8.2 G/DL (ref 12–16)
LYMPHOCYTES # BLD: 1.8 K/UL (ref 1–5.1)
LYMPHOCYTES NFR BLD: 19.5 %
MCH RBC QN AUTO: 30.7 PG (ref 26–34)
MCHC RBC AUTO-ENTMCNC: 33.7 G/DL (ref 31–36)
MCV RBC AUTO: 91.1 FL (ref 80–100)
MONOCYTES # BLD: 0.8 K/UL (ref 0–1.3)
MONOCYTES NFR BLD: 8.6 %
NEUTROPHILS # BLD: 5.9 K/UL (ref 1.7–7.7)
NEUTROPHILS NFR BLD: 64.6 %
PERFORMED ON: ABNORMAL
PERFORMED ON: ABNORMAL
PERFORMED ON: NORMAL
PLATELET # BLD AUTO: 383 K/UL (ref 135–450)
PMV BLD AUTO: 10.4 FL (ref 5–10.5)
POTASSIUM SERPL-SCNC: 3.4 MMOL/L (ref 3.5–5.1)
RBC # BLD AUTO: 2.67 M/UL (ref 4–5.2)
SODIUM SERPL-SCNC: 134 MMOL/L (ref 136–145)
WBC # BLD AUTO: 9.2 K/UL (ref 4–11)

## 2025-05-24 PROCEDURE — 99024 POSTOP FOLLOW-UP VISIT: CPT | Performed by: SURGERY

## 2025-05-24 PROCEDURE — 80048 BASIC METABOLIC PNL TOTAL CA: CPT

## 2025-05-24 PROCEDURE — 6360000002 HC RX W HCPCS: Performed by: CLINICAL NURSE SPECIALIST

## 2025-05-24 PROCEDURE — 6360000002 HC RX W HCPCS: Performed by: SURGERY

## 2025-05-24 PROCEDURE — 2500000003 HC RX 250 WO HCPCS: Performed by: SURGERY

## 2025-05-24 PROCEDURE — 6360000002 HC RX W HCPCS: Performed by: NURSE PRACTITIONER

## 2025-05-24 PROCEDURE — 6370000000 HC RX 637 (ALT 250 FOR IP): Performed by: SURGERY

## 2025-05-24 PROCEDURE — 85025 COMPLETE CBC W/AUTO DIFF WBC: CPT

## 2025-05-24 PROCEDURE — 2580000003 HC RX 258: Performed by: SURGERY

## 2025-05-24 PROCEDURE — 1200000000 HC SEMI PRIVATE

## 2025-05-24 PROCEDURE — 2500000003 HC RX 250 WO HCPCS: Performed by: NURSE PRACTITIONER

## 2025-05-24 RX ORDER — POTASSIUM CHLORIDE 7.45 MG/ML
10 INJECTION INTRAVENOUS
Status: COMPLETED | OUTPATIENT
Start: 2025-05-24 | End: 2025-05-24

## 2025-05-24 RX ADMIN — PIPERACILLIN AND TAZOBACTAM 3375 MG: 3; .375 INJECTION, POWDER, LYOPHILIZED, FOR SOLUTION INTRAVENOUS at 05:53

## 2025-05-24 RX ADMIN — AMLODIPINE BESYLATE 2.5 MG: 2.5 TABLET ORAL at 11:29

## 2025-05-24 RX ADMIN — MORPHINE SULFATE 4 MG: 4 INJECTION, SOLUTION INTRAMUSCULAR; INTRAVENOUS at 11:53

## 2025-05-24 RX ADMIN — MORPHINE SULFATE 4 MG: 4 INJECTION, SOLUTION INTRAMUSCULAR; INTRAVENOUS at 14:47

## 2025-05-24 RX ADMIN — ENOXAPARIN SODIUM 30 MG: 100 INJECTION SUBCUTANEOUS at 11:24

## 2025-05-24 RX ADMIN — PIPERACILLIN AND TAZOBACTAM 3375 MG: 3; .375 INJECTION, POWDER, LYOPHILIZED, FOR SOLUTION INTRAVENOUS at 14:41

## 2025-05-24 RX ADMIN — BENZOCAINE: 200 SPRAY DENTAL; ORAL; PERIODONTAL at 21:06

## 2025-05-24 RX ADMIN — ALTEPLASE 1 MG: 2.2 INJECTION, POWDER, LYOPHILIZED, FOR SOLUTION INTRAVENOUS at 16:03

## 2025-05-24 RX ADMIN — MORPHINE SULFATE 4 MG: 4 INJECTION, SOLUTION INTRAMUSCULAR; INTRAVENOUS at 18:22

## 2025-05-24 RX ADMIN — Medication 6 MG: at 20:49

## 2025-05-24 RX ADMIN — Medication 400 MG: at 11:23

## 2025-05-24 RX ADMIN — DICYCLOMINE HYDROCHLORIDE 10 MG: 10 CAPSULE ORAL at 11:28

## 2025-05-24 RX ADMIN — MORPHINE SULFATE 2 MG: 2 INJECTION, SOLUTION INTRAMUSCULAR; INTRAVENOUS at 00:57

## 2025-05-24 RX ADMIN — SODIUM CHLORIDE, PRESERVATIVE FREE 10 ML: 5 INJECTION INTRAVENOUS at 08:46

## 2025-05-24 RX ADMIN — CALCIUM CARBONATE 500 MG: 500 TABLET, CHEWABLE ORAL at 11:29

## 2025-05-24 RX ADMIN — METOPROLOL 12.5 MG: 25 TABLET ORAL at 11:20

## 2025-05-24 RX ADMIN — MORPHINE SULFATE 2 MG: 2 INJECTION, SOLUTION INTRAMUSCULAR; INTRAVENOUS at 21:44

## 2025-05-24 RX ADMIN — ASCORBIC ACID, VITAMIN A PALMITATE, CHOLECALCIFEROL, THIAMINE HYDROCHLORIDE, RIBOFLAVIN-5 PHOSPHATE SODIUM, PYRIDOXINE HYDROCHLORIDE, NIACINAMIDE, DEXPANTHENOL, ALPHA-TOCOPHEROL ACETATE, VITAMIN K1, FOLIC ACID, BIOTIN, CYANOCOBALAMIN: 200; 3300; 200; 6; 3.6; 6; 40; 15; 10; 150; 600; 60; 5 INJECTION, SOLUTION INTRAVENOUS at 18:22

## 2025-05-24 RX ADMIN — MORPHINE SULFATE 4 MG: 4 INJECTION, SOLUTION INTRAMUSCULAR; INTRAVENOUS at 08:46

## 2025-05-24 RX ADMIN — Medication 400 MG: at 20:49

## 2025-05-24 RX ADMIN — DICYCLOMINE HYDROCHLORIDE 10 MG: 10 CAPSULE ORAL at 20:49

## 2025-05-24 RX ADMIN — MORPHINE SULFATE 2 MG: 2 INJECTION, SOLUTION INTRAMUSCULAR; INTRAVENOUS at 05:12

## 2025-05-24 RX ADMIN — DICYCLOMINE HYDROCHLORIDE 10 MG: 10 CAPSULE ORAL at 16:29

## 2025-05-24 RX ADMIN — KETOROLAC TROMETHAMINE 15 MG: 30 INJECTION, SOLUTION INTRAMUSCULAR at 17:26

## 2025-05-24 RX ADMIN — METOPROLOL 12.5 MG: 25 TABLET ORAL at 20:49

## 2025-05-24 RX ADMIN — PIPERACILLIN AND TAZOBACTAM 3375 MG: 3; .375 INJECTION, POWDER, LYOPHILIZED, FOR SOLUTION INTRAVENOUS at 21:04

## 2025-05-24 RX ADMIN — LIDOCAINE HYDROCHLORIDE 15 ML: 20 SOLUTION ORAL at 21:45

## 2025-05-24 RX ADMIN — POTASSIUM CHLORIDE 10 MEQ: 7.46 INJECTION, SOLUTION INTRAVENOUS at 16:37

## 2025-05-24 RX ADMIN — PANTOPRAZOLE SODIUM 40 MG: 40 INJECTION, POWDER, FOR SOLUTION INTRAVENOUS at 08:45

## 2025-05-24 RX ADMIN — CALCIUM CARBONATE 500 MG: 500 TABLET, CHEWABLE ORAL at 20:48

## 2025-05-24 RX ADMIN — KETOROLAC TROMETHAMINE 15 MG: 30 INJECTION, SOLUTION INTRAMUSCULAR at 01:42

## 2025-05-24 RX ADMIN — ALTEPLASE 1 MG: 2.2 INJECTION, POWDER, LYOPHILIZED, FOR SOLUTION INTRAVENOUS at 00:47

## 2025-05-24 RX ADMIN — POTASSIUM CHLORIDE 10 MEQ: 7.46 INJECTION, SOLUTION INTRAVENOUS at 17:30

## 2025-05-24 RX ADMIN — KETOROLAC TROMETHAMINE 15 MG: 30 INJECTION, SOLUTION INTRAMUSCULAR at 11:07

## 2025-05-24 ASSESSMENT — PAIN DESCRIPTION - DESCRIPTORS: DESCRIPTORS: ACHING;DISCOMFORT

## 2025-05-24 ASSESSMENT — PAIN SCALES - GENERAL
PAINLEVEL_OUTOF10: 8
PAINLEVEL_OUTOF10: 8
PAINLEVEL_OUTOF10: 9
PAINLEVEL_OUTOF10: 8
PAINLEVEL_OUTOF10: 7
PAINLEVEL_OUTOF10: 7
PAINLEVEL_OUTOF10: 6
PAINLEVEL_OUTOF10: 7

## 2025-05-24 ASSESSMENT — PAIN DESCRIPTION - LOCATION
LOCATION: ABDOMEN

## 2025-05-24 NOTE — PLAN OF CARE
Problem: Pain  Goal: Verbalizes/displays adequate comfort level or baseline comfort level  5/23/2025 2319 by Daria Copeland RN  Outcome: Progressing  Flowsheets (Taken 5/22/2025 1227 by Sarah Ellis RN)  Verbalizes/displays adequate comfort level or baseline comfort level:   Encourage patient to monitor pain and request assistance   Administer analgesics based on type and severity of pain and evaluate response   Assess pain using appropriate pain scale   Implement non-pharmacological measures as appropriate and evaluate response  5/23/2025 1121 by Crystal Grace RN  Outcome: Not Progressing  Flowsheets (Taken 5/22/2025 1227 by Sarah Ellis RN)  Verbalizes/displays adequate comfort level or baseline comfort level:   Encourage patient to monitor pain and request assistance   Administer analgesics based on type and severity of pain and evaluate response   Assess pain using appropriate pain scale   Implement non-pharmacological measures as appropriate and evaluate response     Problem: Safety - Adult  Goal: Free from fall injury  5/23/2025 2319 by Daria Copeland RN  Outcome: Progressing  Flowsheets (Taken 5/19/2025 2321 by Keya Vanegas RN)  Free From Fall Injury:   Instruct family/caregiver on patient safety   Based on caregiver fall risk screen, instruct family/caregiver to ask for assistance with transferring infant if caregiver noted to have fall risk factors  5/23/2025 1121 by Crystal Grace RN  Outcome: Not Progressing  Flowsheets (Taken 5/19/2025 2321 by Keya Vanegas RN)  Free From Fall Injury:   Instruct family/caregiver on patient safety   Based on caregiver fall risk screen, instruct family/caregiver to ask for assistance with transferring infant if caregiver noted to have fall risk factors     Problem: Pain  Goal: Verbalizes/displays adequate comfort level or baseline comfort level  5/23/2025 2319 by Daria Copeland RN  Outcome: Progressing  Flowsheets (Taken 5/22/2025

## 2025-05-24 NOTE — PROGRESS NOTES
Patient declined all NG meds at this time, stating she only wants her Morphine and does not \"want to be filled up with fluids.\" Patient then asked if RN could return in 30 minutes, to see if she would be ready then. RN explained that would not be reasonable as this RN cannot promise an exact time, only that he'll be back within the next couple of hours.

## 2025-05-24 NOTE — PROGRESS NOTES
Patient ambulated from chair to hallway with assistance. Patient completed one-half lap on the unit. Encouraged further ambulation tomorrow with family if possible. .

## 2025-05-24 NOTE — PROGRESS NOTES
Pt up to chair when RN entered room. NG tube in place, draining green/brownish fluid. Weldon in place. Left drain to closed bulb suction with minimal output, line striped. Right drain to gravity with minimal output. TPN and normal saline infusing. 3 lumen PICC in LUE, gray lumen is clotted even though day shift administered Cathflo. Will request another order for Cathflo. Pt c/o feeling as though she has the beginnings of a yeast infection. RN will [facility] for recommendations. NG tube canister changed. This RN spent a lengthy amount of time in room with pt. Medications given per MAR. Pt resting in bed when RN left room. Safety precautions in place. Call light within reach. Pt denied any other needs at this time.

## 2025-05-24 NOTE — PROGRESS NOTES
Patient has multiple home medications at bedside or in personal belongings, no narcotics. Patient stated \"These are my personal medications do not touch them, all my other nurses and doctors knew that.\" Explained to patient that for safety purposes they have to be put up. Patient had nystatin topical cream that she stated she used for her mouth. Along with hormones. Explained home medication policy to patient and . Patient asked  to put meds back in her bag instead of on bedside table, asked  to take the medications home.  stated he would.

## 2025-05-24 NOTE — PROGRESS NOTES
[facility] Medicine Progress Note  V 1.6      Date of Admission: 4/23/2025    Hospital Day: 32      Chief Admission Complaint:  Abdominal pain     Subjective:  resting in bed, no new complaints     Presenting Admission History:       \"This is a 64 y.o. female who presented to [facility] with abdominal pain.  PMHx significant for HTN.  History obtained from the patient and review of EMR.  The patient stated she had a hemicolectomy in February 2025 in [facility] and since then has been experiencing intermittent abdominal pain.  Per chart review, the patient has been readmitted 1 time since her surgery for electrolyte abnormalities.  However, the patient stated her pain has gotten progressively worse over the last couple of weeks.  She reports speaking with GI and they ordered a CT outpatient today.  Patient  is at the bedside and stated that she was called and told to go to the emergency department due to \"a collection of pus\" seen on the CT. In the emergency department a CT abdomen pelvis was obtained that revealed Fluid and gas collection within the pelvis interposed between the colon and uterus measuring up to 4.1 cm.  Abscess is favored.  This appears largely enveloped by abdominal and adnexal structures.  A [place] uterine fistula is not excluded.  Additional small dependent collection within the region of the pouch of Alfred measuring up to 2.5 cm.  Mild dilation of small bowel loops which may indicate partial obstruction.  There is no high-grade small bowel obstruction.  The patient was given morphine for pain.  She was also started on Zosyn.  Upon further evaluation, the patient was found to be dehydrated with hyponatremia.  This is likely secondary to inadequate p.o. intake.  She was admitted for further evaluation and treatment.  IV fluids have been initiated and GI has been consulted for the a.m. The patient denied any other associated symptoms as well as any aggravating and/or

## 2025-05-24 NOTE — PROGRESS NOTES
Patient stated \"I may need to go to the bathroom soon, will you be able to be here immediately, when I ring to go?\" Told patient that this RN could take her to the bathroom right now, since at bedside. Patient declined. Stated she doesn't wish to try at this moment.

## 2025-05-24 NOTE — PLAN OF CARE
Problem: Pain  Goal: Verbalizes/displays adequate comfort level or baseline comfort level  Outcome: Progressing  Flowsheets (Taken 5/22/2025 1227 by Sarah Ellis, RN)  Verbalizes/displays adequate comfort level or baseline comfort level:   Encourage patient to monitor pain and request assistance   Administer analgesics based on type and severity of pain and evaluate response   Assess pain using appropriate pain scale   Implement non-pharmacological measures as appropriate and evaluate response     Problem: Safety - Adult  Goal: Free from fall injury  Outcome: Progressing  Flowsheets (Taken 5/19/2025 2321 by Keya Vanegas, RN)  Free From Fall Injury:   Instruct family/caregiver on patient safety   Based on caregiver fall risk screen, instruct family/caregiver to ask for assistance with transferring infant if caregiver noted to have fall risk factors     Problem: ABCDS Injury Assessment  Goal: Absence of physical injury  Outcome: Progressing  Flowsheets (Taken 5/18/2025 1551 by Karuna Trejo, RN)  Absence of Physical Injury: Implement safety measures based on patient assessment     Problem: Discharge Planning  Goal: Discharge to home or other facility with appropriate resources  Outcome: Progressing  Flowsheets (Taken 5/11/2025 2000 by Faiza Foster, RN)  Discharge to home or other facility with appropriate resources:   Identify barriers to discharge with patient and caregiver   Arrange for needed discharge resources and transportation as appropriate   Identify discharge learning needs (meds, wound care, etc)   Arrange for interpreters to assist at discharge as needed   Refer to discharge planning if patient needs post-hospital services based on physician order or complex needs related to functional status, cognitive ability or social support system     Problem: Skin/Tissue Integrity - Adult  Goal: Skin integrity remains intact  Outcome: Progressing  Flowsheets (Taken 5/23/2025 1120 by Sanjay  CORINNA Rojas)  Skin Integrity Remains Intact: Monitor for areas of redness and/or skin breakdown

## 2025-05-24 NOTE — PROGRESS NOTES
[facility] GENERAL SURGERY DAILY PROGRESS NOTE    SUBJECTIVE: Awake, alert    OBJECTIVE: CURRENT VITALS:  /72   Pulse 93   Temp 98.2 °F (36.8 °C) (Oral)   Resp 16   Ht 1.6 m (5' 3\")   Wt 50.3 kg (110 lb 14.3 oz)   SpO2 98%   BMI 19.64 kg/m²          ABD: Soft.  Distended.  No flatus.     LABS:    CBC:   Recent Labs     05/22/25  0550 05/22/25  1351 05/23/25  0244 05/23/25  1100 05/24/25  0530   WBC 15.4*  --   --   --  9.2   RBC 2.27*  --   --   --  2.67*   HGB 6.9*   < > 8.4* 7.5* 8.2*   HCT 20.9*   < > 24.9* 22.2* 24.3*   MCV 92.1  --   --   --  91.1   RDW 18.4*  --   --   --  17.2*   *  --   --   --  383    < > = values in this interval not displayed.     BMP:   Recent Labs     05/22/25  0550 05/23/25  0530 05/24/25  0530     --  134*   K 3.6  --  3.4*     --  101   CO2 22  --  24   PHOS  --  3.1  --    BUN 23*  --  15   CREATININE 0.7  --  0.4*       ASSESSMENT AND PLAN:  65 y.o. female status post 1st surgery: Laparoscopic lysis of adhesions and abscess drainage, 2nd surgery: EXPLORATORY LAPAROTOMY, LYSIS OF ADHESIONS, ABSCESS DRAINAGE AND PARTIAL COLECTOMY        GI: continue with ngt to suction, await return of bowel function  Nutrition: continue with TPN  ID: continue with antibiotics  Pain: continue with PRN morphine and Toradol  Anemia: appropriate rise after transfusion  Activity: PT/OT        ANURADHA LIN MD

## 2025-05-24 NOTE — PROGRESS NOTES
Patient educated on use of IS and demonstrates use Yes    Patient and family educated on incisional care and s/s of infection Yes    Patient and family educated on hand hygiene Yes    Patient and family educated on post operative care Yes   - NG tube maintenance    - Pain control    - rooney Removal    - Insulin Protocol     Day of surgery patient to side of bed or up to chair for minimum of 30 minutes Yes    Patient performs oral hygiene with CHG oral solution until NG tube discontinued. Yes    Patient receives daily bath and linen changes Yes    Patient receives CHG bath until rooney discontinued Yes    SCDs in place Pt refuses    Patient receiving chemical VTE Yes

## 2025-05-25 LAB
GLUCOSE BLD-MCNC: 110 MG/DL (ref 70–99)
GLUCOSE BLD-MCNC: 113 MG/DL (ref 70–99)
GLUCOSE BLD-MCNC: 120 MG/DL (ref 70–99)
GLUCOSE BLD-MCNC: 139 MG/DL (ref 70–99)
GLUCOSE BLD-MCNC: 97 MG/DL (ref 70–99)
HCT VFR BLD AUTO: 21.6 % (ref 36–48)
HGB BLD-MCNC: 7.2 G/DL (ref 12–16)
PERFORMED ON: ABNORMAL
PERFORMED ON: NORMAL

## 2025-05-25 PROCEDURE — 2500000003 HC RX 250 WO HCPCS: Performed by: SURGERY

## 2025-05-25 PROCEDURE — 6360000002 HC RX W HCPCS: Performed by: SURGERY

## 2025-05-25 PROCEDURE — 85018 HEMOGLOBIN: CPT

## 2025-05-25 PROCEDURE — 99024 POSTOP FOLLOW-UP VISIT: CPT | Performed by: SURGERY

## 2025-05-25 PROCEDURE — 85014 HEMATOCRIT: CPT

## 2025-05-25 PROCEDURE — 6360000002 HC RX W HCPCS: Performed by: CLINICAL NURSE SPECIALIST

## 2025-05-25 PROCEDURE — 6370000000 HC RX 637 (ALT 250 FOR IP): Performed by: SURGERY

## 2025-05-25 PROCEDURE — 2580000003 HC RX 258: Performed by: SURGERY

## 2025-05-25 PROCEDURE — 1200000000 HC SEMI PRIVATE

## 2025-05-25 PROCEDURE — 6370000000 HC RX 637 (ALT 250 FOR IP): Performed by: NURSE PRACTITIONER

## 2025-05-25 RX ORDER — BISACODYL 10 MG
20 SUPPOSITORY, RECTAL RECTAL ONCE
Status: DISCONTINUED | OUTPATIENT
Start: 2025-05-25 | End: 2025-05-30 | Stop reason: HOSPADM

## 2025-05-25 RX ORDER — FLUCONAZOLE 100 MG/1
200 TABLET ORAL DAILY
Status: DISCONTINUED | OUTPATIENT
Start: 2025-05-25 | End: 2025-05-30 | Stop reason: HOSPADM

## 2025-05-25 RX ADMIN — DICYCLOMINE HYDROCHLORIDE 10 MG: 10 CAPSULE ORAL at 21:37

## 2025-05-25 RX ADMIN — METOPROLOL 12.5 MG: 25 TABLET ORAL at 21:37

## 2025-05-25 RX ADMIN — CALCIUM CARBONATE 500 MG: 500 TABLET, CHEWABLE ORAL at 21:37

## 2025-05-25 RX ADMIN — METOPROLOL 12.5 MG: 25 TABLET ORAL at 11:05

## 2025-05-25 RX ADMIN — FLUCONAZOLE 200 MG: 100 TABLET ORAL at 11:05

## 2025-05-25 RX ADMIN — MORPHINE SULFATE 4 MG: 4 INJECTION, SOLUTION INTRAMUSCULAR; INTRAVENOUS at 20:03

## 2025-05-25 RX ADMIN — Medication 6 MG: at 21:37

## 2025-05-25 RX ADMIN — ENOXAPARIN SODIUM 30 MG: 100 INJECTION SUBCUTANEOUS at 11:05

## 2025-05-25 RX ADMIN — KETOROLAC TROMETHAMINE 15 MG: 30 INJECTION, SOLUTION INTRAMUSCULAR at 10:16

## 2025-05-25 RX ADMIN — Medication 400 MG: at 11:05

## 2025-05-25 RX ADMIN — MORPHINE SULFATE 2 MG: 2 INJECTION, SOLUTION INTRAMUSCULAR; INTRAVENOUS at 04:13

## 2025-05-25 RX ADMIN — PIPERACILLIN AND TAZOBACTAM 3375 MG: 3; .375 INJECTION, POWDER, LYOPHILIZED, FOR SOLUTION INTRAVENOUS at 22:02

## 2025-05-25 RX ADMIN — DICYCLOMINE HYDROCHLORIDE 10 MG: 10 CAPSULE ORAL at 11:05

## 2025-05-25 RX ADMIN — CALCIUM CARBONATE 500 MG: 500 TABLET, CHEWABLE ORAL at 11:05

## 2025-05-25 RX ADMIN — MORPHINE SULFATE 4 MG: 4 INJECTION, SOLUTION INTRAMUSCULAR; INTRAVENOUS at 14:56

## 2025-05-25 RX ADMIN — KETOROLAC TROMETHAMINE 15 MG: 30 INJECTION, SOLUTION INTRAMUSCULAR at 16:38

## 2025-05-25 RX ADMIN — LIDOCAINE HYDROCHLORIDE 15 ML: 20 SOLUTION ORAL at 15:02

## 2025-05-25 RX ADMIN — MORPHINE SULFATE 2 MG: 2 INJECTION, SOLUTION INTRAMUSCULAR; INTRAVENOUS at 01:22

## 2025-05-25 RX ADMIN — MORPHINE SULFATE 4 MG: 4 INJECTION, SOLUTION INTRAMUSCULAR; INTRAVENOUS at 11:26

## 2025-05-25 RX ADMIN — KETOROLAC TROMETHAMINE 15 MG: 30 INJECTION, SOLUTION INTRAMUSCULAR at 22:39

## 2025-05-25 RX ADMIN — PANTOPRAZOLE SODIUM 40 MG: 40 INJECTION, POWDER, FOR SOLUTION INTRAVENOUS at 11:05

## 2025-05-25 RX ADMIN — DICYCLOMINE HYDROCHLORIDE 10 MG: 10 CAPSULE ORAL at 15:17

## 2025-05-25 RX ADMIN — PIPERACILLIN AND TAZOBACTAM 3375 MG: 3; .375 INJECTION, POWDER, LYOPHILIZED, FOR SOLUTION INTRAVENOUS at 15:07

## 2025-05-25 RX ADMIN — Medication 400 MG: at 21:37

## 2025-05-25 RX ADMIN — ASCORBIC ACID, VITAMIN A PALMITATE, CHOLECALCIFEROL, THIAMINE HYDROCHLORIDE, RIBOFLAVIN-5 PHOSPHATE SODIUM, PYRIDOXINE HYDROCHLORIDE, NIACINAMIDE, DEXPANTHENOL, ALPHA-TOCOPHEROL ACETATE, VITAMIN K1, FOLIC ACID, BIOTIN, CYANOCOBALAMIN: 200; 3300; 200; 6; 3.6; 6; 40; 15; 10; 150; 600; 60; 5 INJECTION, SOLUTION INTRAVENOUS at 18:17

## 2025-05-25 RX ADMIN — SODIUM CHLORIDE: 9 INJECTION, SOLUTION INTRAVENOUS at 15:03

## 2025-05-25 RX ADMIN — MORPHINE SULFATE 2 MG: 2 INJECTION, SOLUTION INTRAMUSCULAR; INTRAVENOUS at 08:10

## 2025-05-25 RX ADMIN — MORPHINE SULFATE 2 MG: 2 INJECTION, SOLUTION INTRAMUSCULAR; INTRAVENOUS at 23:17

## 2025-05-25 RX ADMIN — SODIUM CHLORIDE, PRESERVATIVE FREE 10 ML: 5 INJECTION INTRAVENOUS at 11:09

## 2025-05-25 RX ADMIN — PIPERACILLIN AND TAZOBACTAM 3375 MG: 3; .375 INJECTION, POWDER, LYOPHILIZED, FOR SOLUTION INTRAVENOUS at 06:26

## 2025-05-25 RX ADMIN — KETOROLAC TROMETHAMINE 15 MG: 30 INJECTION, SOLUTION INTRAMUSCULAR at 00:22

## 2025-05-25 RX ADMIN — SODIUM CHLORIDE, PRESERVATIVE FREE 10 ML: 5 INJECTION INTRAVENOUS at 21:38

## 2025-05-25 RX ADMIN — AMLODIPINE BESYLATE 2.5 MG: 2.5 TABLET ORAL at 11:05

## 2025-05-25 ASSESSMENT — PAIN DESCRIPTION - DESCRIPTORS
DESCRIPTORS: ACHING

## 2025-05-25 ASSESSMENT — PAIN SCALES - WONG BAKER: WONGBAKER_NUMERICALRESPONSE: HURTS LITTLE MORE

## 2025-05-25 ASSESSMENT — PAIN DESCRIPTION - ORIENTATION
ORIENTATION: MID;LOWER
ORIENTATION: MID;LOWER
ORIENTATION: LOWER;MID

## 2025-05-25 ASSESSMENT — PAIN DESCRIPTION - LOCATION
LOCATION: ABDOMEN

## 2025-05-25 ASSESSMENT — PAIN SCALES - GENERAL
PAINLEVEL_OUTOF10: 8
PAINLEVEL_OUTOF10: 7
PAINLEVEL_OUTOF10: 8

## 2025-05-25 NOTE — PROGRESS NOTES
[facility] Medicine Progress Note  V 1.6      Date of Admission: 4/23/2025    Hospital Day: 33      Chief Admission Complaint:  Abdominal pain     Subjective:  resting in bed, no new complaints, reports BM today     Presenting Admission History:       \"This is a 64 y.o. female who presented to [facility] with abdominal pain.  PMHx significant for HTN.  History obtained from the patient and review of EMR.  The patient stated she had a hemicolectomy in February 2025 in [facility] and since then has been experiencing intermittent abdominal pain.  Per chart review, the patient has been readmitted 1 time since her surgery for electrolyte abnormalities.  However, the patient stated her pain has gotten progressively worse over the last couple of weeks.  She reports speaking with GI and they ordered a CT outpatient today.  Patient  is at the bedside and stated that she was called and told to go to the emergency department due to \"a collection of pus\" seen on the CT. In the emergency department a CT abdomen pelvis was obtained that revealed Fluid and gas collection within the pelvis interposed between the colon and uterus measuring up to 4.1 cm.  Abscess is favored.  This appears largely enveloped by abdominal and adnexal structures.  A [place] uterine fistula is not excluded.  Additional small dependent collection within the region of the pouch of Alfred measuring up to 2.5 cm.  Mild dilation of small bowel loops which may indicate partial obstruction.  There is no high-grade small bowel obstruction.  The patient was given morphine for pain.  She was also started on Zosyn.  Upon further evaluation, the patient was found to be dehydrated with hyponatremia.  This is likely secondary to inadequate p.o. intake.  She was admitted for further evaluation and treatment.  IV fluids have been initiated and GI has been consulted for the a.m. The patient denied any other associated symptoms as well as any  aggravating and/or alleviating factors. At the time of this assessment, the patient was resting comfortably in bed. She currently denies any chest pain, back pain, abdominal pain, shortness of breath, numbness, tingling, N/V/C/D, fever and/or chills.      Assessment/Plan:       Intraabdominal Abscess in setting of recent hemicolectomy status post diagnostic laparoscopy, lysis of adhesions, and abscess drainage and rigid sigmoidoscopy on 4/29/2025 with Dr. Guerrero  - General Surgery following; has been treated empirically with IV Abx. With persistent RLQ/retroperitoneal fluid collection she underwent  perc drain placement The possibility of connection to prior ileocolic anastomosis was raised by Radiology and in retrospect also questioned on initial imaging. Continue IV antibiotics, NG, and TPN.  Flushed perc drain with increased output - start doing Qshift  - fistulogram from the right drain 5/14  to assess the possibility of leakage from the ileocolic anastomosis  - Infectious Disease consulted and appreciated   - Continue IV antibiotics Zosyn per ID-plan to complete 5 more days end 5/26  - PCA pain medication  -General surgery consulted-TPN, 5/19 CT repeated which showed new fluid collection, 5/20: EXPLORATORY LAPAROTOMY, LYSIS OF ADHESIONS, ABSCESS DRAINAGE AND PARTIAL COLECTOMY.    Sore throat: pt has had prolonged NP tube, added hurricane spray     Small bowel obstruction in setting of above  - CT abdomen pelvis on 5/6/2025 revealing high-grade small bowel obstruction, with a transition point not well-seen but highly likely in the right lower abdomen given collapse small bowel and right lower bowel abdomen and pelvis.  - NG tube placed on 5/6/2025  - TPN started on 5/6/2025- appears to be tolerating well     Hyponatremia, suspect related to decreased solute intake and poor PO intake  - Nephrology consulted, appreciate their input  - Electrolyte replacement protocol in place    Malnutrition - severe

## 2025-05-25 NOTE — PROGRESS NOTES
[facility] GENERAL SURGERY DAILY PROGRESS NOTE    SUBJECTIVE: Awake, alert    OBJECTIVE: CURRENT VITALS:  /62   Pulse 86   Temp 98.2 °F (36.8 °C) (Oral)   Resp 18   Ht 1.6 m (5' 3\")   Wt 50.3 kg (110 lb 14.3 oz)   SpO2 98%   BMI 19.64 kg/m²          ABD: Distended.  Appropriately tender.     LABS:    CBC:   Recent Labs     05/23/25  1100 05/24/25  0530 05/25/25  0701   WBC  --  9.2  --    RBC  --  2.67*  --    HGB 7.5* 8.2* 7.2*   HCT 22.2* 24.3* 21.6*   MCV  --  91.1  --    RDW  --  17.2*  --    PLT  --  383  --      BMP:   Recent Labs     05/23/25  0530 05/24/25  0530   NA  --  134*   K  --  3.4*   CL  --  101   CO2  --  24   PHOS 3.1  --    BUN  --  15   CREATININE  --  0.4*       ASSESSMENT AND PLAN:  65 y.o. female status post 1st surgery: Laparoscopic lysis of adhesions and abscess drainage, 2nd surgery: EXPLORATORY LAPAROTOMY, LYSIS OF ADHESIONS, ABSCESS DRAINAGE AND PARTIAL COLECTOMY        GI: continue with ngt to suction, await return of bowel function.  Dulcolax suppository today.  Nutrition: continue with TPN  ID: continue with antibiotics  Pain: continue with PRN morphine and Toradol  Anemia: Follow H/H  Activity: PT/OT.  Ambulate.           ANURADHA LIN MD

## 2025-05-25 NOTE — PROGRESS NOTES
Page sent to surgeon, patient inquiring about clear liquid diet, has had two BMS since yesterday.

## 2025-05-25 NOTE — PLAN OF CARE
Problem: Pain  Goal: Verbalizes/displays adequate comfort level or baseline comfort level  5/25/2025 0148 by Yenifer Augustine RN  Outcome: Progressing  Flowsheets (Taken 5/22/2025 1227 by Sarah Ellis, RN)  Verbalizes/displays adequate comfort level or baseline comfort level:   Encourage patient to monitor pain and request assistance   Administer analgesics based on type and severity of pain and evaluate response   Assess pain using appropriate pain scale   Implement non-pharmacological measures as appropriate and evaluate response     Problem: Safety - Adult  Goal: Free from fall injury  5/25/2025 0148 by Yenifer Augustine RN  Outcome: Progressing  Flowsheets (Taken 5/19/2025 2321 by Keya Vanegas, RN)  Free From Fall Injury:   Instruct family/caregiver on patient safety   Based on caregiver fall risk screen, instruct family/caregiver to ask for assistance with transferring infant if caregiver noted to have fall risk factors     Problem: ABCDS Injury Assessment  Goal: Absence of physical injury  5/25/2025 0148 by Yenifer Augustine RN  Outcome: Progressing  Flowsheets (Taken 5/18/2025 1551 by Karuna Trejo, RN)  Absence of Physical Injury: Implement safety measures based on patient assessment     Problem: Nutrition Deficit:  Goal: Optimize nutritional status  Outcome: Progressing  Flowsheets (Taken 5/20/2025 1021 by Bita Suero RD)  Nutrient intake appropriate for improving, restoring, or maintaining nutritional needs:   Assess nutritional status and recommend course of action   Monitor oral intake, labs, and treatment plans   Recommend appropriate diets, oral nutritional supplements, and vitamin/mineral supplements   Recommend, monitor, and adjust tube feedings and TPN/PPN based on assessed needs     Problem: Discharge Planning  Goal: Discharge to home or other facility with appropriate resources  5/25/2025 0148 by Yenifer Augustine RN  Outcome: Not Progressing     Problem: Skin/Tissue Integrity - Adult  Goal: Skin

## 2025-05-26 LAB
ALBUMIN SERPL-MCNC: 2.6 G/DL (ref 3.4–5)
ALP SERPL-CCNC: 186 U/L (ref 40–129)
ALT SERPL-CCNC: 13 U/L (ref 10–40)
ANION GAP SERPL CALCULATED.3IONS-SCNC: 12 MMOL/L (ref 3–16)
AST SERPL-CCNC: 42 U/L (ref 15–37)
BASOPHILS # BLD: 0.1 K/UL (ref 0–0.2)
BASOPHILS NFR BLD: 0.8 %
BILIRUB DIRECT SERPL-MCNC: 0.3 MG/DL (ref 0–0.3)
BILIRUB INDIRECT SERPL-MCNC: ABNORMAL MG/DL (ref 0–1)
BILIRUB SERPL-MCNC: <0.2 MG/DL (ref 0–1)
BUN SERPL-MCNC: 18 MG/DL (ref 7–20)
CALCIUM SERPL-MCNC: 8.4 MG/DL (ref 8.3–10.6)
CHLORIDE SERPL-SCNC: 104 MMOL/L (ref 99–110)
CO2 SERPL-SCNC: 20 MMOL/L (ref 21–32)
CREAT SERPL-MCNC: 0.4 MG/DL (ref 0.6–1.2)
DEPRECATED RDW RBC AUTO: 17.1 % (ref 12.4–15.4)
EOSINOPHIL # BLD: 0.4 K/UL (ref 0–0.6)
EOSINOPHIL NFR BLD: 3.3 %
GFR SERPLBLD CREATININE-BSD FMLA CKD-EPI: >90 ML/MIN/{1.73_M2}
GLUCOSE BLD-MCNC: 111 MG/DL (ref 70–99)
GLUCOSE BLD-MCNC: 130 MG/DL (ref 70–99)
GLUCOSE BLD-MCNC: 79 MG/DL (ref 70–99)
GLUCOSE SERPL-MCNC: 93 MG/DL (ref 70–99)
HCT VFR BLD AUTO: 22.2 % (ref 36–48)
HGB BLD-MCNC: 7.4 G/DL (ref 12–16)
LYMPHOCYTES # BLD: 1.1 K/UL (ref 1–5.1)
LYMPHOCYTES NFR BLD: 10 %
MAGNESIUM SERPL-MCNC: 1.77 MG/DL (ref 1.8–2.4)
MCH RBC QN AUTO: 30.4 PG (ref 26–34)
MCHC RBC AUTO-ENTMCNC: 33.2 G/DL (ref 31–36)
MCV RBC AUTO: 91.5 FL (ref 80–100)
MONOCYTES # BLD: 0.9 K/UL (ref 0–1.3)
MONOCYTES NFR BLD: 7.9 %
NEUTROPHILS # BLD: 8.7 K/UL (ref 1.7–7.7)
NEUTROPHILS NFR BLD: 78 %
PERFORMED ON: ABNORMAL
PERFORMED ON: ABNORMAL
PERFORMED ON: NORMAL
PHOSPHATE SERPL-MCNC: 4.3 MG/DL (ref 2.5–4.9)
PLATELET # BLD AUTO: 347 K/UL (ref 135–450)
PMV BLD AUTO: 10.5 FL (ref 5–10.5)
POTASSIUM SERPL-SCNC: 3.6 MMOL/L (ref 3.5–5.1)
PROT SERPL-MCNC: 5.7 G/DL (ref 6.4–8.2)
RBC # BLD AUTO: 2.43 M/UL (ref 4–5.2)
SODIUM SERPL-SCNC: 136 MMOL/L (ref 136–145)
WBC # BLD AUTO: 11.1 K/UL (ref 4–11)

## 2025-05-26 PROCEDURE — 2500000003 HC RX 250 WO HCPCS: Performed by: SURGERY

## 2025-05-26 PROCEDURE — 6370000000 HC RX 637 (ALT 250 FOR IP): Performed by: SURGERY

## 2025-05-26 PROCEDURE — 1200000000 HC SEMI PRIVATE

## 2025-05-26 PROCEDURE — 2580000003 HC RX 258: Performed by: SURGERY

## 2025-05-26 PROCEDURE — 6360000002 HC RX W HCPCS

## 2025-05-26 PROCEDURE — 80076 HEPATIC FUNCTION PANEL: CPT

## 2025-05-26 PROCEDURE — 99024 POSTOP FOLLOW-UP VISIT: CPT | Performed by: SURGERY

## 2025-05-26 PROCEDURE — 6360000002 HC RX W HCPCS: Performed by: SURGERY

## 2025-05-26 PROCEDURE — 6370000000 HC RX 637 (ALT 250 FOR IP): Performed by: NURSE PRACTITIONER

## 2025-05-26 PROCEDURE — 6360000002 HC RX W HCPCS: Performed by: CLINICAL NURSE SPECIALIST

## 2025-05-26 PROCEDURE — 80048 BASIC METABOLIC PNL TOTAL CA: CPT

## 2025-05-26 PROCEDURE — 84100 ASSAY OF PHOSPHORUS: CPT

## 2025-05-26 PROCEDURE — 83735 ASSAY OF MAGNESIUM: CPT

## 2025-05-26 PROCEDURE — 85025 COMPLETE CBC W/AUTO DIFF WBC: CPT

## 2025-05-26 RX ORDER — MAGNESIUM SULFATE 1 G/100ML
1000 INJECTION INTRAVENOUS ONCE
Status: COMPLETED | OUTPATIENT
Start: 2025-05-26 | End: 2025-05-26

## 2025-05-26 RX ORDER — OXYCODONE HYDROCHLORIDE 5 MG/1
10 TABLET ORAL EVERY 4 HOURS PRN
Refills: 0 | Status: DISCONTINUED | OUTPATIENT
Start: 2025-05-26 | End: 2025-05-30

## 2025-05-26 RX ORDER — OXYCODONE HYDROCHLORIDE 5 MG/1
5 TABLET ORAL EVERY 4 HOURS PRN
Refills: 0 | Status: DISCONTINUED | OUTPATIENT
Start: 2025-05-26 | End: 2025-05-30

## 2025-05-26 RX ADMIN — DICYCLOMINE HYDROCHLORIDE 10 MG: 10 CAPSULE ORAL at 21:13

## 2025-05-26 RX ADMIN — CALCIUM CARBONATE 500 MG: 500 TABLET, CHEWABLE ORAL at 11:04

## 2025-05-26 RX ADMIN — PANTOPRAZOLE SODIUM 40 MG: 40 INJECTION, POWDER, FOR SOLUTION INTRAVENOUS at 11:05

## 2025-05-26 RX ADMIN — ENOXAPARIN SODIUM 30 MG: 100 INJECTION SUBCUTANEOUS at 11:05

## 2025-05-26 RX ADMIN — PIPERACILLIN AND TAZOBACTAM 3375 MG: 3; .375 INJECTION, POWDER, LYOPHILIZED, FOR SOLUTION INTRAVENOUS at 13:25

## 2025-05-26 RX ADMIN — AMLODIPINE BESYLATE 2.5 MG: 2.5 TABLET ORAL at 11:04

## 2025-05-26 RX ADMIN — MORPHINE SULFATE 4 MG: 4 INJECTION, SOLUTION INTRAMUSCULAR; INTRAVENOUS at 12:12

## 2025-05-26 RX ADMIN — FLUCONAZOLE 200 MG: 100 TABLET ORAL at 11:04

## 2025-05-26 RX ADMIN — KETOROLAC TROMETHAMINE 15 MG: 30 INJECTION, SOLUTION INTRAMUSCULAR at 21:39

## 2025-05-26 RX ADMIN — MORPHINE SULFATE 4 MG: 4 INJECTION, SOLUTION INTRAMUSCULAR; INTRAVENOUS at 08:49

## 2025-05-26 RX ADMIN — MORPHINE SULFATE 2 MG: 2 INJECTION, SOLUTION INTRAMUSCULAR; INTRAVENOUS at 04:25

## 2025-05-26 RX ADMIN — MAGNESIUM SULFATE HEPTAHYDRATE 1000 MG: 1 INJECTION, SOLUTION INTRAVENOUS at 11:36

## 2025-05-26 RX ADMIN — ASCORBIC ACID, VITAMIN A PALMITATE, CHOLECALCIFEROL, THIAMINE HYDROCHLORIDE, RIBOFLAVIN-5 PHOSPHATE SODIUM, PYRIDOXINE HYDROCHLORIDE, NIACINAMIDE, DEXPANTHENOL, ALPHA-TOCOPHEROL ACETATE, VITAMIN K1, FOLIC ACID, BIOTIN, CYANOCOBALAMIN: 200; 3300; 200; 6; 3.6; 6; 40; 15; 10; 150; 600; 60; 5 INJECTION, SOLUTION INTRAVENOUS at 17:59

## 2025-05-26 RX ADMIN — Medication 400 MG: at 11:04

## 2025-05-26 RX ADMIN — I.V. FAT EMULSION 250 ML: 20 EMULSION INTRAVENOUS at 18:01

## 2025-05-26 RX ADMIN — METOPROLOL 12.5 MG: 25 TABLET ORAL at 21:13

## 2025-05-26 RX ADMIN — KETOROLAC TROMETHAMINE 15 MG: 30 INJECTION, SOLUTION INTRAMUSCULAR at 11:04

## 2025-05-26 RX ADMIN — DICYCLOMINE HYDROCHLORIDE 10 MG: 10 CAPSULE ORAL at 11:05

## 2025-05-26 RX ADMIN — PIPERACILLIN AND TAZOBACTAM 3375 MG: 3; .375 INJECTION, POWDER, LYOPHILIZED, FOR SOLUTION INTRAVENOUS at 04:27

## 2025-05-26 RX ADMIN — SODIUM CHLORIDE, PRESERVATIVE FREE 10 ML: 5 INJECTION INTRAVENOUS at 20:03

## 2025-05-26 RX ADMIN — PIPERACILLIN AND TAZOBACTAM 3375 MG: 3; .375 INJECTION, POWDER, LYOPHILIZED, FOR SOLUTION INTRAVENOUS at 21:43

## 2025-05-26 RX ADMIN — SODIUM CHLORIDE, PRESERVATIVE FREE 10 ML: 5 INJECTION INTRAVENOUS at 08:50

## 2025-05-26 RX ADMIN — MORPHINE SULFATE 4 MG: 4 INJECTION, SOLUTION INTRAMUSCULAR; INTRAVENOUS at 15:35

## 2025-05-26 RX ADMIN — METOPROLOL 12.5 MG: 25 TABLET ORAL at 11:04

## 2025-05-26 RX ADMIN — CALCIUM CARBONATE 500 MG: 500 TABLET, CHEWABLE ORAL at 21:13

## 2025-05-26 RX ADMIN — MORPHINE SULFATE 4 MG: 4 INJECTION, SOLUTION INTRAMUSCULAR; INTRAVENOUS at 20:03

## 2025-05-26 RX ADMIN — Medication 6 MG: at 21:15

## 2025-05-26 RX ADMIN — SODIUM CHLORIDE: 0.9 INJECTION, SOLUTION INTRAVENOUS at 11:35

## 2025-05-26 RX ADMIN — Medication 400 MG: at 21:13

## 2025-05-26 ASSESSMENT — PAIN DESCRIPTION - LOCATION
LOCATION: ABDOMEN

## 2025-05-26 ASSESSMENT — PAIN SCALES - GENERAL
PAINLEVEL_OUTOF10: 7
PAINLEVEL_OUTOF10: 8

## 2025-05-26 ASSESSMENT — PAIN DESCRIPTION - DESCRIPTORS
DESCRIPTORS: ACHING

## 2025-05-26 NOTE — PROGRESS NOTES
Patient educated on use of IS and demonstrates use Yes    Patient and family educated on incisional care and s/s of infection Yes    Patient and family educated on hand hygiene Yes    Patient and family educated on post operative care Yes   - NG tube maintenance    - Pain control    - rooney Removal    - Insulin Protocol     Day of surgery patient to side of bed or up to chair for minimum of 30 minutes NA    POD 1 until discharge, patient up to chair by 9 am and TID. Goal to increase mobility      Patient performs oral hygiene with CHG oral solution until NG tube discontinued. Yes    If patient does not have NG tube in place, patient to brush teeth and use mouth wash Q shift NA    Patient receives daily bath and linen changes Yes    Patient receives CHG bath until rooney discontinued Yes    SCDs in place No    Patient receiving chemical VTE Yes

## 2025-05-26 NOTE — PLAN OF CARE
Problem: Pain  Goal: Verbalizes/displays adequate comfort level or baseline comfort level  Flowsheets (Taken 5/22/2025 1227 by Sarah Ellis, RN)  Verbalizes/displays adequate comfort level or baseline comfort level:   Encourage patient to monitor pain and request assistance   Administer analgesics based on type and severity of pain and evaluate response   Assess pain using appropriate pain scale   Implement non-pharmacological measures as appropriate and evaluate response     Problem: Safety - Adult  Goal: Free from fall injury  Outcome: Progressing  Flowsheets (Taken 5/19/2025 2321 by Keya Vanegas, RN)  Free From Fall Injury:   Instruct family/caregiver on patient safety   Based on caregiver fall risk screen, instruct family/caregiver to ask for assistance with transferring infant if caregiver noted to have fall risk factors     Problem: ABCDS Injury Assessment  Goal: Absence of physical injury  Outcome: Progressing  Flowsheets (Taken 5/25/2025 2038)  Absence of Physical Injury: Implement safety measures based on patient assessment     Problem: Nutrition Deficit:  Goal: Optimize nutritional status  Outcome: Progressing  Flowsheets (Taken 5/20/2025 1021 by Bita Suero, IVY)  Nutrient intake appropriate for improving, restoring, or maintaining nutritional needs:   Assess nutritional status and recommend course of action   Monitor oral intake, labs, and treatment plans   Recommend appropriate diets, oral nutritional supplements, and vitamin/mineral supplements   Recommend, monitor, and adjust tube feedings and TPN/PPN based on assessed needs     Problem: Discharge Planning  Goal: Discharge to home or other facility with appropriate resources  Outcome: Progressing  Flowsheets (Taken 5/11/2025 2000 by Faiza Foster, RN)  Discharge to home or other facility with appropriate resources:   Identify barriers to discharge with patient and caregiver   Arrange for needed discharge resources and transportation as

## 2025-05-26 NOTE — PROGRESS NOTES
Pt A/O x 4, VSS. Pt up to chair this afternoon/evening and tolerating well. Pt reporting pain 8/10. Pain medication administered per MAR. Pt had 2 loose bowel movements this morning in the toilet. Pt is ambulating to bathroom with assistance and little difficulty. R and L drains in place with minimal output. NG tube in place, draining greenish-brown liquid. Weldon in place draining clear yellow urine. Warm blankets provided for comfort.  Standard safety precautions in place. Pt voices no other needs at this time. All care per orders.

## 2025-05-26 NOTE — PROGRESS NOTES
Pt assessment completed and charted. VSS. Pt a/ox4. Pt reports pain 7-8/10 today, pt educated on prn pain meds, meds given per mar. Pt tolerating full liquid diet and NG being clamped. Pt has perk drain and TODD drain in place, both w/ minimal output. Pt up to chair this after noon and walked the bansal. Pt has f/c out and has voided w/o issue. Midline w/ dressing in place, small amount of drainage, dry.  Pt reports pain Pt denies any other needs at this time.  Pt calls out appropriately.       Pt is a fall risk;  -Bed in lowest position and wheels locked.  -Call light within reach.   -Bedside table within reach.   -Non-skid footwear in place.  -pt refused bed check

## 2025-05-26 NOTE — PROGRESS NOTES
[facility] Medicine Progress Note  V 5.17      Date of Admission: 4/23/2025    Hospital Day: 34      Chief Admission Complaint:  abdominal pain    Subjective:  Tolerating full liquid diet at this time.  Denies n/v.  Walked hallways today.   today    Presenting Admission History:  \"This is a 64 y.o. female who presented to [facility] with abdominal pain.  PMHx significant for HTN.  History obtained from the patient and review of EMR.  The patient stated she had a hemicolectomy in February 2025 in [facility] and since then has been experiencing intermittent abdominal pain.  Per chart review, the patient has been readmitted 1 time since her surgery for electrolyte abnormalities.  However, the patient stated her pain has gotten progressively worse over the last couple of weeks.  She reports speaking with GI and they ordered a CT outpatient today.  Patient  is at the bedside and stated that she was called and told to go to the emergency department due to \"a collection of pus\" seen on the CT. In the emergency department a CT abdomen pelvis was obtained that revealed Fluid and gas collection within the pelvis interposed between the colon and uterus measuring up to 4.1 cm.  Abscess is favored.  This appears largely enveloped by abdominal and adnexal structures.  A [place] uterine fistula is not excluded.  Additional small dependent collection within the region of the pouch of Alfred measuring up to 2.5 cm.  Mild dilation of small bowel loops which may indicate partial obstruction.  There is no high-grade small bowel obstruction.  The patient was given morphine for pain.  She was also started on Zosyn.  Upon further evaluation, the patient was found to be dehydrated with hyponatremia.  This is likely secondary to inadequate p.o. intake.  She was admitted for further evaluation and treatment.  IV fluids have been initiated and GI has been consulted for the a.m. The patient denied any other  kg/m²     Telemetry:      Personally reviewed and interpreted telemetry (Rhythm Strip) on 5/26/2025.  Patient is currently ON tele demonstrating NSR w/ controlled rate.    Diet: Diet NPO  PN-Adult Premix 5/15 - Standard Electrolytes - Central Line    DVT Prophylaxis: PPX dose LMWH    Code status: Full Code    PT/OT Eval Status: Seen w/ recs for SNF    Multi-Disciplinary Rounds with Case Management completed on 5/26/2025 with the following recs:     Anticipated Discharge Location: [facility]     Anticipated Discharge Day/Date:  likely >2 days    Barriers to Discharge: Clinical Course    Likely rate limiting factor: diet toleration, NG removal, TPN     --------------------------------------------------    MDM (any 2 required for High level billing)    A. Problems (any 1)  [] Acute/Chronic Illness/injury posing ongoing threat to life and/or bodily function without ongoing treatment    [] Severe exacerbation of chronic illness    --------------------------------------------------  B. Risk of Treatment (any 1)    [x] Drugs/treatments that require intensive monitoring for toxicity    [] IV ABX (Vancomycin, Aminoglycosides, etc)     [] Post-Cath/Contrast study requiring serial monitoring    [] IV Narcotic analgesia    [] Aggressive IV diuresis    [] Hypertonic Saline    [] Critical electrolyte abnormalities requiring IV replacement    [] Insulin - Scheduled/SSI or Insulin gtt    [] Anticoagulation (Heparin gtt or Coumadin - other anticoagulants in special circumstances)    [] Cardiac Medications (IV Amiodarone/Diltiazem, Tikosyn, etc)    [] Hemodialysis    [x] Other -  TPN  [] Change in code status    [] Decision to escalate care    [] Major surgery/procedure with associated risk factors    --------------------------------------------------  C. Data (any 2)    [x] Data Review (any 3)    [x] Consultant notes from yesterday/today    [x] All available current labs reviewed interpreted for clinical significance    [x]

## 2025-05-26 NOTE — PROGRESS NOTES
Patient educated on use of IS and demonstrates use Yes    Patient and family educated on incisional care and s/s of infection Yes    Patient and family educated on hand hygiene Yes    Patient and family educated on post operative care Yes   - NG tube maintenance    - Pain control    - rooney Removal    - Insulin Protocol     Day of surgery patient to side of bed or up to chair for minimum of 30 minutes NA    POD 1 until discharge, patient up to chair by 9 am and TID. Goal to increase mobility  Pt up to chair in afternoon/evening.    Patient performs oral hygiene with CHG oral solution until NG tube discontinued. Yes    If patient does not have NG tube in place, patient to brush teeth and use mouth wash Q shift NA    Patient receives daily bath and linen changes Yes    Patient receives CHG bath until rooney discontinued Yes    SCDs in place No    Patient receiving chemical VTE Yes

## 2025-05-26 NOTE — PROGRESS NOTES
[facility] GENERAL SURGERY DAILY PROGRESS NOTE    SUBJECTIVE: Awake, alert.  Having BMs.    OBJECTIVE: CURRENT VITALS:  /71   Pulse 91   Temp 97.9 °F (36.6 °C) (Oral)   Resp 18   Ht 1.6 m (5' 3\")   Wt 50.3 kg (110 lb 14.3 oz)   SpO2 100%   BMI 19.64 kg/m²          ABD: Soft.  Much less distended.     LABS:    CBC:   Recent Labs     05/24/25  0530 05/25/25  0701 05/26/25  0633   WBC 9.2  --  11.1*   RBC 2.67*  --  2.43*   HGB 8.2* 7.2* 7.4*   HCT 24.3* 21.6* 22.2*   MCV 91.1  --  91.5   RDW 17.2*  --  17.1*     --  347     BMP:   Recent Labs     05/24/25  0530 05/26/25  0633   * 136   K 3.4* 3.6    104   CO2 24 20*   PHOS  --  4.3   BUN 15 18   CREATININE 0.4* 0.4*     Recent Labs     05/26/25  0633   MG 1.77*       ASSESSMENT AND PLAN:  65 y.o. female status post 1st surgery: Laparoscopic lysis of adhesions and abscess drainage, 2nd surgery: EXPLORATORY LAPAROTOMY, LYSIS OF ADHESIONS, ABSCESS DRAINAGE AND PARTIAL COLECTOMY        Nutrition: continue with TPN today.  Trial full liquids.  ID: continue with antibiotics  Pain: continue with PRN morphine and Toradol.  Add PO analgesic option.  Anemia: Follow H/H  Activity: PT/OT.  Ambulate.   D/C Weldon  Clamp NG during liquids trial.       ANURADHA LIN MD

## 2025-05-27 ENCOUNTER — APPOINTMENT (OUTPATIENT)
Dept: CT IMAGING | Age: 65
DRG: 856 | End: 2025-05-27
Payer: MEDICARE

## 2025-05-27 LAB
ANION GAP SERPL CALCULATED.3IONS-SCNC: 11 MMOL/L (ref 3–16)
BUN SERPL-MCNC: 14 MG/DL (ref 7–20)
CALCIUM SERPL-MCNC: 8.6 MG/DL (ref 8.3–10.6)
CHLORIDE SERPL-SCNC: 102 MMOL/L (ref 99–110)
CO2 SERPL-SCNC: 23 MMOL/L (ref 21–32)
CREAT SERPL-MCNC: 0.5 MG/DL (ref 0.6–1.2)
GFR SERPLBLD CREATININE-BSD FMLA CKD-EPI: >90 ML/MIN/{1.73_M2}
GLUCOSE BLD-MCNC: 112 MG/DL (ref 70–99)
GLUCOSE BLD-MCNC: 117 MG/DL (ref 70–99)
GLUCOSE BLD-MCNC: 129 MG/DL (ref 70–99)
GLUCOSE BLD-MCNC: 88 MG/DL (ref 70–99)
GLUCOSE SERPL-MCNC: 88 MG/DL (ref 70–99)
MAGNESIUM SERPL-MCNC: 1.54 MG/DL (ref 1.8–2.4)
PERFORMED ON: ABNORMAL
PERFORMED ON: NORMAL
POTASSIUM SERPL-SCNC: 3.9 MMOL/L (ref 3.5–5.1)
SODIUM SERPL-SCNC: 136 MMOL/L (ref 136–145)

## 2025-05-27 PROCEDURE — 6370000000 HC RX 637 (ALT 250 FOR IP): Performed by: SURGERY

## 2025-05-27 PROCEDURE — 6360000002 HC RX W HCPCS: Performed by: CLINICAL NURSE SPECIALIST

## 2025-05-27 PROCEDURE — 80048 BASIC METABOLIC PNL TOTAL CA: CPT

## 2025-05-27 PROCEDURE — 6360000004 HC RX CONTRAST MEDICATION: Performed by: SURGERY

## 2025-05-27 PROCEDURE — 2500000003 HC RX 250 WO HCPCS: Performed by: SURGERY

## 2025-05-27 PROCEDURE — 99024 POSTOP FOLLOW-UP VISIT: CPT | Performed by: SURGERY

## 2025-05-27 PROCEDURE — 6360000002 HC RX W HCPCS: Performed by: SURGERY

## 2025-05-27 PROCEDURE — 74177 CT ABD & PELVIS W/CONTRAST: CPT

## 2025-05-27 PROCEDURE — 2580000003 HC RX 258: Performed by: SURGERY

## 2025-05-27 PROCEDURE — 83735 ASSAY OF MAGNESIUM: CPT

## 2025-05-27 PROCEDURE — 1200000000 HC SEMI PRIVATE

## 2025-05-27 RX ORDER — DIATRIZOATE MEGLUMINE AND DIATRIZOATE SODIUM 660; 100 MG/ML; MG/ML
12 SOLUTION ORAL; RECTAL
Status: DISCONTINUED | OUTPATIENT
Start: 2025-05-27 | End: 2025-05-30 | Stop reason: HOSPADM

## 2025-05-27 RX ORDER — IOPAMIDOL 755 MG/ML
75 INJECTION, SOLUTION INTRAVASCULAR
Status: COMPLETED | OUTPATIENT
Start: 2025-05-27 | End: 2025-05-27

## 2025-05-27 RX ORDER — MAGNESIUM SULFATE IN WATER 40 MG/ML
2000 INJECTION, SOLUTION INTRAVENOUS ONCE
Status: DISCONTINUED | OUTPATIENT
Start: 2025-05-27 | End: 2025-05-27

## 2025-05-27 RX ADMIN — DICYCLOMINE HYDROCHLORIDE 10 MG: 10 CAPSULE ORAL at 12:30

## 2025-05-27 RX ADMIN — MORPHINE SULFATE 4 MG: 4 INJECTION, SOLUTION INTRAMUSCULAR; INTRAVENOUS at 03:59

## 2025-05-27 RX ADMIN — SODIUM CHLORIDE, PRESERVATIVE FREE 10 ML: 5 INJECTION INTRAVENOUS at 10:16

## 2025-05-27 RX ADMIN — Medication 400 MG: at 12:30

## 2025-05-27 RX ADMIN — MORPHINE SULFATE 4 MG: 4 INJECTION, SOLUTION INTRAMUSCULAR; INTRAVENOUS at 15:57

## 2025-05-27 RX ADMIN — ACETAMINOPHEN 650 MG: 325 TABLET ORAL at 14:03

## 2025-05-27 RX ADMIN — MORPHINE SULFATE 4 MG: 4 INJECTION, SOLUTION INTRAMUSCULAR; INTRAVENOUS at 18:59

## 2025-05-27 RX ADMIN — NYSTATIN 500000 UNITS: 100000 SUSPENSION ORAL at 21:02

## 2025-05-27 RX ADMIN — ENOXAPARIN SODIUM 30 MG: 100 INJECTION SUBCUTANEOUS at 10:35

## 2025-05-27 RX ADMIN — PIPERACILLIN AND TAZOBACTAM 3375 MG: 3; .375 INJECTION, POWDER, LYOPHILIZED, FOR SOLUTION INTRAVENOUS at 21:11

## 2025-05-27 RX ADMIN — Medication 6 MG: at 21:02

## 2025-05-27 RX ADMIN — PIPERACILLIN AND TAZOBACTAM 3375 MG: 3; .375 INJECTION, POWDER, LYOPHILIZED, FOR SOLUTION INTRAVENOUS at 06:08

## 2025-05-27 RX ADMIN — MORPHINE SULFATE 4 MG: 4 INJECTION, SOLUTION INTRAMUSCULAR; INTRAVENOUS at 12:15

## 2025-05-27 RX ADMIN — MORPHINE SULFATE 4 MG: 4 INJECTION, SOLUTION INTRAMUSCULAR; INTRAVENOUS at 21:57

## 2025-05-27 RX ADMIN — PANTOPRAZOLE SODIUM 40 MG: 40 INJECTION, POWDER, FOR SOLUTION INTRAVENOUS at 10:16

## 2025-05-27 RX ADMIN — CALCIUM CARBONATE 500 MG: 500 TABLET, CHEWABLE ORAL at 12:30

## 2025-05-27 RX ADMIN — CALCIUM CARBONATE 500 MG: 500 TABLET, CHEWABLE ORAL at 21:02

## 2025-05-27 RX ADMIN — MAGNESIUM SULFATE HEPTAHYDRATE 2000 MG: 40 INJECTION, SOLUTION INTRAVENOUS at 09:42

## 2025-05-27 RX ADMIN — LIDOCAINE HYDROCHLORIDE 15 ML: 20 SOLUTION ORAL at 13:55

## 2025-05-27 RX ADMIN — METOPROLOL 12.5 MG: 25 TABLET ORAL at 21:02

## 2025-05-27 RX ADMIN — PIPERACILLIN AND TAZOBACTAM 3375 MG: 3; .375 INJECTION, POWDER, LYOPHILIZED, FOR SOLUTION INTRAVENOUS at 13:41

## 2025-05-27 RX ADMIN — ASCORBIC ACID, VITAMIN A PALMITATE, CHOLECALCIFEROL, THIAMINE HYDROCHLORIDE, RIBOFLAVIN-5 PHOSPHATE SODIUM, PYRIDOXINE HYDROCHLORIDE, NIACINAMIDE, DEXPANTHENOL, ALPHA-TOCOPHEROL ACETATE, VITAMIN K1, FOLIC ACID, BIOTIN, CYANOCOBALAMIN: 200; 3300; 200; 6; 3.6; 6; 40; 15; 10; 150; 600; 60; 5 INJECTION, SOLUTION INTRAVENOUS at 17:57

## 2025-05-27 RX ADMIN — MORPHINE SULFATE 4 MG: 4 INJECTION, SOLUTION INTRAMUSCULAR; INTRAVENOUS at 00:15

## 2025-05-27 RX ADMIN — MORPHINE SULFATE 4 MG: 4 INJECTION, SOLUTION INTRAMUSCULAR; INTRAVENOUS at 08:54

## 2025-05-27 RX ADMIN — METOPROLOL 12.5 MG: 25 TABLET ORAL at 12:32

## 2025-05-27 RX ADMIN — AMLODIPINE BESYLATE 2.5 MG: 2.5 TABLET ORAL at 12:31

## 2025-05-27 RX ADMIN — Medication 400 MG: at 21:02

## 2025-05-27 RX ADMIN — Medication 1 LOZENGE: at 13:14

## 2025-05-27 RX ADMIN — DICYCLOMINE HYDROCHLORIDE 10 MG: 10 CAPSULE ORAL at 21:02

## 2025-05-27 RX ADMIN — DIATRIZOATE MEGLUMINE AND DIATRIZOATE SODIUM 12 ML: 660; 100 LIQUID ORAL; RECTAL at 10:15

## 2025-05-27 RX ADMIN — IOPAMIDOL 75 ML: 755 INJECTION, SOLUTION INTRAVENOUS at 11:33

## 2025-05-27 ASSESSMENT — PAIN SCALES - GENERAL
PAINLEVEL_OUTOF10: 9
PAINLEVEL_OUTOF10: 7
PAINLEVEL_OUTOF10: 9
PAINLEVEL_OUTOF10: 8
PAINLEVEL_OUTOF10: 7
PAINLEVEL_OUTOF10: 9
PAINLEVEL_OUTOF10: 7
PAINLEVEL_OUTOF10: 9
PAINLEVEL_OUTOF10: 8
PAINLEVEL_OUTOF10: 8
PAINLEVEL_OUTOF10: 9

## 2025-05-27 ASSESSMENT — PAIN DESCRIPTION - LOCATION
LOCATION: ABDOMEN
LOCATION: ABDOMEN
LOCATION: THROAT
LOCATION: ABDOMEN
LOCATION: ABDOMEN
LOCATION: THROAT
LOCATION: ABDOMEN
LOCATION: THROAT
LOCATION: ABDOMEN
LOCATION: ABDOMEN

## 2025-05-27 ASSESSMENT — PAIN DESCRIPTION - DESCRIPTORS
DESCRIPTORS: SORE
DESCRIPTORS: CRAMPING
DESCRIPTORS: ACHING;DISCOMFORT;SHOOTING;SORE
DESCRIPTORS: ACHING
DESCRIPTORS: ACHING
DESCRIPTORS: ACHING;SORE
DESCRIPTORS: SORE;ACHING

## 2025-05-27 ASSESSMENT — PAIN DESCRIPTION - ORIENTATION
ORIENTATION: MID;LOWER
ORIENTATION: MID

## 2025-05-27 NOTE — CARE COORDINATION
Hospital day 34: Patient on C3 re intra abdominal abscess care managed by IM and General Surgery. Patient from home with family supports. Plans for CT this date. Patient with NGT clamping. Patient with drains x2. Diet full, still getting TPN. SW following.GASTON Alexander

## 2025-05-27 NOTE — PLAN OF CARE
Problem: Pain  Goal: Verbalizes/displays adequate comfort level or baseline comfort level  5/26/2025 2216 by Jyotsna Ledezma LPN  Outcome: Progressing  Flowsheets (Taken 5/26/2025 2216)  Verbalizes/displays adequate comfort level or baseline comfort level:   Encourage patient to monitor pain and request assistance   Assess pain using appropriate pain scale   Implement non-pharmacological measures as appropriate and evaluate response   Administer analgesics based on type and severity of pain and evaluate response   Notify Licensed Independent Practitioner if interventions unsuccessful or patient reports new pain   Consider cultural and social influences on pain and pain management     Problem: Safety - Adult  Goal: Free from fall injury  5/26/2025 2216 by Jyotsna Ledezma LPN  Outcome: Progressing  Flowsheets (Taken 5/26/2025 2216)  Free From Fall Injury:   Instruct family/caregiver on patient safety   Based on caregiver fall risk screen, instruct family/caregiver to ask for assistance with transferring infant if caregiver noted to have fall risk factors     Problem: ABCDS Injury Assessment  Goal: Absence of physical injury  5/26/2025 2216 by Jyotsna Ledezma LPN  Outcome: Progressing  Flowsheets (Taken 5/26/2025 2216)  Absence of Physical Injury: Implement safety measures based on patient assessment     Problem: Nutrition Deficit:  Goal: Optimize nutritional status  5/26/2025 2216 by Jyotsna Ledezma LPN  Outcome: Progressing  Flowsheets (Taken 5/26/2025 2216)  Nutrient intake appropriate for improving, restoring, or maintaining nutritional needs:   Recommend appropriate diets, oral nutritional supplements, and vitamin/mineral supplements   Assess nutritional status and recommend course of action   Monitor oral intake, labs, and treatment plans   Recommend, monitor, and adjust tube feedings and TPN/PPN based on assessed needs   Order, calculate, and assess calorie counts as needed   Provide specific

## 2025-05-27 NOTE — PROGRESS NOTES
[facility] GENERAL SURGERY    Surgery Progress Note           POD # 28 and 7    PATIENT NAME: Tuyet Richter     TODAY'S DATE: 5/27/2025    INTERVAL HISTORY:    Pt without acute events. Abd pain some better.   Having flatus and bms    OBJECTIVE:   VITALS:  BP (!) 145/68   Pulse 98   Temp 97.5 °F (36.4 °C) (Oral)   Resp 18   Ht 1.6 m (5' 3\")   Wt 49.6 kg (109 lb 6.4 oz)   SpO2 100%   BMI 19.38 kg/m²     INTAKE/OUTPUT:    I/O last 3 completed shifts:  In: 470 [P.O.:440; NG/GT:30]  Out: 3800 [Urine:2875; Emesis/NG output:900; Drains:25]  No intake/output data recorded.              CONSTITUTIONAL:  awake and alert  LUNGS: no crackles or wheezes    ABDOMEN: soft, mod distention, tender incision, right sided drain serosanguinous, left sided drain green    INCISION: clean, mild serosanguinous drainage    Data:  CBC:   Recent Labs     05/25/25  0701 05/26/25  0633   WBC  --  11.1*   HGB 7.2* 7.4*   HCT 21.6* 22.2*   PLT  --  347     BMP:    Recent Labs     05/26/25  0633 05/27/25  0610    136   K 3.6 3.9    102   CO2 20* 23   BUN 18 14   CREATININE 0.4* 0.5*   GLUCOSE 93 88     Hepatic:   Recent Labs     05/26/25  0633   AST 42*   ALT 13   BILITOT <0.2   ALKPHOS 186*     Mag:      Recent Labs     05/26/25  0633 05/27/25  0610   MG 1.77* 1.54*        Phos:     Recent Labs     05/26/25 0633   PHOS 4.3      INR:   No results for input(s): \"INR\" in the last 72 hours.      ASSESSMENT AND PLAN:  65 y.o. female status post 1st surgery: Laparoscopic lysis of adhesions and abscess drainage, 2nd surgery: EXPLORATORY LAPAROTOMY, LYSIS OF ADHESIONS, ABSCESS DRAINAGE AND PARTIAL COLECTOMY         CT today to assess need for drains (minimal output past 4 days) and with left sided being green today.  GI: continue with ngt clamping until CT done.  Nutrition: continue with TPN  ID: follow up CT results  Pain: controlled  Anemia: stable  Activity: PT/OT      Electronically signed by Dallas Guerrero MD

## 2025-05-27 NOTE — PLAN OF CARE
Problem: Pain  Goal: Verbalizes/displays adequate comfort level or baseline comfort level  Outcome: Progressing  Flowsheets (Taken 5/27/2025 1718)  Verbalizes/displays adequate comfort level or baseline comfort level:   Encourage patient to monitor pain and request assistance   Assess pain using appropriate pain scale   Administer analgesics based on type and severity of pain and evaluate response   Implement non-pharmacological measures as appropriate and evaluate response   Consider cultural and social influences on pain and pain management   Notify Licensed Independent Practitioner if interventions unsuccessful or patient reports new pain     Problem: Safety - Adult  Goal: Free from fall injury  Outcome: Progressing  Flowsheets (Taken 5/27/2025 1718)  Free From Fall Injury: Instruct family/caregiver on patient safety     Problem: ABCDS Injury Assessment  Goal: Absence of physical injury  Outcome: Progressing  Flowsheets (Taken 5/26/2025 2216 by Jyotsna Ledezma LPN)  Absence of Physical Injury: Implement safety measures based on patient assessment     Problem: Nutrition Deficit:  Goal: Optimize nutritional status  Outcome: Progressing  Flowsheets (Taken 5/27/2025 1718)  Nutrient intake appropriate for improving, restoring, or maintaining nutritional needs:   Assess nutritional status and recommend course of action   Recommend appropriate diets, oral nutritional supplements, and vitamin/mineral supplements   Recommend, monitor, and adjust tube feedings and TPN/PPN based on assessed needs   Monitor oral intake, labs, and treatment plans     Problem: Skin/Tissue Integrity - Adult  Goal: Skin integrity remains intact  Outcome: Progressing  Flowsheets  Taken 5/27/2025 1718  Skin Integrity Remains Intact:   Monitor for areas of redness and/or skin breakdown   Turn and reposition as indicated   Assess need for specialty bed   Pressure redistribution bed/mattress (bed type)  Taken 5/27/2025 0506  Skin Integrity

## 2025-05-27 NOTE — PLAN OF CARE
Problem: Pain  Goal: Verbalizes/displays adequate comfort level or baseline comfort level  5/27/2025 1934 by Sherif Gupta RN  Outcome: Progressing  5/27/2025 1718 by Chiquita Brewer RN  Outcome: Progressing  Flowsheets (Taken 5/27/2025 1718)  Verbalizes/displays adequate comfort level or baseline comfort level:   Encourage patient to monitor pain and request assistance   Assess pain using appropriate pain scale   Administer analgesics based on type and severity of pain and evaluate response   Implement non-pharmacological measures as appropriate and evaluate response   Consider cultural and social influences on pain and pain management   Notify Licensed Independent Practitioner if interventions unsuccessful or patient reports new pain     Problem: Safety - Adult  Goal: Free from fall injury  5/27/2025 1934 by Sherif Gupta RN  Outcome: Progressing  5/27/2025 1718 by Chiquita Brewer RN  Outcome: Progressing  Flowsheets (Taken 5/27/2025 1718)  Free From Fall Injury: Instruct family/caregiver on patient safety     Problem: ABCDS Injury Assessment  Goal: Absence of physical injury  5/27/2025 1934 by Sherif Gupta RN  Outcome: Progressing  5/27/2025 1718 by Chiquita Brewer RN  Outcome: Progressing  Flowsheets (Taken 5/26/2025 2216 by Jyotsna Ledezma LPN)  Absence of Physical Injury: Implement safety measures based on patient assessment     Problem: Nutrition Deficit:  Goal: Optimize nutritional status  5/27/2025 1934 by Sherif Gupta RN  Outcome: Progressing  5/27/2025 1718 by Chiquita Brewer RN  Outcome: Progressing  Flowsheets (Taken 5/27/2025 1718)  Nutrient intake appropriate for improving, restoring, or maintaining nutritional needs:   Assess nutritional status and recommend course of action   Recommend appropriate diets, oral nutritional supplements, and vitamin/mineral supplements   Recommend, monitor, and adjust tube feedings and TPN/PPN based on assessed needs   Monitor

## 2025-05-27 NOTE — PROGRESS NOTES
Physical Therapy  Per RN please hold therapy as she is receiving contrast thru NG for stat CT.  Will follow per POC.  Ana Ayers, PTA#[id number]

## 2025-05-27 NOTE — PROGRESS NOTES
Pt A & O x 4. Assessment complete and documeted. VSS. 3 lumen PICC in LUE infusing TPN, abx and fluids. Ambulating to bathroom with minimal assitance. Tolerating full liquid diet post NG tube removal. Standard Safety precautions in place. RA. Drains in place with minimal output. Pt reports she is satisfied with pain relief at this time and denies nausea. Pt reports BM this AM.

## 2025-05-27 NOTE — PROGRESS NOTES
[facility] Medicine Progress Note  V 5.17      Date of Admission: 4/23/2025    Hospital Day: 35      Chief Admission Complaint:  abdominal pain    Subjective:  reports feeling uncomfortable in general.  States throat pain from NG tube has been affecting her oral intake    Presenting Admission History:  \"This is a 64 y.o. female who presented to [facility] with abdominal pain.  PMHx significant for HTN.  History obtained from the patient and review of EMR.  The patient stated she had a hemicolectomy in February 2025 in [facility] and since then has been experiencing intermittent abdominal pain.  Per chart review, the patient has been readmitted 1 time since her surgery for electrolyte abnormalities.  However, the patient stated her pain has gotten progressively worse over the last couple of weeks.  She reports speaking with GI and they ordered a CT outpatient today.  Patient  is at the bedside and stated that she was called and told to go to the emergency department due to \"a collection of pus\" seen on the CT. In the emergency department a CT abdomen pelvis was obtained that revealed Fluid and gas collection within the pelvis interposed between the colon and uterus measuring up to 4.1 cm.  Abscess is favored.  This appears largely enveloped by abdominal and adnexal structures.  A [place] uterine fistula is not excluded.  Additional small dependent collection within the region of the pouch of Alfred measuring up to 2.5 cm.  Mild dilation of small bowel loops which may indicate partial obstruction.  There is no high-grade small bowel obstruction.  The patient was given morphine for pain.  She was also started on Zosyn.  Upon further evaluation, the patient was found to be dehydrated with hyponatremia.  This is likely secondary to inadequate p.o. intake.  She was admitted for further evaluation and treatment.  IV fluids have been initiated and GI has been consulted for the a.m. The  escalate care    [] Major surgery/procedure with associated risk factors    --------------------------------------------------  C. Data (any 2)    [x] Data Review (any 3)    [x] Consultant notes from yesterday/today    [x] All available current labs reviewed interpreted for clinical significance    [x] Appropriate follow-up labs were ordered  [] Collateral history obtained     [x] Independent Interpretation of tests (any 1)    [x] Telemetry (Rhythm Strip) personally reviewed and interpreted        [] Imaging personally reviewed and interpreted     [x] Discussion (any 1)  [x] Multi-Disciplinary Rounds with Case Management  [] Discussed management of the case with           Labs:  Personally reviewed on 5/27/2025 and interpreted for clinical significance as documented above.     Recent Labs     05/25/25  0701 05/26/25  0633   WBC  --  11.1*   HGB 7.2* 7.4*   HCT 21.6* 22.2*   PLT  --  347     Recent Labs     05/26/25  0633 05/27/25  0610    136   K 3.6 3.9    102   CO2 20* 23   BUN 18 14   CREATININE 0.4* 0.5*   CALCIUM 8.4 8.6   MG 1.77* 1.54*   PHOS 4.3  --      No results for input(s): \"PROBNP\", \"TROPHS\" in the last 72 hours.  No results for input(s): \"LABA1C\" in the last 72 hours.  Recent Labs     05/26/25  0633   AST 42*   ALT 13   BILIDIR 0.3   BILITOT <0.2   ALKPHOS 186*     No results for input(s): \"INR\", \"LACTA\", \"TSH\" in the last 72 hours.    Urine Cultures: No results found for: \"LABURIN\"  Blood Cultures: No results found for: \"BC\"  No results found for: \"BLOODCULT2\"  Organism: No results found for: \"ORG\"      Janet James, APRN - CNP

## 2025-05-27 NOTE — PROGRESS NOTES
Shift assessment completed. Patient is A&O x4.  VSS on RA.  PICC clean dry intact, infusing. NGT clamped, pt denies n/v. Patient ambulates with FWW to bathroom.  Medication given per MAR. Standard safety measures in place.

## 2025-05-27 NOTE — PROGRESS NOTES
Comprehensive Nutrition Assessment    Type and Reason for Visit:  Reassess    Nutrition Recommendations/Plan:   Continue full liquid diet per MD in addition to TPN at goal rate of 75ml/hr.   250 mL of 20% lipids 2 times per week.  Physician/LIP to monitor closely and correct electrolytes (Phos,Mg,K+)   Recommend FSBS, monitor glucose, need for insulin.  Pharmacy to adjust MVI and Trace Elements as needed.  When PN to be discontinued, cut rate by 50% and let current bag run out.      Malnutrition Assessment:  Malnutrition Status:  Severe malnutrition (04/24/25 1120)    Context:  Acute Illness     Findings of the 6 clinical characteristics of malnutrition:  Energy Intake:  75% or less of estimated energy requirements for 7 or more days  Weight Loss:  Mild weight loss     Body Fat Loss:  Moderate body fat loss Orbital, Triceps   Muscle Mass Loss:  Moderate muscle mass loss Temples (temporalis), Clavicles (pectoralis & deltoids)  Fluid Accumulation:  No fluid accumulation     Strength:  Not Performed    Nutrition Assessment:    Followup: Patients diet advanced to full liquids 5/26 in addition to TPN. Clinimix 5/15 at goal rate of 75 ml/hr. Off floor for CT scan. Per flowsheets, pt did consume 26-50% of soup, italian ice, jello, and Ensure ONS yesterday for lunch 5/26. NG output of 900ml. Per GenSurg, \"Abd pain some better. Having flatus and bms.\" Plan to continue TPN for now per general surgery note. Plan to continue with ngt clamping until CT done per GI. Noted weight has increased over the past x1 week (+5lbs). Will continue to monitor.    Nutrition Related Findings:    Mg 1.54. NG output 900ml. TPN at goal. NGT. LBM 5/26. Wound Type: Surgical Incision       Current Nutrition Intake & Therapies:    Average Meal Intake: 26-50%  Average Supplements Intake: Unable to assess  PN-Adult Premix 5/15 - Standard Electrolytes - Central Line  ADULT DIET; Full Liquid  ADULT ORAL NUTRITION SUPPLEMENT; Breakfast, Lunch, Dinner;  Standard High Calorie/High Protein Oral Supplement  PN-Adult Premix 5/15 - Standard Electrolytes - Central Line  Current Parenteral Nutrition Orders:  Type and Formula: Premix Central   Lipids: Two times weekly, 250ml  Duration: Continuous  Rate/Volume: Clinimix 5/15 @ 75ml/hr to provide TV 1800ml, 1278kcal, 90g protein, 270g dextrose. + biweekly 20% 250ml lipids to provide an additional 143kcal per day. GIR = 3.74mg/kg/min.    Anthropometric Measures:  Height: 160 cm (5' 3\")  Ideal Body Weight (IBW): 115 lbs (52 kg)       Current Body Weight: 49.6 kg (109 lb 5.6 oz) (5/27/25), 95.1 % IBW. Weight Source: Bed scale  Current BMI (kg/m2): 19.4  Usual Body Weight: 56.2 kg (124 lb) (5/17/24 - [facility])     % Weight Change (Calculated): -10.9  Weight Adjustment For: No Adjustment           BMI Categories: Normal Weight (BMI 18.5-24.9)    Estimated Daily Nutrient Needs:  Energy Requirements Based On: Kcal/kg  Weight Used for Energy Requirements: Current  Energy (kcal/day): 1410 - 1645 (30-35kcal/kg)  Weight Used for Protein Requirements: Ideal  Protein (g/day): 63 - 94 (1.2-1.8g/kg)  Method Used for Fluid Requirements: 1 ml/kcal  Fluid (ml/day): 1410 - 1645    Nutrition Diagnosis:   Severe malnutrition related to acute injury/trauma, altered GI function as evidenced by criteria as identified in malnutrition assessment, poor intake prior to admission, GI abnormality    Nutrition Interventions:   Food and/or Nutrient Delivery: Continue Current Parenteral Nutrition, Continue Current Diet  Nutrition Education/Counseling: Education/Counseling initiated  Coordination of Nutrition Care: Continue to monitor while inpatient       Goals:  Goals: PO intake 50% or greater, by next RD assessment  Type of Goal: New goal  Previous Goal Met: New Goal    Nutrition Monitoring and Evaluation:   Behavioral-Environmental Outcomes: None Identified  Food/Nutrient Intake Outcomes: Progression of Nutrition, Diet

## 2025-05-28 LAB
ALBUMIN SERPL-MCNC: 2.9 G/DL (ref 3.4–5)
ALP SERPL-CCNC: 266 U/L (ref 40–129)
ALT SERPL-CCNC: 18 U/L (ref 10–40)
ANION GAP SERPL CALCULATED.3IONS-SCNC: 11 MMOL/L (ref 3–16)
AST SERPL-CCNC: 21 U/L (ref 15–37)
BILIRUB DIRECT SERPL-MCNC: <0.1 MG/DL (ref 0–0.3)
BILIRUB INDIRECT SERPL-MCNC: ABNORMAL MG/DL (ref 0–1)
BILIRUB SERPL-MCNC: <0.2 MG/DL (ref 0–1)
BUN SERPL-MCNC: 14 MG/DL (ref 7–20)
CALCIUM SERPL-MCNC: 8.9 MG/DL (ref 8.3–10.6)
CHLORIDE SERPL-SCNC: 100 MMOL/L (ref 99–110)
CO2 SERPL-SCNC: 24 MMOL/L (ref 21–32)
CREAT SERPL-MCNC: 0.5 MG/DL (ref 0.6–1.2)
GFR SERPLBLD CREATININE-BSD FMLA CKD-EPI: >90 ML/MIN/{1.73_M2}
GLUCOSE BLD-MCNC: 118 MG/DL (ref 70–99)
GLUCOSE BLD-MCNC: 125 MG/DL (ref 70–99)
GLUCOSE BLD-MCNC: 130 MG/DL (ref 70–99)
GLUCOSE SERPL-MCNC: 83 MG/DL (ref 70–99)
MAGNESIUM SERPL-MCNC: 1.71 MG/DL (ref 1.8–2.4)
PERFORMED ON: ABNORMAL
PHOSPHATE SERPL-MCNC: 3.7 MG/DL (ref 2.5–4.9)
POTASSIUM SERPL-SCNC: 3.9 MMOL/L (ref 3.5–5.1)
PROT SERPL-MCNC: 6.3 G/DL (ref 6.4–8.2)
SODIUM SERPL-SCNC: 135 MMOL/L (ref 136–145)

## 2025-05-28 PROCEDURE — 97530 THERAPEUTIC ACTIVITIES: CPT

## 2025-05-28 PROCEDURE — 1200000000 HC SEMI PRIVATE

## 2025-05-28 PROCEDURE — 99024 POSTOP FOLLOW-UP VISIT: CPT | Performed by: SURGERY

## 2025-05-28 PROCEDURE — 6370000000 HC RX 637 (ALT 250 FOR IP): Performed by: SURGERY

## 2025-05-28 PROCEDURE — 6370000000 HC RX 637 (ALT 250 FOR IP): Performed by: NURSE PRACTITIONER

## 2025-05-28 PROCEDURE — 97116 GAIT TRAINING THERAPY: CPT

## 2025-05-28 PROCEDURE — 6360000002 HC RX W HCPCS: Performed by: SURGERY

## 2025-05-28 PROCEDURE — 84100 ASSAY OF PHOSPHORUS: CPT

## 2025-05-28 PROCEDURE — 2500000003 HC RX 250 WO HCPCS: Performed by: SURGERY

## 2025-05-28 PROCEDURE — 6360000002 HC RX W HCPCS: Performed by: CLINICAL NURSE SPECIALIST

## 2025-05-28 PROCEDURE — 83735 ASSAY OF MAGNESIUM: CPT

## 2025-05-28 PROCEDURE — 97535 SELF CARE MNGMENT TRAINING: CPT

## 2025-05-28 PROCEDURE — 80048 BASIC METABOLIC PNL TOTAL CA: CPT

## 2025-05-28 PROCEDURE — 2580000003 HC RX 258: Performed by: SURGERY

## 2025-05-28 PROCEDURE — 6360000002 HC RX W HCPCS

## 2025-05-28 PROCEDURE — 80076 HEPATIC FUNCTION PANEL: CPT

## 2025-05-28 RX ORDER — DOCUSATE SODIUM 100 MG/1
100 CAPSULE, LIQUID FILLED ORAL DAILY
Status: DISCONTINUED | OUTPATIENT
Start: 2025-05-28 | End: 2025-05-30 | Stop reason: HOSPADM

## 2025-05-28 RX ORDER — MAGNESIUM SULFATE IN WATER 40 MG/ML
2000 INJECTION, SOLUTION INTRAVENOUS ONCE
Status: COMPLETED | OUTPATIENT
Start: 2025-05-28 | End: 2025-05-28

## 2025-05-28 RX ADMIN — MAGNESIUM SULFATE HEPTAHYDRATE 2000 MG: 40 INJECTION, SOLUTION INTRAVENOUS at 09:41

## 2025-05-28 RX ADMIN — DICYCLOMINE HYDROCHLORIDE 10 MG: 10 CAPSULE ORAL at 13:42

## 2025-05-28 RX ADMIN — PANTOPRAZOLE SODIUM 40 MG: 40 INJECTION, POWDER, FOR SOLUTION INTRAVENOUS at 09:42

## 2025-05-28 RX ADMIN — FLUCONAZOLE 200 MG: 100 TABLET ORAL at 09:02

## 2025-05-28 RX ADMIN — ENOXAPARIN SODIUM 30 MG: 100 INJECTION SUBCUTANEOUS at 09:02

## 2025-05-28 RX ADMIN — METOPROLOL 12.5 MG: 25 TABLET ORAL at 21:37

## 2025-05-28 RX ADMIN — CALCIUM CARBONATE 500 MG: 500 TABLET, CHEWABLE ORAL at 21:37

## 2025-05-28 RX ADMIN — MORPHINE SULFATE 4 MG: 4 INJECTION, SOLUTION INTRAMUSCULAR; INTRAVENOUS at 05:18

## 2025-05-28 RX ADMIN — PIPERACILLIN AND TAZOBACTAM 3375 MG: 3; .375 INJECTION, POWDER, LYOPHILIZED, FOR SOLUTION INTRAVENOUS at 21:37

## 2025-05-28 RX ADMIN — DICYCLOMINE HYDROCHLORIDE 10 MG: 10 CAPSULE ORAL at 09:02

## 2025-05-28 RX ADMIN — LIDOCAINE HYDROCHLORIDE 15 ML: 20 SOLUTION ORAL at 12:41

## 2025-05-28 RX ADMIN — PIPERACILLIN AND TAZOBACTAM 3375 MG: 3; .375 INJECTION, POWDER, LYOPHILIZED, FOR SOLUTION INTRAVENOUS at 05:28

## 2025-05-28 RX ADMIN — DICYCLOMINE HYDROCHLORIDE 10 MG: 10 CAPSULE ORAL at 21:37

## 2025-05-28 RX ADMIN — Medication 6 MG: at 21:37

## 2025-05-28 RX ADMIN — CALCIUM CARBONATE 500 MG: 500 TABLET, CHEWABLE ORAL at 09:01

## 2025-05-28 RX ADMIN — Medication 400 MG: at 09:23

## 2025-05-28 RX ADMIN — MORPHINE SULFATE 4 MG: 4 INJECTION, SOLUTION INTRAMUSCULAR; INTRAVENOUS at 09:25

## 2025-05-28 RX ADMIN — PIPERACILLIN AND TAZOBACTAM 3375 MG: 3; .375 INJECTION, POWDER, LYOPHILIZED, FOR SOLUTION INTRAVENOUS at 12:41

## 2025-05-28 RX ADMIN — MORPHINE SULFATE 4 MG: 4 INJECTION, SOLUTION INTRAMUSCULAR; INTRAVENOUS at 01:10

## 2025-05-28 RX ADMIN — METOPROLOL 12.5 MG: 25 TABLET ORAL at 09:24

## 2025-05-28 RX ADMIN — MORPHINE SULFATE 4 MG: 4 INJECTION, SOLUTION INTRAMUSCULAR; INTRAVENOUS at 23:49

## 2025-05-28 RX ADMIN — HYDROCORTISONE: 1 CREAM TOPICAL at 09:02

## 2025-05-28 RX ADMIN — AMLODIPINE BESYLATE 2.5 MG: 2.5 TABLET ORAL at 09:00

## 2025-05-28 RX ADMIN — DOCUSATE SODIUM 100 MG: 100 CAPSULE, LIQUID FILLED ORAL at 09:51

## 2025-05-28 RX ADMIN — MORPHINE SULFATE 4 MG: 4 INJECTION, SOLUTION INTRAMUSCULAR; INTRAVENOUS at 16:43

## 2025-05-28 RX ADMIN — SODIUM CHLORIDE, PRESERVATIVE FREE 10 ML: 5 INJECTION INTRAVENOUS at 21:38

## 2025-05-28 RX ADMIN — MORPHINE SULFATE 4 MG: 4 INJECTION, SOLUTION INTRAMUSCULAR; INTRAVENOUS at 20:36

## 2025-05-28 RX ADMIN — MORPHINE SULFATE 4 MG: 4 INJECTION, SOLUTION INTRAMUSCULAR; INTRAVENOUS at 12:41

## 2025-05-28 RX ADMIN — ASCORBIC ACID, VITAMIN A PALMITATE, CHOLECALCIFEROL, THIAMINE HYDROCHLORIDE, RIBOFLAVIN-5 PHOSPHATE SODIUM, PYRIDOXINE HYDROCHLORIDE, NIACINAMIDE, DEXPANTHENOL, ALPHA-TOCOPHEROL ACETATE, VITAMIN K1, FOLIC ACID, BIOTIN, CYANOCOBALAMIN: 200; 3300; 200; 6; 3.6; 6; 40; 15; 10; 150; 600; 60; 5 INJECTION, SOLUTION INTRAVENOUS at 18:33

## 2025-05-28 RX ADMIN — Medication 400 MG: at 21:37

## 2025-05-28 ASSESSMENT — PAIN DESCRIPTION - LOCATION
LOCATION: ABDOMEN
LOCATION: THROAT
LOCATION: ABDOMEN

## 2025-05-28 ASSESSMENT — PAIN SCALES - WONG BAKER: WONGBAKER_NUMERICALRESPONSE: NO HURT

## 2025-05-28 ASSESSMENT — PAIN DESCRIPTION - DESCRIPTORS
DESCRIPTORS: CRAMPING

## 2025-05-28 ASSESSMENT — PAIN SCALES - GENERAL
PAINLEVEL_OUTOF10: 7
PAINLEVEL_OUTOF10: 8
PAINLEVEL_OUTOF10: 7
PAINLEVEL_OUTOF10: 8

## 2025-05-28 ASSESSMENT — PAIN DESCRIPTION - ORIENTATION
ORIENTATION: MID;LOWER

## 2025-05-28 NOTE — CARE COORDINATION
Hospital day 35: Patient on C3 re intra abd abscess care managed by IM and General Surgery. Patient from home with spouse. Therapy saw this date recs home with HHC. Spoke with Patient bedside reviewed recs and agreeable. Choice list provided. Referral sent to [facility]. Patient TPN weaning, now on Reg, low fiber diet. Patient with Drains x2, unsure if will dc with at this time. SW following.Venessa Gatica, GASTON  c

## 2025-05-28 NOTE — PLAN OF CARE
Problem: Pain  Goal: Verbalizes/displays adequate comfort level or baseline comfort level  Outcome: Progressing     Problem: Safety - Adult  Goal: Free from fall injury  Outcome: Progressing     Problem: ABCDS Injury Assessment  Goal: Absence of physical injury  Outcome: Progressing     Problem: Nutrition Deficit:  Goal: Optimize nutritional status  Outcome: Progressing     Problem: Discharge Planning  Goal: Discharge to home or other facility with appropriate resources  Outcome: Progressing     Problem: Skin/Tissue Integrity  Goal: Skin integrity remains intact  Description: 1.  Monitor for areas of redness and/or skin breakdown2.  Assess vascular access sites hourly3.  Every 4-6 hours minimum:  Change oxygen saturation probe site4.  Every 4-6 hours:  If on nasal continuous positive airway pressure, respiratory therapy assess nares and determine need for appliance change or resting period  Outcome: Progressing  Electronically signed by Camilla Singh RN on 5/28/2025 at 11:38 AM

## 2025-05-28 NOTE — PROGRESS NOTES
Shift assessment completed. Patient is A&O x4. VSS. Pain 7/10 in her throat and abdomen. 3 lumen PICC line patent/ flushed, and infusing TPN, abx, and fluids. Patient on RA. Midline and side abdominal Incisions C/D/I. Patient's last BM- 5/28/2025. Patient admits to passing gas. Patient ambulates X1 standby to bathroom. Pt just called out any threw up her lunch. Pt stated she ate too much, too fast and feels fine now. Zofran offered and pt denied. Medication given per MAR.     Safety Measures in place:   Denies any needs at this time.   Bed/ Chair alarm on for safety.   Bed locked and in lowest position.    Call light within reach.   Gripper socks applied.   Patient in stable condition when RN leaving room.    Electronically signed by Camilla Singh RN on 5/28/2025 at 1:13 PM

## 2025-05-28 NOTE — PROGRESS NOTES
Physical Therapy  Facility/Department: [facility] C3 TELE/MED SURG/ONC  Daily Treatment Note  NAME: Tuyet Richter  : 1960  MRN: [id number]    Date of Service: 2025    Discharge Recommendations:  24 hour supervision or assist, Home with Home health PT   PT Equipment Recommendations  Equipment Needed: No    Patient Diagnosis(es): The primary encounter diagnosis was Intra-abdominal abscess (HCC). Diagnoses of Diverticulitis and SBO (small bowel obstruction) (HCC) were also pertinent to this visit.    Assessment  Assessment: Pt with significant improvement in overall mobility this date and met 2/4 goals. Pt complete transfers with SBA, ambulates with and without a RW in the room and bansal with SBA. Pt with increased sway and antalgic gait due to hip bursitis when using no AD. Pt completes all bathroom mobility with grossly SBA and demos good standing balance at the sink when performing dynamic UE task. Pt with continue to benefit from skilled PT services to address current deficits. Due to significant improvement in mobility it is anticipated pt will be safe to DC home with 24/7 and HHPT when medically appropriate. It is anticiated pt will no longer require co-treat in future sessions. Co-tx collaboration this date to safely meet goals and will have better occupational performance outcomes with in a co-treatment than 1:1 session.    Activity Tolerance: Patient tolerated treatment well  Equipment Needed: No    Plan  Physical Therapy Plan  General Plan: 2-3 times per week  Current Treatment Recommendations: Strengthening;Balance training;Functional mobility training;Transfer training;Endurance training;Gait training;Neuromuscular re-education;Pain management;Home exercise program;Safety education & training;Patient/Caregiver education & training;Positioning;Therapeutic activities;Co-Treatment    Restrictions  Restrictions/Precautions  Restrictions/Precautions: General Precautions, Fall Risk  Activity Level: Up as  Tolerated  Position Activity Restriction  Other Position/Activity Restrictions: TPN, drains     Subjective   Subjective  Subjective: Pt in the chair upon arrival, RN cleared for therapy. Pt pleasant and agreeable  Pain: Pt reports \"not really.\"    Objective  Vitals   Vitals  Pulse: 91  SpO2: 100 %  O2 Device: None (Room air)  BP: 112/71  MAP (Calculated): 85  BP Location: Right upper arm  BP Method: Automatic  Patient Position: Up in chair  Bed Mobility Training  Bed Mobility Training: Yes  Supine to Sit: Unable to assess (in chair at end of session)  Sit to Supine: Contact guard assistance  Scooting: Stand by assistance  Balance  Sitting: Intact  Standing: Impaired (trials both with and without RW)  Standing - Static: Good  Standing - Dynamic: Good  Transfer Training  Transfer Training: Yes  Interventions: Safety awareness training;Verbal cues;Tactile cues  Sit to Stand: Stand by assistance  Stand to Sit: Stand by assistance  Toilet Transfer: Stand by assistance (use of R grab bar)  Gait  Gait Training: Yes  Overall Level of Assistance: Stand by assistance  Distance (ft): 500 Feet  Assistive Device: Gait belt;Walker, rolling;None (No AD in the room for ~25ft, then RW in the bansal for ~450ft and the last 50ft with no AD)  Interventions: Verbal cues;Safety awareness training  Gait Abnormalities: Trunk sway increased;Decreased step clearance (gait grossly steady with no LOB, increased sway and antalgic from hip bursitis when using no AD.)           Safety Devices  Type of Devices: Patient at risk for falls;All fall risk precautions in place;Call light within reach;Nurse notified;Gait belt;Left in bed;Bed alarm in place         Goals  Short Term Goals  Time Frame for Short Term Goals: 5/28 (7 days) unless otherwise noted -- extend to 6/04 due to longer than anticipated LOS -- 5/28 continue all goals  Short Term Goal 1: Pt will perform bed mobility safely w/ CGA.  Short Term Goal 2: Pt will perform STS transfers safely

## 2025-05-28 NOTE — DISCHARGE INSTR - COC
Continuity of Care Form    Patient Name: Tuyet Richter   :  1960  MRN:  [id number]    Admit date:  2025  Discharge date:  ***    Code Status Order: Full Code   Advance Directives:    Date/Time Healthcare Directive Type of Healthcare Directive Copy in Chart Healthcare Agent Appointed Healthcare Agent's Name Healthcare Agent's Phone Number    [phone] No, patient does not have an advance directive for healthcare treatment  --  --  --  --  --             Admitting Physician:  Vicente Beltrán MD  PCP: Cyn Hendrickson APRN - CNP    Discharging Nurse: ***  Discharging Hospital Unit/Room#: 0325/0325-01  Discharging Unit Phone Number: ***    Emergency Contact:   Extended Emergency Contact Information  Primary Emergency Contact: Blas Richter  Address: [address] [place] [address]  Home Phone: [phone]  Mobile Phone: [phone]  Relation: Spouse  Secondary Emergency Contact: Sheri Lantigua  Home Phone: [phone]  Relation: Child    Past Surgical History:  Past Surgical History:   Procedure Laterality Date    COLONOSCOPY  2013    polyps    COLONOSCOPY  3/18/16    diverticulosis    ENDOMETRIAL ABLATION      LAPAROTOMY N/A 2025    EXPLORATORY LAPAROTOMY, LYSIS OF ADHESIONS, ABSCESS DRAINAGE AND PARTIAL COLECTOMY performed by Dallas Guerrero MD at [facility] OR    NOSE SURGERY      SIGMOID COLECTOMY N/A 2025    DIAGNOSTIC LAPAROSCOPY, LYSIS OF ADHESIONS, AND ABSCESS DRAINAGE performed by Dallas Guerrero MD at [facility] OR    UPPER GASTROINTESTINAL ENDOSCOPY  2013    Hiatal Hernia    UPPER GASTROINTESTINAL ENDOSCOPY  3/18/16    bx       Immunization History:   Immunization History   Administered Date(s) Administered    COVID-19, MODERNA Bivalent, (age 12y+), IM, 50 mcg/0.5 mL 10/04/2022    COVID-19, PFIZER, , (age 12y+), IM, 30mcg/0.3mL 10/21/2024       Active Problems:  Patient Active Problem List   Diagnosis Code

## 2025-05-28 NOTE — CONSULTS
[facility] Contin[facility] Nurse  Consult Note       NAME:  Tuyet Richter  MEDICAL RECORD NUMBER:  [id number]  AGE: 65 y.o.   GENDER: female  : 1960  TODAY'S DATE:  2025    Subjective; you want to see my bottom? Ya, it is sore.  Can we go into the bath room.   Reason for WOCN Evaluation and Assessment:     buttocks, bernadette area         Tuyet Richter is a 65 y.o. female referred by:   [] Physician  [x] Nursing  [] Other:     Wound Identification:  Wound Type:  moisture and shearing  Contributing Factors: chronic pressure and decreased mobility    Wound History: redness noted on May 10th has evolved into dry, flaky red area of sacrum   Current Wound Care Treatment:  igor    Patient Goal of Care:  [x] Wound Healing  [] Odor Control  [] Palliative Care  [x] Pain Control   [] Other:         PAST MEDICAL HISTORY        Diagnosis Date    Acid reflux     Gastritis     Hiatal hernia     Hypertension     Pinched nerve in neck        PAST SURGICAL HISTORY    Past Surgical History:   Procedure Laterality Date    COLONOSCOPY  2013    polyps    COLONOSCOPY  3/18/16    diverticulosis    ENDOMETRIAL ABLATION      LAPAROTOMY N/A 2025    EXPLORATORY LAPAROTOMY, LYSIS OF ADHESIONS, ABSCESS DRAINAGE AND PARTIAL COLECTOMY performed by Dallas Guerrero MD at [facility] OR    NOSE SURGERY      SIGMOID COLECTOMY N/A 2025    DIAGNOSTIC LAPAROSCOPY, LYSIS OF ADHESIONS, AND ABSCESS DRAINAGE performed by Dallas Guerrero MD at [facility] OR    UPPER GASTROINTESTINAL ENDOSCOPY  2013    Hiatal Hernia    UPPER GASTROINTESTINAL ENDOSCOPY  3/18/16    bx       FAMILY HISTORY    History reviewed. No pertinent family history.    SOCIAL HISTORY    Social History     Tobacco Use    Smoking status: Former     Current packs/day: 0.00     Average packs/day: 0.5 packs/day for 30.0 years (15.0 ttl pk-yrs)     Types: Cigarettes     Start date: 1987     Quit date: 2017     Years since quittin.3    Smokeless  2304   Number of days: 28       Incision 05/20/25 Abdomen Medial;Upper (Active)   Dressing Status Clean;Dry;Intact 05/27/25 2304   Incision Cleansed Not Cleansed 05/26/25 0848   Dressing/Treatment Gauze dressing/dressing sponge 05/22/25 0806   Margins Approximated 05/27/25 2304   Incision Assessment Dry 05/27/25 2304   Drainage Amount None (dry) 05/27/25 2304   Odor None 05/27/25 2304   Number of days: 8         Response to treatment:  Well tolerated by patient.     Pain Assessment:  Severity:  0 / 10  Quality of pain: N/A  Wound Pain Timing/Severity: none  Premedicated: No    Plan   Plan of Care: Wound 05/27/25 Buttocks redness, rash, open area-Dressing/Treatment: Zinc paste    Recommendation  Sacrum/coccyx and into perineum daily & prn with bernadette care cleanse area with bath wipe & pat dry.  Apply zinc to dry, flaky red areas.    Wound Care to sign off, please re consult if needed.     Added portable air flow to base of bed.    Specialty Bed Required : Yes   [] Low Air Loss   [x] Pressure Redistribution  [] Fluid Immersion  [] Bariatric  [] Total Pressure Relief  [] Other:     Current Diet: ADULT ORAL NUTRITION SUPPLEMENT; Breakfast, Lunch, Dinner; Standard High Calorie/High Protein Oral Supplement  PN-Adult Premix 5/15 - Standard Electrolytes - Central Line  ADULT DIET; Regular; Low Fiber  PN-Adult Premix 5/15 - Standard Electrolytes - [place]  Dietician consult:  Yes    Discharge Plan:  Placement for patient upon discharge: home with support    Patient appropriate for Outpatient Wound Care Center: No    Referrals:  [x]  / discharge planner  [] Home Health Care  [] Supplies  [] Other    Patient/Caregiver Teaching:  Level of patient/caregiver understanding able to: patient  [] Indicates understanding       [x] Needs reinforcement  [] Unsuccessful      [x] Verbal Understanding  [] Demonstrated understanding       [] No evidence of learning  [] Refused teaching         [] N/A       Electronically

## 2025-05-28 NOTE — PROGRESS NOTES
[facility] GENERAL SURGERY    Surgery Progress Note           POD # 29 and 8    PATIENT NAME: Tuyet Richter     TODAY'S DATE: 5/28/2025    INTERVAL HISTORY:    Pt with stable bloating, having bms    OBJECTIVE:   VITALS:  /70   Pulse 91   Temp 97.9 °F (36.6 °C) (Oral)   Resp 16   Ht 1.6 m (5' 3\")   Wt 48.9 kg (107 lb 12.9 oz)   SpO2 98%   BMI 19.10 kg/m²     INTAKE/OUTPUT:    I/O last 3 completed shifts:  In: 900 [P.O.:900]  Out: 2330 [Urine:2300; Drains:30]  No intake/output data recorded.              CONSTITUTIONAL:  awake and alert  LUNGS: no crackles or wheezes    ABDOMEN: soft, mod distention, tender incision, right sided drain minimal and serosanguinous, left sided drain minimal and green  INCISION: clean, mild serosanguinous drainage    Data:  CBC:   Recent Labs     05/26/25 0633   WBC 11.1*   HGB 7.4*   HCT 22.2*        BMP:    Recent Labs     05/26/25 0633 05/27/25  0610 05/28/25  0523    136 135*   K 3.6 3.9 3.9    102 100   CO2 20* 23 24   BUN 18 14 14   CREATININE 0.4* 0.5* 0.5*   GLUCOSE 93 88 83     Hepatic:   Recent Labs     05/26/25 0633 05/28/25  0523   AST 42* 21   ALT 13 18   BILITOT <0.2 <0.2   ALKPHOS 186* 266*     Mag:      Recent Labs     05/26/25 0633 05/27/25  0610 05/28/25  0523   MG 1.77* 1.54* 1.71*        Phos:     Recent Labs     05/26/25 0633 05/28/25  0523   PHOS 4.3 3.7      INR:   No results for input(s): \"INR\" in the last 72 hours.    CT - no contrast extravasation, small abscess at site of left sided drain    ASSESSMENT AND PLAN:  65 y.o. female status post 1st surgery: Laparoscopic lysis of adhesions and abscess drainage, 2nd surgery: EXPLORATORY LAPAROTOMY, LYSIS OF ADHESIONS, ABSCESS DRAINAGE AND PARTIAL COLECTOMY     GI: low fiber diet  Nutrition: continue with TPN another day  ID:  continue abx  Pain: controlled  Anemia: stable  Activity: PT/OT      Electronically signed by Dallas Guerrero MD

## 2025-05-28 NOTE — PROGRESS NOTES
[facility] Medicine Progress Note  V 5.17      Date of Admission: 4/23/2025    Hospital Day: 36      Chief Admission Complaint:  abdominal pain    Subjective:  no needs expressed.  Throat pain still present, but improved    Presenting Admission History:  \"This is a 64 y.o. female who presented to [facility] with abdominal pain.  PMHx significant for HTN.  History obtained from the patient and review of EMR.  The patient stated she had a hemicolectomy in February 2025 in [facility] and since then has been experiencing intermittent abdominal pain.  Per chart review, the patient has been readmitted 1 time since her surgery for electrolyte abnormalities.  However, the patient stated her pain has gotten progressively worse over the last couple of weeks.  She reports speaking with GI and they ordered a CT outpatient today.  Patient  is at the bedside and stated that she was called and told to go to the emergency department due to \"a collection of pus\" seen on the CT. In the emergency department a CT abdomen pelvis was obtained that revealed Fluid and gas collection within the pelvis interposed between the colon and uterus measuring up to 4.1 cm.  Abscess is favored.  This appears largely enveloped by abdominal and adnexal structures.  A [place] uterine fistula is not excluded.  Additional small dependent collection within the region of the pouch of Alfred measuring up to 2.5 cm.  Mild dilation of small bowel loops which may indicate partial obstruction.  There is no high-grade small bowel obstruction.  The patient was given morphine for pain.  She was also started on Zosyn.  Upon further evaluation, the patient was found to be dehydrated with hyponatremia.  This is likely secondary to inadequate p.o. intake.  She was admitted for further evaluation and treatment.  IV fluids have been initiated and GI has been consulted for the a.m. The patient denied any other associated symptoms as well

## 2025-05-28 NOTE — PROGRESS NOTES
Occupational Therapy  Facility/Department: [facility] C3 TELE/MED SURG/ONC  Daily Treatment Note  NAME: Tuyet Richter  : 1960  MRN: [id number]    Date of Service: 2025    Discharge Recommendations:  24 hour supervision or assist, Home with Home health OT         Patient Diagnosis(es): The primary encounter diagnosis was Intra-abdominal abscess (HCC). Diagnoses of Diverticulitis and SBO (small bowel obstruction) (HCC) were also pertinent to this visit.     Assessment   Assessment: Pt demos good progress toward OT goals, demos improved pain control and participation in ADL task and mobility. Pt seen as cotx due to previous need for Ax2, due to pt progress will see as individual sessions moving forwards as appropriate. Pt grossly SBA for functional transfers and mobility, pt does well without AD no LOB noted, however does request RW with further distances. Pt demos need for min A to manage clothing with toileting to manage around R drain site. Pt reports spouse can provide adequate physical assistance, due to pt progress recommend pt return home with 24 hr A and HHOT.   Activity Tolerance: Patient tolerated treatment well  Discharge Recommendations: 24 hour supervision or assist;Home with Home health OT     Plan  Occupational Therapy Plan  Times Per Week: 2-5x/week    Restrictions  Restrictions/Precautions  Restrictions/Precautions: General Precautions;Fall Risk  Activity Level: Up as Tolerated  Position Activity Restriction  Other Position/Activity Restrictions: TPN, drains    Subjective  Subjective  Subjective: Pt up in chair at entry, agreeable to OT treatment.    Orientation  Overall Orientation Status: Within Functional Limits    Pain: Pt reports \"not really.\"    Cognition  Overall Cognitive Status: WFL       Objective  Vitals  Vitals  Pulse: 91  SpO2: 100 %  O2 Device: None (Room air)  BP: 112/71  MAP (Calculated): 85  BP Location: Right upper arm  BP Method: Automatic  Patient Position: Up in

## 2025-05-29 LAB
ANION GAP SERPL CALCULATED.3IONS-SCNC: 10 MMOL/L (ref 3–16)
BUN SERPL-MCNC: 15 MG/DL (ref 7–20)
CALCIUM SERPL-MCNC: 8.5 MG/DL (ref 8.3–10.6)
CHLORIDE SERPL-SCNC: 101 MMOL/L (ref 99–110)
CO2 SERPL-SCNC: 24 MMOL/L (ref 21–32)
CREAT SERPL-MCNC: 0.5 MG/DL (ref 0.6–1.2)
GFR SERPLBLD CREATININE-BSD FMLA CKD-EPI: >90 ML/MIN/{1.73_M2}
GLUCOSE BLD-MCNC: 127 MG/DL (ref 70–99)
GLUCOSE BLD-MCNC: 89 MG/DL (ref 70–99)
GLUCOSE BLD-MCNC: 95 MG/DL (ref 70–99)
GLUCOSE SERPL-MCNC: 97 MG/DL (ref 70–99)
MAGNESIUM SERPL-MCNC: 1.77 MG/DL (ref 1.8–2.4)
PERFORMED ON: ABNORMAL
PERFORMED ON: NORMAL
PERFORMED ON: NORMAL
POTASSIUM SERPL-SCNC: 4.1 MMOL/L (ref 3.5–5.1)
SODIUM SERPL-SCNC: 135 MMOL/L (ref 136–145)

## 2025-05-29 PROCEDURE — 6370000000 HC RX 637 (ALT 250 FOR IP): Performed by: SURGERY

## 2025-05-29 PROCEDURE — 83735 ASSAY OF MAGNESIUM: CPT

## 2025-05-29 PROCEDURE — 6360000002 HC RX W HCPCS: Performed by: SURGERY

## 2025-05-29 PROCEDURE — 2580000003 HC RX 258: Performed by: SURGERY

## 2025-05-29 PROCEDURE — 80048 BASIC METABOLIC PNL TOTAL CA: CPT

## 2025-05-29 PROCEDURE — 99024 POSTOP FOLLOW-UP VISIT: CPT | Performed by: SURGERY

## 2025-05-29 PROCEDURE — 2500000003 HC RX 250 WO HCPCS: Performed by: SURGERY

## 2025-05-29 PROCEDURE — 87077 CULTURE AEROBIC IDENTIFY: CPT

## 2025-05-29 PROCEDURE — 1200000000 HC SEMI PRIVATE

## 2025-05-29 PROCEDURE — APPNB45 APP NON BILLABLE 31-45 MINUTES: Performed by: CLINICAL NURSE SPECIALIST

## 2025-05-29 PROCEDURE — 87186 SC STD MICRODIL/AGAR DIL: CPT

## 2025-05-29 PROCEDURE — 6360000002 HC RX W HCPCS: Performed by: CLINICAL NURSE SPECIALIST

## 2025-05-29 PROCEDURE — 87205 SMEAR GRAM STAIN: CPT

## 2025-05-29 PROCEDURE — 87070 CULTURE OTHR SPECIMN AEROBIC: CPT

## 2025-05-29 PROCEDURE — 6360000002 HC RX W HCPCS

## 2025-05-29 RX ORDER — MAGNESIUM SULFATE IN WATER 40 MG/ML
2000 INJECTION, SOLUTION INTRAVENOUS ONCE
Status: COMPLETED | OUTPATIENT
Start: 2025-05-29 | End: 2025-05-29

## 2025-05-29 RX ORDER — LANOLIN ALCOHOL/MO/W.PET/CERES
400 CREAM (GRAM) TOPICAL DAILY
Status: DISCONTINUED | OUTPATIENT
Start: 2025-05-29 | End: 2025-05-30 | Stop reason: HOSPADM

## 2025-05-29 RX ORDER — SIMETHICONE 80 MG
80 TABLET,CHEWABLE ORAL EVERY 6 HOURS PRN
Status: DISCONTINUED | OUTPATIENT
Start: 2025-05-29 | End: 2025-05-30 | Stop reason: HOSPADM

## 2025-05-29 RX ADMIN — MORPHINE SULFATE 4 MG: 4 INJECTION, SOLUTION INTRAMUSCULAR; INTRAVENOUS at 06:04

## 2025-05-29 RX ADMIN — MORPHINE SULFATE 4 MG: 4 INJECTION, SOLUTION INTRAMUSCULAR; INTRAVENOUS at 09:39

## 2025-05-29 RX ADMIN — OXYCODONE 10 MG: 5 TABLET ORAL at 18:54

## 2025-05-29 RX ADMIN — DICYCLOMINE HYDROCHLORIDE 10 MG: 10 CAPSULE ORAL at 20:57

## 2025-05-29 RX ADMIN — METOPROLOL 12.5 MG: 25 TABLET ORAL at 20:57

## 2025-05-29 RX ADMIN — NYSTATIN 500000 UNITS: 100000 SUSPENSION ORAL at 20:58

## 2025-05-29 RX ADMIN — CALCIUM CARBONATE 500 MG: 500 TABLET, CHEWABLE ORAL at 20:57

## 2025-05-29 RX ADMIN — SODIUM CHLORIDE, PRESERVATIVE FREE 10 ML: 5 INJECTION INTRAVENOUS at 09:39

## 2025-05-29 RX ADMIN — MAGNESIUM SULFATE HEPTAHYDRATE 2000 MG: 40 INJECTION, SOLUTION INTRAVENOUS at 09:39

## 2025-05-29 RX ADMIN — DICYCLOMINE HYDROCHLORIDE 10 MG: 10 CAPSULE ORAL at 13:32

## 2025-05-29 RX ADMIN — SODIUM CHLORIDE, PRESERVATIVE FREE 10 ML: 5 INJECTION INTRAVENOUS at 20:54

## 2025-05-29 RX ADMIN — MORPHINE SULFATE 4 MG: 4 INJECTION, SOLUTION INTRAMUSCULAR; INTRAVENOUS at 03:07

## 2025-05-29 RX ADMIN — PIPERACILLIN AND TAZOBACTAM 3375 MG: 3; .375 INJECTION, POWDER, LYOPHILIZED, FOR SOLUTION INTRAVENOUS at 21:51

## 2025-05-29 RX ADMIN — PIPERACILLIN AND TAZOBACTAM 3375 MG: 3; .375 INJECTION, POWDER, LYOPHILIZED, FOR SOLUTION INTRAVENOUS at 05:53

## 2025-05-29 RX ADMIN — Medication 6 MG: at 20:57

## 2025-05-29 RX ADMIN — DICYCLOMINE HYDROCHLORIDE 10 MG: 10 CAPSULE ORAL at 09:39

## 2025-05-29 RX ADMIN — PIPERACILLIN AND TAZOBACTAM 3375 MG: 3; .375 INJECTION, POWDER, LYOPHILIZED, FOR SOLUTION INTRAVENOUS at 13:31

## 2025-05-29 RX ADMIN — OXYCODONE 10 MG: 5 TABLET ORAL at 13:32

## 2025-05-29 ASSESSMENT — PAIN SCALES - GENERAL
PAINLEVEL_OUTOF10: 9
PAINLEVEL_OUTOF10: 8
PAINLEVEL_OUTOF10: 7
PAINLEVEL_OUTOF10: 7
PAINLEVEL_OUTOF10: 9

## 2025-05-29 ASSESSMENT — PAIN DESCRIPTION - LOCATION
LOCATION: ABDOMEN

## 2025-05-29 ASSESSMENT — PAIN DESCRIPTION - ORIENTATION
ORIENTATION: LOWER
ORIENTATION: MID;LOWER

## 2025-05-29 ASSESSMENT — PAIN DESCRIPTION - DESCRIPTORS
DESCRIPTORS: CRAMPING
DESCRIPTORS: CRAMPING;ACHING
DESCRIPTORS: CRAMPING;ACHING

## 2025-05-29 ASSESSMENT — PAIN SCALES - WONG BAKER: WONGBAKER_NUMERICALRESPONSE: NO HURT

## 2025-05-29 ASSESSMENT — PAIN - FUNCTIONAL ASSESSMENT: PAIN_FUNCTIONAL_ASSESSMENT: ACTIVITIES ARE NOT PREVENTED

## 2025-05-29 NOTE — PLAN OF CARE
Problem: Pain  Goal: Verbalizes/displays adequate comfort level or baseline comfort level  Outcome: Progressing  Flowsheets (Taken 5/29/2025 1945)  Verbalizes/displays adequate comfort level or baseline comfort level:   Encourage patient to monitor pain and request assistance   Assess pain using appropriate pain scale   Administer analgesics based on type and severity of pain and evaluate response   Implement non-pharmacological measures as appropriate and evaluate response     Problem: Safety - Adult  Goal: Free from fall injury  Outcome: Progressing     Problem: ABCDS Injury Assessment  Goal: Absence of physical injury  Outcome: Progressing  Flowsheets (Taken 5/29/2025 1945)  Absence of Physical Injury: Implement safety measures based on patient assessment     Problem: Skin/Tissue Integrity - Adult  Goal: Incisions, wounds, or drain sites healing without S/S of infection  Outcome: Progressing  Flowsheets (Taken 5/29/2025 1945)  Incisions, Wounds, or Drain Sites Healing Without Sign and Symptoms of Infection: TWICE DAILY: Assess and document dressing/incision, wound bed, drain sites and surrounding tissue

## 2025-05-29 NOTE — PROGRESS NOTES
Comprehensive Nutrition Assessment    Type and Reason for Visit:  Reassess    Nutrition Recommendations/Plan:   Continue to cut TPN rate by 50% and let current bag run out.   Continue low fiber diet.   Encourage PO intakes as tolerated.   Monitor pertinent labs, bowel habits, weight, N/V, supplement acceptance, clinical progression.       Malnutrition Assessment:  Malnutrition Status:  Severe malnutrition (04/24/25 1120)    Context:  Acute Illness     Findings of the 6 clinical characteristics of malnutrition:  Energy Intake:  75% or less of estimated energy requirements for 7 or more days  Weight Loss:  Mild weight loss     Body Fat Loss:  Moderate body fat loss Orbital, Triceps   Muscle Mass Loss:  Moderate muscle mass loss Temples (temporalis), Clavicles (pectoralis & deltoids)  Fluid Accumulation:  No fluid accumulation     Strength:  Not Performed    Nutrition Assessment:    Followup: Noted pts diet advanced to low fiber 5/28. TPN now running at 40ml/hr due to improved PO intakes of 26-50% of meals. Patient seen resting in chair. States she tolerated dinner well last night (turkey wrap). Patient with diet questions regarding low fiber fruits and vegetables. RD discussed low fiber fruits (peeled apple, banana, canned peaches, applesauce) and low fiber vegetables (canned green beans, canned carrots, potatoes without skin, etc). Handout left with patient. Noted pt having BMs. Weight fluctuating 103-110lbs throughout admission. Will continue to monitor.    Nutrition Related Findings:    Na 135, Mg 1.77, BG x24 hrs . LBM 5/29. TPN weaning. Wound Type: Surgical Incision       Current Nutrition Intake & Therapies:    Average Meal Intake: 26-50%  Average Supplements Intake: Unable to assess  ADULT ORAL NUTRITION SUPPLEMENT; Breakfast, Lunch, Dinner; Standard High Calorie/High Protein Oral Supplement  ADULT DIET; Regular; Low Fiber  PN-Adult Premix 5/15 - Standard Electrolytes - Central Line  Current

## 2025-05-29 NOTE — CARE COORDINATION
Hospital day 36, POD 30, 9 care managed by IM and General Surgery. Patient from home with Spouse. Patient seen by therapy recs for home with 24 hr and HHC. [facility] follow. Plans to wean TPN this date should get last dose. Patient will dc home with drains x2. RN to start edu on management this date per IDT conversation. Patient with pending cx from would spoke with by IM and Gen Surg anticipate oral ATB at dc if any. SW following.GASTON Alexander

## 2025-05-29 NOTE — PROGRESS NOTES
on 5/29/2025.  Patient is currently ON tele demonstrating NSR w/ controlled rate.    Diet: ADULT ORAL NUTRITION SUPPLEMENT; Breakfast, Lunch, Dinner; Standard High Calorie/High Protein Oral Supplement  ADULT DIET; Regular; Low Fiber  PN-Adult Premix 5/15 - Standard Electrolytes - [place]    DVT Prophylaxis: PPX dose LMWH    Code status: Full Code    PT/OT Eval Status: Seen w/ recs for home w/ assist and Seen w/ recs for HHC    Multi-Disciplinary Rounds with Case Management completed on 5/29/2025 with the following recs:     Anticipated Discharge Location: Home w/ HHC     Anticipated Discharge Day/Date: 1-2 days    Barriers to Discharge: Clinical Course    Likely rate limiting factor: diet toleration    --------------------------------------------------    MDM (any 2 required for High level billing)    A. Problems (any 1)  [] Acute/Chronic Illness/injury posing ongoing threat to life and/or bodily function without ongoing treatment    [] Severe exacerbation of chronic illness    --------------------------------------------------  B. Risk of Treatment (any 1)    [x] Drugs/treatments that require intensive monitoring for toxicity    [] IV ABX (Vancomycin, Aminoglycosides, etc)     [] Post-Cath/Contrast study requiring serial monitoring    [] IV Narcotic analgesia    [] Aggressive IV diuresis    [] Hypertonic Saline    [] Critical electrolyte abnormalities requiring IV replacement    [] Insulin - Scheduled/SSI or Insulin gtt    [] Anticoagulation (Heparin gtt or Coumadin - other anticoagulants in special circumstances)    [] Cardiac Medications (IV Amiodarone/Diltiazem, Tikosyn, etc)    [] Hemodialysis    [x] Other -  TPN  [] Change in code status    [] Decision to escalate care    [] Major surgery/procedure with associated risk factors    --------------------------------------------------  C. Data (any 2)    [x] Data Review (any 3)    [x] Consultant notes from yesterday/today    [x] All available current labs

## 2025-05-29 NOTE — PROGRESS NOTES
[facility] GENERAL SURGERY    Surgery Progress Note           POD # 30 and 9    PATIENT NAME: Tuyet Richter     TODAY'S DATE: 5/29/2025    INTERVAL HISTORY:    Pt having BMs, incisional pain. Eating some better. Walked more with therapy yesterday    OBJECTIVE:   VITALS:  /70   Pulse 82   Temp 97.9 °F (36.6 °C) (Oral)   Resp 18   Ht 1.6 m (5' 3\")   Wt 48.3 kg (106 lb 7.7 oz)   SpO2 96%   BMI 18.86 kg/m²     INTAKE/OUTPUT:    I/O last 3 completed shifts:  In: 1560 [P.O.:1560]  Out: 2315 [Urine:2300; Drains:15]  I/O this shift:  In: -   Out: 700 [Urine:700]              CONSTITUTIONAL:  awake and alert  LUNGS: no crackles or wheezes    ABDOMEN: soft, mod distention, tender incision, right sided drain minimal bilious, left sided drain minimal and green  INCISION: clean, mild erythema, some purulent drainage centrally    Data:  CBC:   No results for input(s): \"WBC\", \"HGB\", \"HCT\", \"PLT\" in the last 72 hours.    BMP:    Recent Labs     05/27/25  0610 05/28/25  0523 05/29/25  0550    135* 135*   K 3.9 3.9 4.1    100 101   CO2 23 24 24   BUN 14 14 15   CREATININE 0.5* 0.5* 0.5*   GLUCOSE 88 83 97     Hepatic:   Recent Labs     05/28/25  0523   AST 21   ALT 18   BILITOT <0.2   ALKPHOS 266*     Mag:      Recent Labs     05/27/25  0610 05/28/25  0523 05/29/25  0550   MG 1.54* 1.71* 1.77*        Phos:     Recent Labs     05/28/25  0523   PHOS 3.7      INR:   No results for input(s): \"INR\" in the last 72 hours.    CT - no contrast extravasation, small abscess at site of left sided drain    ASSESSMENT AND PLAN:  65 y.o. female status post 1st surgery: Laparoscopic lysis of adhesions and abscess drainage, 2nd surgery: EXPLORATORY LAPAROTOMY, LYSIS OF ADHESIONS, ABSCESS DRAINAGE AND PARTIAL COLECTOMY     GI: low fiber diet  Nutrition: wean TPN  ID:  continue with zosyn, will culture wound  Wound: will need to remove some staples today   Pain: controlled  Activity: PT/OT      Electronically signed by

## 2025-05-29 NOTE — PLAN OF CARE
Problem: Pain  Goal: Verbalizes/displays adequate comfort level or baseline comfort level  5/28/2025 2012 by Sherif Gupta RN  Outcome: Progressing  5/28/2025 1138 by Camilla Singh RN  Outcome: Progressing     Problem: Safety - Adult  Goal: Free from fall injury  5/28/2025 2012 by Sherif Gupta RN  Outcome: Progressing  5/28/2025 1138 by Camilla Singh RN  Outcome: Progressing     Problem: ABCDS Injury Assessment  Goal: Absence of physical injury  5/28/2025 2012 by Sherif Gupta RN  Outcome: Progressing  5/28/2025 1138 by Camilla Singh RN  Outcome: Progressing     Problem: Discharge Planning  Goal: Discharge to home or other facility with appropriate resources  5/28/2025 2012 by Sherif Gupta RN  Outcome: Progressing  5/28/2025 1138 by Camilla Singh RN  Outcome: Progressing     Problem: Skin/Tissue Integrity - Adult  Goal: Skin integrity remains intact  Outcome: Progressing  Goal: Incisions, wounds, or drain sites healing without S/S of infection  Outcome: Progressing     Problem: Infection - Adult  Goal: Absence of infection at discharge  Outcome: Progressing  Goal: Absence of infection during hospitalization  Outcome: Progressing     Problem: Skin/Tissue Integrity  Goal: Skin integrity remains intact  Description: 1.  Monitor for areas of redness and/or skin breakdown2.  Assess vascular access sites hourly3.  Every 4-6 hours minimum:  Change oxygen saturation probe site4.  Every 4-6 hours:  If on nasal continuous positive airway pressure, respiratory therapy assess nares and determine need for appliance change or resting period  5/28/2025 2012 by Sherif Gupta RN  Outcome: Progressing  5/28/2025 1138 by Camilla Singh RN  Outcome: Progressing

## 2025-05-30 VITALS
TEMPERATURE: 98.1 F | BODY MASS INDEX: 18.87 KG/M2 | OXYGEN SATURATION: 97 % | HEART RATE: 96 BPM | SYSTOLIC BLOOD PRESSURE: 110 MMHG | WEIGHT: 106.48 LBS | HEIGHT: 63 IN | DIASTOLIC BLOOD PRESSURE: 63 MMHG | RESPIRATION RATE: 18 BRPM

## 2025-05-30 LAB
ALBUMIN SERPL-MCNC: 3 G/DL (ref 3.4–5)
ALP SERPL-CCNC: 320 U/L (ref 40–129)
ALT SERPL-CCNC: 25 U/L (ref 10–40)
ANION GAP SERPL CALCULATED.3IONS-SCNC: 12 MMOL/L (ref 3–16)
AST SERPL-CCNC: 27 U/L (ref 15–37)
BILIRUB DIRECT SERPL-MCNC: <0.1 MG/DL (ref 0–0.3)
BILIRUB INDIRECT SERPL-MCNC: ABNORMAL MG/DL (ref 0–1)
BILIRUB SERPL-MCNC: <0.2 MG/DL (ref 0–1)
BUN SERPL-MCNC: 13 MG/DL (ref 7–20)
CALCIUM SERPL-MCNC: 9.3 MG/DL (ref 8.3–10.6)
CHLORIDE SERPL-SCNC: 100 MMOL/L (ref 99–110)
CO2 SERPL-SCNC: 23 MMOL/L (ref 21–32)
CREAT SERPL-MCNC: 0.5 MG/DL (ref 0.6–1.2)
GFR SERPLBLD CREATININE-BSD FMLA CKD-EPI: >90 ML/MIN/{1.73_M2}
GLUCOSE SERPL-MCNC: 179 MG/DL (ref 70–99)
MAGNESIUM SERPL-MCNC: 1.85 MG/DL (ref 1.8–2.4)
PHOSPHATE SERPL-MCNC: 4.1 MG/DL (ref 2.5–4.9)
POTASSIUM SERPL-SCNC: 4 MMOL/L (ref 3.5–5.1)
PROT SERPL-MCNC: 6.4 G/DL (ref 6.4–8.2)
SODIUM SERPL-SCNC: 135 MMOL/L (ref 136–145)

## 2025-05-30 PROCEDURE — 6370000000 HC RX 637 (ALT 250 FOR IP): Performed by: SURGERY

## 2025-05-30 PROCEDURE — 2580000003 HC RX 258: Performed by: SURGERY

## 2025-05-30 PROCEDURE — 6360000002 HC RX W HCPCS: Performed by: SURGERY

## 2025-05-30 PROCEDURE — 84100 ASSAY OF PHOSPHORUS: CPT

## 2025-05-30 PROCEDURE — 6370000000 HC RX 637 (ALT 250 FOR IP): Performed by: NURSE PRACTITIONER

## 2025-05-30 PROCEDURE — 2500000003 HC RX 250 WO HCPCS: Performed by: SURGERY

## 2025-05-30 PROCEDURE — 6370000000 HC RX 637 (ALT 250 FOR IP)

## 2025-05-30 PROCEDURE — 80048 BASIC METABOLIC PNL TOTAL CA: CPT

## 2025-05-30 PROCEDURE — 83735 ASSAY OF MAGNESIUM: CPT

## 2025-05-30 PROCEDURE — 99024 POSTOP FOLLOW-UP VISIT: CPT | Performed by: SURGERY

## 2025-05-30 PROCEDURE — 80076 HEPATIC FUNCTION PANEL: CPT

## 2025-05-30 PROCEDURE — APPNB60 APP NON BILLABLE TIME 46-60 MINS: Performed by: CLINICAL NURSE SPECIALIST

## 2025-05-30 RX ORDER — HYDROCODONE BITARTRATE AND ACETAMINOPHEN 5; 325 MG/1; MG/1
1 TABLET ORAL EVERY 6 HOURS PRN
Qty: 12 TABLET | Refills: 0 | Status: SHIPPED | OUTPATIENT
Start: 2025-05-30 | End: 2025-06-02

## 2025-05-30 RX ORDER — PSEUDOEPHEDRINE HCL 30 MG
100 TABLET ORAL DAILY
Qty: 30 CAPSULE | Refills: 0 | Status: SHIPPED | OUTPATIENT
Start: 2025-05-30

## 2025-05-30 RX ORDER — OMEPRAZOLE 40 MG/1
40 CAPSULE, DELAYED RELEASE ORAL DAILY
Qty: 30 CAPSULE | Refills: 1 | Status: SHIPPED | OUTPATIENT
Start: 2025-05-30

## 2025-05-30 RX ORDER — FLUCONAZOLE 200 MG/1
200 TABLET ORAL DAILY
Qty: 5 TABLET | Refills: 0 | Status: SHIPPED | OUTPATIENT
Start: 2025-05-30 | End: 2025-06-04

## 2025-05-30 RX ORDER — AMLODIPINE BESYLATE 2.5 MG/1
2.5 TABLET ORAL DAILY
Qty: 30 TABLET | Refills: 3 | Status: SHIPPED | OUTPATIENT
Start: 2025-05-30

## 2025-05-30 RX ORDER — TRAMADOL HYDROCHLORIDE 50 MG/1
50 TABLET ORAL EVERY 6 HOURS PRN
Qty: 12 TABLET | Refills: 0 | Status: SHIPPED | OUTPATIENT
Start: 2025-05-30 | End: 2025-05-30 | Stop reason: HOSPADM

## 2025-05-30 RX ORDER — TRAMADOL HYDROCHLORIDE 50 MG/1
50 TABLET ORAL EVERY 6 HOURS PRN
Status: DISCONTINUED | OUTPATIENT
Start: 2025-05-30 | End: 2025-05-30

## 2025-05-30 RX ORDER — HYDROCODONE BITARTRATE AND ACETAMINOPHEN 5; 325 MG/1; MG/1
1 TABLET ORAL EVERY 6 HOURS PRN
Status: DISCONTINUED | OUTPATIENT
Start: 2025-05-30 | End: 2025-05-30 | Stop reason: HOSPADM

## 2025-05-30 RX ADMIN — AMLODIPINE BESYLATE 2.5 MG: 2.5 TABLET ORAL at 10:52

## 2025-05-30 RX ADMIN — HYDROCODONE BITARTRATE AND ACETAMINOPHEN 1 TABLET: 5; 325 TABLET ORAL at 14:35

## 2025-05-30 RX ADMIN — PANTOPRAZOLE SODIUM 40 MG: 40 INJECTION, POWDER, FOR SOLUTION INTRAVENOUS at 10:52

## 2025-05-30 RX ADMIN — OXYCODONE 5 MG: 5 TABLET ORAL at 03:30

## 2025-05-30 RX ADMIN — ONDANSETRON 4 MG: 2 INJECTION, SOLUTION INTRAMUSCULAR; INTRAVENOUS at 11:29

## 2025-05-30 RX ADMIN — ENOXAPARIN SODIUM 30 MG: 100 INJECTION SUBCUTANEOUS at 10:51

## 2025-05-30 RX ADMIN — Medication 400 MG: at 10:52

## 2025-05-30 RX ADMIN — PIPERACILLIN AND TAZOBACTAM 3375 MG: 3; .375 INJECTION, POWDER, LYOPHILIZED, FOR SOLUTION INTRAVENOUS at 05:43

## 2025-05-30 RX ADMIN — FLUCONAZOLE 200 MG: 100 TABLET ORAL at 10:51

## 2025-05-30 RX ADMIN — TRAMADOL HYDROCHLORIDE 50 MG: 50 TABLET, COATED ORAL at 10:51

## 2025-05-30 RX ADMIN — SODIUM CHLORIDE, PRESERVATIVE FREE 10 ML: 5 INJECTION INTRAVENOUS at 10:53

## 2025-05-30 RX ADMIN — CALCIUM CARBONATE 500 MG: 500 TABLET, CHEWABLE ORAL at 10:51

## 2025-05-30 RX ADMIN — DOCUSATE SODIUM 100 MG: 100 CAPSULE, LIQUID FILLED ORAL at 10:51

## 2025-05-30 RX ADMIN — DICYCLOMINE HYDROCHLORIDE 10 MG: 10 CAPSULE ORAL at 10:51

## 2025-05-30 RX ADMIN — METOPROLOL 12.5 MG: 25 TABLET ORAL at 10:52

## 2025-05-30 ASSESSMENT — PAIN DESCRIPTION - LOCATION
LOCATION: ABDOMEN

## 2025-05-30 ASSESSMENT — PAIN DESCRIPTION - DESCRIPTORS
DESCRIPTORS: ACHING;DISCOMFORT
DESCRIPTORS: ACHING;DISCOMFORT

## 2025-05-30 ASSESSMENT — PAIN SCALES - GENERAL
PAINLEVEL_OUTOF10: 8
PAINLEVEL_OUTOF10: 4
PAINLEVEL_OUTOF10: 4

## 2025-05-30 ASSESSMENT — PAIN SCALES - WONG BAKER: WONGBAKER_NUMERICALRESPONSE: NO HURT

## 2025-05-30 ASSESSMENT — PAIN DESCRIPTION - ORIENTATION: ORIENTATION: MID

## 2025-05-30 NOTE — PROGRESS NOTES
Pt assessment completed and charted. VSS, pt on RA.   Safety Measures in place:   Bed in lowest position and wheels locked.   Call light within reach.   Bedside table within reach.   Non-skid socks in place.   Pt denies any other needs at this time.    Pt calls out appropriately.  Patient in stable condition when RN left room.

## 2025-05-30 NOTE — PROGRESS NOTES
Occupational Therapy    Chart reviewed, attempted to see pt for follow up treatment this date;  pt declined any OOB or bathroom activity d/t increased pain \"I am going home this afternoon.\" RN aware of pt's pain.    Shirley Tenorio,OT

## 2025-05-30 NOTE — PROGRESS NOTES
Pt assessment completed and charted. Pt A&O v4. VSS.   With [place] UQ abd drain, patent.  IV site patent/flushed, saline locked.   Medication given per MAR.      Safety Measures in place:   Bed in lowest position and wheels locked.   Call light and bedside table within reach.   Non-skid socks in place.   Pt denies any other needs at this time.    Pt calls out appropriately.  Patient in stable condition when RN left room.

## 2025-05-30 NOTE — CARE COORDINATION
Case Management Discharge Summary       DISCHARGE Disposition: Home with Home Health Care    Home Care:  Home Care ordered at discharge: Yes  Skilled home care services to be provded:  RN, PT, and OT  Home Care Agency: [facility]  Phone: [phone]  Fax: [phone]  Orders faxed: No, Spoke with Mario ( covering for Deon, she pulled orders no further needs)     IMM Completed: Yes  05/28/2025    Durable Medical Equipment:  No    Transportation:  Transportation PLAN for discharge: family   Mode of Transport: Private Car      The Patient and/or patient representative Tuyet and her family were provided with a choice of provider and agrees with the discharge plan Yes  Freedom of choice list was provided with basic dialogue that supports the patient's individualized plan of care/goals and shares the quality data associated with the providers. Yes    GASTON Alexander  [facility]   Case Management Department  Ph: [phone]  Fax: [phone]

## 2025-05-30 NOTE — PROGRESS NOTES
[facility] GENERAL SURGERY    Surgery Progress Note           POD # 31 and 10    PATIENT NAME: Tuyet Richter     TODAY'S DATE: 5/30/2025    INTERVAL HISTORY:    Pt having BMs, incisional pain. Didn't tolerate roxicodone    OBJECTIVE:   VITALS:  /69   Pulse 94   Temp 98.1 °F (36.7 °C)   Resp 18   Ht 1.6 m (5' 3\")   Wt 48.3 kg (106 lb 7.7 oz)   SpO2 98%   BMI 18.86 kg/m²     INTAKE/OUTPUT:    I/O last 3 completed shifts:  In: 900 [P.O.:900]  Out: 3835 [Urine:3800; Drains:35]  No intake/output data recorded.              CONSTITUTIONAL:  awake and alert  LUNGS: no crackles or wheezes    ABDOMEN: soft, rt drain removed, left drain green with scant output  INCISION: clean, no further drainage, wound repacked    Data:  CBC:   No results for input(s): \"WBC\", \"HGB\", \"HCT\", \"PLT\" in the last 72 hours.    BMP:    Recent Labs     05/28/25  0523 05/29/25  0550 05/30/25  0530   * 135* 135*   K 3.9 4.1 4.0    101 100   CO2 24 24 23   BUN 14 15 13   CREATININE 0.5* 0.5* 0.5*   GLUCOSE 83 97 179*     Hepatic:   Recent Labs     05/28/25  0523 05/30/25  0530   AST 21 27   ALT 18 25   BILITOT <0.2 <0.2   ALKPHOS 266* 320*     Mag:      Recent Labs     05/28/25  0523 05/29/25  0550 05/30/25  0530   MG 1.71* 1.77* 1.85        Phos:     Recent Labs     05/28/25  0523 05/30/25  0530   PHOS 3.7 4.1      INR:   No results for input(s): \"INR\" in the last 72 hours.    CT - no contrast extravasation, small abscess at site of left sided drain    ASSESSMENT AND PLAN:  65 y.o. female status post 1st surgery: Laparoscopic lysis of adhesions and abscess drainage, 2nd surgery: EXPLORATORY LAPAROTOMY, LYSIS OF ADHESIONS, ABSCESS DRAINAGE AND PARTIAL COLECTOMY     GI: low fiber diet  Nutrition: off TPN  ID:  augmentin at d/c  Wound: continue with local wound care  Pain: change to tramadol  Activity: PT/OT    Ok to d/c to home today - follow-up Mon 6/9    Electronically signed by SARAI Arciniega CNP     Patient

## 2025-05-30 NOTE — DISCHARGE SUMMARY
Hospital Medicine Discharge Summary    Patient: Tuyet Richter   : 1960     Hospital:  [facility]  Admit Date: 2025   Discharge Date:  25  Disposition:  Home w/ [facility]   Code status:  Full  Condition at Discharge: Stable  Primary Care Provider: Cyn Hendrickson APRN - CNP    Admitting Provider: Vicente Beltrán MD  Discharge Provider: SARAI Burt CNP     Discharge Diagnoses:      Active Hospital Problems    Diagnosis     Severe protein-calorie malnutrition [E43]     Intra-abdominal abscess (HCC) [K65.1]     Diverticulitis [K57.92]     SBO (small bowel obstruction) (HCC) [K56.609]      Presenting Admission History:  \"This is a 64 y.o. female who presented to [facility] with abdominal pain.  PMHx significant for HTN.  History obtained from the patient and review of EMR.  The patient stated she had a hemicolectomy in 2025 in [facility] and since then has been experiencing intermittent abdominal pain.  Per chart review, the patient has been readmitted 1 time since her surgery for electrolyte abnormalities.  However, the patient stated her pain has gotten progressively worse over the last couple of weeks.  She reports speaking with GI and they ordered a CT outpatient today.  Patient  is at the bedside and stated that she was called and told to go to the emergency department due to \"a collection of pus\" seen on the CT. In the emergency department a CT abdomen pelvis was obtained that revealed Fluid and gas collection within the pelvis interposed between the colon and uterus measuring up to 4.1 cm.  Abscess is favored.  This appears largely enveloped by abdominal and adnexal structures.  A [place] uterine fistula is not excluded.  Additional small dependent collection within the region of the pouch of Alfred measuring up to 2.5 cm.  Mild dilation of small bowel loops which may indicate partial obstruction.  There is no high-grade small  marginated. REPRODUCTIVE: The prostate is normal in size and attenuation. PELVIC CAVITY: Curvilinear gas/fluid collection is identified in the pelvis located anterior to the uterus, similar in position to previous study. Current dimensions measure 4.9 x 1.8 cm. There has been interval peritoneal drainage catheter placement in this region with less fluid noted within the collection. The catheter exits the left lower abdominal quadrant anterior wall. A smaller focus of crescentic fluid is identified posterior to the uterus with a few small gas bubbles noted the finding currently measures approximately 3.1 x 1.0 cm demonstrating overall similar size and configuration to the previous study, axial series 2 image #139. Numerous punctate foci of pneumoperitoneum are identified. LYMPHATIC: No evidence of pelvic lymphadenopathy. INGUINAL REGION: No evidence of inguinal hernia. No evidence of inguinal lymphadenopathy.     1. Worsening abdominal ileus. 2. Interval intervention with placement of peritoneal drainage catheter as described. The tip is located in the aforementioned mid pelvic collection. 3. A smaller crescentic poorly defined collection similar to previous study is noted posterior to the uterus. Electronically signed by MD Jesse Green    XR ABDOMEN (2 VIEWS)  Result Date: 5/1/2025  ABDOMEN TWO VIEWS: CLINICAL HISTORY: Abdominal distention. FINDINGS:  Upright and supine AP views of the abdomen were obtained. CT scan of the abdomen pelvis dated 4/27/2025..  There are dilated loops of small bowel and colon. There is a surgical drain projecting over the pelvis. There is fusion hardware present  with the lower lumbar spine.     1. There are dilated loops of small bowel and colon. This can be seen with ileus. Findings are new from CT scan of the abdomen and  pelvis dated 4/27/2025. Electronically signed by Olivier Estes MD      Consults:     IP CONSULT TO HOSPITALIST  IP CONSULT TO GI  IP CONSULT TO GENERAL

## 2025-06-01 LAB
BACTERIA SPEC AEROBE CULT: ABNORMAL
GRAM STN SPEC: ABNORMAL
ORGANISM: ABNORMAL

## 2025-06-02 ENCOUNTER — TELEPHONE (OUTPATIENT)
Dept: SURGERY | Age: 65
End: 2025-06-02

## 2025-06-02 DIAGNOSIS — R10.9 ACUTE POSTOPERATIVE ABDOMINAL PAIN: ICD-10-CM

## 2025-06-02 DIAGNOSIS — K65.1 INTRA-ABDOMINAL ABSCESS (HCC): Primary | ICD-10-CM

## 2025-06-02 DIAGNOSIS — K56.609 SBO (SMALL BOWEL OBSTRUCTION) (HCC): ICD-10-CM

## 2025-06-02 DIAGNOSIS — G89.18 ACUTE POSTOPERATIVE ABDOMINAL PAIN: ICD-10-CM

## 2025-06-02 RX ORDER — HYDROCODONE BITARTRATE AND ACETAMINOPHEN 5; 325 MG/1; MG/1
1 TABLET ORAL EVERY 6 HOURS PRN
Qty: 20 TABLET | Refills: 0 | Status: SHIPPED | OUTPATIENT
Start: 2025-06-02 | End: 2025-06-07

## 2025-06-02 NOTE — TELEPHONE ENCOUNTER
Patient is calling to see if she is able to get more pain medication.. She is stating her pain level is an 8, pain is from the abdomen.     ((Hydrocodone 325 mg tablets are working good for her she states))      [facility] [id number]  Pharmacy--  [phone]     Tuyet   [phone]

## 2025-06-02 NOTE — PROGRESS NOTES
Pt called stating that the pain medicine she was sent home on is doing a good job of controlling her pain and asking for a refill.     Will give 5 more days due to acute post op pain with multiple surgeries, open wound and multiple drains in place.     Holly Davila, SARAI - CNP

## 2025-06-06 ENCOUNTER — OFFICE VISIT (OUTPATIENT)
Dept: SURGERY | Age: 65
End: 2025-06-06

## 2025-06-06 ENCOUNTER — HOSPITAL ENCOUNTER (OUTPATIENT)
Dept: CT IMAGING | Age: 65
Discharge: HOME | End: 2025-06-06
Attending: SURGERY
Payer: MEDICARE

## 2025-06-06 VITALS
BODY MASS INDEX: 17.22 KG/M2 | DIASTOLIC BLOOD PRESSURE: 62 MMHG | SYSTOLIC BLOOD PRESSURE: 102 MMHG | HEIGHT: 63 IN | WEIGHT: 97.2 LBS

## 2025-06-06 DIAGNOSIS — Z98.890 S/P LAPAROTOMY WITH LYSIS OF ADHESIONS: ICD-10-CM

## 2025-06-06 DIAGNOSIS — K57.92 DIVERTICULITIS: ICD-10-CM

## 2025-06-06 DIAGNOSIS — G89.18 POST-OP PAIN: ICD-10-CM

## 2025-06-06 DIAGNOSIS — G89.18 POST-OP PAIN: Primary | ICD-10-CM

## 2025-06-06 PROCEDURE — 6360000004 HC RX CONTRAST MEDICATION: Performed by: SURGERY

## 2025-06-06 PROCEDURE — 74177 CT ABD & PELVIS W/CONTRAST: CPT

## 2025-06-06 PROCEDURE — 99024 POSTOP FOLLOW-UP VISIT: CPT | Performed by: SURGERY

## 2025-06-06 RX ORDER — IOPAMIDOL 755 MG/ML
75 INJECTION, SOLUTION INTRAVASCULAR
Status: COMPLETED | OUTPATIENT
Start: 2025-06-06 | End: 2025-06-06

## 2025-06-06 RX ORDER — DIATRIZOATE MEGLUMINE AND DIATRIZOATE SODIUM 660; 100 MG/ML; MG/ML
12 SOLUTION ORAL; RECTAL
Status: DISCONTINUED | OUTPATIENT
Start: 2025-06-06 | End: 2025-06-07 | Stop reason: HOSPADM

## 2025-06-06 RX ADMIN — IOPAMIDOL 75 ML: 755 INJECTION, SOLUTION INTRAVENOUS at 16:59

## 2025-06-06 RX ADMIN — DIATRIZOATE MEGLUMINE AND DIATRIZOATE SODIUM 12 ML: 660; 100 LIQUID ORAL; RECTAL at 17:01

## 2025-06-07 NOTE — PROGRESS NOTES
Surgery Post-op Progress Note    HPI:  Notes reviewed, and agree with documentation in pt's chart.   Postoperative Follow-up: Patient presents for after a prolonged hospital stay for complications related to prior right colectomy (for cecal volvulus) done at outside hospital.  She required two additional procedures/surgeries during her recent stay, including resection of the the prior ileocolic anastomosis due to a leak.  She has been home ~1 week now.  Has been doing her best to maintain adequate nutrition but she has not really been eating enough solid food.  Taking liquids ok.  Bowels functioning well.  Howvever, she comes in today, earlier than planned, because today she has been feeling nauseated, and her percutaneous drain spontaneously fell out at home.  She was worried there was still some pus visible at the old drain site.    ROS:    10 point review of systems performed; please refer to HPI with pertinent positives, all other ROS are negative    A review of the patient's record including allergies, medication list, tobacco history, family history, problem list, medical history and social history has been completed and updates made to the patient's EMR where indicated.     PE:   CONSTITUTIONAL:  awake and alert    ABDOMEN: soft, non-distended, tenderness noted around the drain site in left abdomen     INCISION: clean, dry, no drainage, healing, staples still in place      ASSESSMENT:   Diagnosis Orders   1. Post-op pain  CBC with Auto Differential    CT ABDOMEN PELVIS W IV CONTRAST Additional Contrast? Oral      2. S/P laparotomy with lysis of adhesions  CBC with Auto Differential    CT ABDOMEN PELVIS W IV CONTRAST Additional Contrast? Oral      3. Diverticulitis  CBC with Auto Differential    CT ABDOMEN PELVIS W IV CONTRAST Additional Contrast? Oral            PLAN:    Staples removed without incident  Will get CT to re-check for any intraabdominal complications to explain her recent nausea and pain

## 2025-06-09 ENCOUNTER — OFFICE VISIT (OUTPATIENT)
Dept: SURGERY | Age: 65
End: 2025-06-09

## 2025-06-09 VITALS
BODY MASS INDEX: 17.4 KG/M2 | SYSTOLIC BLOOD PRESSURE: 130 MMHG | WEIGHT: 98.2 LBS | HEIGHT: 63 IN | DIASTOLIC BLOOD PRESSURE: 76 MMHG

## 2025-06-09 DIAGNOSIS — R19.7 DIARRHEA, UNSPECIFIED TYPE: ICD-10-CM

## 2025-06-09 DIAGNOSIS — G89.18 POST-OP PAIN: Primary | ICD-10-CM

## 2025-06-09 PROCEDURE — 99024 POSTOP FOLLOW-UP VISIT: CPT | Performed by: SURGERY

## 2025-06-09 RX ORDER — OXYCODONE HYDROCHLORIDE 5 MG/1
5 TABLET ORAL EVERY 6 HOURS PRN
Qty: 20 TABLET | Refills: 0 | Status: SHIPPED | OUTPATIENT
Start: 2025-06-09 | End: 2025-06-10

## 2025-06-10 ENCOUNTER — TELEPHONE (OUTPATIENT)
Dept: SURGERY | Age: 65
End: 2025-06-10

## 2025-06-10 DIAGNOSIS — G89.18 POST-OP PAIN: Primary | ICD-10-CM

## 2025-06-10 RX ORDER — HYDROCODONE BITARTRATE AND ACETAMINOPHEN 5; 325 MG/1; MG/1
1 TABLET ORAL EVERY 6 HOURS PRN
Qty: 18 TABLET | Refills: 0 | Status: SHIPPED | OUTPATIENT
Start: 2025-06-10 | End: 2025-06-15

## 2025-06-10 NOTE — TELEPHONE ENCOUNTER
[facility] in [place] called stating they need clarification of pt RX of Hydrocodone ([place]).  [phone]

## 2025-06-10 NOTE — TELEPHONE ENCOUNTER
Pt was prescribed Oxycodone yesterday but said she needs it to be Hydrocodone, Oxy makes her sick. I told her I would add it to her allergies. Please send to the same [facility] in [place] the other script was sent to.

## 2025-06-11 ENCOUNTER — TELEPHONE (OUTPATIENT)
Dept: SURGERY | Age: 65
End: 2025-06-11

## 2025-06-13 NOTE — PROGRESS NOTES
HPI: Nursing notes reviewed.  Patient with mild tenderness.  Intermittent nausea but no emesis. Having bms - loose and multiple per day.  No fevers.  Drain came out.    ROS:  10 point review of systems performed with pertinent positives in HPI    Phys:    Abd - soft, mild tender, nondistended,    Incision - open area with good granulation, no erythema    Assesment: 64 yo s/p partial colectomy    Plan: 1.  Slow recovery normal given her prolonged hospital course and major operation   2.  Will rule out c diff given prolonged abx usage   3.  Continue to push caloric intake   4.  Wound care continued   5.  Follow up in two weeks   6.  CT reviewed

## 2025-06-16 ENCOUNTER — TELEPHONE (OUTPATIENT)
Dept: SURGERY | Age: 65
End: 2025-06-16

## 2025-06-16 DIAGNOSIS — G89.18 POST-OP PAIN: Primary | ICD-10-CM

## 2025-06-16 RX ORDER — HYDROCODONE BITARTRATE AND ACETAMINOPHEN 5; 325 MG/1; MG/1
1 TABLET ORAL EVERY 4 HOURS PRN
Qty: 18 TABLET | Refills: 0 | Status: SHIPPED | OUTPATIENT
Start: 2025-06-16 | End: 2025-06-20 | Stop reason: SDUPTHER

## 2025-06-16 NOTE — TELEPHONE ENCOUNTER
Patient is in pain and is requesting to know if she can have more Hydrocodone.  She states she is current out.    Pain level is a 7, did just take a pain pill about 30 mins ago.     [phone]

## 2025-06-20 ENCOUNTER — TELEPHONE (OUTPATIENT)
Dept: SURGERY | Age: 65
End: 2025-06-20

## 2025-06-20 DIAGNOSIS — G89.18 POST-OP PAIN: ICD-10-CM

## 2025-06-20 RX ORDER — HYDROCODONE BITARTRATE AND ACETAMINOPHEN 5; 325 MG/1; MG/1
1 TABLET ORAL EVERY 4 HOURS PRN
Qty: 18 TABLET | Refills: 0 | Status: SHIPPED | OUTPATIENT
Start: 2025-06-20 | End: 2025-06-23

## 2025-06-20 NOTE — TELEPHONE ENCOUNTER
Patient is calling for refill of Hydrocodone.   She states she will be out by the weekend.     Tuyet   [phone]

## 2025-06-23 ENCOUNTER — OFFICE VISIT (OUTPATIENT)
Dept: SURGERY | Age: 65
End: 2025-06-23

## 2025-06-23 VITALS
HEIGHT: 63 IN | DIASTOLIC BLOOD PRESSURE: 82 MMHG | WEIGHT: 97 LBS | BODY MASS INDEX: 17.19 KG/M2 | SYSTOLIC BLOOD PRESSURE: 130 MMHG

## 2025-06-23 DIAGNOSIS — K57.92 DIVERTICULITIS: Primary | ICD-10-CM

## 2025-06-23 PROCEDURE — 99024 POSTOP FOLLOW-UP VISIT: CPT | Performed by: SURGERY

## 2025-06-25 NOTE — PROGRESS NOTES
HPI: Nursing notes reviewed.  Patient with pain moreso in morning when awakening and after eating.  No nausea.  Eatig better.  Energy still low. No fevers.  Diarrhea improved.    ROS:  10 point review of systems performed with pertinent positives in HPI    Phys   Abd - soft, mild tender, nondistended   Wound - smaller and no signs of infection    Assesment: 64 yo s/p partial colectomy    Plan: 1.  Slowly improving   2.  No dietary restrictions.  Fiber supplement ok   3.  Aquaphor or abx ointment daily to wound   4.  F/u in several weeks

## 2025-06-26 ENCOUNTER — TELEPHONE (OUTPATIENT)
Dept: SURGERY | Age: 65
End: 2025-06-26

## 2025-06-26 NOTE — TELEPHONE ENCOUNTER
Calling for refill of Hydrocodone .  Patient will be out of them tomorrow morning      Tuyet   [phone]      Pharmacy -- [facility] ([place])

## 2025-06-27 DIAGNOSIS — G89.18 POST-OP PAIN: ICD-10-CM

## 2025-06-27 RX ORDER — HYDROCODONE BITARTRATE AND ACETAMINOPHEN 5; 325 MG/1; MG/1
1 TABLET ORAL EVERY 4 HOURS PRN
Qty: 18 TABLET | Refills: 0 | Status: SHIPPED | OUTPATIENT
Start: 2025-06-27 | End: 2025-06-30

## 2025-07-01 ENCOUNTER — TELEPHONE (OUTPATIENT)
Dept: SURGERY | Age: 65
End: 2025-07-01

## 2025-07-01 NOTE — TELEPHONE ENCOUNTER
Pt called and requested a refill on Hydrocodone be sent to [facility] ([place])  Pt will be out as of tomorrow morning. Pt is aware Dr Guerrero is out today and will see this message tomorrow.

## 2025-07-02 ENCOUNTER — TELEPHONE (OUTPATIENT)
Dept: SURGERY | Age: 65
End: 2025-07-02

## 2025-07-02 DIAGNOSIS — G89.18 POST-OP PAIN: ICD-10-CM

## 2025-07-02 RX ORDER — HYDROCODONE BITARTRATE AND ACETAMINOPHEN 5; 325 MG/1; MG/1
1 TABLET ORAL EVERY 4 HOURS PRN
Qty: 18 TABLET | Refills: 0 | Status: SHIPPED | OUTPATIENT
Start: 2025-07-02 | End: 2025-07-05

## 2025-07-02 RX ORDER — HYDROCODONE BITARTRATE AND ACETAMINOPHEN 5; 325 MG/1; MG/1
1 TABLET ORAL EVERY 4 HOURS PRN
Qty: 18 TABLET | Refills: 0 | Status: SHIPPED | OUTPATIENT
Start: 2025-07-04 | End: 2025-07-07

## 2025-07-02 NOTE — TELEPHONE ENCOUNTER
Pt states Dr Guerrero filled a script for pain meds for her but she needs 24 to get through the weekend - The script was only for 18

## 2025-07-07 ENCOUNTER — OFFICE VISIT (OUTPATIENT)
Dept: SURGERY | Age: 65
End: 2025-07-07

## 2025-07-07 VITALS
DIASTOLIC BLOOD PRESSURE: 68 MMHG | SYSTOLIC BLOOD PRESSURE: 114 MMHG | BODY MASS INDEX: 17.12 KG/M2 | WEIGHT: 96.6 LBS | HEIGHT: 63 IN

## 2025-07-07 DIAGNOSIS — G89.18 POST-OP PAIN: Primary | ICD-10-CM

## 2025-07-07 DIAGNOSIS — Z90.49 S/P PARTIAL COLECTOMY: ICD-10-CM

## 2025-07-07 PROCEDURE — 99024 POSTOP FOLLOW-UP VISIT: CPT | Performed by: SURGERY

## 2025-07-07 RX ORDER — METHOCARBAMOL 750 MG/1
750 TABLET, FILM COATED ORAL 3 TIMES DAILY PRN
Qty: 21 TABLET | Refills: 0 | Status: SHIPPED | OUTPATIENT
Start: 2025-07-07 | End: 2025-07-14

## 2025-07-07 RX ORDER — HYDROCODONE BITARTRATE AND ACETAMINOPHEN 5; 325 MG/1; MG/1
1 TABLET ORAL EVERY 6 HOURS PRN
Qty: 24 TABLET | Refills: 0 | Status: SHIPPED | OUTPATIENT
Start: 2025-07-07 | End: 2025-07-11 | Stop reason: SDUPTHER

## 2025-07-07 NOTE — PROGRESS NOTES
HPI: Nursing notes reviewed.  Patient with persistent pain.  Occurs more in morning that other times of day.  Improved with rest and heating pad.  Does have pain throughout the day.  No emesis. Bms harder and several per day. No fevers.    ROS:  10 point review of systems performed with pertinent positives in HPI    Phys:    Abd - soft, mild tender, nondistended   Incision - healed    Assesment: 64 yo s/p partial colectomy    Plan: 1.  Increased colace to bid   2.  Will get CT given persistent pain

## 2025-07-08 ENCOUNTER — TELEPHONE (OUTPATIENT)
Dept: SURGERY | Age: 65
End: 2025-07-08

## 2025-07-08 NOTE — TELEPHONE ENCOUNTER
Patient scheduled CT Scan for July 18th.  Was in office 7/7 for PO appt and is currently in pain. Wants to know if Dr. Guerrero can put \"emergency\" or rush on the order so she can get it done sooner.    Bowel resection surgery was 5/20    CT Scan is scheduled through [facility]  [phone]

## 2025-07-10 ENCOUNTER — HOSPITAL ENCOUNTER (OUTPATIENT)
Dept: CT IMAGING | Age: 65
Discharge: HOME | End: 2025-07-10
Attending: SURGERY
Payer: MEDICARE

## 2025-07-10 DIAGNOSIS — Z90.49 S/P PARTIAL COLECTOMY: ICD-10-CM

## 2025-07-10 DIAGNOSIS — G89.18 POST-OP PAIN: ICD-10-CM

## 2025-07-10 PROCEDURE — 6360000004 HC RX CONTRAST MEDICATION: Performed by: SURGERY

## 2025-07-10 PROCEDURE — 74177 CT ABD & PELVIS W/CONTRAST: CPT

## 2025-07-10 RX ORDER — IOPAMIDOL 755 MG/ML
75 INJECTION, SOLUTION INTRAVASCULAR
Status: COMPLETED | OUTPATIENT
Start: 2025-07-10 | End: 2025-07-10

## 2025-07-10 RX ORDER — DIATRIZOATE MEGLUMINE AND DIATRIZOATE SODIUM 660; 100 MG/ML; MG/ML
12 SOLUTION ORAL; RECTAL
Status: DISCONTINUED | OUTPATIENT
Start: 2025-07-10 | End: 2025-07-11 | Stop reason: HOSPADM

## 2025-07-10 RX ADMIN — IOPAMIDOL 75 ML: 755 INJECTION, SOLUTION INTRAVENOUS at 15:54

## 2025-07-10 RX ADMIN — DIATRIZOATE MEGLUMINE AND DIATRIZOATE SODIUM 12 ML: 660; 100 LIQUID ORAL; RECTAL at 15:55

## 2025-07-11 ENCOUNTER — TELEPHONE (OUTPATIENT)
Dept: SURGERY | Age: 65
End: 2025-07-11

## 2025-07-11 DIAGNOSIS — G89.18 POST-OP PAIN: ICD-10-CM

## 2025-07-11 RX ORDER — HYDROCODONE BITARTRATE AND ACETAMINOPHEN 5; 325 MG/1; MG/1
1 TABLET ORAL EVERY 6 HOURS PRN
Qty: 24 TABLET | Refills: 0 | Status: SHIPPED | OUTPATIENT
Start: 2025-07-11 | End: 2025-07-17

## 2025-07-11 RX ORDER — DICYCLOMINE HYDROCHLORIDE 10 MG/1
10 CAPSULE ORAL
Qty: 60 CAPSULE | Refills: 0 | Status: SHIPPED | OUTPATIENT
Start: 2025-07-11 | End: 2025-07-26

## 2025-07-11 NOTE — TELEPHONE ENCOUNTER
Pt was given CT results and scheduled for a f/u 8/4/25. Pt is requesting a refill on her Hydrocodone, she has #7 pills left. She has tried to take the Robaxin instead, but is still having to take the other because the pain is too great. Pt stated she also needs a refill of Bentyl 10mg, she will be out tomorrow.

## 2025-07-14 ENCOUNTER — TELEPHONE (OUTPATIENT)
Dept: SURGERY | Age: 65
End: 2025-07-14

## 2025-07-14 RX ORDER — CIPROFLOXACIN 500 MG/1
500 TABLET, FILM COATED ORAL 2 TIMES DAILY
Qty: 20 TABLET | Refills: 0 | Status: SHIPPED | OUTPATIENT
Start: 2025-07-14 | End: 2025-07-24

## 2025-07-14 RX ORDER — METHOCARBAMOL 750 MG/1
750 TABLET, FILM COATED ORAL 3 TIMES DAILY PRN
Qty: 21 TABLET | Refills: 0 | Status: SHIPPED | OUTPATIENT
Start: 2025-07-14 | End: 2025-07-21

## 2025-07-14 NOTE — TELEPHONE ENCOUNTER
Patient is calling was she started new antibiotics last Friday 6/11 and states she has been having bad side effects since. Diarrhea     Can we switch her to something else??  Please advise     Tuyet   [phone]

## 2025-07-17 ENCOUNTER — TELEPHONE (OUTPATIENT)
Dept: SURGERY | Age: 65
End: 2025-07-17

## 2025-07-22 ENCOUNTER — TELEPHONE (OUTPATIENT)
Dept: SURGERY | Age: 65
End: 2025-07-22

## 2025-07-22 DIAGNOSIS — G89.18 POST-OP PAIN: ICD-10-CM

## 2025-07-22 RX ORDER — METHOCARBAMOL 750 MG/1
750 TABLET, FILM COATED ORAL 3 TIMES DAILY PRN
Qty: 21 TABLET | Refills: 0 | Status: SHIPPED | OUTPATIENT
Start: 2025-07-22 | End: 2025-07-29

## 2025-07-22 RX ORDER — CIPROFLOXACIN 500 MG/1
500 TABLET, FILM COATED ORAL 2 TIMES DAILY
Qty: 14 TABLET | Refills: 0 | Status: SHIPPED | OUTPATIENT
Start: 2025-07-22 | End: 2025-07-29

## 2025-07-22 RX ORDER — HYDROCODONE BITARTRATE AND ACETAMINOPHEN 5; 325 MG/1; MG/1
1 TABLET ORAL EVERY 6 HOURS PRN
Qty: 24 TABLET | Refills: 0 | Status: SHIPPED | OUTPATIENT
Start: 2025-07-22 | End: 2025-07-28

## 2025-07-22 NOTE — TELEPHONE ENCOUNTER
Calling for med refills on all 2 ..      Methocarbamol   Hydrocodone     Patient is still in as much pain as last CT scan.   Has appt sched Aug 4th she is in pain, should she be seen sooner?? ( Ct scan had showed infection, pt thinks she may still have infection)       Tuyet   [phone]    Pharm -- [facility]

## 2025-07-23 ENCOUNTER — TELEPHONE (OUTPATIENT)
Dept: SURGERY | Age: 65
End: 2025-07-23

## 2025-07-23 NOTE — TELEPHONE ENCOUNTER
Calling about the antibiotic, has questions.     States she was supposed to be off of them.  Did we call them in because of the possible infection patient thinks she has??    Please Advise    Tuyet  [phone]

## 2025-07-24 ENCOUNTER — TELEPHONE (OUTPATIENT)
Dept: SURGERY | Age: 65
End: 2025-07-24

## 2025-07-24 NOTE — TELEPHONE ENCOUNTER
Pt called stating she needs a letter signed by Dr Guerrero to be excused from Jury Duty on 8/18/25

## 2025-07-29 ENCOUNTER — TELEPHONE (OUTPATIENT)
Dept: SURGERY | Age: 65
End: 2025-07-29

## 2025-07-29 RX ORDER — METHOCARBAMOL 750 MG/1
750 TABLET, FILM COATED ORAL 3 TIMES DAILY PRN
Qty: 21 TABLET | Refills: 0 | Status: SHIPPED | OUTPATIENT
Start: 2025-07-29 | End: 2025-08-05

## 2025-07-29 RX ORDER — DICYCLOMINE HYDROCHLORIDE 10 MG/1
10 CAPSULE ORAL
Qty: 60 CAPSULE | Refills: 0 | Status: SHIPPED | OUTPATIENT
Start: 2025-07-29 | End: 2025-08-13

## 2025-07-29 NOTE — TELEPHONE ENCOUNTER
Needing refill Dicyclomie 10mg  Needing refill Methocarbamol 750 mg       Calling also asking for a Jury Duty letter to exempt her    Please email to pt , Arturo:  [email or web address]    Tuyet  [phone]

## 2025-08-04 ENCOUNTER — OFFICE VISIT (OUTPATIENT)
Dept: SURGERY | Age: 65
End: 2025-08-04

## 2025-08-04 VITALS
BODY MASS INDEX: 17.36 KG/M2 | WEIGHT: 98 LBS | SYSTOLIC BLOOD PRESSURE: 110 MMHG | DIASTOLIC BLOOD PRESSURE: 70 MMHG | HEIGHT: 63 IN

## 2025-08-04 DIAGNOSIS — G89.18 POST-OP PAIN: Primary | ICD-10-CM

## 2025-08-04 PROCEDURE — 99024 POSTOP FOLLOW-UP VISIT: CPT | Performed by: SURGERY

## 2025-08-04 RX ORDER — HYDROCODONE BITARTRATE AND ACETAMINOPHEN 5; 325 MG/1; MG/1
1 TABLET ORAL EVERY 6 HOURS PRN
Qty: 28 TABLET | Refills: 0 | Status: SHIPPED | OUTPATIENT
Start: 2025-08-04 | End: 2025-08-11

## 2025-08-04 RX ORDER — ONDANSETRON 4 MG/1
4 TABLET, FILM COATED ORAL 3 TIMES DAILY PRN
Qty: 15 TABLET | Refills: 0 | Status: SHIPPED | OUTPATIENT
Start: 2025-08-04

## 2025-08-08 ENCOUNTER — TELEPHONE (OUTPATIENT)
Dept: SURGERY | Age: 65
End: 2025-08-08

## 2025-08-08 RX ORDER — METHOCARBAMOL 750 MG/1
TABLET, FILM COATED ORAL
Qty: 21 TABLET | Refills: 0 | Status: SHIPPED | OUTPATIENT
Start: 2025-08-08

## 2025-08-13 ENCOUNTER — TELEPHONE (OUTPATIENT)
Dept: SURGERY | Age: 65
End: 2025-08-13

## 2025-08-13 RX ORDER — DICYCLOMINE HYDROCHLORIDE 10 MG/1
10 CAPSULE ORAL
Qty: 60 CAPSULE | Refills: 0 | Status: SHIPPED | OUTPATIENT
Start: 2025-08-13 | End: 2025-08-28

## 2025-08-13 RX ORDER — METHOCARBAMOL 750 MG/1
750 TABLET, FILM COATED ORAL 3 TIMES DAILY PRN
Qty: 21 TABLET | Refills: 0 | Status: SHIPPED | OUTPATIENT
Start: 2025-08-13

## 2025-08-13 RX ORDER — ONDANSETRON 4 MG/1
4 TABLET, FILM COATED ORAL 3 TIMES DAILY PRN
Qty: 30 TABLET | Refills: 0 | Status: SHIPPED | OUTPATIENT
Start: 2025-08-13

## 2025-08-14 ENCOUNTER — TELEPHONE (OUTPATIENT)
Dept: SURGERY | Age: 65
End: 2025-08-14

## 2025-08-25 ENCOUNTER — TELEPHONE (OUTPATIENT)
Dept: SURGERY | Age: 65
End: 2025-08-25

## 2025-08-25 RX ORDER — METHOCARBAMOL 750 MG/1
750 TABLET, FILM COATED ORAL 2 TIMES DAILY PRN
Qty: 20 TABLET | Refills: 0 | Status: SHIPPED | OUTPATIENT
Start: 2025-08-25

## 2025-09-03 ENCOUNTER — HOSPITAL ENCOUNTER (OUTPATIENT)
Dept: CT IMAGING | Age: 65
Discharge: HOME | End: 2025-09-03
Payer: MEDICARE

## 2025-09-03 DIAGNOSIS — R10.32 ABDOMINAL PAIN, LEFT LOWER QUADRANT: ICD-10-CM

## 2025-09-03 PROCEDURE — 74177 CT ABD & PELVIS W/CONTRAST: CPT

## 2025-09-03 PROCEDURE — 6360000004 HC RX CONTRAST MEDICATION: Performed by: NURSE PRACTITIONER

## 2025-09-03 RX ORDER — IOPAMIDOL 755 MG/ML
75 INJECTION, SOLUTION INTRAVASCULAR
Status: COMPLETED | OUTPATIENT
Start: 2025-09-03 | End: 2025-09-03

## 2025-09-03 RX ORDER — DIATRIZOATE MEGLUMINE AND DIATRIZOATE SODIUM 660; 100 MG/ML; MG/ML
12 SOLUTION ORAL; RECTAL
Status: DISCONTINUED | OUTPATIENT
Start: 2025-09-03 | End: 2025-09-04 | Stop reason: HOSPADM

## 2025-09-03 RX ORDER — DICYCLOMINE HYDROCHLORIDE 10 MG/1
10 CAPSULE ORAL
Qty: 60 CAPSULE | Refills: 0 | Status: SHIPPED | OUTPATIENT
Start: 2025-09-03 | End: 2025-09-18

## 2025-09-03 RX ORDER — METHOCARBAMOL 750 MG/1
750 TABLET, FILM COATED ORAL DAILY PRN
Qty: 20 TABLET | Refills: 0 | Status: SHIPPED | OUTPATIENT
Start: 2025-09-03

## 2025-09-03 RX ADMIN — IOPAMIDOL 75 ML: 755 INJECTION, SOLUTION INTRAVENOUS at 17:50

## 2025-09-03 RX ADMIN — DIATRIZOATE MEGLUMINE AND DIATRIZOATE SODIUM 12 ML: 660; 100 LIQUID ORAL; RECTAL at 17:47

## (undated) DEVICE — FENESTRATED BIPOLAR FORCEPS: Brand: ENDOWRIST

## (undated) DEVICE — SUTURE ABSORBABLE MONOFILAMENT 3-0 SH 27 IN UD PDS + PDP416H

## (undated) DEVICE — REDUCER: Brand: ENDOWRIST

## (undated) DEVICE — DRESSING ALG W4XL8IN AG FOAM SUPERABSORBENT SIL ANTIMIC

## (undated) DEVICE — STAPLER INT RELD REG 60 MM VERY THCK TISS 2 ROW 4FIRING PROX

## (undated) DEVICE — Device

## (undated) DEVICE — GOWN SIRUS NONREIN XL W/TWL: Brand: MEDLINE INDUSTRIES, INC.

## (undated) DEVICE — NEPTUNE E-SEP SMOKE EVACUATION PENCIL, COATED, 70MM BLADE, PUSH BUTTON SWITCH: Brand: NEPTUNE E-SEP

## (undated) DEVICE — BLADELESS OBTURATOR: Brand: WECK VISTA

## (undated) DEVICE — CADIERE FORCEPS: Brand: ENDOWRIST

## (undated) DEVICE — RELOAD STPL L75MM OPN STPL H4.5MM CLS STPL H2MM WIRE

## (undated) DEVICE — SUTURE PERMAHAND SZ 3-0 L18IN NONABSORBABLE BLK L26MM SH C013D

## (undated) DEVICE — WOUND RETRACTOR AND PROTECTOR: Brand: ALEXIS WOUND PROTECTOR-RETRACTOR

## (undated) DEVICE — STAPLER INT L75MM CUT LN L73MM STPL LN L77MM BLU B FRM 8

## (undated) DEVICE — SUTURE ABSORBABLE MONOFILAMENT 0 CTX 60 IN VIO PDS + PDP990G

## (undated) DEVICE — MEGA SUTURECUT ND: Brand: ENDOWRIST

## (undated) DEVICE — 40583 XL ADVANCED TRENDELENBURG POSITIONING KIT: Brand: 40583 XL ADVANCED TRENDELENBURG POSITIONING KIT

## (undated) DEVICE — SEAL

## (undated) DEVICE — 3M™ IOBAN™ 2 ANTIMICROBIAL INCISE DRAPE 6650EZ: Brand: IOBAN™ 2

## (undated) DEVICE — SUTURE VICRYL + SZ 0 L27IN ABSRB VLT L26MM UR-6 5/8 CIR VCP603H

## (undated) DEVICE — SOLUTION IRRIG 1000ML STRL H2O USP PLAS POUR BTL

## (undated) DEVICE — COLUMN DRAPE

## (undated) DEVICE — LITHOTOMY ROBOT: Brand: MEDLINE INDUSTRIES, INC.

## (undated) DEVICE — TIBURON LAPAROSCOPIC CHOLECYSTECTOMY DRAPE: Brand: CONVERTORS

## (undated) DEVICE — LIQUIBAND RAPID ADHESIVE 36/CS 0.8ML: Brand: MEDLINE

## (undated) DEVICE — YANKAUER,OPEN TIP,W/O VENT,STERILE: Brand: MEDLINE INDUSTRIES, INC.

## (undated) DEVICE — 1LYRTR 16FR10ML 100%SILI SNAP: Brand: MEDLINE INDUSTRIES, INC.

## (undated) DEVICE — SPONGE LAP W18XL18IN WHT COT 4 PLY FLD STRUNG RADPQ DISP ST 2 PER PACK

## (undated) DEVICE — SUTURE MONOCRYL + SZ 4-0 L18IN ABSRB UD L19MM PS-2 3/8 CIR MCP496G

## (undated) DEVICE — SPONGE,LAP,18"X18",DLX,XR,ST,5/PK,40/PK: Brand: MEDLINE

## (undated) DEVICE — PUMP SUC IRR TBNG L10FT W/ HNDPC ASSEMB STRYKEFLOW 2

## (undated) DEVICE — 3M™ TEGADERM™ TRANSPARENT FILM DRESSING FRAME STYLE WITH BORDER, 1616, 4 IN X 4-3/4 IN (10 CM X 12 CM), 50/CT 4CT/CASE: Brand: 3M™ TEGADERM™

## (undated) DEVICE — WOUND RETRACTOR AND PROTECTOR: Brand: ALEXIS O WOUND PROTECTOR-RETRACTOR

## (undated) DEVICE — SUTURE VICRYL + SZ 3-0 L18IN ABSRB UD SH 1/2 CIR TAPERCUT NDL VCP864D

## (undated) DEVICE — RESERVOIR,SUCTION,100CC,SILICONE: Brand: MEDLINE

## (undated) DEVICE — AIRSEAL BIFURCATED SMOKE EVAC FILTERED TUBE SET: Brand: AIRSEAL

## (undated) DEVICE — DRAIN SURG 15FR SIL RND CHN W/ TRCR FULL FLUT DBL WRP TRAD

## (undated) DEVICE — GLOVE,SURG,SENSICARE SLT,LF,PF,7: Brand: MEDLINE

## (undated) DEVICE — BLADE ES L6IN ELASTOMERIC COAT EXT DURABLE BEND UPTO 90DEG

## (undated) DEVICE — RELOAD STPL L75MM OPN H3.8MM CLS 1.5MM WIRE DIA0.2MM REG

## (undated) DEVICE — SOLUTION IRRIG 1000ML 0.9% SOD CHL USP POUR PLAS BTL

## (undated) DEVICE — TROCAR: Brand: KII FIOS FIRST ENTRY

## (undated) DEVICE — SCISSORS SURG DIA8MM MPLR CRV ENDOWRIST

## (undated) DEVICE — TIP COVER ACCESSORY

## (undated) DEVICE — OPTIFOAM GENTLE SA, POSTOP, 4X8: Brand: MEDLINE

## (undated) DEVICE — COVER LT HNDL PLAS RIG 2 PER PK

## (undated) DEVICE — TUBING, SUCTION, 3/16" X 12', STRAIGHT: Brand: MEDLINE

## (undated) DEVICE — GAUZE,SPONGE,2"X2",8PLY,STERILE,LF,2'S: Brand: MEDLINE

## (undated) DEVICE — ARM DRAPE